# Patient Record
Sex: MALE | Race: WHITE | NOT HISPANIC OR LATINO
[De-identification: names, ages, dates, MRNs, and addresses within clinical notes are randomized per-mention and may not be internally consistent; named-entity substitution may affect disease eponyms.]

---

## 2021-01-01 ENCOUNTER — APPOINTMENT (OUTPATIENT)
Dept: ORTHOPEDIC SURGERY | Facility: CLINIC | Age: 81
End: 2021-01-01

## 2021-01-01 ENCOUNTER — TRANSCRIPTION ENCOUNTER (OUTPATIENT)
Age: 81
End: 2021-01-01

## 2021-01-01 ENCOUNTER — APPOINTMENT (OUTPATIENT)
Dept: SURGERY | Facility: CLINIC | Age: 81
End: 2021-01-01

## 2021-01-01 ENCOUNTER — INPATIENT (INPATIENT)
Facility: HOSPITAL | Age: 81
LOS: 11 days | Discharge: NURSING, REHAB OR LONG-TERM CARE | End: 2021-11-26
Attending: HOSPITALIST | Admitting: HOSPITALIST
Payer: MEDICARE

## 2021-01-01 ENCOUNTER — NON-APPOINTMENT (OUTPATIENT)
Age: 81
End: 2021-01-01

## 2021-01-01 ENCOUNTER — EMERGENCY (EMERGENCY)
Facility: HOSPITAL | Age: 81
LOS: 0 days | Discharge: HOME | End: 2021-11-01
Attending: EMERGENCY MEDICINE | Admitting: EMERGENCY MEDICINE
Payer: MEDICARE

## 2021-01-01 VITALS
RESPIRATION RATE: 18 BRPM | DIASTOLIC BLOOD PRESSURE: 67 MMHG | HEART RATE: 88 BPM | TEMPERATURE: 98 F | WEIGHT: 175.05 LBS | OXYGEN SATURATION: 98 % | SYSTOLIC BLOOD PRESSURE: 148 MMHG

## 2021-01-01 VITALS
RESPIRATION RATE: 18 BRPM | HEART RATE: 82 BPM | DIASTOLIC BLOOD PRESSURE: 68 MMHG | SYSTOLIC BLOOD PRESSURE: 142 MMHG | OXYGEN SATURATION: 99 %

## 2021-01-01 VITALS
SYSTOLIC BLOOD PRESSURE: 121 MMHG | RESPIRATION RATE: 18 BRPM | TEMPERATURE: 98 F | DIASTOLIC BLOOD PRESSURE: 89 MMHG | OXYGEN SATURATION: 98 % | WEIGHT: 169.98 LBS | HEART RATE: 75 BPM

## 2021-01-01 VITALS
HEART RATE: 74 BPM | TEMPERATURE: 97 F | SYSTOLIC BLOOD PRESSURE: 135 MMHG | DIASTOLIC BLOOD PRESSURE: 63 MMHG | RESPIRATION RATE: 18 BRPM

## 2021-01-01 DIAGNOSIS — M25.571 PAIN IN RIGHT ANKLE AND JOINTS OF RIGHT FOOT: ICD-10-CM

## 2021-01-01 DIAGNOSIS — T36.0X5A ADVERSE EFFECT OF PENICILLINS, INITIAL ENCOUNTER: ICD-10-CM

## 2021-01-01 DIAGNOSIS — I10 ESSENTIAL (PRIMARY) HYPERTENSION: ICD-10-CM
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
## 2021-01-01 DIAGNOSIS — E11.9 TYPE 2 DIABETES MELLITUS WITHOUT COMPLICATIONS: ICD-10-CM

## 2021-01-01 DIAGNOSIS — Z87.891 PERSONAL HISTORY OF NICOTINE DEPENDENCE: ICD-10-CM

## 2021-01-01 DIAGNOSIS — K52.1 TOXIC GASTROENTERITIS AND COLITIS: ICD-10-CM

## 2021-01-01 DIAGNOSIS — E87.5 HYPERKALEMIA: ICD-10-CM

## 2021-01-01 DIAGNOSIS — R05.9 COUGH, UNSPECIFIED: ICD-10-CM

## 2021-01-01 DIAGNOSIS — Z77.090 CONTACT WITH AND (SUSPECTED) EXPOSURE TO ASBESTOS: ICD-10-CM

## 2021-01-01 DIAGNOSIS — K80.00 CALCULUS OF GALLBLADDER WITH ACUTE CHOLECYSTITIS WITHOUT OBSTRUCTION: ICD-10-CM

## 2021-01-01 DIAGNOSIS — Z88.8 ALLERGY STATUS TO OTHER DRUGS, MEDICAMENTS AND BIOLOGICAL SUBSTANCES STATUS: ICD-10-CM

## 2021-01-01 DIAGNOSIS — K71.9 TOXIC LIVER DISEASE, UNSPECIFIED: ICD-10-CM

## 2021-01-01 DIAGNOSIS — J06.9 ACUTE UPPER RESPIRATORY INFECTION, UNSPECIFIED: ICD-10-CM

## 2021-01-01 DIAGNOSIS — R26.2 DIFFICULTY IN WALKING, NOT ELSEWHERE CLASSIFIED: ICD-10-CM

## 2021-01-01 DIAGNOSIS — M25.471 EFFUSION, RIGHT ANKLE: ICD-10-CM
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
## 2021-01-01 DIAGNOSIS — E78.5 HYPERLIPIDEMIA, UNSPECIFIED: ICD-10-CM

## 2021-01-01 DIAGNOSIS — N17.9 ACUTE KIDNEY FAILURE, UNSPECIFIED: ICD-10-CM

## 2021-01-01 DIAGNOSIS — T36.95XA ADVERSE EFFECT OF UNSPECIFIED SYSTEMIC ANTIBIOTIC, INITIAL ENCOUNTER: ICD-10-CM

## 2021-01-01 LAB
A1C WITH ESTIMATED AVERAGE GLUCOSE RESULT: 5.7 % — HIGH (ref 4–5.6)
ALBUMIN SERPL ELPH-MCNC: 2.1 G/DL — LOW (ref 3.5–5.2)
ALBUMIN SERPL ELPH-MCNC: 2.2 G/DL — LOW (ref 3.5–5.2)
ALBUMIN SERPL ELPH-MCNC: 2.2 G/DL — LOW (ref 3.5–5.2)
ALBUMIN SERPL ELPH-MCNC: 2.4 G/DL — LOW (ref 3.5–5.2)
ALBUMIN SERPL ELPH-MCNC: 2.5 G/DL — LOW (ref 3.5–5.2)
ALBUMIN SERPL ELPH-MCNC: 2.6 G/DL — LOW (ref 3.5–5.2)
ALBUMIN SERPL ELPH-MCNC: 2.7 G/DL — LOW (ref 3.5–5.2)
ALBUMIN SERPL ELPH-MCNC: 3.2 G/DL — LOW (ref 3.5–5.2)
ALP SERPL-CCNC: 177 U/L — HIGH (ref 30–115)
ALP SERPL-CCNC: 194 U/L — HIGH (ref 30–115)
ALP SERPL-CCNC: 208 U/L — HIGH (ref 30–115)
ALP SERPL-CCNC: 209 U/L — HIGH (ref 30–115)
ALP SERPL-CCNC: 239 U/L — HIGH (ref 30–115)
ALP SERPL-CCNC: 243 U/L — HIGH (ref 30–115)
ALP SERPL-CCNC: 283 U/L — HIGH (ref 30–115)
ALP SERPL-CCNC: 289 U/L — HIGH (ref 30–115)
ALP SERPL-CCNC: 318 U/L — HIGH (ref 30–115)
ALP SERPL-CCNC: 324 U/L — HIGH (ref 30–115)
ALP SERPL-CCNC: 351 U/L — HIGH (ref 30–115)
ALT FLD-CCNC: 108 U/L — HIGH (ref 0–41)
ALT FLD-CCNC: 117 U/L — HIGH (ref 0–41)
ALT FLD-CCNC: 128 U/L — HIGH (ref 0–41)
ALT FLD-CCNC: 206 U/L — HIGH (ref 0–41)
ALT FLD-CCNC: 78 U/L — HIGH (ref 0–41)
ALT FLD-CCNC: 79 U/L — HIGH (ref 0–41)
ALT FLD-CCNC: 80 U/L — HIGH (ref 0–41)
ALT FLD-CCNC: 81 U/L — HIGH (ref 0–41)
ALT FLD-CCNC: 82 U/L — HIGH (ref 0–41)
ALT FLD-CCNC: 91 U/L — HIGH (ref 0–41)
ALT FLD-CCNC: 92 U/L — HIGH (ref 0–41)
ANA TITR SER: NEGATIVE — SIGNIFICANT CHANGE UP
ANION GAP SERPL CALC-SCNC: 10 MMOL/L — SIGNIFICANT CHANGE UP (ref 7–14)
ANION GAP SERPL CALC-SCNC: 10 MMOL/L — SIGNIFICANT CHANGE UP (ref 7–14)
ANION GAP SERPL CALC-SCNC: 11 MMOL/L — SIGNIFICANT CHANGE UP (ref 7–14)
ANION GAP SERPL CALC-SCNC: 12 MMOL/L — SIGNIFICANT CHANGE UP (ref 7–14)
ANION GAP SERPL CALC-SCNC: 13 MMOL/L — SIGNIFICANT CHANGE UP (ref 7–14)
ANION GAP SERPL CALC-SCNC: 14 MMOL/L — SIGNIFICANT CHANGE UP (ref 7–14)
ANION GAP SERPL CALC-SCNC: 15 MMOL/L — HIGH (ref 7–14)
ANION GAP SERPL CALC-SCNC: 9 MMOL/L — SIGNIFICANT CHANGE UP (ref 7–14)
APPEARANCE UR: CLEAR — SIGNIFICANT CHANGE UP
APTT BLD: 27.2 SEC — SIGNIFICANT CHANGE UP (ref 27–39.2)
APTT BLD: 31.2 SEC — SIGNIFICANT CHANGE UP (ref 27–39.2)
AST SERPL-CCNC: 120 U/L — HIGH (ref 0–41)
AST SERPL-CCNC: 125 U/L — HIGH (ref 0–41)
AST SERPL-CCNC: 159 U/L — HIGH (ref 0–41)
AST SERPL-CCNC: 197 U/L — HIGH (ref 0–41)
AST SERPL-CCNC: 210 U/L — HIGH (ref 0–41)
AST SERPL-CCNC: 212 U/L — HIGH (ref 0–41)
AST SERPL-CCNC: 217 U/L — HIGH (ref 0–41)
AST SERPL-CCNC: 217 U/L — HIGH (ref 0–41)
AST SERPL-CCNC: 250 U/L — HIGH (ref 0–41)
AST SERPL-CCNC: 256 U/L — HIGH (ref 0–41)
AST SERPL-CCNC: 274 U/L — HIGH (ref 0–41)
AUTO DIFF PNL BLD: NEGATIVE — SIGNIFICANT CHANGE UP
BACTERIA # UR AUTO: ABNORMAL
BASOPHILS # BLD AUTO: 0.01 K/UL — SIGNIFICANT CHANGE UP (ref 0–0.2)
BASOPHILS # BLD AUTO: 0.02 K/UL — SIGNIFICANT CHANGE UP (ref 0–0.2)
BASOPHILS # BLD AUTO: 0.03 K/UL — SIGNIFICANT CHANGE UP (ref 0–0.2)
BASOPHILS # BLD AUTO: 0.03 K/UL — SIGNIFICANT CHANGE UP (ref 0–0.2)
BASOPHILS NFR BLD AUTO: 0.1 % — SIGNIFICANT CHANGE UP (ref 0–1)
BASOPHILS NFR BLD AUTO: 0.2 % — SIGNIFICANT CHANGE UP (ref 0–1)
BASOPHILS NFR BLD AUTO: 0.2 % — SIGNIFICANT CHANGE UP (ref 0–1)
BILIRUB SERPL-MCNC: 0.3 MG/DL — SIGNIFICANT CHANGE UP (ref 0.2–1.2)
BILIRUB SERPL-MCNC: 0.4 MG/DL — SIGNIFICANT CHANGE UP (ref 0.2–1.2)
BILIRUB SERPL-MCNC: 0.5 MG/DL — SIGNIFICANT CHANGE UP (ref 0.2–1.2)
BILIRUB SERPL-MCNC: 0.6 MG/DL — SIGNIFICANT CHANGE UP (ref 0.2–1.2)
BILIRUB SERPL-MCNC: 0.6 MG/DL — SIGNIFICANT CHANGE UP (ref 0.2–1.2)
BILIRUB SERPL-MCNC: 0.7 MG/DL — SIGNIFICANT CHANGE UP (ref 0.2–1.2)
BILIRUB UR-MCNC: NEGATIVE — SIGNIFICANT CHANGE UP
BUN SERPL-MCNC: 23 MG/DL — HIGH (ref 10–20)
BUN SERPL-MCNC: 24 MG/DL — HIGH (ref 10–20)
BUN SERPL-MCNC: 25 MG/DL — HIGH (ref 10–20)
BUN SERPL-MCNC: 26 MG/DL — HIGH (ref 10–20)
BUN SERPL-MCNC: 28 MG/DL — HIGH (ref 10–20)
BUN SERPL-MCNC: 28 MG/DL — HIGH (ref 10–20)
BUN SERPL-MCNC: 29 MG/DL — HIGH (ref 10–20)
BUN SERPL-MCNC: 29 MG/DL — HIGH (ref 10–20)
BUN SERPL-MCNC: 31 MG/DL — HIGH (ref 10–20)
BUN SERPL-MCNC: 32 MG/DL — HIGH (ref 10–20)
BUN SERPL-MCNC: 32 MG/DL — HIGH (ref 10–20)
BUN SERPL-MCNC: 34 MG/DL — HIGH (ref 10–20)
C DIFF BY PCR RESULT: NEGATIVE — SIGNIFICANT CHANGE UP
C DIFF TOX GENS STL QL NAA+PROBE: SIGNIFICANT CHANGE UP
C-ANCA SER-ACNC: NEGATIVE — SIGNIFICANT CHANGE UP
CALCIUM SERPL-MCNC: 7.7 MG/DL — LOW (ref 8.5–10.1)
CALCIUM SERPL-MCNC: 7.8 MG/DL — LOW (ref 8.5–10.1)
CALCIUM SERPL-MCNC: 7.9 MG/DL — LOW (ref 8.5–10.1)
CALCIUM SERPL-MCNC: 7.9 MG/DL — LOW (ref 8.5–10.1)
CALCIUM SERPL-MCNC: 8 MG/DL — LOW (ref 8.5–10.1)
CALCIUM SERPL-MCNC: 8.1 MG/DL — LOW (ref 8.5–10.1)
CALCIUM SERPL-MCNC: 8.4 MG/DL — LOW (ref 8.5–10.1)
CALCIUM SERPL-MCNC: 9 MG/DL — SIGNIFICANT CHANGE UP (ref 8.5–10.1)
CHLORIDE SERPL-SCNC: 100 MMOL/L — SIGNIFICANT CHANGE UP (ref 98–110)
CHLORIDE SERPL-SCNC: 101 MMOL/L — SIGNIFICANT CHANGE UP (ref 98–110)
CHLORIDE SERPL-SCNC: 101 MMOL/L — SIGNIFICANT CHANGE UP (ref 98–110)
CHLORIDE SERPL-SCNC: 102 MMOL/L — SIGNIFICANT CHANGE UP (ref 98–110)
CHLORIDE SERPL-SCNC: 103 MMOL/L — SIGNIFICANT CHANGE UP (ref 98–110)
CHLORIDE SERPL-SCNC: 97 MMOL/L — LOW (ref 98–110)
CHLORIDE SERPL-SCNC: 99 MMOL/L — SIGNIFICANT CHANGE UP (ref 98–110)
CO2 SERPL-SCNC: 18 MMOL/L — SIGNIFICANT CHANGE UP (ref 17–32)
CO2 SERPL-SCNC: 21 MMOL/L — SIGNIFICANT CHANGE UP (ref 17–32)
CO2 SERPL-SCNC: 22 MMOL/L — SIGNIFICANT CHANGE UP (ref 17–32)
CO2 SERPL-SCNC: 23 MMOL/L — SIGNIFICANT CHANGE UP (ref 17–32)
CO2 SERPL-SCNC: 25 MMOL/L — SIGNIFICANT CHANGE UP (ref 17–32)
COLOR SPEC: YELLOW — SIGNIFICANT CHANGE UP
CREAT SERPL-MCNC: 1 MG/DL — SIGNIFICANT CHANGE UP (ref 0.7–1.5)
CREAT SERPL-MCNC: 1.1 MG/DL — SIGNIFICANT CHANGE UP (ref 0.7–1.5)
CREAT SERPL-MCNC: 1.2 MG/DL — SIGNIFICANT CHANGE UP (ref 0.7–1.5)
CREAT SERPL-MCNC: 1.4 MG/DL — SIGNIFICANT CHANGE UP (ref 0.7–1.5)
CREAT SERPL-MCNC: 1.4 MG/DL — SIGNIFICANT CHANGE UP (ref 0.7–1.5)
CULTURE RESULTS: SIGNIFICANT CHANGE UP
DIFF PNL FLD: ABNORMAL
EBV EA AB SER IA-ACNC: <5 U/ML — SIGNIFICANT CHANGE UP
EBV EA AB TITR SER IF: POSITIVE
EBV EA IGG SER-ACNC: NEGATIVE — SIGNIFICANT CHANGE UP
EBV NA IGG SER IA-ACNC: 46.1 U/ML — HIGH
EBV PATRN SPEC IB-IMP: SIGNIFICANT CHANGE UP
EBV VCA IGG AVIDITY SER QL IA: POSITIVE
EBV VCA IGM SER IA-ACNC: 96.2 U/ML — HIGH
EBV VCA IGM SER IA-ACNC: <10 U/ML — SIGNIFICANT CHANGE UP
EBV VCA IGM TITR FLD: NEGATIVE — SIGNIFICANT CHANGE UP
EOSINOPHIL # BLD AUTO: 0.01 K/UL — SIGNIFICANT CHANGE UP (ref 0–0.7)
EOSINOPHIL # BLD AUTO: 0.03 K/UL — SIGNIFICANT CHANGE UP (ref 0–0.7)
EOSINOPHIL # BLD AUTO: 0.04 K/UL — SIGNIFICANT CHANGE UP (ref 0–0.7)
EOSINOPHIL # BLD AUTO: 0.05 K/UL — SIGNIFICANT CHANGE UP (ref 0–0.7)
EOSINOPHIL # BLD AUTO: 0.05 K/UL — SIGNIFICANT CHANGE UP (ref 0–0.7)
EOSINOPHIL # BLD AUTO: 0.07 K/UL — SIGNIFICANT CHANGE UP (ref 0–0.7)
EOSINOPHIL # BLD AUTO: 0.09 K/UL — SIGNIFICANT CHANGE UP (ref 0–0.7)
EOSINOPHIL # BLD AUTO: 0.13 K/UL — SIGNIFICANT CHANGE UP (ref 0–0.7)
EOSINOPHIL NFR BLD AUTO: 0.1 % — SIGNIFICANT CHANGE UP (ref 0–8)
EOSINOPHIL NFR BLD AUTO: 0.2 % — SIGNIFICANT CHANGE UP (ref 0–8)
EOSINOPHIL NFR BLD AUTO: 0.3 % — SIGNIFICANT CHANGE UP (ref 0–8)
EOSINOPHIL NFR BLD AUTO: 0.3 % — SIGNIFICANT CHANGE UP (ref 0–8)
EOSINOPHIL NFR BLD AUTO: 0.4 % — SIGNIFICANT CHANGE UP (ref 0–8)
EOSINOPHIL NFR BLD AUTO: 0.4 % — SIGNIFICANT CHANGE UP (ref 0–8)
EOSINOPHIL NFR BLD AUTO: 0.6 % — SIGNIFICANT CHANGE UP (ref 0–8)
EOSINOPHIL NFR BLD AUTO: 0.8 % — SIGNIFICANT CHANGE UP (ref 0–8)
EPI CELLS # UR: ABNORMAL /HPF
ESTIMATED AVERAGE GLUCOSE: 117 MG/DL — HIGH (ref 68–114)
FERRITIN SERPL-MCNC: 1707 NG/ML — HIGH (ref 30–400)
GGT SERPL-CCNC: 326 U/L — HIGH (ref 1–40)
GLUCOSE BLDC GLUCOMTR-MCNC: 103 MG/DL — HIGH (ref 70–99)
GLUCOSE BLDC GLUCOMTR-MCNC: 109 MG/DL — HIGH (ref 70–99)
GLUCOSE BLDC GLUCOMTR-MCNC: 119 MG/DL — HIGH (ref 70–99)
GLUCOSE BLDC GLUCOMTR-MCNC: 121 MG/DL — HIGH (ref 70–99)
GLUCOSE BLDC GLUCOMTR-MCNC: 126 MG/DL — HIGH (ref 70–99)
GLUCOSE BLDC GLUCOMTR-MCNC: 127 MG/DL — HIGH (ref 70–99)
GLUCOSE BLDC GLUCOMTR-MCNC: 133 MG/DL — HIGH (ref 70–99)
GLUCOSE BLDC GLUCOMTR-MCNC: 135 MG/DL — HIGH (ref 70–99)
GLUCOSE BLDC GLUCOMTR-MCNC: 137 MG/DL — HIGH (ref 70–99)
GLUCOSE BLDC GLUCOMTR-MCNC: 139 MG/DL — HIGH (ref 70–99)
GLUCOSE BLDC GLUCOMTR-MCNC: 140 MG/DL — HIGH (ref 70–99)
GLUCOSE BLDC GLUCOMTR-MCNC: 141 MG/DL — HIGH (ref 70–99)
GLUCOSE BLDC GLUCOMTR-MCNC: 142 MG/DL — HIGH (ref 70–99)
GLUCOSE BLDC GLUCOMTR-MCNC: 142 MG/DL — HIGH (ref 70–99)
GLUCOSE BLDC GLUCOMTR-MCNC: 144 MG/DL — HIGH (ref 70–99)
GLUCOSE BLDC GLUCOMTR-MCNC: 146 MG/DL — HIGH (ref 70–99)
GLUCOSE BLDC GLUCOMTR-MCNC: 149 MG/DL — HIGH (ref 70–99)
GLUCOSE BLDC GLUCOMTR-MCNC: 156 MG/DL — HIGH (ref 70–99)
GLUCOSE BLDC GLUCOMTR-MCNC: 158 MG/DL — HIGH (ref 70–99)
GLUCOSE BLDC GLUCOMTR-MCNC: 158 MG/DL — HIGH (ref 70–99)
GLUCOSE BLDC GLUCOMTR-MCNC: 159 MG/DL — HIGH (ref 70–99)
GLUCOSE BLDC GLUCOMTR-MCNC: 160 MG/DL — HIGH (ref 70–99)
GLUCOSE BLDC GLUCOMTR-MCNC: 165 MG/DL — HIGH (ref 70–99)
GLUCOSE BLDC GLUCOMTR-MCNC: 168 MG/DL — HIGH (ref 70–99)
GLUCOSE BLDC GLUCOMTR-MCNC: 168 MG/DL — HIGH (ref 70–99)
GLUCOSE BLDC GLUCOMTR-MCNC: 172 MG/DL — HIGH (ref 70–99)
GLUCOSE BLDC GLUCOMTR-MCNC: 173 MG/DL — HIGH (ref 70–99)
GLUCOSE BLDC GLUCOMTR-MCNC: 173 MG/DL — HIGH (ref 70–99)
GLUCOSE BLDC GLUCOMTR-MCNC: 178 MG/DL — HIGH (ref 70–99)
GLUCOSE BLDC GLUCOMTR-MCNC: 182 MG/DL — HIGH (ref 70–99)
GLUCOSE BLDC GLUCOMTR-MCNC: 183 MG/DL — HIGH (ref 70–99)
GLUCOSE BLDC GLUCOMTR-MCNC: 183 MG/DL — HIGH (ref 70–99)
GLUCOSE BLDC GLUCOMTR-MCNC: 184 MG/DL — HIGH (ref 70–99)
GLUCOSE BLDC GLUCOMTR-MCNC: 185 MG/DL — HIGH (ref 70–99)
GLUCOSE BLDC GLUCOMTR-MCNC: 198 MG/DL — HIGH (ref 70–99)
GLUCOSE BLDC GLUCOMTR-MCNC: 207 MG/DL — HIGH (ref 70–99)
GLUCOSE BLDC GLUCOMTR-MCNC: 207 MG/DL — HIGH (ref 70–99)
GLUCOSE BLDC GLUCOMTR-MCNC: 211 MG/DL — HIGH (ref 70–99)
GLUCOSE BLDC GLUCOMTR-MCNC: 211 MG/DL — HIGH (ref 70–99)
GLUCOSE BLDC GLUCOMTR-MCNC: 221 MG/DL — HIGH (ref 70–99)
GLUCOSE BLDC GLUCOMTR-MCNC: 233 MG/DL — HIGH (ref 70–99)
GLUCOSE BLDC GLUCOMTR-MCNC: 235 MG/DL — HIGH (ref 70–99)
GLUCOSE BLDC GLUCOMTR-MCNC: 263 MG/DL — HIGH (ref 70–99)
GLUCOSE BLDC GLUCOMTR-MCNC: 263 MG/DL — HIGH (ref 70–99)
GLUCOSE BLDC GLUCOMTR-MCNC: 70 MG/DL — SIGNIFICANT CHANGE UP (ref 70–99)
GLUCOSE BLDC GLUCOMTR-MCNC: 86 MG/DL — SIGNIFICANT CHANGE UP (ref 70–99)
GLUCOSE SERPL-MCNC: 101 MG/DL — HIGH (ref 70–99)
GLUCOSE SERPL-MCNC: 130 MG/DL — HIGH (ref 70–99)
GLUCOSE SERPL-MCNC: 132 MG/DL — HIGH (ref 70–99)
GLUCOSE SERPL-MCNC: 140 MG/DL — HIGH (ref 70–99)
GLUCOSE SERPL-MCNC: 142 MG/DL — HIGH (ref 70–99)
GLUCOSE SERPL-MCNC: 153 MG/DL — HIGH (ref 70–99)
GLUCOSE SERPL-MCNC: 156 MG/DL — HIGH (ref 70–99)
GLUCOSE SERPL-MCNC: 165 MG/DL — HIGH (ref 70–99)
GLUCOSE SERPL-MCNC: 170 MG/DL — HIGH (ref 70–99)
GLUCOSE SERPL-MCNC: 173 MG/DL — HIGH (ref 70–99)
GLUCOSE SERPL-MCNC: 187 MG/DL — HIGH (ref 70–99)
GLUCOSE SERPL-MCNC: 202 MG/DL — HIGH (ref 70–99)
GLUCOSE SERPL-MCNC: 212 MG/DL — HIGH (ref 70–99)
GLUCOSE SERPL-MCNC: 91 MG/DL — SIGNIFICANT CHANGE UP (ref 70–99)
GLUCOSE UR QL: NEGATIVE MG/DL — SIGNIFICANT CHANGE UP
HAV IGM SER-ACNC: SIGNIFICANT CHANGE UP
HBV CORE IGM SER-ACNC: SIGNIFICANT CHANGE UP
HBV SURFACE AG SER-ACNC: SIGNIFICANT CHANGE UP
HCT VFR BLD CALC: 31.8 % — LOW (ref 42–52)
HCT VFR BLD CALC: 33.8 % — LOW (ref 42–52)
HCT VFR BLD CALC: 33.8 % — LOW (ref 42–52)
HCT VFR BLD CALC: 34.9 % — LOW (ref 42–52)
HCT VFR BLD CALC: 35 % — LOW (ref 42–52)
HCT VFR BLD CALC: 35.2 % — LOW (ref 42–52)
HCT VFR BLD CALC: 35.2 % — LOW (ref 42–52)
HCT VFR BLD CALC: 35.4 % — LOW (ref 42–52)
HCT VFR BLD CALC: 35.9 % — LOW (ref 42–52)
HCT VFR BLD CALC: 36.1 % — LOW (ref 42–52)
HCT VFR BLD CALC: 36.9 % — LOW (ref 42–52)
HCT VFR BLD CALC: 41.2 % — LOW (ref 42–52)
HCV AB S/CO SERPL IA: 0.08 S/CO — SIGNIFICANT CHANGE UP (ref 0–0.99)
HCV AB SERPL-IMP: SIGNIFICANT CHANGE UP
HGB BLD-MCNC: 10.4 G/DL — LOW (ref 14–18)
HGB BLD-MCNC: 11 G/DL — LOW (ref 14–18)
HGB BLD-MCNC: 11.1 G/DL — LOW (ref 14–18)
HGB BLD-MCNC: 11.3 G/DL — LOW (ref 14–18)
HGB BLD-MCNC: 11.4 G/DL — LOW (ref 14–18)
HGB BLD-MCNC: 11.6 G/DL — LOW (ref 14–18)
HGB BLD-MCNC: 11.7 G/DL — LOW (ref 14–18)
HGB BLD-MCNC: 11.9 G/DL — LOW (ref 14–18)
HGB BLD-MCNC: 12 G/DL — LOW (ref 14–18)
HGB BLD-MCNC: 13.5 G/DL — LOW (ref 14–18)
IMM GRANULOCYTES NFR BLD AUTO: 0.4 % — HIGH (ref 0.1–0.3)
IMM GRANULOCYTES NFR BLD AUTO: 0.5 % — HIGH (ref 0.1–0.3)
IMM GRANULOCYTES NFR BLD AUTO: 0.6 % — HIGH (ref 0.1–0.3)
IMM GRANULOCYTES NFR BLD AUTO: 0.6 % — HIGH (ref 0.1–0.3)
IMM GRANULOCYTES NFR BLD AUTO: 0.7 % — HIGH (ref 0.1–0.3)
IMM GRANULOCYTES NFR BLD AUTO: 0.8 % — HIGH (ref 0.1–0.3)
IMM GRANULOCYTES NFR BLD AUTO: 0.9 % — HIGH (ref 0.1–0.3)
IMM GRANULOCYTES NFR BLD AUTO: 1 % — HIGH (ref 0.1–0.3)
INR BLD: 1.32 RATIO — HIGH (ref 0.65–1.3)
INR BLD: 1.36 RATIO — HIGH (ref 0.65–1.3)
KETONES UR-MCNC: NEGATIVE — SIGNIFICANT CHANGE UP
LEUKOCYTE ESTERASE UR-ACNC: NEGATIVE — SIGNIFICANT CHANGE UP
LYMPHOCYTES # BLD AUTO: 0.51 K/UL — LOW (ref 1.2–3.4)
LYMPHOCYTES # BLD AUTO: 0.54 K/UL — LOW (ref 1.2–3.4)
LYMPHOCYTES # BLD AUTO: 0.58 K/UL — LOW (ref 1.2–3.4)
LYMPHOCYTES # BLD AUTO: 0.62 K/UL — LOW (ref 1.2–3.4)
LYMPHOCYTES # BLD AUTO: 0.63 K/UL — LOW (ref 1.2–3.4)
LYMPHOCYTES # BLD AUTO: 0.64 K/UL — LOW (ref 1.2–3.4)
LYMPHOCYTES # BLD AUTO: 0.65 K/UL — LOW (ref 1.2–3.4)
LYMPHOCYTES # BLD AUTO: 0.71 K/UL — LOW (ref 1.2–3.4)
LYMPHOCYTES # BLD AUTO: 3.2 % — LOW (ref 20.5–51.1)
LYMPHOCYTES # BLD AUTO: 3.4 % — LOW (ref 20.5–51.1)
LYMPHOCYTES # BLD AUTO: 3.6 % — LOW (ref 20.5–51.1)
LYMPHOCYTES # BLD AUTO: 3.9 % — LOW (ref 20.5–51.1)
LYMPHOCYTES # BLD AUTO: 3.9 % — LOW (ref 20.5–51.1)
LYMPHOCYTES # BLD AUTO: 4 % — LOW (ref 20.5–51.1)
LYMPHOCYTES # BLD AUTO: 4 % — LOW (ref 20.5–51.1)
LYMPHOCYTES # BLD AUTO: 5.3 % — LOW (ref 20.5–51.1)
MCHC RBC-ENTMCNC: 28.7 PG — SIGNIFICANT CHANGE UP (ref 27–31)
MCHC RBC-ENTMCNC: 28.7 PG — SIGNIFICANT CHANGE UP (ref 27–31)
MCHC RBC-ENTMCNC: 28.8 PG — SIGNIFICANT CHANGE UP (ref 27–31)
MCHC RBC-ENTMCNC: 28.9 PG — SIGNIFICANT CHANGE UP (ref 27–31)
MCHC RBC-ENTMCNC: 28.9 PG — SIGNIFICANT CHANGE UP (ref 27–31)
MCHC RBC-ENTMCNC: 29 PG — SIGNIFICANT CHANGE UP (ref 27–31)
MCHC RBC-ENTMCNC: 29 PG — SIGNIFICANT CHANGE UP (ref 27–31)
MCHC RBC-ENTMCNC: 29.2 PG — SIGNIFICANT CHANGE UP (ref 27–31)
MCHC RBC-ENTMCNC: 29.2 PG — SIGNIFICANT CHANGE UP (ref 27–31)
MCHC RBC-ENTMCNC: 29.4 PG — SIGNIFICANT CHANGE UP (ref 27–31)
MCHC RBC-ENTMCNC: 32.3 G/DL — SIGNIFICANT CHANGE UP (ref 32–37)
MCHC RBC-ENTMCNC: 32.4 G/DL — SIGNIFICANT CHANGE UP (ref 32–37)
MCHC RBC-ENTMCNC: 32.5 G/DL — SIGNIFICANT CHANGE UP (ref 32–37)
MCHC RBC-ENTMCNC: 32.5 G/DL — SIGNIFICANT CHANGE UP (ref 32–37)
MCHC RBC-ENTMCNC: 32.6 G/DL — SIGNIFICANT CHANGE UP (ref 32–37)
MCHC RBC-ENTMCNC: 32.7 G/DL — SIGNIFICANT CHANGE UP (ref 32–37)
MCHC RBC-ENTMCNC: 32.8 G/DL — SIGNIFICANT CHANGE UP (ref 32–37)
MCHC RBC-ENTMCNC: 33 G/DL — SIGNIFICANT CHANGE UP (ref 32–37)
MCHC RBC-ENTMCNC: 33 G/DL — SIGNIFICANT CHANGE UP (ref 32–37)
MCHC RBC-ENTMCNC: 33.2 G/DL — SIGNIFICANT CHANGE UP (ref 32–37)
MCV RBC AUTO: 87.3 FL — SIGNIFICANT CHANGE UP (ref 80–94)
MCV RBC AUTO: 87.8 FL — SIGNIFICANT CHANGE UP (ref 80–94)
MCV RBC AUTO: 88 FL — SIGNIFICANT CHANGE UP (ref 80–94)
MCV RBC AUTO: 88.3 FL — SIGNIFICANT CHANGE UP (ref 80–94)
MCV RBC AUTO: 88.5 FL — SIGNIFICANT CHANGE UP (ref 80–94)
MCV RBC AUTO: 88.5 FL — SIGNIFICANT CHANGE UP (ref 80–94)
MCV RBC AUTO: 88.6 FL — SIGNIFICANT CHANGE UP (ref 80–94)
MCV RBC AUTO: 88.7 FL — SIGNIFICANT CHANGE UP (ref 80–94)
MCV RBC AUTO: 88.9 FL — SIGNIFICANT CHANGE UP (ref 80–94)
MCV RBC AUTO: 89 FL — SIGNIFICANT CHANGE UP (ref 80–94)
MCV RBC AUTO: 89.1 FL — SIGNIFICANT CHANGE UP (ref 80–94)
MCV RBC AUTO: 89.1 FL — SIGNIFICANT CHANGE UP (ref 80–94)
MITOCHONDRIA AB SER-ACNC: SIGNIFICANT CHANGE UP
MONOCYTES # BLD AUTO: 0.62 K/UL — HIGH (ref 0.1–0.6)
MONOCYTES # BLD AUTO: 0.68 K/UL — HIGH (ref 0.1–0.6)
MONOCYTES # BLD AUTO: 0.7 K/UL — HIGH (ref 0.1–0.6)
MONOCYTES # BLD AUTO: 0.71 K/UL — HIGH (ref 0.1–0.6)
MONOCYTES # BLD AUTO: 0.81 K/UL — HIGH (ref 0.1–0.6)
MONOCYTES # BLD AUTO: 0.83 K/UL — HIGH (ref 0.1–0.6)
MONOCYTES # BLD AUTO: 0.86 K/UL — HIGH (ref 0.1–0.6)
MONOCYTES # BLD AUTO: 0.95 K/UL — HIGH (ref 0.1–0.6)
MONOCYTES NFR BLD AUTO: 4.2 % — SIGNIFICANT CHANGE UP (ref 1.7–9.3)
MONOCYTES NFR BLD AUTO: 4.4 % — SIGNIFICANT CHANGE UP (ref 1.7–9.3)
MONOCYTES NFR BLD AUTO: 4.4 % — SIGNIFICANT CHANGE UP (ref 1.7–9.3)
MONOCYTES NFR BLD AUTO: 5 % — SIGNIFICANT CHANGE UP (ref 1.7–9.3)
MONOCYTES NFR BLD AUTO: 5.1 % — SIGNIFICANT CHANGE UP (ref 1.7–9.3)
MONOCYTES NFR BLD AUTO: 5.2 % — SIGNIFICANT CHANGE UP (ref 1.7–9.3)
MONOCYTES NFR BLD AUTO: 5.2 % — SIGNIFICANT CHANGE UP (ref 1.7–9.3)
MONOCYTES NFR BLD AUTO: 5.6 % — SIGNIFICANT CHANGE UP (ref 1.7–9.3)
MPO AB + PR3 PNL SER: SIGNIFICANT CHANGE UP
MRSA PCR RESULT.: NEGATIVE — SIGNIFICANT CHANGE UP
NEUTROPHILS # BLD AUTO: 10.7 K/UL — HIGH (ref 1.4–6.5)
NEUTROPHILS # BLD AUTO: 13.71 K/UL — HIGH (ref 1.4–6.5)
NEUTROPHILS # BLD AUTO: 14.3 K/UL — HIGH (ref 1.4–6.5)
NEUTROPHILS # BLD AUTO: 14.45 K/UL — HIGH (ref 1.4–6.5)
NEUTROPHILS # BLD AUTO: 14.52 K/UL — HIGH (ref 1.4–6.5)
NEUTROPHILS # BLD AUTO: 14.53 K/UL — HIGH (ref 1.4–6.5)
NEUTROPHILS # BLD AUTO: 14.7 K/UL — HIGH (ref 1.4–6.5)
NEUTROPHILS # BLD AUTO: 16.34 K/UL — HIGH (ref 1.4–6.5)
NEUTROPHILS NFR BLD AUTO: 88 % — HIGH (ref 42.2–75.2)
NEUTROPHILS NFR BLD AUTO: 89.4 % — HIGH (ref 42.2–75.2)
NEUTROPHILS NFR BLD AUTO: 90.1 % — HIGH (ref 42.2–75.2)
NEUTROPHILS NFR BLD AUTO: 90.1 % — HIGH (ref 42.2–75.2)
NEUTROPHILS NFR BLD AUTO: 90.3 % — HIGH (ref 42.2–75.2)
NEUTROPHILS NFR BLD AUTO: 90.6 % — HIGH (ref 42.2–75.2)
NEUTROPHILS NFR BLD AUTO: 90.7 % — HIGH (ref 42.2–75.2)
NEUTROPHILS NFR BLD AUTO: 90.8 % — HIGH (ref 42.2–75.2)
NITRITE UR-MCNC: NEGATIVE — SIGNIFICANT CHANGE UP
NRBC # BLD: 0 /100 WBCS — SIGNIFICANT CHANGE UP (ref 0–0)
P-ANCA SER-ACNC: NEGATIVE — SIGNIFICANT CHANGE UP
PH UR: 6 — SIGNIFICANT CHANGE UP (ref 5–8)
PLATELET # BLD AUTO: 201 K/UL — SIGNIFICANT CHANGE UP (ref 130–400)
PLATELET # BLD AUTO: 210 K/UL — SIGNIFICANT CHANGE UP (ref 130–400)
PLATELET # BLD AUTO: 231 K/UL — SIGNIFICANT CHANGE UP (ref 130–400)
PLATELET # BLD AUTO: 248 K/UL — SIGNIFICANT CHANGE UP (ref 130–400)
PLATELET # BLD AUTO: 254 K/UL — SIGNIFICANT CHANGE UP (ref 130–400)
PLATELET # BLD AUTO: 262 K/UL — SIGNIFICANT CHANGE UP (ref 130–400)
PLATELET # BLD AUTO: 269 K/UL — SIGNIFICANT CHANGE UP (ref 130–400)
PLATELET # BLD AUTO: 280 K/UL — SIGNIFICANT CHANGE UP (ref 130–400)
PLATELET # BLD AUTO: 283 K/UL — SIGNIFICANT CHANGE UP (ref 130–400)
PLATELET # BLD AUTO: 295 K/UL — SIGNIFICANT CHANGE UP (ref 130–400)
PLATELET # BLD AUTO: 296 K/UL — SIGNIFICANT CHANGE UP (ref 130–400)
PLATELET # BLD AUTO: 338 K/UL — SIGNIFICANT CHANGE UP (ref 130–400)
POTASSIUM SERPL-MCNC: 4.2 MMOL/L — SIGNIFICANT CHANGE UP (ref 3.5–5)
POTASSIUM SERPL-MCNC: 4.7 MMOL/L — SIGNIFICANT CHANGE UP (ref 3.5–5)
POTASSIUM SERPL-MCNC: 4.7 MMOL/L — SIGNIFICANT CHANGE UP (ref 3.5–5)
POTASSIUM SERPL-MCNC: 4.8 MMOL/L — SIGNIFICANT CHANGE UP (ref 3.5–5)
POTASSIUM SERPL-MCNC: 4.9 MMOL/L — SIGNIFICANT CHANGE UP (ref 3.5–5)
POTASSIUM SERPL-MCNC: 4.9 MMOL/L — SIGNIFICANT CHANGE UP (ref 3.5–5)
POTASSIUM SERPL-MCNC: 5 MMOL/L — SIGNIFICANT CHANGE UP (ref 3.5–5)
POTASSIUM SERPL-MCNC: 5.1 MMOL/L — HIGH (ref 3.5–5)
POTASSIUM SERPL-MCNC: 5.2 MMOL/L — HIGH (ref 3.5–5)
POTASSIUM SERPL-MCNC: 5.9 MMOL/L — HIGH (ref 3.5–5)
POTASSIUM SERPL-SCNC: 4.2 MMOL/L — SIGNIFICANT CHANGE UP (ref 3.5–5)
POTASSIUM SERPL-SCNC: 4.7 MMOL/L — SIGNIFICANT CHANGE UP (ref 3.5–5)
POTASSIUM SERPL-SCNC: 4.7 MMOL/L — SIGNIFICANT CHANGE UP (ref 3.5–5)
POTASSIUM SERPL-SCNC: 4.8 MMOL/L — SIGNIFICANT CHANGE UP (ref 3.5–5)
POTASSIUM SERPL-SCNC: 4.9 MMOL/L — SIGNIFICANT CHANGE UP (ref 3.5–5)
POTASSIUM SERPL-SCNC: 4.9 MMOL/L — SIGNIFICANT CHANGE UP (ref 3.5–5)
POTASSIUM SERPL-SCNC: 5 MMOL/L — SIGNIFICANT CHANGE UP (ref 3.5–5)
POTASSIUM SERPL-SCNC: 5.1 MMOL/L — HIGH (ref 3.5–5)
POTASSIUM SERPL-SCNC: 5.2 MMOL/L — HIGH (ref 3.5–5)
POTASSIUM SERPL-SCNC: 5.9 MMOL/L — HIGH (ref 3.5–5)
PROCALCITONIN SERPL-MCNC: 0.22 NG/ML — HIGH (ref 0.02–0.1)
PROCALCITONIN SERPL-MCNC: 0.33 NG/ML — HIGH (ref 0.02–0.1)
PROCALCITONIN SERPL-MCNC: 0.35 NG/ML — HIGH (ref 0.02–0.1)
PROCALCITONIN SERPL-MCNC: 0.38 NG/ML — HIGH (ref 0.02–0.1)
PROT SERPL-MCNC: 4.5 G/DL — LOW (ref 6–8)
PROT SERPL-MCNC: 4.8 G/DL — LOW (ref 6–8)
PROT SERPL-MCNC: 4.8 G/DL — LOW (ref 6–8)
PROT SERPL-MCNC: 4.9 G/DL — LOW (ref 6–8)
PROT SERPL-MCNC: 5.1 G/DL — LOW (ref 6–8)
PROT SERPL-MCNC: 5.2 G/DL — LOW (ref 6–8)
PROT SERPL-MCNC: 5.3 G/DL — LOW (ref 6–8)
PROT SERPL-MCNC: 5.4 G/DL — LOW (ref 6–8)
PROT SERPL-MCNC: 6.3 G/DL — SIGNIFICANT CHANGE UP (ref 6–8)
PROT UR-MCNC: 30 MG/DL
PROTHROM AB SERPL-ACNC: 15.1 SEC — HIGH (ref 9.95–12.87)
PROTHROM AB SERPL-ACNC: 15.6 SEC — HIGH (ref 9.95–12.87)
RAPID RVP RESULT: SIGNIFICANT CHANGE UP
RBC # BLD: 3.62 M/UL — LOW (ref 4.7–6.1)
RBC # BLD: 3.83 M/UL — LOW (ref 4.7–6.1)
RBC # BLD: 3.84 M/UL — LOW (ref 4.7–6.1)
RBC # BLD: 3.93 M/UL — LOW (ref 4.7–6.1)
RBC # BLD: 3.95 M/UL — LOW (ref 4.7–6.1)
RBC # BLD: 3.96 M/UL — LOW (ref 4.7–6.1)
RBC # BLD: 4 M/UL — LOW (ref 4.7–6.1)
RBC # BLD: 4 M/UL — LOW (ref 4.7–6.1)
RBC # BLD: 4.05 M/UL — LOW (ref 4.7–6.1)
RBC # BLD: 4.08 M/UL — LOW (ref 4.7–6.1)
RBC # BLD: 4.16 M/UL — LOW (ref 4.7–6.1)
RBC # BLD: 4.63 M/UL — LOW (ref 4.7–6.1)
RBC # FLD: 13.2 % — SIGNIFICANT CHANGE UP (ref 11.5–14.5)
RBC # FLD: 13.3 % — SIGNIFICANT CHANGE UP (ref 11.5–14.5)
RBC # FLD: 13.4 % — SIGNIFICANT CHANGE UP (ref 11.5–14.5)
RBC # FLD: 13.5 % — SIGNIFICANT CHANGE UP (ref 11.5–14.5)
RBC # FLD: 13.7 % — SIGNIFICANT CHANGE UP (ref 11.5–14.5)
RBC # FLD: 13.7 % — SIGNIFICANT CHANGE UP (ref 11.5–14.5)
RBC # FLD: 13.8 % — SIGNIFICANT CHANGE UP (ref 11.5–14.5)
RBC CASTS # UR COMP ASSIST: SIGNIFICANT CHANGE UP /HPF
SARS-COV-2 RNA SPEC QL NAA+PROBE: SIGNIFICANT CHANGE UP
SMOOTH MUSCLE AB SER-ACNC: SIGNIFICANT CHANGE UP
SODIUM SERPL-SCNC: 134 MMOL/L — LOW (ref 135–146)
SODIUM SERPL-SCNC: 134 MMOL/L — LOW (ref 135–146)
SODIUM SERPL-SCNC: 135 MMOL/L — SIGNIFICANT CHANGE UP (ref 135–146)
SODIUM SERPL-SCNC: 136 MMOL/L — SIGNIFICANT CHANGE UP (ref 135–146)
SODIUM SERPL-SCNC: 137 MMOL/L — SIGNIFICANT CHANGE UP (ref 135–146)
SODIUM SERPL-SCNC: 139 MMOL/L — SIGNIFICANT CHANGE UP (ref 135–146)
SP GR SPEC: 1.02 — SIGNIFICANT CHANGE UP (ref 1.01–1.03)
SPECIMEN SOURCE: SIGNIFICANT CHANGE UP
TRANSFERRIN SERPL-MCNC: 90 MG/DL — LOW (ref 200–360)
UROBILINOGEN FLD QL: 0.2 MG/DL — SIGNIFICANT CHANGE UP
WBC # BLD: 12.17 K/UL — HIGH (ref 4.8–10.8)
WBC # BLD: 12.22 K/UL — HIGH (ref 4.8–10.8)
WBC # BLD: 14.29 K/UL — HIGH (ref 4.8–10.8)
WBC # BLD: 14.85 K/UL — HIGH (ref 4.8–10.8)
WBC # BLD: 14.98 K/UL — HIGH (ref 4.8–10.8)
WBC # BLD: 15.35 K/UL — HIGH (ref 4.8–10.8)
WBC # BLD: 15.84 K/UL — HIGH (ref 4.8–10.8)
WBC # BLD: 15.91 K/UL — HIGH (ref 4.8–10.8)
WBC # BLD: 16.04 K/UL — HIGH (ref 4.8–10.8)
WBC # BLD: 16.1 K/UL — HIGH (ref 4.8–10.8)
WBC # BLD: 16.23 K/UL — HIGH (ref 4.8–10.8)
WBC # BLD: 18.14 K/UL — HIGH (ref 4.8–10.8)
WBC # FLD AUTO: 12.17 K/UL — HIGH (ref 4.8–10.8)
WBC # FLD AUTO: 12.22 K/UL — HIGH (ref 4.8–10.8)
WBC # FLD AUTO: 14.29 K/UL — HIGH (ref 4.8–10.8)
WBC # FLD AUTO: 14.85 K/UL — HIGH (ref 4.8–10.8)
WBC # FLD AUTO: 14.98 K/UL — HIGH (ref 4.8–10.8)
WBC # FLD AUTO: 15.35 K/UL — HIGH (ref 4.8–10.8)
WBC # FLD AUTO: 15.84 K/UL — HIGH (ref 4.8–10.8)
WBC # FLD AUTO: 15.91 K/UL — HIGH (ref 4.8–10.8)
WBC # FLD AUTO: 16.04 K/UL — HIGH (ref 4.8–10.8)
WBC # FLD AUTO: 16.1 K/UL — HIGH (ref 4.8–10.8)
WBC # FLD AUTO: 16.23 K/UL — HIGH (ref 4.8–10.8)
WBC # FLD AUTO: 18.14 K/UL — HIGH (ref 4.8–10.8)

## 2021-01-01 PROCEDURE — 99232 SBSQ HOSP IP/OBS MODERATE 35: CPT

## 2021-01-01 PROCEDURE — 71045 X-RAY EXAM CHEST 1 VIEW: CPT | Mod: 26

## 2021-01-01 PROCEDURE — 99233 SBSQ HOSP IP/OBS HIGH 50: CPT

## 2021-01-01 PROCEDURE — 73630 X-RAY EXAM OF FOOT: CPT | Mod: 26,RT

## 2021-01-01 PROCEDURE — 99222 1ST HOSP IP/OBS MODERATE 55: CPT

## 2021-01-01 PROCEDURE — 99285 EMERGENCY DEPT VISIT HI MDM: CPT

## 2021-01-01 PROCEDURE — 93010 ELECTROCARDIOGRAM REPORT: CPT

## 2021-01-01 PROCEDURE — 72170 X-RAY EXAM OF PELVIS: CPT | Mod: 26

## 2021-01-01 PROCEDURE — 73610 X-RAY EXAM OF ANKLE: CPT | Mod: 26,RT

## 2021-01-01 PROCEDURE — 99284 EMERGENCY DEPT VISIT MOD MDM: CPT

## 2021-01-01 PROCEDURE — 76705 ECHO EXAM OF ABDOMEN: CPT | Mod: 26

## 2021-01-01 PROCEDURE — 99221 1ST HOSP IP/OBS SF/LOW 40: CPT

## 2021-01-01 PROCEDURE — 99223 1ST HOSP IP/OBS HIGH 75: CPT

## 2021-01-01 PROCEDURE — 70486 CT MAXILLOFACIAL W/O DYE: CPT | Mod: 26

## 2021-01-01 PROCEDURE — 99239 HOSP IP/OBS DSCHRG MGMT >30: CPT

## 2021-01-01 PROCEDURE — 78226 HEPATOBILIARY SYSTEM IMAGING: CPT | Mod: 26

## 2021-01-01 PROCEDURE — 71250 CT THORAX DX C-: CPT | Mod: 26

## 2021-01-01 RX ORDER — AMPICILLIN SODIUM AND SULBACTAM SODIUM 250; 125 MG/ML; MG/ML
3 INJECTION, POWDER, FOR SUSPENSION INTRAMUSCULAR; INTRAVENOUS EVERY 6 HOURS
Refills: 0 | Status: DISCONTINUED | OUTPATIENT
Start: 2021-01-01 | End: 2021-01-01

## 2021-01-01 RX ORDER — INSULIN LISPRO 100/ML
VIAL (ML) SUBCUTANEOUS AT BEDTIME
Refills: 0 | Status: DISCONTINUED | OUTPATIENT
Start: 2021-01-01 | End: 2021-01-01

## 2021-01-01 RX ORDER — DEXTROSE 50 % IN WATER 50 %
12.5 SYRINGE (ML) INTRAVENOUS ONCE
Refills: 0 | Status: DISCONTINUED | OUTPATIENT
Start: 2021-01-01 | End: 2021-01-01

## 2021-01-01 RX ORDER — GLYBURIDE 5 MG
0 TABLET ORAL
Qty: 0 | Refills: 0 | DISCHARGE

## 2021-01-01 RX ORDER — ACETAMINOPHEN 500 MG
650 TABLET ORAL EVERY 6 HOURS
Refills: 0 | Status: DISCONTINUED | OUTPATIENT
Start: 2021-01-01 | End: 2021-01-01

## 2021-01-01 RX ORDER — DEXTROSE 50 % IN WATER 50 %
15 SYRINGE (ML) INTRAVENOUS ONCE
Refills: 0 | Status: DISCONTINUED | OUTPATIENT
Start: 2021-01-01 | End: 2021-01-01

## 2021-01-01 RX ORDER — GLUCAGON INJECTION, SOLUTION 0.5 MG/.1ML
1 INJECTION, SOLUTION SUBCUTANEOUS ONCE
Refills: 0 | Status: DISCONTINUED | OUTPATIENT
Start: 2021-01-01 | End: 2021-01-01

## 2021-01-01 RX ORDER — ATENOLOL 25 MG/1
0 TABLET ORAL
Qty: 0 | Refills: 0 | DISCHARGE

## 2021-01-01 RX ORDER — JNJ-78436735 50000000000 [PFU]/.5ML
0.5 SUSPENSION INTRAMUSCULAR ONCE
Refills: 0 | Status: COMPLETED | OUTPATIENT
Start: 2021-01-01 | End: 2021-01-01

## 2021-01-01 RX ORDER — SIMVASTATIN 20 MG/1
40 TABLET, FILM COATED ORAL AT BEDTIME
Refills: 0 | Status: DISCONTINUED | OUTPATIENT
Start: 2021-01-01 | End: 2021-01-01

## 2021-01-01 RX ORDER — HEPARIN SODIUM 5000 [USP'U]/ML
5000 INJECTION INTRAVENOUS; SUBCUTANEOUS EVERY 12 HOURS
Refills: 0 | Status: DISCONTINUED | OUTPATIENT
Start: 2021-01-01 | End: 2021-01-01

## 2021-01-01 RX ORDER — AMPICILLIN SODIUM AND SULBACTAM SODIUM 250; 125 MG/ML; MG/ML
INJECTION, POWDER, FOR SUSPENSION INTRAMUSCULAR; INTRAVENOUS
Refills: 0 | Status: DISCONTINUED | OUTPATIENT
Start: 2021-01-01 | End: 2021-01-01

## 2021-01-01 RX ORDER — DEXTROSE 50 % IN WATER 50 %
25 SYRINGE (ML) INTRAVENOUS ONCE
Refills: 0 | Status: DISCONTINUED | OUTPATIENT
Start: 2021-01-01 | End: 2021-01-01

## 2021-01-01 RX ORDER — CEFTRIAXONE 500 MG/1
1000 INJECTION, POWDER, FOR SOLUTION INTRAMUSCULAR; INTRAVENOUS EVERY 24 HOURS
Refills: 0 | Status: DISCONTINUED | OUTPATIENT
Start: 2021-01-01 | End: 2021-01-01

## 2021-01-01 RX ORDER — INSULIN LISPRO 100/ML
VIAL (ML) SUBCUTANEOUS
Refills: 0 | Status: DISCONTINUED | OUTPATIENT
Start: 2021-01-01 | End: 2021-01-01

## 2021-01-01 RX ORDER — ATENOLOL 25 MG/1
50 TABLET ORAL DAILY
Refills: 0 | Status: DISCONTINUED | OUTPATIENT
Start: 2021-01-01 | End: 2021-01-01

## 2021-01-01 RX ORDER — CEFTRIAXONE 500 MG/1
1000 INJECTION, POWDER, FOR SOLUTION INTRAMUSCULAR; INTRAVENOUS ONCE
Refills: 0 | Status: COMPLETED | OUTPATIENT
Start: 2021-01-01 | End: 2021-01-01

## 2021-01-01 RX ORDER — CHLORHEXIDINE GLUCONATE 213 G/1000ML
1 SOLUTION TOPICAL
Refills: 0 | Status: DISCONTINUED | OUTPATIENT
Start: 2021-01-01 | End: 2021-01-01

## 2021-01-01 RX ORDER — GUAIFENESIN/DEXTROMETHORPHAN 600MG-30MG
10 TABLET, EXTENDED RELEASE 12 HR ORAL EVERY 4 HOURS
Refills: 0 | Status: DISCONTINUED | OUTPATIENT
Start: 2021-01-01 | End: 2021-01-01

## 2021-01-01 RX ORDER — SODIUM CHLORIDE 9 MG/ML
1000 INJECTION INTRAMUSCULAR; INTRAVENOUS; SUBCUTANEOUS
Refills: 0 | Status: DISCONTINUED | OUTPATIENT
Start: 2021-01-01 | End: 2021-01-01

## 2021-01-01 RX ORDER — AMPICILLIN SODIUM AND SULBACTAM SODIUM 250; 125 MG/ML; MG/ML
3 INJECTION, POWDER, FOR SUSPENSION INTRAMUSCULAR; INTRAVENOUS ONCE
Refills: 0 | Status: COMPLETED | OUTPATIENT
Start: 2021-01-01 | End: 2021-01-01

## 2021-01-01 RX ORDER — SODIUM CHLORIDE 9 MG/ML
1000 INJECTION, SOLUTION INTRAVENOUS
Refills: 0 | Status: DISCONTINUED | OUTPATIENT
Start: 2021-01-01 | End: 2021-01-01

## 2021-01-01 RX ORDER — SIMVASTATIN 20 MG/1
0 TABLET, FILM COATED ORAL
Qty: 0 | Refills: 0 | DISCHARGE

## 2021-01-01 RX ORDER — LISINOPRIL 2.5 MG/1
0 TABLET ORAL
Qty: 0 | Refills: 0 | DISCHARGE

## 2021-01-01 RX ORDER — AZITHROMYCIN 500 MG/1
500 TABLET, FILM COATED ORAL ONCE
Refills: 0 | Status: COMPLETED | OUTPATIENT
Start: 2021-01-01 | End: 2021-01-01

## 2021-01-01 RX ADMIN — AMPICILLIN SODIUM AND SULBACTAM SODIUM 200 GRAM(S): 250; 125 INJECTION, POWDER, FOR SUSPENSION INTRAMUSCULAR; INTRAVENOUS at 05:26

## 2021-01-01 RX ADMIN — Medication 1: at 08:33

## 2021-01-01 RX ADMIN — CHLORHEXIDINE GLUCONATE 1 APPLICATION(S): 213 SOLUTION TOPICAL at 06:06

## 2021-01-01 RX ADMIN — AMPICILLIN SODIUM AND SULBACTAM SODIUM 200 GRAM(S): 250; 125 INJECTION, POWDER, FOR SUSPENSION INTRAMUSCULAR; INTRAVENOUS at 23:07

## 2021-01-01 RX ADMIN — AMPICILLIN SODIUM AND SULBACTAM SODIUM 200 GRAM(S): 250; 125 INJECTION, POWDER, FOR SUSPENSION INTRAMUSCULAR; INTRAVENOUS at 12:55

## 2021-01-01 RX ADMIN — AMPICILLIN SODIUM AND SULBACTAM SODIUM 200 GRAM(S): 250; 125 INJECTION, POWDER, FOR SUSPENSION INTRAMUSCULAR; INTRAVENOUS at 06:05

## 2021-01-01 RX ADMIN — HEPARIN SODIUM 5000 UNIT(S): 5000 INJECTION INTRAVENOUS; SUBCUTANEOUS at 17:29

## 2021-01-01 RX ADMIN — Medication 1: at 11:47

## 2021-01-01 RX ADMIN — Medication 2: at 07:47

## 2021-01-01 RX ADMIN — ATENOLOL 50 MILLIGRAM(S): 25 TABLET ORAL at 05:15

## 2021-01-01 RX ADMIN — Medication 10 MILLILITER(S): at 05:37

## 2021-01-01 RX ADMIN — Medication 1 TABLET(S): at 05:27

## 2021-01-01 RX ADMIN — AMPICILLIN SODIUM AND SULBACTAM SODIUM 200 GRAM(S): 250; 125 INJECTION, POWDER, FOR SUSPENSION INTRAMUSCULAR; INTRAVENOUS at 17:19

## 2021-01-01 RX ADMIN — Medication 1: at 12:54

## 2021-01-01 RX ADMIN — HEPARIN SODIUM 5000 UNIT(S): 5000 INJECTION INTRAVENOUS; SUBCUTANEOUS at 17:45

## 2021-01-01 RX ADMIN — JNJ-78436735 0.5 MILLILITER(S): 50000000000 SUSPENSION INTRAMUSCULAR at 13:01

## 2021-01-01 RX ADMIN — AMPICILLIN SODIUM AND SULBACTAM SODIUM 200 GRAM(S): 250; 125 INJECTION, POWDER, FOR SUSPENSION INTRAMUSCULAR; INTRAVENOUS at 23:21

## 2021-01-01 RX ADMIN — SODIUM CHLORIDE 60 MILLILITER(S): 9 INJECTION INTRAMUSCULAR; INTRAVENOUS; SUBCUTANEOUS at 07:48

## 2021-01-01 RX ADMIN — SODIUM CHLORIDE 60 MILLILITER(S): 9 INJECTION INTRAMUSCULAR; INTRAVENOUS; SUBCUTANEOUS at 17:02

## 2021-01-01 RX ADMIN — Medication 10 MILLILITER(S): at 17:42

## 2021-01-01 RX ADMIN — CHLORHEXIDINE GLUCONATE 1 APPLICATION(S): 213 SOLUTION TOPICAL at 05:27

## 2021-01-01 RX ADMIN — HEPARIN SODIUM 5000 UNIT(S): 5000 INJECTION INTRAVENOUS; SUBCUTANEOUS at 05:27

## 2021-01-01 RX ADMIN — HEPARIN SODIUM 5000 UNIT(S): 5000 INJECTION INTRAVENOUS; SUBCUTANEOUS at 05:37

## 2021-01-01 RX ADMIN — Medication 10 MILLILITER(S): at 22:26

## 2021-01-01 RX ADMIN — AMPICILLIN SODIUM AND SULBACTAM SODIUM 200 GRAM(S): 250; 125 INJECTION, POWDER, FOR SUSPENSION INTRAMUSCULAR; INTRAVENOUS at 11:08

## 2021-01-01 RX ADMIN — CEFTRIAXONE 100 MILLIGRAM(S): 500 INJECTION, POWDER, FOR SOLUTION INTRAMUSCULAR; INTRAVENOUS at 09:55

## 2021-01-01 RX ADMIN — Medication 10 MILLILITER(S): at 20:41

## 2021-01-01 RX ADMIN — Medication 10 MILLILITER(S): at 14:30

## 2021-01-01 RX ADMIN — HEPARIN SODIUM 5000 UNIT(S): 5000 INJECTION INTRAVENOUS; SUBCUTANEOUS at 17:27

## 2021-01-01 RX ADMIN — HEPARIN SODIUM 5000 UNIT(S): 5000 INJECTION INTRAVENOUS; SUBCUTANEOUS at 16:58

## 2021-01-01 RX ADMIN — AMPICILLIN SODIUM AND SULBACTAM SODIUM 200 GRAM(S): 250; 125 INJECTION, POWDER, FOR SUSPENSION INTRAMUSCULAR; INTRAVENOUS at 23:20

## 2021-01-01 RX ADMIN — AMPICILLIN SODIUM AND SULBACTAM SODIUM 200 GRAM(S): 250; 125 INJECTION, POWDER, FOR SUSPENSION INTRAMUSCULAR; INTRAVENOUS at 17:04

## 2021-01-01 RX ADMIN — HEPARIN SODIUM 5000 UNIT(S): 5000 INJECTION INTRAVENOUS; SUBCUTANEOUS at 18:08

## 2021-01-01 RX ADMIN — HEPARIN SODIUM 5000 UNIT(S): 5000 INJECTION INTRAVENOUS; SUBCUTANEOUS at 06:07

## 2021-01-01 RX ADMIN — SIMVASTATIN 40 MILLIGRAM(S): 20 TABLET, FILM COATED ORAL at 21:03

## 2021-01-01 RX ADMIN — Medication 10 MILLILITER(S): at 05:27

## 2021-01-01 RX ADMIN — AMPICILLIN SODIUM AND SULBACTAM SODIUM 200 GRAM(S): 250; 125 INJECTION, POWDER, FOR SUSPENSION INTRAMUSCULAR; INTRAVENOUS at 23:00

## 2021-01-01 RX ADMIN — Medication 1 TABLET(S): at 05:15

## 2021-01-01 RX ADMIN — CHLORHEXIDINE GLUCONATE 1 APPLICATION(S): 213 SOLUTION TOPICAL at 05:28

## 2021-01-01 RX ADMIN — Medication 10 MILLILITER(S): at 20:20

## 2021-01-01 RX ADMIN — HEPARIN SODIUM 5000 UNIT(S): 5000 INJECTION INTRAVENOUS; SUBCUTANEOUS at 17:02

## 2021-01-01 RX ADMIN — AMPICILLIN SODIUM AND SULBACTAM SODIUM 200 GRAM(S): 250; 125 INJECTION, POWDER, FOR SUSPENSION INTRAMUSCULAR; INTRAVENOUS at 05:25

## 2021-01-01 RX ADMIN — ATENOLOL 50 MILLIGRAM(S): 25 TABLET ORAL at 05:51

## 2021-01-01 RX ADMIN — Medication 10 MILLILITER(S): at 15:37

## 2021-01-01 RX ADMIN — HEPARIN SODIUM 5000 UNIT(S): 5000 INJECTION INTRAVENOUS; SUBCUTANEOUS at 17:43

## 2021-01-01 RX ADMIN — HEPARIN SODIUM 5000 UNIT(S): 5000 INJECTION INTRAVENOUS; SUBCUTANEOUS at 17:17

## 2021-01-01 RX ADMIN — ATENOLOL 50 MILLIGRAM(S): 25 TABLET ORAL at 05:39

## 2021-01-01 RX ADMIN — HEPARIN SODIUM 5000 UNIT(S): 5000 INJECTION INTRAVENOUS; SUBCUTANEOUS at 05:39

## 2021-01-01 RX ADMIN — Medication 650 MILLIGRAM(S): at 20:27

## 2021-01-01 RX ADMIN — HEPARIN SODIUM 5000 UNIT(S): 5000 INJECTION INTRAVENOUS; SUBCUTANEOUS at 17:28

## 2021-01-01 RX ADMIN — Medication 2: at 11:43

## 2021-01-01 RX ADMIN — HEPARIN SODIUM 5000 UNIT(S): 5000 INJECTION INTRAVENOUS; SUBCUTANEOUS at 05:06

## 2021-01-01 RX ADMIN — Medication 1: at 16:56

## 2021-01-01 RX ADMIN — ATENOLOL 50 MILLIGRAM(S): 25 TABLET ORAL at 05:27

## 2021-01-01 RX ADMIN — Medication 1 TABLET(S): at 05:06

## 2021-01-01 RX ADMIN — Medication 1: at 08:16

## 2021-01-01 RX ADMIN — Medication 10 MILLILITER(S): at 20:28

## 2021-01-01 RX ADMIN — HEPARIN SODIUM 5000 UNIT(S): 5000 INJECTION INTRAVENOUS; SUBCUTANEOUS at 17:20

## 2021-01-01 RX ADMIN — Medication 2: at 12:06

## 2021-01-01 RX ADMIN — Medication 10 MILLILITER(S): at 11:54

## 2021-01-01 RX ADMIN — Medication 3: at 11:36

## 2021-01-01 RX ADMIN — AMPICILLIN SODIUM AND SULBACTAM SODIUM 200 GRAM(S): 250; 125 INJECTION, POWDER, FOR SUSPENSION INTRAMUSCULAR; INTRAVENOUS at 23:29

## 2021-01-01 RX ADMIN — AMPICILLIN SODIUM AND SULBACTAM SODIUM 200 GRAM(S): 250; 125 INJECTION, POWDER, FOR SUSPENSION INTRAMUSCULAR; INTRAVENOUS at 17:17

## 2021-01-01 RX ADMIN — AMPICILLIN SODIUM AND SULBACTAM SODIUM 200 GRAM(S): 250; 125 INJECTION, POWDER, FOR SUSPENSION INTRAMUSCULAR; INTRAVENOUS at 05:52

## 2021-01-01 RX ADMIN — AMPICILLIN SODIUM AND SULBACTAM SODIUM 200 GRAM(S): 250; 125 INJECTION, POWDER, FOR SUSPENSION INTRAMUSCULAR; INTRAVENOUS at 11:38

## 2021-01-01 RX ADMIN — ATENOLOL 50 MILLIGRAM(S): 25 TABLET ORAL at 05:25

## 2021-01-01 RX ADMIN — HEPARIN SODIUM 5000 UNIT(S): 5000 INJECTION INTRAVENOUS; SUBCUTANEOUS at 17:42

## 2021-01-01 RX ADMIN — CHLORHEXIDINE GLUCONATE 1 APPLICATION(S): 213 SOLUTION TOPICAL at 05:16

## 2021-01-01 RX ADMIN — AMPICILLIN SODIUM AND SULBACTAM SODIUM 200 GRAM(S): 250; 125 INJECTION, POWDER, FOR SUSPENSION INTRAMUSCULAR; INTRAVENOUS at 12:33

## 2021-01-01 RX ADMIN — AMPICILLIN SODIUM AND SULBACTAM SODIUM 200 GRAM(S): 250; 125 INJECTION, POWDER, FOR SUSPENSION INTRAMUSCULAR; INTRAVENOUS at 17:25

## 2021-01-01 RX ADMIN — HEPARIN SODIUM 5000 UNIT(S): 5000 INJECTION INTRAVENOUS; SUBCUTANEOUS at 06:04

## 2021-01-01 RX ADMIN — Medication 10 MILLILITER(S): at 20:36

## 2021-01-01 RX ADMIN — ATENOLOL 50 MILLIGRAM(S): 25 TABLET ORAL at 06:07

## 2021-01-01 RX ADMIN — Medication 1: at 12:27

## 2021-01-01 RX ADMIN — HEPARIN SODIUM 5000 UNIT(S): 5000 INJECTION INTRAVENOUS; SUBCUTANEOUS at 17:05

## 2021-01-01 RX ADMIN — Medication 1: at 08:05

## 2021-01-01 RX ADMIN — ATENOLOL 50 MILLIGRAM(S): 25 TABLET ORAL at 05:37

## 2021-01-01 RX ADMIN — AMPICILLIN SODIUM AND SULBACTAM SODIUM 200 GRAM(S): 250; 125 INJECTION, POWDER, FOR SUSPENSION INTRAMUSCULAR; INTRAVENOUS at 17:44

## 2021-01-01 RX ADMIN — AMPICILLIN SODIUM AND SULBACTAM SODIUM 200 GRAM(S): 250; 125 INJECTION, POWDER, FOR SUSPENSION INTRAMUSCULAR; INTRAVENOUS at 17:34

## 2021-01-01 RX ADMIN — AMPICILLIN SODIUM AND SULBACTAM SODIUM 200 GRAM(S): 250; 125 INJECTION, POWDER, FOR SUSPENSION INTRAMUSCULAR; INTRAVENOUS at 13:27

## 2021-01-01 RX ADMIN — Medication 650 MILLIGRAM(S): at 21:05

## 2021-01-01 RX ADMIN — Medication 1 TABLET(S): at 17:27

## 2021-01-01 RX ADMIN — Medication 1: at 08:18

## 2021-01-01 RX ADMIN — Medication 3: at 12:19

## 2021-01-01 RX ADMIN — AMPICILLIN SODIUM AND SULBACTAM SODIUM 200 GRAM(S): 250; 125 INJECTION, POWDER, FOR SUSPENSION INTRAMUSCULAR; INTRAVENOUS at 23:36

## 2021-01-01 RX ADMIN — Medication 1 TABLET(S): at 16:58

## 2021-01-01 RX ADMIN — SIMVASTATIN 40 MILLIGRAM(S): 20 TABLET, FILM COATED ORAL at 21:19

## 2021-01-01 RX ADMIN — AMPICILLIN SODIUM AND SULBACTAM SODIUM 200 GRAM(S): 250; 125 INJECTION, POWDER, FOR SUSPENSION INTRAMUSCULAR; INTRAVENOUS at 05:14

## 2021-01-01 RX ADMIN — CEFTRIAXONE 100 MILLIGRAM(S): 500 INJECTION, POWDER, FOR SOLUTION INTRAMUSCULAR; INTRAVENOUS at 08:21

## 2021-01-01 RX ADMIN — HEPARIN SODIUM 5000 UNIT(S): 5000 INJECTION INTRAVENOUS; SUBCUTANEOUS at 05:16

## 2021-01-01 RX ADMIN — HEPARIN SODIUM 5000 UNIT(S): 5000 INJECTION INTRAVENOUS; SUBCUTANEOUS at 05:14

## 2021-01-01 RX ADMIN — AMPICILLIN SODIUM AND SULBACTAM SODIUM 200 GRAM(S): 250; 125 INJECTION, POWDER, FOR SUSPENSION INTRAMUSCULAR; INTRAVENOUS at 18:08

## 2021-01-01 RX ADMIN — CHLORHEXIDINE GLUCONATE 1 APPLICATION(S): 213 SOLUTION TOPICAL at 05:38

## 2021-01-01 RX ADMIN — ATENOLOL 50 MILLIGRAM(S): 25 TABLET ORAL at 05:06

## 2021-01-01 RX ADMIN — AMPICILLIN SODIUM AND SULBACTAM SODIUM 200 GRAM(S): 250; 125 INJECTION, POWDER, FOR SUSPENSION INTRAMUSCULAR; INTRAVENOUS at 11:33

## 2021-01-01 RX ADMIN — ATENOLOL 50 MILLIGRAM(S): 25 TABLET ORAL at 06:04

## 2021-01-01 RX ADMIN — Medication 2: at 12:05

## 2021-01-01 RX ADMIN — SIMVASTATIN 40 MILLIGRAM(S): 20 TABLET, FILM COATED ORAL at 21:37

## 2021-01-01 RX ADMIN — AZITHROMYCIN 255 MILLIGRAM(S): 500 TABLET, FILM COATED ORAL at 13:01

## 2021-01-01 RX ADMIN — CEFTRIAXONE 100 MILLIGRAM(S): 500 INJECTION, POWDER, FOR SOLUTION INTRAMUSCULAR; INTRAVENOUS at 07:45

## 2021-01-01 RX ADMIN — CHLORHEXIDINE GLUCONATE 1 APPLICATION(S): 213 SOLUTION TOPICAL at 06:04

## 2021-01-01 RX ADMIN — SIMVASTATIN 40 MILLIGRAM(S): 20 TABLET, FILM COATED ORAL at 23:36

## 2021-01-01 RX ADMIN — CEFTRIAXONE 100 MILLIGRAM(S): 500 INJECTION, POWDER, FOR SOLUTION INTRAMUSCULAR; INTRAVENOUS at 11:14

## 2021-01-01 RX ADMIN — CHLORHEXIDINE GLUCONATE 1 APPLICATION(S): 213 SOLUTION TOPICAL at 05:52

## 2021-01-01 RX ADMIN — Medication 1: at 17:04

## 2021-01-01 RX ADMIN — HEPARIN SODIUM 5000 UNIT(S): 5000 INJECTION INTRAVENOUS; SUBCUTANEOUS at 05:25

## 2021-11-01 NOTE — ED PROVIDER NOTE - PATIENT PORTAL LINK FT
You can access the FollowMyHealth Patient Portal offered by [facility] by registering at the following website: [email or web address]. By joining [employer]’s FollowMyHealth portal, you will also be able to view your health information using other applications (apps) compatible with our system.

## 2021-11-01 NOTE — ED PROVIDER NOTE - CARE PROVIDER_API CALL
Jabari Jaramillo (MD)  Orthopaedic Surgery  [address] [place] [address]  Phone: [phone]  Fax: [phone]  Follow Up Time: 1-3 Days

## 2021-11-01 NOTE — ED PROVIDER NOTE - OBJECTIVE STATEMENT
82 yo male, pmg of htn, presents to ed for rt ankle pain, mild, aching, no radiation, occurred two days ago, twisted. denies numbness, tingling, limited rom, chi.

## 2021-11-01 NOTE — ED PROVIDER NOTE - PHYSICAL EXAMINATION
Physical Exam    Vital Signs: I have reviewed the initial vital signs.  Constitutional: well-nourished, appears stated age, no acute distress  Eyes: Conjunctiva pink, Sclera clear,  Cardiovascular: S1 and S2, regular rate, regular rhythm, well-perfused extremities, radial pulses equal and 2+  Respiratory: unlabored respiratory effort, clear to auscultation bilaterally no wheezing, rales and rhonchi  Musculoskeletal: supple neck, no lower extremity edema, no midline tenderness, no rt tibia pain, from of right ankle, no specific ttp to right ankle or foot.   Integumentary: warm, dry, no rash  Neurologic: awake, alert, nvi

## 2021-11-01 NOTE — ED PROVIDER NOTE - CLINICAL SUMMARY MEDICAL DECISION MAKING FREE TEXT BOX
isolated ankle pain and swelling, no fx, pt able to ambulate and take care of self at home. dc with akbar

## 2021-11-01 NOTE — ED PROVIDER NOTE - ATTENDING CONTRIBUTION TO CARE
81 yr old male here for right ankle pain. pt reports fell 2 days ago, twisted his right ankle. Since then pt ambulating around but with pain. In addition, pt has developed swelling to right ankle. pt denies any other trauma, specifically denies hitting head.     well appearing, nontoxic, awake alert, ncat perrl eomi, no midline spinal ttp, no pain with compression of ribs, pelvis stable, no bony ext ttp, full rom X 4 swelling to right ankle, bilateral malleolus ttp s1s2 ctab soft nt/nd, perrl eomi, gcs 15, motor and sensation grossly intact, no abrasion/laceration.

## 2021-11-01 NOTE — ED PROVIDER NOTE - CARE PROVIDERS DIRECT ADDRESSES
[email or web address]

## 2021-11-08 PROBLEM — I10 ESSENTIAL (PRIMARY) HYPERTENSION: Chronic | Status: ACTIVE | Noted: 2021-01-01

## 2021-11-14 NOTE — ED PROVIDER NOTE - CLINICAL SUMMARY MEDICAL DECISION MAKING FREE TEXT BOX
Patient here with cough, runny nose, weakness urinary Sx. Patient found to have wbc 18K, bl infiltrates on xray. mild arelis. Will admit for further eval and tx

## 2021-11-14 NOTE — ED ADULT NURSE NOTE - NSIMPLEMENTINTERV_GEN_ALL_ED
Implemented All Fall with Harm Risk Interventions:  [place] to call system. Call bell, personal items and telephone within reach. Instruct patient to call for assistance. Room bathroom lighting operational. Non-slip footwear when patient is off stretcher. Physically safe environment: no spills, clutter or unnecessary equipment. Stretcher in lowest position, wheels locked, appropriate side rails in place. Provide visual cue, wrist band, yellow gown, etc. Monitor gait and stability. Monitor for mental status changes and reorient to person, place, and time. Review medications for side effects contributing to fall risk. Reinforce activity limits and safety measures with patient and family. Provide visual clues: red socks. Father

## 2021-11-14 NOTE — H&P ADULT - NSHPSOCIALHISTORY_GEN_ALL_CORE
Tobacco use: Exsmoker, Quit 31 years ago   EtOH use: Occasional beer   Illicit drug use: Denies   Marital Status:

## 2021-11-14 NOTE — ED PROVIDER NOTE - OBJECTIVE STATEMENT
81 year old M with hx of htn, hld, dm presenting to er for eval. Pt had mech fall x 2 weeks ago when rt leg gave out and was seen in ed. Now sts is having difficulty ambulating with walker because rt leg "gives out." Pt also c/o dry cough, sinus congestion, runny nose and increased urination. pt denies any pain to leg/back pain, decreased sensation, paresthesias, decreased rom, fever/chills, nausea, vomiting, diarrhea, sick contacts, chest pain, sob. Pt utd with covid vaccine. Pt has follow up with PT

## 2021-11-14 NOTE — ED PROVIDER NOTE - PROGRESS NOTE DETAILS
ATTENDING NOTE:  82 y/o M here for 2 weeks of HA, sinus congestion, cough, and runny nose. Pt reports he was recently here in the ED for ankle swelling from a fall. Since then pain has been improving but the pt feels generalized weakness. Pt is ambulating in the house but still feels weak. Pt notes he also feels urinary frequency.   On exam: CON: ao x 3, HENMT: clear oropharynx, neck supple,  CV: rrr, equal pulses b/l, RESP: cta b/l, GI:  soft, nontender, no rebound, no guarding, SKIN: no rash, MSK: no deformities, NEURO: no gross motor or sensory deficit Psychiatric: appropriate mood, appropriate affect.  Impression: cough, runny nose, weakness urinary Sx. Will check labs, UA, CXR.

## 2021-11-14 NOTE — ED PROVIDER NOTE - PHYSICAL EXAMINATION
CONSTITUTIONAL: Well-appearing; well-nourished; in no apparent distress.   EYES: PERRL; EOM intact.   ENT: normal nose; no rhinorrhea; normal pharynx with no tonsillar hypertrophy.   NECK: Supple; non-tender; no cervical lymphadenopathy.   CARDIOVASCULAR: Normal S1, S2; no murmurs, rubs, or gallops. equal radial pulses  RESPIRATORY: Normal chest excursion with respiration; breath sounds clear and equal bilaterally; no wheezes, rhonchi, or rales.  GI/: Normal bowel sounds; non-distended; non-tender; no palpable organomegaly  MSK: No midline spinal ttp. Stable pelvis. No ttp to knee. Full rom of all extrem. Strength equal 5/5 throughout  SKIN: Normal for age and race; warm; dry; good turgor; no apparent lesions or exudate.   NEURO/PSYCH: A & O x 4; grossly unremarkable. mood and manner are appropriate. no focal deficits. Neurovascular intact. Sensation intact

## 2021-11-14 NOTE — H&P ADULT - NSHPLABSRESULTS_GEN_ALL_CORE
13.5   18.14 )-----------( 338      ( 2021 10:00 )             41.2           135  |  97<L>  |  32<H>  ----------------------------<  91  5.1<H>   |  25  |  1.4    Ca    9.0      2021 10:00    TPro  6.3  /  Alb  3.2<L>  /  TBili  0.7  /  DBili  x   /  AST  120<H>  /  ALT  91<H>  /  AlkPhos  318<H>                    Urinalysis Basic - ( 2021 11:20 )    Color: Yellow / Appearance: Clear / S.020 / pH: x  Gluc: x / Ketone: Negative  / Bili: Negative / Urobili: 0.2 mg/dL   Blood: x / Protein: 30 mg/dL / Nitrite: Negative   Leuk Esterase: Negative / RBC: 1-2 /HPF / WBC x   Sq Epi: x / Non Sq Epi: Few /HPF / Bacteria: Moderate      Xray Chest 1 View-PORTABLE IMMEDIATE (Xray Chest 1 View-PORTABLE IMMEDIATE .) (11.14.21 @ 10:36)  Impression:  Bilateral opacities compatible with pleural plaques. These are without significant change when compared to chest radiograph 2008 given differences in technique. Given overlying pleural plaques, evaluation is limited on this single frontal chest radiograph.

## 2021-11-14 NOTE — H&P ADULT - HISTORY OF PRESENT ILLNESS
Patient is an 81 year old Male with past medical history of HTN, HLD, DM presenting to ED secondary to Acute URI symptoms and Dysambulatory status. Patient states he had a mechanical fall 2 weeks ago, s/p Splint in ER on 11/1. Patient went home and has been having difficulty with ambulation even with walker as his legs give up. Patient also started to develop cough and sinus congestion along with rhinorrhea. Patient admits to dysuria but states it was resolved. Patient denies fevers, chills, nausea, vomiting, chest pain, shortness of breath, palpitations, abdominal pain, flank pain, new onset of / worsening low back pain, diarrhea, constipation, new onset of leg swelling.

## 2021-11-14 NOTE — ED ADULT TRIAGE NOTE - CHIEF COMPLAINT QUOTE
c/o headache, sinus congestion, cough, runny nose x 2 weeks. Pt fell 2 weeks age and has been unable to get an ortho appointment  Also has urinary frequency and weakness of both legs when he tries to stand from sitting position

## 2021-11-14 NOTE — CONSULT NOTE ADULT - ASSESSMENT
Patient is a 81y old  Male with past medical history of HTN, HLD, DM, now presents to the ER  for evaluation of URI symptoms and non ambulatory status. Patient states he had a mechanical fall 2 weeks ago, s/p Splint in ER on 11/1, went home and has been having difficulty with ambulation even with walker as his legs give up. Patient also started to develop cough and sinus congestion along with rhinorrhea. Patient admits to dysuria but states it was resolved. On admission, he has no fever but Leukocytosis. The CXR shows no infiltrate. The ID consult requested to assist with further evaluation and antibiotic  management.    # Suspected pneumonia  # Leukocytosis  # S./p Fall    would recommend:    1. Follow up Blood cultures  2. Please obtain Procalcitonin level, MRSA PCR and vitamin D level  3. CT chest and sinus for further evaluation of pneumonia and sinusitis  4. Obtain C and P ANCA    will follow the patient with you and make further recommendation based on the clinical course and Lab results  Thank you for the opportunity to participate in Ms. GARZA's care      Attending Attestation:    Spent more than 65 minutes on total encounter, more than 50 % of the visit was spent counseling and/or coordinating care by the Attending physician.         Patient is a 81y old  Male with past medical history of HTN, HLD, DM, now presents to the ER  for evaluation of URI symptoms and non ambulatory status. Patient states he had a mechanical fall 2 weeks ago, s/p Splint in ER on 11/1, went home and has been having difficulty with ambulation even with walker as his legs give up. Patient also started to develop cough and sinus congestion along with rhinorrhea. Patient admits to dysuria but states it was resolved. On admission, he has no fever but Leukocytosis. The CXR shows no infiltrate. The ID consult requested to assist with further evaluation and antibiotic  management.    # Suspected pneumonia  # Leukocytosis  # S./p Fall    would recommend:    1. Follow up Blood cultures  2. Please obtain Procalcitonin level, MRSA PCR and vitamin D level  3. CT chest and sinus for further evaluation of pneumonia and sinusitis  4. Obtain C and P ANCA  5. Monitor WBC count    will follow the patient with you and make further recommendation based on the clinical course and Lab results  Thank you for the opportunity to participate in Ms. GARZA's care      Attending Attestation:    Spent more than 65 minutes on total encounter, more than 50 % of the visit was spent counseling and/or coordinating care by the Attending physician.

## 2021-11-14 NOTE — ED PROVIDER NOTE - NS ED ROS FT
Constitutional: no fever, chills, no recent weight loss, change in appetite or malaise  Eyes: no redness/discharge/pain/vision changes  ENT: + sinus congestion and runny nose  Cardiac: No chest pain, SOB or edema.  Respiratory: + cough. No respiratory distress  GI: No nausea, vomiting, diarrhea or abdominal pain.  : No dysuria, frequency, urgency or hematuria  MS: no pain to back or extremities, no loss of ROM, no weakness  Neuro: No headache or weakness. No LOC.  Skin: No skin rash.  Endocrine: + hx of diabetes.  Except as documented in the HPI, all other systems are negative.

## 2021-11-14 NOTE — H&P ADULT - NSHPPHYSICALEXAM_GEN_ALL_CORE
VITAL SIGNS: Vital Signs Last 24 Hrs  T(C): 37.7 (14 Nov 2021 11:09), Max: 37.7 (14 Nov 2021 11:09)  T(F): 99.9 (14 Nov 2021 11:09), Max: 99.9 (14 Nov 2021 11:09)  HR: 75 (14 Nov 2021 09:45) (75 - 75)  BP: 121/89 (14 Nov 2021 09:45) (121/89 - 121/89)  RR: 18 (14 Nov 2021 09:45) (18 - 18)  SpO2: 98% (14 Nov 2021 09:45) (98% - 98%)    GENERAL: NAD, lying in bed  HEAD:  Atraumatic, Normocephalic  EYES: Left eye ?yellowish/Purulent discharge, conjunctiva clear   ENT: Moist mucous membranes  NECK: Supple  CHEST/LUNG: Clear to auscultation bilaterally. Unlabored respirations  HEART: Regular rate and rhythm  ABDOMEN: Soft, Nontender, Nondistended  EXTREMITIES:  No calf tenderness   NERVOUS SYSTEM:  Alert & Oriented X3, speech clear  MSK: FROM all 4 extremities, full and equal strength  SKIN: No rashes or lesions

## 2021-11-14 NOTE — H&P ADULT - ASSESSMENT
Patient is an 81 year old Male with past medical history of HTN, HLD, DM presenting to ED secondary to Acute URI symptoms and Dysambulatory status. Patient was found to have Pneumonia on CXR, therefore will be admitted to medicine.     # Pneumonia   # Acute Respiratory Infection   - CXR: Bilateral opacities compatible with pleural plaques.  - Patient was afebrile, monitor fevers   - +Leukocytosis 18k, trend WBC   - PRN Tylenol, Robitussin   - S/P Azithromycin and Rocephin in ER, will continue   - Follow up blood cultures   - ID Consult   - Procalcitonin in AM   - Incentive spirometry     # Ambulatory Difficulties   - Physical Therapy   - Physiatry     # Dysuria   - UA negative   - Follow up urine cultures   - Patient is receiving Rocephin for Pneumonia, which also covers UTI     # Essential Hypertension    - Monitor Vital Signs    - DASH diet   - Continue with Atenolol   - Holding Lisinopril for Creatinine of 1.4      # Mixed Hyperlipidemia  - DASH diet   - Continue with home Simvastatin     # FILOMENA   - Likely Prerenal   - Gentle Intravenous Fluids   - Hold Lisinopril for now  Patient is an 81 year old Male with past medical history of HTN, HLD, DM presenting to ED secondary to Acute URI symptoms and Dysambulatory status. Patient was found to have Pneumonia on CXR, therefore will be admitted to medicine.     # Pneumonia   # Acute Respiratory Infection   - CXR: Bilateral opacities compatible with pleural plaques.  - Patient was afebrile, monitor fevers   - +Leukocytosis 18k, trend WBC   - PRN Tylenol, Robitussin   - S/P Azithromycin and Rocephin in ER, will continue   - Follow up blood cultures   - ID Consult   - Procalcitonin in AM   - Incentive spirometry     # Ambulatory Difficulties   - Physical Therapy   - Physiatry     # Dysuria   - UA negative   - Follow up urine cultures   - Patient is receiving Rocephin for Pneumonia, which also covers UTI     # Essential Hypertension    - Monitor Vital Signs    - DASH diet   - Continue with Atenolol   - Holding Lisinopril for Creatinine of 1.4      # Mixed Hyperlipidemia  - DASH diet   - Continue with home Simvastatin     # Diabetes Mellitus  - Monitor Fingersticks  - Diabetic carbohydrate consistent diet   - Glyburide-Metformin is nonformulary, will give insulin sliding scale     # FILOMENA   - Likely Prerenal   - Gentle Intravenous Fluids   - Hold Lisinopril for now

## 2021-11-14 NOTE — H&P ADULT - ATTENDING COMMENTS
81 year old Male with past medical history of HTN, HLD, DM presenting to ED secondary to Acute URI symptoms and Dysambulatory status. HE reports he had been having symptoms for about a week; denies F/C, SOB, CP, palpitations. Does reports of dry cough. Patient was found to have Pneumonia on CXR. Patient was seen and examined at bedside.    PE:  Gen: NAD, elderly, pleasant  HEENT: AT, NC, EOMI, MMM  Resp: CTAb/l, no w/r/r  CVS: RRR, no m/r/g, no LE edema  Abd: soft, NT, ND, +BS  Neuro: AAOX3, no focal deficit  Psych: appropiate mood, normal affect    # leukocytosis, generalized weakness, cough possible 2/2 community acquired pneumonia   # Ambulatory Difficulties 2/2 debility in the setting of underlying infection  # Mild FILOMENA  # HTN, HLD  # DM 2  # Dysuria  - PRN Tylenol, Robitussin   - S/P Azithromycin and Rocephin in ER, will continue   - Follow up blood cultures   - ID Consult   - Procalcitonin in AM   - Incentive spirometry   - Physical Therapy; Physiatry   - Follow up urine cultures   - Continue with Atenolol   - Holding Lisinopril for Creatinine of 1.4    - Continue with home Simvastatin   - Monitor Fingersticks  - ISS    Patient was seen and examined at bedside. Case was discussed with PA, agree with his H&P and A/P

## 2021-11-15 NOTE — PHYSICAL THERAPY INITIAL EVALUATION ADULT - GENERAL OBSERVATIONS, REHAB EVAL
Chart reviewed. Patient available to be seen for physical therapy, confirmed with nurse. Patient encountered semi-reclined in bed, +IV, +condom catheter. Denies pain at rest and would like to walk with PT now.

## 2021-11-15 NOTE — PHYSICAL THERAPY INITIAL EVALUATION ADULT - ADDITIONAL COMMENTS
Per patient , resides with his wife in an apartment, with 4-5 steps outside to enter. Patient ambulates with use of rolling walker as needed.

## 2021-11-15 NOTE — PROGRESS NOTE ADULT - ASSESSMENT
Patient is a 81y old  Male with past medical history of HTN, HLD, DM, now presents to the ER  for evaluation of URI symptoms and non ambulatory status. Patient states he had a mechanical fall 2 weeks ago, s/p Splint in ER on 11/1, went home and has been having difficulty with ambulation even with walker as his legs give up. Patient also started to develop cough and sinus congestion along with rhinorrhea. Patient admits to dysuria but states it was resolved. On admission, he has no fever but Leukocytosis. The CXR shows no infiltrate. The ID consult requested to assist with further evaluation and antibiotic  management.    # Suspected pneumonia  # Leukocytosis  # S./p Fall    would recommend:    1. Follow up Blood cultures  Procalcitonin level,   2.  Please obtain MRSA PCR and Vitamin D level   3. CT chest and sinus for further evaluation of pneumonia and sinusitis  4. Follow up C and P ANCA  5. Monitor WBC count, started trending down  6. Continue Ceftriaxone for now     Attending Attestation:    Spent more than 45 minutes on total encounter, more than 50 % of the visit was spent counseling and/or coordinating care by the Attending physician.   Patient is a 81y old  Male with past medical history of HTN, HLD, DM, now presents to the ER  for evaluation of URI symptoms and non ambulatory status. Patient states he had a mechanical fall 2 weeks ago, s/p Splint in ER on 11/1, went home and has been having difficulty with ambulation even with walker as his legs give up. Patient also started to develop cough and sinus congestion along with rhinorrhea. Patient admits to dysuria but states it was resolved. On admission, he has no fever but Leukocytosis. The CXR shows no infiltrate. The ID consult requested to assist with further evaluation and antibiotic  management.    # Suspected pneumonia  # Leukocytosis - resolving   # S./p Fall    would recommend:    1. Follow up Blood culture and  Procalcitonin level,   2. Please obtain MRSA PCR and Vitamin D level   3. CT chest and sinus for further evaluation of pneumonia and sinusitis  4. Follow up C and P ANCA  5. Monitor WBC count, started trending down  6. Continue Ceftriaxone for now     Attending Attestation:    Spent more than 45 minutes on total encounter, more than 50 % of the visit was spent counseling and/or coordinating care by the Attending physician.

## 2021-11-15 NOTE — PHYSICAL THERAPY INITIAL EVALUATION ADULT - IMPAIRMENTS CONTRIBUTING IMPAIRED BED MOBILITY, REHAB EVAL
decreased endurance/impaired balance/decreased flexibility/impaired postural control/decreased strength

## 2021-11-15 NOTE — CONSULT NOTE ADULT - ASSESSMENT
IMPRESSION: Rehab of 82 y/o m  rehab  for  debility  GD  sp  fall      PRECAUTIONS: [  ] Cardiac  [  ] Respiratory  [  ] Seizures [  ] Contact Isolation  [  ] Droplet Isolation  [ FALL ] Other    Weight Bearing Status:     RECOMMENDATION:    Out of Bed to Chair     DVT/Decubiti Prophylaxis    REHAB PLAN:     [  xx ] Bedside P/T 3-5 times a week   [   ]   Bedside O/T  2-3 times a week             [   ] No Rehab Therapy Indicated                   [   ]  Speech Therapy   Conditioning/ROM                                    ADL  Bed Mobility                                               Conditioning/ROM  Transfers                                                     Bed Mobility  Sitting /Standing Balance                         Transfers                                        Gait Training                                               Sitting/Standing Balance  Stair Training [   ]Applicable                    Home equipment Eval                                                                        Splinting  [   ] Only      GOALS:   ADL   [  x ]   Independent                    Transfers  [ x  ] Independent                          Ambulation  [x   ] Independent     [x    ] With device                            [  x ]  CG                                                         [x   ]  CG                                                                  [ x ] CG                            [    ] Min A                                                   [   ] Min A                                                              [   ] Min  A          DISCHARGE PLAN:   [   ]  Good candidate for Intensive Rehabilitation/Hospital based-4A [facility]                                             Will tolerate 3hrs Intensive Rehab Daily                                       [ xx   ]  Short Term Rehab in Skilled Nursing Facility and  cont  iv  abx  and  current care                                        [    ]  Home with Outpatient or VN services                                         [    ]  Possible Candidate for Intensive Hospital based Rehab

## 2021-11-15 NOTE — PROGRESS NOTE ADULT - SUBJECTIVE AND OBJECTIVE BOX
KAYLAMIGUEL ANGEL  81y, Male  Allergy: No Known Allergies    Hospital Day: 1d    Patient seen and examined. No acute events overnight    PMH/PSH:  PAST MEDICAL & SURGICAL HISTORY:  Hypertension    Diabetes mellitus    VITALS:  T(F): 97.6 (11-15-21 @ 05:34), Max: 97.9 (21 @ 21:19)  HR: 84 (11-15-21 @ 05:34)  BP: 124/69 (11-15-21 @ 05:34) (121/76 - 149/72)  RR: 16 (11-15-21 @ 05:34)  SpO2: 96% (11-15-21 @ 07:49)    TESTS & MEASUREMENTS:  Weight (Kg): 87 (21 @ 14:54)  BMI (kg/m2): 31.9 ()    21 @ 07:01  -  11-15-21 @ 07:00  --------------------------------------------------------  IN: 0 mL / OUT: 150 mL / NET: -150 mL                        11.6   14.98 )-----------( 296      ( 15 Nov 2021 06:59 )             35.4     11-15    136  |  101  |  25<H>  ----------------------------<  101<H>  4.8   |  23  |  1.1    Ca    8.4<L>      15 Nov 2021 06:59    TPro  5.3<L>  /  Alb  2.7<L>  /  TBili  0.6  /  DBili  x   /  AST  125<H>  /  ALT  82<H>  /  AlkPhos  289<H>  11-15    LIVER FUNCTIONS - ( 15 Nov 2021 06:59 )  Alb: 2.7 g/dL / Pro: 5.3 g/dL / ALK PHOS: 289 U/L / ALT: 82 U/L / AST: 125 U/L / GGT: x           Urinalysis Basic - ( 2021 11:20 )    Color: Yellow / Appearance: Clear / S.020 / pH: x  Gluc: x / Ketone: Negative  / Bili: Negative / Urobili: 0.2 mg/dL   Blood: x / Protein: 30 mg/dL / Nitrite: Negative   Leuk Esterase: Negative / RBC: 1-2 /HPF / WBC x   Sq Epi: x / Non Sq Epi: Few /HPF / Bacteria: Moderate    RECENT DIAGNOSTIC ORDERS:  CT Chest No Cont: Routine   Indication: Assess for PNA  Transport: Stretcher-Crib  Exam Completed (11-15-21 @ 07:53)  Procalcitonin, Serum: AM Sched. Collection: 15-Nov-2021 04:30 (11-15-21 @ 00:23)  ANCA Reflex MPO and PROT3: AM Sched. Collection: 15-Nov-2021 04:30 (21 @ 21:17)  Neutrophil Cytoplasmic Antibody: AM Sched. Collection: 15-Nov-2021 04:30 (21 @ 21:17)  MRSA/MSSA PCR: Routine (21 @ 21:16)  Procalcitonin, Serum: AM Sched. Collection: 15-Nov-2021 04:30 (21 @ 21:16)  COVID-19 Boyd Domain Antibody: AM Sched. Collection: 15-Nov-2021 04:30 (21 @ 16:37)  COVID-19 Nucleocapsid Antibody: AM Sched. Collection: 15-Nov-2021 04:30 (21 @ 16:37)  Procalcitonin, Serum: AM Sched. Collection: 15-Nov-2021 04:30 (21 @ 14:25)  A1C with Estimated Average Glucose: AM Sched. Collection: 15-Nov-2021 04:30 (21 @ 14:25)  Diet, Regular:   Consistent Carbohydrate Evening Snack  DASH/TLC Sodium & Cholesterol Restricted (21 @ 14:25)    MEDICATIONS:  MEDICATIONS  (STANDING):  ATENolol  Tablet 50 milliGRAM(s) Oral daily  cefTRIAXone   IVPB 1000 milliGRAM(s) IV Intermittent every 24 hours  chlorhexidine 4% Liquid 1 Application(s) Topical <User Schedule>  dextrose 40% Gel 15 Gram(s) Oral once  dextrose 5%. 1000 milliLiter(s) (50 mL/Hr) IV Continuous <Continuous>  dextrose 5%. 1000 milliLiter(s) (100 mL/Hr) IV Continuous <Continuous>  dextrose 50% Injectable 25 Gram(s) IV Push once  dextrose 50% Injectable 12.5 Gram(s) IV Push once  dextrose 50% Injectable 25 Gram(s) IV Push once  glucagon  Injectable 1 milliGRAM(s) IntraMuscular once  heparin   Injectable 5000 Unit(s) SubCutaneous every 12 hours  insulin lispro (ADMELOG) corrective regimen sliding scale   SubCutaneous three times a day before meals  insulin lispro (ADMELOG) corrective regimen sliding scale   SubCutaneous at bedtime  simvastatin 40 milliGRAM(s) Oral at bedtime  sodium chloride 0.9%. 1000 milliLiter(s) (60 mL/Hr) IV Continuous <Continuous>    MEDICATIONS  (PRN):  acetaminophen     Tablet .. 650 milliGRAM(s) Oral every 6 hours PRN Temp greater or equal to 38C (100.4F), Mild Pain (1 - 3)  guaifenesin/dextromethorphan Oral Liquid 10 milliLiter(s) Oral every 4 hours PRN Cough    HOME MEDICATIONS:  atenolol 50 mg oral tablet ()  glyburide-metformin 2.5 mg-500 mg oral tablet ()  lisinopril 20 mg oral tablet ()  simvastatin 40 mg oral tablet ()    REVIEW OF SYSTEMS:  All other review of systems is negative unless indicated above.     PHYSICAL EXAM:  PHYSICAL EXAM:  GENERAL: NAD, well-developed  HEAD:  Atraumatic, Normocephalic  NECK: Supple, No JVD  CHEST/LUNG: Clear to auscultation bilaterally; No wheeze  HEART: Regular rate and rhythm; No murmurs, rubs, or gallops  ABDOMEN: Soft, Nontender, Nondistended; Bowel sounds present  EXTREMITIES:  2+ Peripheral Pulses, No clubbing, cyanosis, or edema  PSYCH: AAOx3  SKIN: No rashes or lesions

## 2021-11-15 NOTE — PHYSICAL THERAPY INITIAL EVALUATION ADULT - GAIT DEVIATIONS NOTED, PT EVAL
stooped posture, decreased heel strike / push off/decreased femi/increased time in double stance/decreased step length/decreased weight-shifting ability

## 2021-11-15 NOTE — PROGRESS NOTE ADULT - ASSESSMENT
81 year old Male with past medical history of HTN, HLD, DM presenting to ED secondary to Acute URI symptoms and Dysambulatory status. Patient states he had a mechanical fall 2 weeks ago, s/p Splint in ER on 11/1. Patient went home and has been having difficulty with ambulation even with walker as his legs give up.    Cough/Congestion  Likely due to URI, acute sinusitis  Does not meet sepsis criteria, sepsis ruled out  CT Chest (11/15): Extensive bilateral partially calcified pleural plaques and adjacent bibasilar round atelectasis, unchanged since 2013. No consolidation noted  CT SInuses (11/15): Findings suggesting acute sinusitis  ID following  - Cont ceftriaxone 1g q24h  - f/u BCx, UCx, MRSA PCR, procalcitonin  - f/u [facility] P Anca 81 year old Male with past medical history of HTN, HLD, DM presenting to ED secondary to Acute URI symptoms and Dysambulatory status. Patient states he had a mechanical fall 2 weeks ago, s/p Splint in ER on 11/1. Patient went home and has been having difficulty with ambulation even with walker as his legs give up.    Cough/Congestion  Likely due to URI, acute sinusitis  Does not meet sepsis criteria, sepsis ruled out  CT Chest (11/15): Extensive bilateral partially calcified pleural plaques and adjacent bibasilar round atelectasis, unchanged since 2013. No consolidation noted  CT SInuses (11/15): Findings suggesting acute sinusitis  ID following  - Cont ceftriaxone 1g q24h  - f/u BCx, UCx, MRSA PCR, procalcitonin  - f/u [facility]    #HTN/HLD  - Cont atenolol 50mg qD  - Cont simvastatin 40mg qHs    #DM2  - Cont ISS  - Monitor FS    DVT PPX, heparin   81 year old Male with past medical history of HTN, HLD, DM presenting to ED secondary to Acute URI symptoms and Dysambulatory status. Patient states he had a mechanical fall 2 weeks ago, s/p Splint in ER on 11/1. Patient went home and has been having difficulty with ambulation even with walker as his legs give up.    Cough/Congestion  Likely due to URI  Does not meet sepsis criteria, sepsis ruled out  CT Chest (11/15): Extensive bilateral partially calcified pleural plaques and adjacent bibasilar round atelectasis, unchanged since 2013. No consolidation noted  CT SInuses (11/15): No findings of acute sinusitis  ID following  - Cont ceftriaxone 1g q24h  - f/u BCx, UCx, MRSA PCR, procalcitonin  - f/u C and P Anca    #HTN/HLD  - Cont atenolol 50mg qD  - Cont simvastatin 40mg qHs    #DM2  - Cont ISS  - Monitor FS    DVT PPX, heparin

## 2021-11-15 NOTE — PROGRESS NOTE ADULT - SUBJECTIVE AND OBJECTIVE BOX
Patient is seen and examined at the bed side, is afebrile.      REVIEW OF SYSTEMS: All other review systems are negative      ALLERGIES: No Known Allergies      Vital Signs Last 24 Hrs  T(C): 36.4 (15 Nov 2021 05:34), Max: 36.6 (2021 21:19)  T(F): 97.6 (15 Nov 2021 05:34), Max: 97.9 (2021 21:19)  HR: 84 (15 Nov 2021 05:34) (82 - 85)  BP: 124/69 (15 Nov 2021 05:34) (121/76 - 149/72)  BP(mean): --  RR: 16 (15 Nov 2021 05:34) (16 - 16)  SpO2: 96% (15 Nov 2021 07:49) (95% - 96%)      PHYSICAL EXAM:  GENERAL: Not in distress   CHEST/LUNG: Not using accessory muscles   HEART: s1 and s2 present  ABDOMEN:  Nontender and  Nondistended  EXTREMITIES: No pedal  edema  CNS: Awake and Alert      LABS:                        11.6   14.98 )-----------( 296      ( 15 Nov 2021 06:59 )             35.4                           13.5   18.14 )-----------( 338      ( 2021 10:00 )             41.2       11-15    136  |  101  |  25<H>  ----------------------------<  101<H>  4.8   |  23  |  1.1    Ca    8.4<L>      15 Nov 2021 06:59    TPro  5.3<L>  /  Alb  2.7<L>  /  TBili  0.6  /  DBili  x   /  AST  125<H>  /  ALT  82<H>  /  AlkPhos  289<H>  11-15    11-14    135  |  97<L>  |  32<H>  ----------------------------<  91  5.1<H>   |  25  |  1.4    Ca    9.0      2021 10:00    TPro  6.3  /  Alb  3.2<L>  /  TBili  0.7  /  DBili  x   /  AST  120<H>  /  ALT  91<H>  /  AlkPhos  318<H>        CAPILLARY BLOOD GLUCOSE  POCT Blood Glucose.: 70 mg/dL (2021 20:52)  POCT Blood Glucose.: 86 mg/dL (2021 16:52)      Urinalysis Basic - ( 2021 11:20 )  Color: Yellow / Appearance: Clear / S.020 / pH: x  Gluc: x / Ketone: Negative  / Bili: Negative / Urobili: 0.2 mg/dL   Blood: x / Protein: 30 mg/dL / Nitrite: Negative   Leuk Esterase: Negative / RBC: 1-2 /HPF / WBC x   Sq Epi: x / Non Sq Epi: Few /HPF / Bacteria: Moderate        MEDICATIONS  (STANDING):    ATENolol  Tablet 50 milliGRAM(s) Oral daily  cefTRIAXone   IVPB 1000 milliGRAM(s) IV Intermittent every 24 hours  chlorhexidine 4% Liquid 1 Application(s) Topical <User Schedule>  glucagon  Injectable 1 milliGRAM(s) IntraMuscular once  heparin   Injectable 5000 Unit(s) SubCutaneous every 12 hours  insulin lispro (ADMELOG) corrective regimen sliding scale   SubCutaneous three times a day before meals  insulin lispro (ADMELOG) corrective regimen sliding scale   SubCutaneous at bedtime  simvastatin 40 milliGRAM(s) Oral at bedtime  sodium chloride 0.9%. 1000 milliLiter(s) (60 mL/Hr) IV Continuous <Continuous>      RADIOLOGY & ADDITIONAL TESTS:    < from: Xray Chest 1 View-PORTABLE IMMEDIATE (Xray Chest 1 View-PORTABLE IMMEDIATE .) (21 @ 10:36) >  Bilateral opacities compatible with pleural plaques. These are without significant change when compared to chest radiograph 2008 given differences in technique. Given overlying pleural plaques, evaluation is limited on this single frontal chest radiograph.      MICROBIOLOGY DATA:    Culture - Urine (21 @ 11:20)   Specimen Source: Clean Catch Clean Catch (Midstream)   Culture Results:   <10,000 CFU/mL Normal Urogenital Keiko       Respiratory Viral Panel with COVID-19 by SOFIA (21 @ 09:55)   Rapid RVP Result: Yohantearis   SARS-CoV-2: NotDetec: T           Patient is seen and examined at the bed side, is afebrile. The Leukocytosis is trending down.       REVIEW OF SYSTEMS: All other review systems are negative      ALLERGIES: No Known Allergies      Vital Signs Last 24 Hrs  T(C): 36.4 (15 Nov 2021 05:34), Max: 36.6 (2021 21:19)  T(F): 97.6 (15 Nov 2021 05:34), Max: 97.9 (2021 21:19)  HR: 84 (15 Nov 2021 05:34) (82 - 85)  BP: 124/69 (15 Nov 2021 05:34) (121/76 - 149/72)  BP(mean): --  RR: 16 (15 Nov 2021 05:34) (16 - 16)  SpO2: 96% (15 Nov 2021 07:49) (95% - 96%)      PHYSICAL EXAM:  GENERAL: Not in distress   CHEST/LUNG: Not using accessory muscles   HEART: s1 and s2 present  ABDOMEN:  Nontender and  Nondistended  EXTREMITIES: No pedal  edema  CNS: Awake and Alert      LABS:                        11.6   14.98 )-----------( 296      ( 15 Nov 2021 06:59 )             35.4                           13.5   18.14 )-----------( 338      ( 2021 10:00 )             41.2       11-15    136  |  101  |  25<H>  ----------------------------<  101<H>  4.8   |  23  |  1.1    Ca    8.4<L>      15 Nov 2021 06:59    TPro  5.3<L>  /  Alb  2.7<L>  /  TBili  0.6  /  DBili  x   /  AST  125<H>  /  ALT  82<H>  /  AlkPhos  289<H>  11-15    11-14    135  |  97<L>  |  32<H>  ----------------------------<  91  5.1<H>   |  25  |  1.4    Ca    9.0      2021 10:00    TPro  6.3  /  Alb  3.2<L>  /  TBili  0.7  /  DBili  x   /  AST  120<H>  /  ALT  91<H>  /  AlkPhos  318<H>        CAPILLARY BLOOD GLUCOSE  POCT Blood Glucose.: 70 mg/dL (2021 20:52)  POCT Blood Glucose.: 86 mg/dL (2021 16:52)      Urinalysis Basic - ( 2021 11:20 )  Color: Yellow / Appearance: Clear / S.020 / pH: x  Gluc: x / Ketone: Negative  / Bili: Negative / Urobili: 0.2 mg/dL   Blood: x / Protein: 30 mg/dL / Nitrite: Negative   Leuk Esterase: Negative / RBC: 1-2 /HPF / WBC x   Sq Epi: x / Non Sq Epi: Few /HPF / Bacteria: Moderate        MEDICATIONS  (STANDING):    ATENolol  Tablet 50 milliGRAM(s) Oral daily  cefTRIAXone   IVPB 1000 milliGRAM(s) IV Intermittent every 24 hours  chlorhexidine 4% Liquid 1 Application(s) Topical <User Schedule>  glucagon  Injectable 1 milliGRAM(s) IntraMuscular once  heparin   Injectable 5000 Unit(s) SubCutaneous every 12 hours  insulin lispro (ADMELOG) corrective regimen sliding scale   SubCutaneous three times a day before meals  insulin lispro (ADMELOG) corrective regimen sliding scale   SubCutaneous at bedtime  simvastatin 40 milliGRAM(s) Oral at bedtime  sodium chloride 0.9%. 1000 milliLiter(s) (60 mL/Hr) IV Continuous <Continuous>      RADIOLOGY & ADDITIONAL TESTS:    < from: Xray Chest 1 View-PORTABLE IMMEDIATE (Xray Chest 1 View-PORTABLE IMMEDIATE .) (21 @ 10:36) >  Bilateral opacities compatible with pleural plaques. These are without significant change when compared to chest radiograph 2008 given differences in technique. Given overlying pleural plaques, evaluation is limited on this single frontal chest radiograph.      MICROBIOLOGY DATA:    Culture - Urine (21 @ 11:20)   Specimen Source: Clean Catch Clean Catch (Midstream)   Culture Results:   <10,000 CFU/mL Normal Urogenital Keiko       Respiratory Viral Panel with COVID-19 by SOFIA (21 @ 09:55)   Rapid RVP Result: Yohantearis   SARS-CoV-2: NotDetec: T

## 2021-11-16 NOTE — DISCHARGE NOTE PROVIDER - PROVIDER TOKENS
PROVIDER:[TOKEN:[[id number]],FOLLOWUP:[2 weeks]] PROVIDER:[TOKEN:[[id number]],FOLLOWUP:[2 weeks]],FREE:[LAST:[Gastroenterology],PHONE:[(   )    -],FAX:[(   )    -],ADDRESS:[[address] [place] [address]  Phone: [phone]  Fax: [phone]]],PROVIDER:[TOKEN:[[id number]],FOLLOWUP:[1 week]]

## 2021-11-16 NOTE — DISCHARGE NOTE PROVIDER - NSDCFUADDINST_GEN_ALL_CORE_FT
-follow up with your pcp  -follow up with gastroenterology for transaminitis  -follow up with surgery for cholelithiasis  Patient was given Sanjeev and Sanjeev booster vaccine today 11/26/2021. Patient was consented for the vaccine.  ----------------------------------------------------------------------------------  -follow up with your pcp  -follow up with gastroenterology for transaminitis  -follow up with surgery for cholelithiasis

## 2021-11-16 NOTE — DISCHARGE NOTE PROVIDER - CARE PROVIDER_API CALL
Blanca Viramontes (DO)  Internal Medicine  [address] [place] [address]  Phone: [phone]  Fax: [phone]  Follow Up Time: 2 weeks   Blanca Viramontes (DO)  Internal Medicine  [address] [place] [address]  Phone: [phone]  Fax: [phone]  Follow Up Time: 2 weeks    Gastroenterology,   [address] [place] [address]  Phone: [phone]  Fax: [phone]  Phone: (   )    -  Fax: (   )    -  Follow Up Time:     Edmundo Hirsch)  ColonRectal Surgery; Surgery  [address] [facility], [address] [place] [address]  Phone: [phone]  Fax: [phone]  Follow Up Time: 1 week

## 2021-11-16 NOTE — DISCHARGE NOTE PROVIDER - NSDCCPCAREPLAN_GEN_ALL_CORE_FT
PRINCIPAL DISCHARGE DIAGNOSIS  Diagnosis: Upper respiratory infection  Assessment and Plan of Treatment: Continue course of antibiotics per medication rec. Follow up with PCP within 2 weeks of discharge.      SECONDARY DISCHARGE DIAGNOSES  Diagnosis: Weakness  Assessment and Plan of Treatment: Continue PT at short term rehab     PRINCIPAL DISCHARGE DIAGNOSIS  Diagnosis: Upper respiratory infection  Assessment and Plan of Treatment: Continue course of antibiotics per medication rec.   Follow up with PCP within 2 weeks of discharge.      SECONDARY DISCHARGE DIAGNOSES  Diagnosis: Weakness  Assessment and Plan of Treatment: Continue PT at short term rehab    Diagnosis: Leukocytosis  Assessment and Plan of Treatment: resolved, treated with IV anitbiotics  take antibiotics as prescribed  follow up with your PCP    Diagnosis: Transaminitis  Assessment and Plan of Treatment: follow up with Gastroenterology as oupt    Diagnosis: Cholelithiasis  Assessment and Plan of Treatment: -follow up with surgery as oupt

## 2021-11-16 NOTE — PROGRESS NOTE ADULT - ASSESSMENT
Patient is a 81y old  Male with past medical history of HTN, HLD, DM, now presents to the ER  for evaluation of URI symptoms and non ambulatory status. Patient states he had a mechanical fall 2 weeks ago, s/p Splint in ER on 11/1, went home and has been having difficulty with ambulation even with walker as his legs give up. Patient also started to develop cough and sinus congestion along with rhinorrhea. Patient admits to dysuria but states it was resolved. On admission, he has no fever but Leukocytosis. The CXR shows no infiltrate. The ID consult requested to assist with further evaluation and antibiotic  management.    # Suspected pneumonia  - CT Chest No Cont (11.15.21 @ 08:58):  No CT evidence for acute intrathoracic disease. 2. Extensive bilateral partially calcified pleural plaques and adjacent bibasilar round atelectasis, unchanged since 2013. 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  - CT Sinuses No Cont (11.15.21 @ 08:56):  Trace scattered paranasal sinus mucosal thickening. No air-fluid levelto suggest acute sinusitis. Severe deviation of the osseous nasal septum to the left with a 4 mm left-sided septal spur impinging the left nasal cavity. Right-sided lula bullosa.  Left maxillary periapical lucencies. Recommend follow-up with dental exam.  - RVP negative  - Blood Cx 11/14 NG  - Urine Cx 11/14 NG  - Procalcitonin, Serum: 0.22 (11.15.21 @ 06:59)    #Transaminitis   # Leukocytosis - resolving   # S./p Fall    Recommendations  This is a preliminary incomplete pended note, all final recommendations to follow after interview and examination of the patient.    Please call or message on [employer] Teams if with any questions.  Spectra [phone]

## 2021-11-16 NOTE — PROGRESS NOTE ADULT - SUBJECTIVE AND OBJECTIVE BOX
KAYLAMIGUEL ANGEL WILSON  81y, Male  Allergy: No Known Allergies    Hospital Day: 2d    Patient seen and examined. No acute events overnight    PMH/PSH:  PAST MEDICAL & SURGICAL HISTORY:  Hypertension    Diabetes mellitus    VITALS:  T(F): 96.3 (11-16-21 @ 14:00), Max: 99.6 (11-15-21 @ 21:00)  HR: 75 (11-16-21 @ 14:00)  BP: 149/68 (11-16-21 @ 14:00) (135/66 - 151/67)  RR: 16 (11-16-21 @ 14:00)  SpO2: --    TESTS & MEASUREMENTS:  Weight (Kg):   BMI (kg/m2): 31.9 (11-14)    11-14-21 @ 07:01  -  11-15-21 @ 07:00  --------------------------------------------------------  IN: 0 mL / OUT: 150 mL / NET: -150 mL    11-15-21 @ 07:01  -  11-16-21 @ 07:00  --------------------------------------------------------  IN: 650 mL / OUT: 400 mL / NET: 250 mL    11-16-21 @ 07:01  -  11-16-21 @ 16:05  --------------------------------------------------------  IN: 0 mL / OUT: 500 mL / NET: -500 mL                       11.6   14.29 )-----------( 269      ( 16 Nov 2021 06:25 )             35.2     11-16    135  |  100  |  25<H>  ----------------------------<  187<H>  4.8   |  22  |  1.1    Ca    7.9<L>      16 Nov 2021 06:25    TPro  5.3<L>  /  Alb  2.7<L>  /  TBili  0.6  /  DBili  x   /  AST  125<H>  /  ALT  82<H>  /  AlkPhos  289<H>  11-15    LIVER FUNCTIONS - ( 15 Nov 2021 06:59 )  Alb: 2.7 g/dL / Pro: 5.3 g/dL / ALK PHOS: 289 U/L / ALT: 82 U/L / AST: 125 U/L / GGT: x           Culture - Blood (collected 11-14-21 @ 11:20)  Source: .Blood Blood  Preliminary Report (11-15-21 @ 19:02):    No growth to date.    Culture - Blood (collected 11-14-21 @ 11:20)  Source: .Blood Blood  Preliminary Report (11-15-21 @ 19:02):    No growth to date.    Culture - Urine (collected 11-14-21 @ 11:20)  Source: Clean Catch Clean Catch (Midstream)  Final Report (11-15-21 @ 13:37):    <10,000 CFU/mL Normal Urogenital Keiko    MEDICATIONS:  MEDICATIONS  (STANDING):  ATENolol  Tablet 50 milliGRAM(s) Oral daily  cefTRIAXone   IVPB 1000 milliGRAM(s) IV Intermittent every 24 hours  chlorhexidine 4% Liquid 1 Application(s) Topical <User Schedule>  dextrose 40% Gel 15 Gram(s) Oral once  dextrose 5%. 1000 milliLiter(s) (50 mL/Hr) IV Continuous <Continuous>  dextrose 5%. 1000 milliLiter(s) (100 mL/Hr) IV Continuous <Continuous>  dextrose 50% Injectable 25 Gram(s) IV Push once  dextrose 50% Injectable 12.5 Gram(s) IV Push once  dextrose 50% Injectable 25 Gram(s) IV Push once  glucagon  Injectable 1 milliGRAM(s) IntraMuscular once  heparin   Injectable 5000 Unit(s) SubCutaneous every 12 hours  insulin lispro (ADMELOG) corrective regimen sliding scale   SubCutaneous three times a day before meals  insulin lispro (ADMELOG) corrective regimen sliding scale   SubCutaneous at bedtime  simvastatin 40 milliGRAM(s) Oral at bedtime    MEDICATIONS  (PRN):  acetaminophen     Tablet .. 650 milliGRAM(s) Oral every 6 hours PRN Temp greater or equal to 38C (100.4F), Mild Pain (1 - 3)  guaifenesin/dextromethorphan Oral Liquid 10 milliLiter(s) Oral every 4 hours PRN Cough    HOME MEDICATIONS:  atenolol 50 mg oral tablet (11-14)  glyburide-metformin 2.5 mg-500 mg oral tablet (11-14)  lisinopril 20 mg oral tablet (11-14)  simvastatin 40 mg oral tablet (11-14)    REVIEW OF SYSTEMS:  All other review of systems is negative unless indicated above.     PHYSICAL EXAM:  PHYSICAL EXAM:  GENERAL: NAD, well-developed  HEAD:  Atraumatic, Normocephalic  NECK: Supple, No JVD  CHEST/LUNG: Clear to auscultation bilaterally; No wheeze  HEART: Regular rate and rhythm; No murmurs, rubs, or gallops  ABDOMEN: Soft, Nontender, Nondistended; Bowel sounds present  EXTREMITIES:  2+ Peripheral Pulses, No clubbing, cyanosis, or edema  PSYCH: AAOx3  SKIN: No rashes or lesions

## 2021-11-16 NOTE — PROGRESS NOTE ADULT - SUBJECTIVE AND OBJECTIVE BOX
MIGUEL ANGEL GARZA  81y, Male  Allergy: No Known Allergies      LOS  2d    CHIEF COMPLAINT: Pneumonia (15 Nov 2021 13:36)      INTERVAL EVENTS/HPI  - No acute events overnight  - T(F): , Max: 99.6 (11-15-21 @ 21:00)  - Denies any worsening symptoms  - Tolerating medication  - WBC Count: 14.29 (11-16-21 @ 06:25)  WBC Count: 14.98 (11-15-21 @ 06:59)     - Creatinine, Serum: 1.1 (11-16-21 @ 06:25)  Creatinine, Serum: 1.1 (11-15-21 @ 06:59)       ROS  General: Denies rigors, nightsweats  HEENT: Denies headache, rhinorrhea, sore throat, eye pain  CV: Denies CP, palpitations  PULM: Denies wheezing, hemoptysis  GI: Denies hematemesis, hematochezia, melena  : Denies discharge, hematuria  MSK: Denies arthralgias, myalgias  SKIN: Denies rash, lesions  NEURO: Denies paresthesias, weakness  PSYCH: Denies depression, anxiety    VITALS:  T(F): 98.9, Max: 99.6 (11-15-21 @ 21:00)  HR: 91  BP: 135/66  RR: 16Vital Signs Last 24 Hrs  T(C): 37.2 (16 Nov 2021 05:46), Max: 37.6 (15 Nov 2021 21:00)  T(F): 98.9 (16 Nov 2021 05:46), Max: 99.6 (15 Nov 2021 21:00)  HR: 91 (16 Nov 2021 05:46) (77 - 91)  BP: 135/66 (16 Nov 2021 05:46) (135/66 - 151/67)  BP(mean): --  RR: 16 (16 Nov 2021 05:46) (16 - 156)  SpO2: --    PHYSICAL EXAM:  Gen: NAD, resting in bed  HEENT: Normocephalic, atraumatic  Neck: supple, no lymphadenopathy  CV: Regular rate & regular rhythm  Lungs: decreased BS at bases, no fremitus  Abdomen: Soft, BS present  Ext: Warm, well perfused  Neuro: non focal, awake  Skin: no rash, no erythema  Lines: no phlebitis    FH: Non-contributory  Social Hx: Non-contributory    TESTS & MEASUREMENTS:                        11.6   14.29 )-----------( 269      ( 16 Nov 2021 06:25 )             35.2     11-16    135  |  100  |  25<H>  ----------------------------<  187<H>  4.8   |  22  |  1.1    Ca    7.9<L>      16 Nov 2021 06:25    TPro  5.3<L>  /  Alb  2.7<L>  /  TBili  0.6  /  DBili  x   /  AST  125<H>  /  ALT  82<H>  /  AlkPhos  289<H>  11-15    eGFR if : 72 mL/min/1.73M2 (11-16-21 @ 06:25)  eGFR if Non African American: 62 mL/min/1.73M2 (11-16-21 @ 06:25)    LIVER FUNCTIONS - ( 15 Nov 2021 06:59 )  Alb: 2.7 g/dL / Pro: 5.3 g/dL / ALK PHOS: 289 U/L / ALT: 82 U/L / AST: 125 U/L / GGT: x               Culture - Blood (collected 11-14-21 @ 11:20)  Source: .Blood Blood  Preliminary Report (11-15-21 @ 19:02):    No growth to date.    Culture - Blood (collected 11-14-21 @ 11:20)  Source: .Blood Blood  Preliminary Report (11-15-21 @ 19:02):    No growth to date.    Culture - Urine (collected 11-14-21 @ 11:20)  Source: Clean Catch Clean Catch (Midstream)  Final Report (11-15-21 @ 13:37):    <10,000 CFU/mL Normal Urogenital Keiko            INFECTIOUS DISEASES TESTING  MRSA PCR Result.: Negative (11-15-21 @ 13:30)  Procalcitonin, Serum: 0.22 (11-15-21 @ 06:59)  Rapid RVP Result: NotDetec (11-14-21 @ 09:55)      INFLAMMATORY MARKERS      RADIOLOGY & ADDITIONAL TESTS:  I have personally reviewed the last available Chest xray  CXR      CT  CT Chest No Cont:   EXAM:  CT CHEST            PROCEDURE DATE:  11/15/2021            INTERPRETATION:  CLINICAL INDICATION: Evaluate for pneumonia    TECHNIQUE:  A noncontrast CT of the thorax was performed. Sagittal and coronal reformats were performed as well as MIPreconstructions.    COMPARISON: CT chest dated 5/31/2013    INTERPRETATION:    AIRWAYS, LUNGS AND PLEURA: The central tracheobronchial tree is patent. Stable scattered subcentimeter left lung pulmonary nodules. Extensive bilateral partially calcifiedpleural plaques with stable adjacent areas of bibasilar round atelectasis. No confluent consolidation.  There is no pleural effusion. There is no pneumothorax.    MEDIASTINUM: There are no enlarged mediastinal, hilar or axillary lymph nodes. The visualized portion of the thyroid gland is unremarkable.    HEART AND VESSELS: The heart is normal in size. Aortic valvular calcifications. There are aortic and coronary artery calcifications. The thoracic aorta has a normal caliber. There is no pericardial effusion.    UPPER ABDOMEN: Images of the upper abdomen demonstrate no abnormality.    BONES AND SOFT TISSUES: There are degenerative changes of the spine.  The soft tissues are unremarkable.    TUBES AND LINES: None.    IMPRESSION:    1. No CT evidence for acute intrathoracic disease.    2. Extensive bilateral partially calcified pleural plaques and adjacent bibasilar round atelectasis, unchanged since 2013. 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    --- End of Report ---              NYDIA NINO MD; Attending Radiologist  This document has been electronically signed. Nov 15 2021 12:24PM (11-15-21 @ 08:58)      CARDIOLOGY TESTING  12 Lead ECG:   Ventricular Rate 83 BPM    Atrial Rate 83 BPM    P-R Interval 154 ms    QRS Duration 76 ms    Q-T Interval 344 ms    QTC Calculation(Bazett) 404 ms    P Axis 41 degrees    R Axis 25 degrees    T Axis 23 degrees    Diagnosis Line Normal sinus rhythm  Nonspecific ST-T changes    Confirmed by MIGUEL ANGEL PALUMBO MD ([id number]) on 11/15/2021 1:58:29 PM (11-14-21 @ 10:53)      MEDICATIONS  ATENolol  Tablet 50 Oral daily  cefTRIAXone   IVPB 1000 IV Intermittent every 24 hours  chlorhexidine 4% Liquid 1 Topical <User Schedule>  dextrose 40% Gel 15 Oral once  dextrose 5%. 1000 IV Continuous <Continuous>  dextrose 5%. 1000 IV Continuous <Continuous>  dextrose 50% Injectable 25 IV Push once  dextrose 50% Injectable 12.5 IV Push once  dextrose 50% Injectable 25 IV Push once  glucagon  Injectable 1 IntraMuscular once  heparin   Injectable 5000 SubCutaneous every 12 hours  insulin lispro (ADMELOG) corrective regimen sliding scale  SubCutaneous three times a day before meals  insulin lispro (ADMELOG) corrective regimen sliding scale  SubCutaneous at bedtime  simvastatin 40 Oral at bedtime  sodium chloride 0.9%. 1000 IV Continuous <Continuous>      WEIGHT  Weight (kg): 87 (11-14-21 @ 14:54)  Creatinine, Serum: 1.1 mg/dL (11-16-21 @ 06:25)      ANTIBIOTICS:  cefTRIAXone   IVPB 1000 milliGRAM(s) IV Intermittent every 24 hours      All available historical records have been reviewed

## 2021-11-16 NOTE — DISCHARGE NOTE PROVIDER - NSDCMRMEDTOKEN_GEN_ALL_CORE_FT
atenolol 50 mg oral tablet: 1 tab(s) orally once a day  glyburide-metformin 2.5 mg-500 mg oral tablet: 1 tab(s) orally once a day  lisinopril 20 mg oral tablet: 1 tab(s) orally once a day  simvastatin 40 mg oral tablet: 1 tab(s) orally once a day (at bedtime)   amoxicillin-clavulanate 875 mg-125 mg oral tablet: 1 tab(s) orally 2 times a day until 11/27/21  atenolol 50 mg oral tablet: 1 tab(s) orally once a day  glyburide-metformin 2.5 mg-500 mg oral tablet: 1 tab(s) orally once a day  lisinopril 20 mg oral tablet: 1 tab(s) orally once a day  simvastatin 40 mg oral tablet: 1 tab(s) orally once a day (at bedtime)

## 2021-11-16 NOTE — DISCHARGE NOTE PROVIDER - HOSPITAL COURSE
81 year old Male with past medical history of HTN, HLD, DM presenting to ED secondary to Acute URI symptoms and Dysambulatory status. Patient states he had a mechanical fall 2 weeks ago, s/p Splint in ER on 11/1. Patient went home and has been having difficulty with ambulation even with walker as his legs give up. 81 year old Male with past medical history of HTN, HLD, DM presenting to ED secondary to Acute URI symptoms and Dysambulatory status.   Patient states he had a mechanical fall 2 weeks ago, s/p Splint in ER on 11/1.   Patient went home and has been having difficulty with ambulation even with walker as his legs give up.  Patient also started to develop cough and sinus congestion along with rhinorrhea.   - CT Sinuses No Cont (11.15.21 @ 08:56):  Trace scattered paranasal sinus mucosal thickening. No air-fluid level to suggest acute sinusitis. Severe deviation of the osseous nasal septum to the left with a 4 mm left-sided septal spur impinging the left nasal cavity. Right-sided lula bullosa. Left maxillary periapical lucencies.   Pt to follow up with his dentist as oupt.  Pt with  Leucocytosis -persistent; episode of isolated fever on 11/21 ,  possibly related to asymptomatic acute cholecystitis  HIDA concerning for cholecystitis; surgeon following- recommended conservative management  - IR consulted and case discussed for cholecystostomy; no need for intervention  Pt was treated with IV abx , seen by infection disease, IV  ceftriaxone  changed to unasyn 3g q 6 on 11/17--> now on Augmentin until 11/27/21.   Pt with diarrhea episode on 11/21- possibly  antibiotics related- resolved  - C diff - neg; culture- no enteric pathogen  PT with Transaminitis - stable; Drug induced injury vs cholecystitis,  GGT elevated; RUQ US showing cholelithiasis; no bile duct dialatation, P-ANCA and C-ANCA- neg  - TAWANDA, AMA, Sm Ab- pending; Pt seen by GI to follow up as oupt   Follow up with your PCP 81 year old Male with past medical history of HTN, HLD, DM presenting to ED secondary to Acute URI symptoms and Dysambulatory status.   Patient states he had a mechanical fall 2 weeks ago, s/p Splint in ER on 11/1.   Patient went home and has been having difficulty with ambulation even with walker as his legs give up.  Patient also started to develop cough and sinus congestion along with rhinorrhea.   - CT Sinuses No Cont (11.15.21 @ 08:56):  Trace scattered paranasal sinus mucosal thickening. No air-fluid level to suggest acute sinusitis. Severe deviation of the osseous nasal septum to the left with a 4 mm left-sided septal spur impinging the left nasal cavity. Right-sided lula bullosa. Left maxillary periapical lucencies.   Pt to follow up with his dentist as oupt.  Pt with  Leucocytosis -persistent; episode of isolated fever on 11/21 ,  possibly related to asymptomatic acute cholecystitis  HIDA concerning for cholecystitis; surgeon following- recommended conservative management  - IR consulted and case discussed for cholecystostomy; no need for intervention  Pt was treated with IV abx , seen by infection disease, IV  ceftriaxone  changed to unasyn 3g q 6 on 11/17--> now on Augmentin until 11/27/21.   Pt with diarrhea episode on 11/21- possibly  antibiotics related- resolved  - C diff - neg; culture- no enteric pathogen  PT with Transaminitis - stable; Drug induced injury vs cholecystitis,  GGT elevated; RUQ US showing cholelithiasis; no bile duct dialatation, P-ANCA and C-ANCA- neg  - TAWANDA, AMA, Sm Ab- pending; Pt seen by GI to follow up as oupt   Follow up with your PCP     Patient was given Sanjeev and Sanjeev booster vaccine today 11/26/2021. Patient was consented for the vaccine.

## 2021-11-16 NOTE — PROGRESS NOTE ADULT - ASSESSMENT
81 year old Male with past medical history of HTN, HLD, DM presenting to ED secondary to Acute URI symptoms and Dysambulatory status. Patient states he had a mechanical fall 2 weeks ago, s/p Splint in ER on 11/1. Patient went home and has been having difficulty with ambulation even with walker as his legs give up.    Cough/Congestion  Debility  Likely due to URI  Does not meet sepsis criteria, sepsis ruled out  CT Chest (11/15): Extensive bilateral partially calcified pleural plaques and adjacent bibasilar round atelectasis, unchanged since 2013. No consolidation noted  CT SInuses (11/15): No findings of acute sinusitis  ID following  BCX UCX (11/15): NGTD  Procalcitonin 0.22  - Cont ceftriaxone 1g q24h  - f/u C and P Anca  - DC planning to STR    #HTN/HLD  - Cont atenolol 50mg qD  - Cont simvastatin 40mg qHs    #DM2  - Cont ISS  - Monitor FS    DVT PPX, heparin    #Progress Note Handoff  Pending (specify): Trending WBC, DC planning to STR in 24h  Family discussion: d/w pt regarding DC to STR  Disposition: STR   81 year old Male with past medical history of HTN, HLD, DM presenting to ED secondary to Acute URI symptoms and Dysambulatory status. Patient states he had a mechanical fall 2 weeks ago, s/p Splint in ER on 11/1. Patient went home and has been having difficulty with ambulation even with walker as his legs give up.    Cough/Congestion  Debility  Likely due to URI  Does not meet sepsis criteria, sepsis ruled out  CT Chest (11/15): Extensive bilateral partially calcified pleural plaques and adjacent bibasilar round atelectasis, unchanged since 2013. No consolidation noted  CT SInuses (11/15): No findings of acute sinusitis  ID following  BCX UCX (11/15): NGTD  Procalcitonin 0.22  - Cont ceftriaxone 1g q24h  - ID f/u for DC antibiotics  - f/u C and P Anca  - DC planning to STR    #HTN/HLD  - Cont atenolol 50mg qD  - Cont simvastatin 40mg qHs    #DM2  - Cont ISS  - Monitor FS    DVT PPX, heparin    #Progress Note Handoff  Pending (specify): Trending WBC, DC planning to STR in 24h  Family discussion: d/w pt regarding DC to STR  Disposition: STR

## 2021-11-17 NOTE — PROGRESS NOTE ADULT - SUBJECTIVE AND OBJECTIVE BOX
MIGUEL ANGEL GARZA  81y, Male  Allergy: No Known Allergies      LOS  3d    CHIEF COMPLAINT: Pneumonia (16 Nov 2021 17:23)      INTERVAL EVENTS/HPI  - No acute events overnight  - T(F): , Max: 97.5 (11-16-21 @ 20:27)  - WBC remains elevated  - denies any abdominal pain   - WBC Count: 14.85 (11-17-21 @ 07:14)  WBC Count: 14.29 (11-16-21 @ 06:25)     - Creatinine, Serum: 1.0 (11-17-21 @ 07:14)  Creatinine, Serum: 1.1 (11-16-21 @ 06:25)       ROS  General: Denies rigors, nightsweats  HEENT: Denies headache, rhinorrhea, sore throat, eye pain  CV: Denies CP, palpitations  PULM: Denies wheezing, hemoptysis  GI: Denies hematemesis, hematochezia, melena  : Denies discharge, hematuria  MSK: Denies arthralgias, myalgias  SKIN: Denies rash, lesions  NEURO: Denies paresthesias, weakness  PSYCH: Denies depression, anxiety    VITALS:  T(F): 97, Max: 97.5 (11-16-21 @ 20:27)  HR: 91  BP: 156/75  RR: 16Vital Signs Last 24 Hrs  T(C): 36.1 (17 Nov 2021 05:40), Max: 36.4 (16 Nov 2021 20:27)  T(F): 97 (17 Nov 2021 05:40), Max: 97.5 (16 Nov 2021 20:27)  HR: 91 (17 Nov 2021 05:40) (75 - 91)  BP: 156/75 (17 Nov 2021 05:40) (139/69 - 156/75)  BP(mean): --  RR: 16 (17 Nov 2021 05:40) (16 - 16)  SpO2: --    PHYSICAL EXAM:  Gen: NAD, resting in bed  HEENT: Normocephalic, atraumatic  Neck: supple, no lymphadenopathy  CV: Regular rate & regular rhythm  Lungs: decreased BS at bases, no fremitus  Abdomen: Soft, BS present  Ext: Warm, well perfused  Neuro: non focal, awake  Skin: no rash, no erythema  Lines: no phlebitis    FH: Non-contributory  Social Hx: Non-contributory    TESTS & MEASUREMENTS:                        11.9   14.85 )-----------( 201      ( 17 Nov 2021 07:14 )             36.1     11-17    134<L>  |  102  |  23<H>  ----------------------------<  156<H>  4.7   |  22  |  1.0    Ca    8.1<L>      17 Nov 2021 07:14      eGFR if Non : 69 mL/min/1.73M2 (11-17-21 @ 07:14)  eGFR if African American: 80 mL/min/1.73M2 (11-17-21 @ 07:14)          Culture - Blood (collected 11-14-21 @ 11:20)  Source: .Blood Blood  Preliminary Report (11-15-21 @ 19:02):    No growth to date.    Culture - Blood (collected 11-14-21 @ 11:20)  Source: .Blood Blood  Preliminary Report (11-15-21 @ 19:02):    No growth to date.    Culture - Urine (collected 11-14-21 @ 11:20)  Source: Clean Catch Clean Catch (Midstream)  Final Report (11-15-21 @ 13:37):    <10,000 CFU/mL Normal Urogenital Keiko            INFECTIOUS DISEASES TESTING  MRSA PCR Result.: Negative (11-15-21 @ 13:30)  Procalcitonin, Serum: 0.22 (11-15-21 @ 06:59)  Rapid RVP Result: NotDetec (11-14-21 @ 09:55)      INFLAMMATORY MARKERS      RADIOLOGY & ADDITIONAL TESTS:  I have personally reviewed the last available Chest xray  CXR      CT  CT Chest No Cont:   EXAM:  CT CHEST            PROCEDURE DATE:  11/15/2021            INTERPRETATION:  CLINICAL INDICATION: Evaluate for pneumonia    TECHNIQUE:  A noncontrast CT of the thorax was performed. Sagittal and coronal reformats were performed as well as MIPreconstructions.    COMPARISON: CT chest dated 5/31/2013    INTERPRETATION:    AIRWAYS, LUNGS AND PLEURA: The central tracheobronchial tree is patent. Stable scattered subcentimeter left lung pulmonary nodules. Extensive bilateral partially calcifiedpleural plaques with stable adjacent areas of bibasilar round atelectasis. No confluent consolidation.  There is no pleural effusion. There is no pneumothorax.    MEDIASTINUM: There are no enlarged mediastinal, hilar or axillary lymph nodes. The visualized portion of the thyroid gland is unremarkable.    HEART AND VESSELS: The heart is normal in size. Aortic valvular calcifications. There are aortic and coronary artery calcifications. The thoracic aorta has a normal caliber. There is no pericardial effusion.    UPPER ABDOMEN: Images of the upper abdomen demonstrate no abnormality.    BONES AND SOFT TISSUES: There are degenerative changes of the spine.  The soft tissues are unremarkable.    TUBES AND LINES: None.    IMPRESSION:    1. No CT evidence for acute intrathoracic disease.    2. Extensive bilateral partially calcified pleural plaques and adjacent bibasilar round atelectasis, unchanged since 2013. 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    --- End of Report ---              NYDIA NINO MD; Attending Radiologist  This document has been electronically signed. Nov 15 2021 12:24PM (11-15-21 @ 08:58)      CARDIOLOGY TESTING  12 Lead ECG:   Ventricular Rate 83 BPM    Atrial Rate 83 BPM    P-R Interval 154 ms    QRS Duration 76 ms    Q-T Interval 344 ms    QTC Calculation(Bazett) 404 ms    P Axis 41 degrees    R Axis 25 degrees    T Axis 23 degrees    Diagnosis Line Normal sinus rhythm  Nonspecific ST-T changes    Confirmed by MIGUEL ANGEL PALUMBO MD ([id number]) on 11/15/2021 1:58:29 PM (11-14-21 @ 10:53)      MEDICATIONS  ATENolol  Tablet 50 Oral daily  cefTRIAXone   IVPB 1000 IV Intermittent every 24 hours  chlorhexidine 4% Liquid 1 Topical <User Schedule>  dextrose 40% Gel 15 Oral once  dextrose 5%. 1000 IV Continuous <Continuous>  dextrose 5%. 1000 IV Continuous <Continuous>  dextrose 50% Injectable 25 IV Push once  dextrose 50% Injectable 12.5 IV Push once  dextrose 50% Injectable 25 IV Push once  glucagon  Injectable 1 IntraMuscular once  heparin   Injectable 5000 SubCutaneous every 12 hours  insulin lispro (ADMELOG) corrective regimen sliding scale  SubCutaneous three times a day before meals  insulin lispro (ADMELOG) corrective regimen sliding scale  SubCutaneous at bedtime  simvastatin 40 Oral at bedtime      WEIGHT  Weight (kg): 87 (11-14-21 @ 14:54)  Creatinine, Serum: 1.0 mg/dL (11-17-21 @ 07:14)      ANTIBIOTICS:  cefTRIAXone   IVPB 1000 milliGRAM(s) IV Intermittent every 24 hours      All available historical records have been reviewed

## 2021-11-17 NOTE — PROGRESS NOTE ADULT - SUBJECTIVE AND OBJECTIVE BOX
HPI  Patient is a 81y old Male who presents with a chief complaint of Pneumonia (17 Nov 2021 13:25)    Currently admitted to medicine with the primary diagnosis of Upper respiratory infection       Today is hospital day 3d.     INTERVAL HPI / OVERNIGHT EVENTS:  Patient was seen and examined at bedside  Has some clear phlegm; chronic    Patient Feels okay  No new complaints  Denies any complains of chest pain or shortness of breath  Denies any abdominal pain/nausea/vomiting        PAST MEDICAL & SURGICAL HISTORY  Hypertension    Diabetes mellitus      ALLERGIES  No Known Allergies    MEDICATIONS  STANDING MEDICATIONS  ampicillin/sulbactam  IVPB      ATENolol  Tablet 50 milliGRAM(s) Oral daily  chlorhexidine 4% Liquid 1 Application(s) Topical <User Schedule>  dextrose 40% Gel 15 Gram(s) Oral once  dextrose 5%. 1000 milliLiter(s) IV Continuous <Continuous>  dextrose 5%. 1000 milliLiter(s) IV Continuous <Continuous>  dextrose 50% Injectable 25 Gram(s) IV Push once  dextrose 50% Injectable 12.5 Gram(s) IV Push once  dextrose 50% Injectable 25 Gram(s) IV Push once  glucagon  Injectable 1 milliGRAM(s) IntraMuscular once  heparin   Injectable 5000 Unit(s) SubCutaneous every 12 hours  insulin lispro (ADMELOG) corrective regimen sliding scale   SubCutaneous three times a day before meals  insulin lispro (ADMELOG) corrective regimen sliding scale   SubCutaneous at bedtime  simvastatin 40 milliGRAM(s) Oral at bedtime    PRN MEDICATIONS  acetaminophen     Tablet .. 650 milliGRAM(s) Oral every 6 hours PRN  guaifenesin/dextromethorphan Oral Liquid 10 milliLiter(s) Oral every 4 hours PRN    VITALS:  T(F): 97.1  HR: 80  BP: 122/58  RR: 16  SpO2: --    PHYSICAL EXAM  GEN: no distress, comfortable  PULM: BS heard b/l equal, No wheezing  CVS: S1S2 present, no rubs or gallops  ABD: Soft, non-distended, no guarding; non-tender  EXT: No lower extremity edema  NEURO: A&Ox3, moving all extremities    LABS                        11.9   14.85 )-----------( 201      ( 17 Nov 2021 07:14 )             36.1     11-17    134<L>  |  102  |  23<H>  ----------------------------<  156<H>  4.7   |  22  |  1.0    Ca    8.1<L>      17 Nov 2021 07:14                    RADIOLOGY

## 2021-11-17 NOTE — PROGRESS NOTE ADULT - ASSESSMENT
Patient is a 81y old  Male with past medical history of HTN, HLD, DM, now presents to the ER  for evaluation of URI symptoms and non ambulatory status. Patient states he had a mechanical fall 2 weeks ago, s/p Splint in ER on 11/1, went home and has been having difficulty with ambulation even with walker as his legs give up. Patient also started to develop cough and sinus congestion along with rhinorrhea. Patient admits to dysuria but states it was resolved. On admission, he has no fever but Leukocytosis. The CXR shows no infiltrate. The ID consult requested to assist with further evaluation and antibiotic  management.    # Suspected pneumonia?  - CT Chest No Cont (11.15.21 @ 08:58):  No CT evidence for acute intrathoracic disease. Extensive bilateral partially calcified pleural plaques and adjacent bibasilar round atelectasis, unchanged since 2013. 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  - CT Sinuses No Cont (11.15.21 @ 08:56):  Trace scattered paranasal sinus mucosal thickening. No air-fluid levelto suggest acute sinusitis. Severe deviation of the osseous nasal septum to the left with a 4 mm left-sided septal spur impinging the left nasal cavity. Right-sided lula bullosa. Left maxillary periapical lucencies. Recommend follow-up with dental exam    # Leukocytosis   - Procalcitonin, Serum: 0.22(11.15.21 @ 06:59)  - Blood Cx 11/14 NG  - Urine Cx 11/14 NG     # Transaminitis   # S./p Fall    Recommendations  - unclear etiology of WBC -- no abdominal pain, but having elevated LFTs  - check GGT and consider RUQ US  - can switch ceftriaxone to unasyn 3g q 6 hours for now while trending WBC  - follow-up P-ANCA and C-ANCA    Please call or message on [employer] Teams if with any questions.  Spectra [phone]

## 2021-11-17 NOTE — PROGRESS NOTE ADULT - ASSESSMENT
81 year old Male with past medical history of HTN, HLD, DM presenting to ED secondary to Acute URI symptoms and Dysambulatory status. Patient states he had a mechanical fall 2 weeks ago, s/p Splint in ER on 11/1. Patient went home and has been having difficulty with ambulation even with walker as his legs give up.  Patient also started to develop cough and sinus congestion along with rhinorrhea. Patient admits to dysuria but states it was resolved. On admission, he has no fever but Leukocytosis    - CT Chest No Cont (11.15.21 @ 08:58):  No CT evidence for acute intrathoracic disease. Extensive bilateral partially calcified pleural plaques and adjacent bibasilar round atelectasis, unchanged since 2013. 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  - CT Sinuses No Cont (11.15.21 @ 08:56):  Trace scattered paranasal sinus mucosal thickening. No air-fluid level to suggest acute sinusitis. Severe deviation of the osseous nasal septum to the left with a 4 mm left-sided septal spur impinging the left nasal cavity. Right-sided lula bullosa. Left maxillary periapical lucencies. Recommend follow-up with dental exam    A&P    # Leucocytosis of unsure etiology  # Transaminitis   - Procalcitonin, Serum: 0.22(11.15.21 @ 06:59)  - Blood Cx 11/14 NGTD  - P-ANCA and C-ANCA- neg  monitor LFT, procal level, blood culture  ID following  - check GGT ,  RUQ US  - change eftriaxone to unasyn 3g q 6    #  chronic pulmonary change- stable; possibly due to asbestosis    #HTN/HLD  - Cont atenolol 50mg qD  - Cont simvastatin 40mg qHs    #DM2  - Cont ISS  - Monitor FS    DVT PPX, heparin    #Progress Note Handoff  Pending (specify): Trending WBC, DC planning to STR in 24h  Family discussion: d/w pt and wife  regarding DC to STR  Disposition: STR

## 2021-11-18 PROBLEM — Z00.00 ENCOUNTER FOR PREVENTIVE HEALTH EXAMINATION: Status: ACTIVE | Noted: 2021-01-01

## 2021-11-18 NOTE — PROGRESS NOTE ADULT - SUBJECTIVE AND OBJECTIVE BOX
Patient is seen and examined at the bed side, is afebrile. The wbc count stay elevated. The LFts are worsening,.       REVIEW OF SYSTEMS: All other review systems are negative      ALLERGIES: No Known Allergies      Vital Signs Last 24 Hrs  T(C): 36.7 (2021 14:31), Max: 37.4 (2021 05:51)  T(F): 98 (2021 14:31), Max: 99.4 (2021 05:51)  HR: 73 (2021 14:31) (73 - 81)  BP: 142/63 (2021 14:31) (142/63 - 150/67)  BP(mean): --  RR: 16 (:31) (16 - 16)  SpO2: --      PHYSICAL EXAM:  GENERAL: Not in distress   CHEST/LUNG: Not using accessory muscles   HEART: s1 and s2 present  ABDOMEN:  Nontender and  Nondistended  EXTREMITIES: No pedal  edema  CNS: Awake and Alert      LABS:                        12.0   15.91 )-----------( 295      ( 2021 09:58 )             36.9                           11.6   14.98 )-----------( 296      ( 15 Nov 2021 06:59 )             35.4           135  |  100  |  24<H>  ----------------------------<  170<H>  4.8   |  23  |  1.2    Ca    8.1<L>      2021 09:58    TPro  5.4<L>  /  Alb  2.6<L>  /  TBili  0.6  /  DBili  x   /  AST  210<H>  /  ALT  117<H>  /  AlkPhos  351<H>      135  |  97<L>  |  32<H>  ----------------------------<  91  5.1<H>   |  25  |  1.4    Ca    9.0      2021 10:00    TPro  6.3  /  Alb  3.2<L>  /  TBili  0.7  /  DBili  x   /  AST  120<H>  /  ALT  91<H>  /  AlkPhos  318<H>        CAPILLARY BLOOD GLUCOSE  POCT Blood Glucose.: 70 mg/dL (2021 20:52)  POCT Blood Glucose.: 86 mg/dL (2021 16:52)      Urinalysis Basic - ( 2021 11:20 )  Color: Yellow / Appearance: Clear / S.020 / pH: x  Gluc: x / Ketone: Negative  / Bili: Negative / Urobili: 0.2 mg/dL   Blood: x / Protein: 30 mg/dL / Nitrite: Negative   Leuk Esterase: Negative / RBC: 1-2 /HPF / WBC x   Sq Epi: x / Non Sq Epi: Few /HPF / Bacteria: Moderate        MEDICATIONS  (STANDING):    ampicillin/sulbactam  IVPB      ampicillin/sulbactam  IVPB 3 Gram(s) IV Intermittent every 6 hours  ATENolol  Tablet 50 milliGRAM(s) Oral daily  chlorhexidine 4% Liquid 1 Application(s) Topical <User Schedule>  glucagon  Injectable 1 milliGRAM(s) IntraMuscular once  heparin   Injectable 5000 Unit(s) SubCutaneous every 12 hours  insulin lispro (ADMELOG) corrective regimen sliding scale   SubCutaneous three times a day before meals  insulin lispro (ADMELOG) corrective regimen sliding scale   SubCutaneous at bedtime      RADIOLOGY & ADDITIONAL TESTS:    < from: US Abdomen Upper Quadrant Right (21 @ 17:11) >    Cholelithiasis without cholecystitis.    < from: Xray Chest 1 View-PORTABLE IMMEDIATE (Xray Chest 1 View-PORTABLE IMMEDIATE .) (21 @ 10:36) >  Bilateral opacities compatible with pleural plaques. These are without significant change when compared to chest radiograph 2008 given differences in technique. Given overlying pleural plaques, evaluation is limited on this single frontal chest radiograph.      MICROBIOLOGY DATA:    Culture - Urine (21 @ 11:20)   Specimen Source: Clean Catch Clean Catch (Midstream)   Culture Results:   <10,000 CFU/mL Normal Urogenital Keiko       Respiratory Viral Panel with COVID-19 by SOFIA (21 @ 09:55)   Rapid RVP Result: NotDetearis   SARS-CoV-2: NotDetec: T

## 2021-11-18 NOTE — PROGRESS NOTE ADULT - SUBJECTIVE AND OBJECTIVE BOX
HPI  Patient is a 81y old Male who presents with a chief complaint of Pneumonia (17 Nov 2021 16:27)    Currently admitted to medicine with the primary diagnosis of Upper respiratory infection       Today is hospital day 4d.     INTERVAL HPI / OVERNIGHT EVENTS:  Patient was seen and examined at bedside    Patient Feels better  No new complaints  Denies any complains of chest pain or shortness of breath  Denies any abdominal pain/nausea/vomiting        PAST MEDICAL & SURGICAL HISTORY  Hypertension    Diabetes mellitus      ALLERGIES  No Known Allergies    MEDICATIONS  STANDING MEDICATIONS  ampicillin/sulbactam  IVPB      ampicillin/sulbactam  IVPB 3 Gram(s) IV Intermittent every 6 hours  ATENolol  Tablet 50 milliGRAM(s) Oral daily  chlorhexidine 4% Liquid 1 Application(s) Topical <User Schedule>  dextrose 40% Gel 15 Gram(s) Oral once  dextrose 5%. 1000 milliLiter(s) IV Continuous <Continuous>  dextrose 5%. 1000 milliLiter(s) IV Continuous <Continuous>  dextrose 50% Injectable 25 Gram(s) IV Push once  dextrose 50% Injectable 12.5 Gram(s) IV Push once  dextrose 50% Injectable 25 Gram(s) IV Push once  glucagon  Injectable 1 milliGRAM(s) IntraMuscular once  heparin   Injectable 5000 Unit(s) SubCutaneous every 12 hours  insulin lispro (ADMELOG) corrective regimen sliding scale   SubCutaneous three times a day before meals  insulin lispro (ADMELOG) corrective regimen sliding scale   SubCutaneous at bedtime    PRN MEDICATIONS  acetaminophen     Tablet .. 650 milliGRAM(s) Oral every 6 hours PRN  guaifenesin/dextromethorphan Oral Liquid 10 milliLiter(s) Oral every 4 hours PRN    VITALS:  T(F): 98  HR: 73  BP: 142/63  RR: 16  SpO2: --    PHYSICAL EXAM  GEN: no distress, comfortable  PULM: BS heard b/l equal, No wheezing  CVS: S1S2 present, no rubs or gallops  ABD: Soft, non-distended, no guarding; non-tender  EXT: No lower extremity edema  NEURO: A&Ox3, moving all extremities    LABS                        12.0   15.91 )-----------( 295      ( 18 Nov 2021 09:58 )             36.9     11-18    135  |  100  |  24<H>  ----------------------------<  170<H>  4.8   |  23  |  1.2    Ca    8.1<L>      18 Nov 2021 09:58    TPro  5.4<L>  /  Alb  2.6<L>  /  TBili  0.6  /  DBili  x   /  AST  210<H>  /  ALT  117<H>  /  AlkPhos  351<H>  11-18                  RADIOLOGY

## 2021-11-18 NOTE — PROGRESS NOTE ADULT - ASSESSMENT
Patient is a 81y old  Male with past medical history of HTN, HLD, DM, now presents to the ER  for evaluation of URI symptoms and non ambulatory status. Patient states he had a mechanical fall 2 weeks ago, s/p Splint in ER on 11/1, went home and has been having difficulty with ambulation even with walker as his legs give up. Patient also started to develop cough and sinus congestion along with rhinorrhea. Patient admits to dysuria but states it was resolved. On admission, he has no fever but Leukocytosis. The CXR shows no infiltrate. The ID consult requested to assist with further evaluation and antibiotic  management.    # Suspected pneumonia  # Leukocytosis - resolving   # Cholelithiasis w/o cholecystitis- on Abdominal US    would recommend:    1. Please obtain HIDA scan   2. Monitor LFTs, AVOID Cephalosporins   3. Monitor WBC count  4. Continue Unasyn for now    Attending Attestation:    Spent more than 35 minutes on total encounter, more than 50 % of the visit was spent counseling and/or coordinating care by the Attending physician.

## 2021-11-18 NOTE — PROGRESS NOTE ADULT - ASSESSMENT
81 year old Male with past medical history of HTN, HLD, DM presenting to ED secondary to Acute URI symptoms and Dysambulatory status. Patient states he had a mechanical fall 2 weeks ago, s/p Splint in ER on 11/1. Patient went home and has been having difficulty with ambulation even with walker as his legs give up.  Patient also started to develop cough and sinus congestion along with rhinorrhea. Patient admits to dysuria but states it was resolved. On admission, he has no fever but Leukocytosis    - CT Chest No Cont (11.15.21 @ 08:58):  No CT evidence for acute intrathoracic disease. Extensive bilateral partially calcified pleural plaques and adjacent bibasilar round atelectasis, unchanged since 2013. 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  - CT Sinuses No Cont (11.15.21 @ 08:56):  Trace scattered paranasal sinus mucosal thickening. No air-fluid level to suggest acute sinusitis. Severe deviation of the osseous nasal septum to the left with a 4 mm left-sided septal spur impinging the left nasal cavity. Right-sided lula bullosa. Left maxillary periapical lucencies. Recommend follow-up with dental exam    A&P    # Leucocytosis of unsure etiology  - Procalcitonin, Serum: 0.22(11.15.21 @ 06:59)  - Blood Cx 11/14 NGTD  -  eftriaxone  changed to unasyn 3g q 6 on 11/17  - monitor procal level, blood culture  ID following    # Transaminitis   GGT elevated; RUQ US showing cholelithiasis; no bile duct dialatation  - P-ANCA and C-ANCA- neg  check TAWANDA, AMA  monitor LFT,   Consult gastroenterology  hold statin    #  chronic pulmonary change- stable; possibly due to asbestosis    #HTN/HLD  - Cont atenolol 50mg qD    #DM2  - Cont ISS  - Monitor FS    DVT PPX, heparin    #Progress Note Handoff  Pending (specify): Trending WBC, GI consult; ID follow up  Family discussion: d/w patient  Disposition: STR

## 2021-11-18 NOTE — CHART NOTE - NSCHARTNOTEFT_GEN_A_CORE
COVID-19 collected and received by laboratory.   Patient tolerated well, now resting comfortably in his bed.

## 2021-11-19 NOTE — CONSULT NOTE ADULT - ATTENDING COMMENTS
Patient is 81M with PMH of HTN and Type 2 diabetes admitted for URI / Pnuemonia.    Surgery consulted due to findings of Leukocytosis, elevated LFT's and HIDA scan findings consistent with cholecystitis.      Patient seen and examined.  Patient denies abdominal pain nausea or vomiting.    Abdomen - soft, non tender, non distended, negative Lawson's sign    Vitals labs and images reviewed    Plan  - CLD  - IVF  - IV Unasyn  - f/u GI  - f/u ID  - patient is poor surgical candidate and is currently asymptomatic.  Will plan for non operative management.
I edited the note

## 2021-11-19 NOTE — CONSULT NOTE ADULT - ASSESSMENT
Impression:  80 y/o male with medical history of HTN and Type 2 diabetes admitted for URI / Pnuemonia.    Surgery consulted due to findings of Leukocytosis, elevated LFT's and HIDA scan findings consistent with cholecystitis.          Plan:  - Patient currently NPO.  - IV hydration.  - Ivabx --> Patient currently on Unasyn per I.D.    - Monitor WBC count and LFT's.  - GI following.    - I.D. following.   - Case d/w Dr. Hirsch and patient not a good surgical candidate for Lap Marylu at this time due to respiratory issues with URI / PNA and recommends to continue conservative mgt.   Dr. Hirsch will follow in am.

## 2021-11-19 NOTE — CONSULT NOTE ADULT - ASSESSMENT
81yMale pmh HTN and DM here for recent fall and acute UTI symptoms. GI consulted for altered LFTs.    Problem 1-Altered LFTs  -mixed pattern  likely due to DILI with superimposed rhabdo  Rec  -unasyn likelihood liver injury class C probable rare cause of apparent liver injury that can cause mild AST and ALT elevations  -ceftriaxone causes cholestatic hepatitis it was stopped and ALP improving   -Monitor LFTs  -CKMB levels   -CBD 6mm not dilated   -Avoid hepatotoxic medications when possible   - Check acute hepatitis panel ,Smooth Muscle Antibody, Transferrin Saturation, SPEP, Anti LK M1 antibody, AMA, TAWANDA, Ceruloplasmin, Ferritin, Alpha-1-antitrypsin, CMV, Herpes serologies, EBV serologies, Immunoglobulins panel.       Problem 2- asymptomatic Cholelithiasis  Rec  - Care as per primary team   81yMale pmh HTN and DM here for recent fall and acute UTI symptoms. GI consulted for altered LFTs.    Problem 1-Altered LFTs  -mixed pattern  likely due to DILI with superimposed rhabdo  Rec  -unasyn likelihood liver injury class C probable rare cause of apparent liver injury that can cause mild AST and ALT elevations  -ceftriaxone causes cholestatic hepatitis it was stopped and ALP improving   -Monitor LFTs  -CKMB levels   -CBD 6mm not dilated   -Avoid hepatotoxic medications when possible   - Check acute hepatitis panel ,Smooth Muscle Antibody, Transferrin Saturation, SPEP, Anti LK M1 antibody, AMA, TAWANDA, Ceruloplasmin, Ferritin, Alpha-1-antitrypsin, CMV, Herpes serologies, EBV serologies, Immunoglobulins panel.     Problem 2- asymptomatic Cholelithiasis  +HIDA scan  Rec  -please obtain surgical consult    Recall GI if needed  81yMale pmh HTN and DM here for recent fall and acute UTI symptoms. GI consulted for altered LFTs.    Problem 1-Altered LFTs  -mixed pattern  likely due to DILI with superimposed rhabdo  Rec  -unasyn likelihood liver injury class C probable rare cause of apparent liver injury that can cause mild AST and ALT elevations  -ceftriaxone causes cholestatic hepatitis it was stopped and ALP improving   -Monitor LFTs  -CKMB levels   -CBD 6mm not dilated   -Avoid hepatotoxic medications when possible   - Check acute hepatitis panel ,Smooth Muscle Antibody, Transferrin Saturation, SPEP, Anti LK M1 antibody, AMA, TAWANDA, Ceruloplasmin, Ferritin, Alpha-1-antitrypsin, CMV, Herpes serologies, EBV serologies, Immunoglobulins panel.     Problem 2- asymptomatic Cholelithiasis  +HIDA scan for cholecystitis  Rec  -please obtain surgical consult  will follow up

## 2021-11-19 NOTE — PROGRESS NOTE ADULT - ASSESSMENT
Patient is a 81y old  Male with past medical history of HTN, HLD, DM, now presents to the ER  for evaluation of URI symptoms and non ambulatory status. Patient states he had a mechanical fall 2 weeks ago, s/p Splint in ER on 11/1, went home and has been having difficulty with ambulation even with walker as his legs give up. Patient also started to develop cough and sinus congestion along with rhinorrhea. Patient admits to dysuria but states it was resolved. On admission, he has no fever but Leukocytosis. The CXR shows no infiltrate. The ID consult requested to assist with further evaluation and antibiotic  management.    # Suspected pneumonia  # Leukocytosis - resolving   # Cholelithiasis w/o cholecystitis- on Abdominal US    Recommendations  - given elevated GGT, plan for HIDA scan given leukocytosis  - continue unasyn for now  - trend LFTs -- cephalosporins are being held   - Monitor WBC count    Please call or message on [employer] Teams if with any questions.  Spectra [phone]

## 2021-11-19 NOTE — PROGRESS NOTE ADULT - SUBJECTIVE AND OBJECTIVE BOX
HPI  Patient is a 81y old Male who presents with a chief complaint of Pneumonia (19 Nov 2021 11:21)    Currently admitted to medicine with the primary diagnosis of Upper respiratory infection       Today is hospital day 5d.     INTERVAL HPI / OVERNIGHT EVENTS:  Patient was seen and examined at bedside    Patient Feels good  No new complaints  Denies any complains of chest pain or shortness of breath  Denies any abdominal pain/nausea/vomiting        PAST MEDICAL & SURGICAL HISTORY  Hypertension    Diabetes mellitus      ALLERGIES  No Known Allergies    MEDICATIONS  STANDING MEDICATIONS  ampicillin/sulbactam  IVPB      ampicillin/sulbactam  IVPB 3 Gram(s) IV Intermittent every 6 hours  ATENolol  Tablet 50 milliGRAM(s) Oral daily  chlorhexidine 4% Liquid 1 Application(s) Topical <User Schedule>  dextrose 40% Gel 15 Gram(s) Oral once  dextrose 5%. 1000 milliLiter(s) IV Continuous <Continuous>  dextrose 5%. 1000 milliLiter(s) IV Continuous <Continuous>  dextrose 50% Injectable 25 Gram(s) IV Push once  dextrose 50% Injectable 12.5 Gram(s) IV Push once  dextrose 50% Injectable 25 Gram(s) IV Push once  glucagon  Injectable 1 milliGRAM(s) IntraMuscular once  heparin   Injectable 5000 Unit(s) SubCutaneous every 12 hours  insulin lispro (ADMELOG) corrective regimen sliding scale   SubCutaneous three times a day before meals  insulin lispro (ADMELOG) corrective regimen sliding scale   SubCutaneous at bedtime    PRN MEDICATIONS  acetaminophen     Tablet .. 650 milliGRAM(s) Oral every 6 hours PRN  guaifenesin/dextromethorphan Oral Liquid 10 milliLiter(s) Oral every 4 hours PRN    VITALS:  T(F): 97.2  HR: 73  BP: 157/65  RR: 16  SpO2: 94%    PHYSICAL EXAM  GEN: no distress, comfortable  PULM: BS heard b/l equal, No wheezing  CVS: S1S2 present, no rubs or gallops  ABD: Soft, non-distended, no guarding; non-tender  EXT: No lower extremity edema  NEURO: A&Ox3, moving all extremities    LABS                        11.6   15.35 )-----------( 283      ( 19 Nov 2021 06:41 )             34.9     11-19    136  |  100  |  28<H>  ----------------------------<  132<H>  4.7   |  22  |  1.2    Ca    7.8<L>      19 Nov 2021 06:41    TPro  5.2<L>  /  Alb  2.4<L>  /  TBili  0.5  /  DBili  x   /  AST  217<H>  /  ALT  108<H>  /  AlkPhos  324<H>  11-19    PT/INR - ( 19 Nov 2021 11:09 )   PT: 15.10 sec;   INR: 1.32 ratio         PTT - ( 19 Nov 2021 11:09 )  PTT:31.2 sec              RADIOLOGY

## 2021-11-19 NOTE — PROGRESS NOTE ADULT - ASSESSMENT
81 year old Male with past medical history of HTN, HLD, DM presenting to ED secondary to Acute URI symptoms and Dysambulatory status. Patient states he had a mechanical fall 2 weeks ago, s/p Splint in ER on 11/1. Patient went home and has been having difficulty with ambulation even with walker as his legs give up.  Patient also started to develop cough and sinus congestion along with rhinorrhea. Patient admits to dysuria but states it was resolved. On admission, he has no fever but Leukocytosis    - CT Chest No Cont (11.15.21 @ 08:58):  No CT evidence for acute intrathoracic disease. Extensive bilateral partially calcified pleural plaques and adjacent bibasilar round atelectasis, unchanged since 2013. 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  - CT Sinuses No Cont (11.15.21 @ 08:56):  Trace scattered paranasal sinus mucosal thickening. No air-fluid level to suggest acute sinusitis. Severe deviation of the osseous nasal septum to the left with a 4 mm left-sided septal spur impinging the left nasal cavity. Right-sided lula bullosa. Left maxillary periapical lucencies. Recommend follow-up with dental exam    A&P    # Leucocytosis of unsure etiology  - Procalcitonin, Serum: 0.22(11.15.21 @ 06:59)  - Blood Cx 11/14 NGTD  -  eftriaxone  changed to unasyn 3g q 6 on 11/17  - monitor procal level, blood culture  ID following- case dsicussed; check HIDA    # Transaminitis - Drug induced injury  GGT elevated; RUQ US showing cholelithiasis; no bile duct dialatation  - P-ANCA and C-ANCA- neg  check TAWANDA, AMA  monitor LFT,   Consult gastroenterology- check infectious and autoimmune markers  hold statin    #  chronic pulmonary change- stable; possibly due to asbestosis    #HTN/HLD  - Cont atenolol 50mg qD    #DM2  - Cont ISS  - Monitor FS    DVT PPX, heparin

## 2021-11-19 NOTE — PROGRESS NOTE ADULT - SUBJECTIVE AND OBJECTIVE BOX
MIGUEL ANGEL GARZA  81y, Male  Allergy: No Known Allergies      LOS  5d    CHIEF COMPLAINT: Pneumonia (19 Nov 2021 11:17)      INTERVAL EVENTS/HPI  - No acute events overnight  - T(F): , Max: 99.6 (11-19-21 @ 05:48)  - WBC remains elevated  - denies any abdominal pain, nausae, vomiting   - WBC Count: 15.35 (11-19-21 @ 06:41)  WBC Count: 15.91 (11-18-21 @ 09:58)     - Creatinine, Serum: 1.2 (11-19-21 @ 06:41)  Creatinine, Serum: 1.2 (11-18-21 @ 09:58)       ROS  General: Denies rigors, nightsweats  HEENT: Denies headache, rhinorrhea, sore throat, eye pain  CV: Denies CP, palpitations  PULM: Denies wheezing, hemoptysis  GI: Denies hematemesis, hematochezia, melena  : Denies discharge, hematuria  MSK: Denies arthralgias, myalgias  SKIN: Denies rash, lesions  NEURO: Denies paresthesias, weakness  PSYCH: Denies depression, anxiety    VITALS:  T(F): 99.6, Max: 99.6 (11-19-21 @ 05:48)  HR: 87  BP: 148/68  RR: 16Vital Signs Last 24 Hrs  T(C): 37.6 (19 Nov 2021 05:48), Max: 37.6 (19 Nov 2021 05:48)  T(F): 99.6 (19 Nov 2021 05:48), Max: 99.6 (19 Nov 2021 05:48)  HR: 87 (19 Nov 2021 05:48) (73 - 87)  BP: 148/68 (19 Nov 2021 05:48) (142/63 - 148/68)  BP(mean): --  RR: 16 (19 Nov 2021 05:48) (16 - 16)  SpO2: 94% (19 Nov 2021 09:33) (94% - 94%)    PHYSICAL EXAM:  Gen: NAD, resting in bed  HEENT: Normocephalic, atraumatic  Neck: supple, no lymphadenopathy  CV: Regular rate & regular rhythm  Lungs: decreased BS at bases, no fremitus  Abdomen: Soft, BS present  Ext: Warm, well perfused  Neuro: non focal, awake  Skin: no rash, no erythema  Lines: no phlebitis    FH: Non-contributory  Social Hx: Non-contributory    TESTS & MEASUREMENTS:                        11.6   15.35 )-----------( 283      ( 19 Nov 2021 06:41 )             34.9     11-19    136  |  100  |  28<H>  ----------------------------<  132<H>  4.7   |  22  |  1.2    Ca    7.8<L>      19 Nov 2021 06:41    TPro  5.2<L>  /  Alb  2.4<L>  /  TBili  0.5  /  DBili  x   /  AST  217<H>  /  ALT  108<H>  /  AlkPhos  324<H>  11-19    eGFR if Non African American: 56 mL/min/1.73M2 (11-19-21 @ 06:41)  eGFR if : 64 mL/min/1.73M2 (11-19-21 @ 06:41)    LIVER FUNCTIONS - ( 19 Nov 2021 06:41 )  Alb: 2.4 g/dL / Pro: 5.2 g/dL / ALK PHOS: 324 U/L / ALT: 108 U/L / AST: 217 U/L / GGT: x               Culture - Blood (collected 11-14-21 @ 11:20)  Source: .Blood Blood  Preliminary Report (11-15-21 @ 19:02):    No growth to date.    Culture - Blood (collected 11-14-21 @ 11:20)  Source: .Blood Blood  Preliminary Report (11-15-21 @ 19:02):    No growth to date.    Culture - Urine (collected 11-14-21 @ 11:20)  Source: Clean Catch Clean Catch (Midstream)  Final Report (11-15-21 @ 13:37):    <10,000 CFU/mL Normal Urogenital Keiko            INFECTIOUS DISEASES TESTING  Procalcitonin, Serum: 0.38 (11-18-21 @ 09:58)  COVID-19 PCR: NotDetec (11-18-21 @ 08:50)  MRSA PCR Result.: Negative (11-15-21 @ 13:30)  Procalcitonin, Serum: 0.22 (11-15-21 @ 06:59)  Rapid RVP Result: NotDetec (11-14-21 @ 09:55)      INFLAMMATORY MARKERS      RADIOLOGY & ADDITIONAL TESTS:  I have personally reviewed the last available Chest xray  CXR      CT      CARDIOLOGY TESTING  12 Lead ECG:   Ventricular Rate 83 BPM    Atrial Rate 83 BPM    P-R Interval 154 ms    QRS Duration 76 ms    Q-T Interval 344 ms    QTC Calculation(Bazett) 404 ms    P Axis 41 degrees    R Axis 25 degrees    T Axis 23 degrees    Diagnosis Line Normal sinus rhythm  Nonspecific ST-T changes    Confirmed by MIGUEL ANGEL PALUMBO MD ([id number]) on 11/15/2021 1:58:29 PM (11-14-21 @ 10:53)      MEDICATIONS  ampicillin/sulbactam  IVPB     ampicillin/sulbactam  IVPB 3 IV Intermittent every 6 hours  ATENolol  Tablet 50 Oral daily  chlorhexidine 4% Liquid 1 Topical <User Schedule>  dextrose 40% Gel 15 Oral once  dextrose 5%. 1000 IV Continuous <Continuous>  dextrose 5%. 1000 IV Continuous <Continuous>  dextrose 50% Injectable 25 IV Push once  dextrose 50% Injectable 12.5 IV Push once  dextrose 50% Injectable 25 IV Push once  glucagon  Injectable 1 IntraMuscular once  heparin   Injectable 5000 SubCutaneous every 12 hours  insulin lispro (ADMELOG) corrective regimen sliding scale  SubCutaneous three times a day before meals  insulin lispro (ADMELOG) corrective regimen sliding scale  SubCutaneous at bedtime      WEIGHT  Weight (kg): 87 (11-14-21 @ 14:54)  Creatinine, Serum: 1.2 mg/dL (11-19-21 @ 06:41)      ANTIBIOTICS:  ampicillin/sulbactam  IVPB      ampicillin/sulbactam  IVPB 3 Gram(s) IV Intermittent every 6 hours      All available historical records have been reviewed

## 2021-11-20 NOTE — PROGRESS NOTE ADULT - SUBJECTIVE AND OBJECTIVE BOX
HPI  Patient is a 81y old Male who presents with a chief complaint of Pneumonia (19 Nov 2021 21:45)    Currently admitted to medicine with the primary diagnosis of Upper respiratory infection       Today is hospital day 6d.     INTERVAL HPI / OVERNIGHT EVENTS:  Patient was seen and examined at bedside    Patient Feels okay  Denies any complains of chest pain or shortness of breath  Denies any abdominal pain/nausea/vomiting        PAST MEDICAL & SURGICAL HISTORY  Hypertension    Diabetes mellitus      ALLERGIES  No Known Allergies    MEDICATIONS  STANDING MEDICATIONS  ampicillin/sulbactam  IVPB      ampicillin/sulbactam  IVPB 3 Gram(s) IV Intermittent every 6 hours  ATENolol  Tablet 50 milliGRAM(s) Oral daily  chlorhexidine 4% Liquid 1 Application(s) Topical <User Schedule>  dextrose 40% Gel 15 Gram(s) Oral once  dextrose 5%. 1000 milliLiter(s) IV Continuous <Continuous>  dextrose 5%. 1000 milliLiter(s) IV Continuous <Continuous>  dextrose 50% Injectable 25 Gram(s) IV Push once  dextrose 50% Injectable 12.5 Gram(s) IV Push once  dextrose 50% Injectable 25 Gram(s) IV Push once  glucagon  Injectable 1 milliGRAM(s) IntraMuscular once  heparin   Injectable 5000 Unit(s) SubCutaneous every 12 hours  insulin lispro (ADMELOG) corrective regimen sliding scale   SubCutaneous three times a day before meals  insulin lispro (ADMELOG) corrective regimen sliding scale   SubCutaneous at bedtime    PRN MEDICATIONS  acetaminophen     Tablet .. 650 milliGRAM(s) Oral every 6 hours PRN  guaifenesin/dextromethorphan Oral Liquid 10 milliLiter(s) Oral every 4 hours PRN    VITALS:  T(F): 98.9  HR: 77  BP: 149/67  RR: 16  SpO2: --    PHYSICAL EXAM  GEN: no distress, comfortable  PULM: BS heard b/l equal, No wheezing  CVS: S1S2 present, no rubs or gallops  ABD: Soft, non-distended, no guarding; non-tender  EXT: No lower extremity edema  NEURO: A&Ox3, generalized weakness    LABS                        11.7   15.84 )-----------( 280      ( 20 Nov 2021 09:17 )             35.9     11-20    136  |  99  |  26<H>  ----------------------------<  140<H>  5.0   |  22  |  1.1    Ca    7.7<L>      20 Nov 2021 09:17    TPro  5.1<L>  /  Alb  2.4<L>  /  TBili  0.5  /  DBili  x   /  AST  212<H>  /  ALT  92<H>  /  AlkPhos  283<H>  11-20    PT/INR - ( 19 Nov 2021 11:09 )   PT: 15.10 sec;   INR: 1.32 ratio         PTT - ( 19 Nov 2021 11:09 )  PTT:31.2 sec              RADIOLOGY

## 2021-11-20 NOTE — PROGRESS NOTE ADULT - ASSESSMENT
81 year old Male with past medical history of HTN, HLD, DM presenting to ED secondary to Acute URI symptoms and Dysambulatory status. Patient states he had a mechanical fall 2 weeks ago, s/p Splint in ER on 11/1. Patient went home and has been having difficulty with ambulation even with walker as his legs give up.  Patient also started to develop cough and sinus congestion along with rhinorrhea. Patient admits to dysuria but states it was resolved. On admission, he has no fever but Leukocytosis    - CT Chest No Cont (11.15.21 @ 08:58):  No CT evidence for acute intrathoracic disease. Extensive bilateral partially calcified pleural plaques and adjacent bibasilar round atelectasis, unchanged since 2013. 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  - CT Sinuses No Cont (11.15.21 @ 08:56):  Trace scattered paranasal sinus mucosal thickening. No air-fluid level to suggest acute sinusitis. Severe deviation of the osseous nasal septum to the left with a 4 mm left-sided septal spur impinging the left nasal cavity. Right-sided lula bullosa. Left maxillary periapical lucencies. Recommend follow-up with dental exam    A&P    # Leucocytosis - possibly related to asymptomatic acute cholecystitis  - Procalcitonin, Serum: 0.22- 0.38:   - Blood Cx 11/14 NGTD  - ceftriaxone  changed to unasyn 3g q 6 on 11/17  HIDA concerning for cholecystitis; surgeon following; case d/w team    # Transaminitis - Drug induced injury vs cholecystitis; no biliary ductal dialatation  GGT elevated; RUQ US showing cholelithiasis; no bile duct dialatation  - P-ANCA and C-ANCA- neg  check TAWANDA, AMA, Sm Ab; ferritin, ceruloplasmin  monitor LFT,   GI following  hold statin    #  chronic pulmonary change- stable; possibly due to asbestosis    #HTN/HLD  - Cont atenolol 50mg qD    #DM2  - Cont ISS  - Monitor FS    DVT PPX, heparin  plan of care d/w patient and family  pending: improvement in leucocytosis

## 2021-11-21 NOTE — PROGRESS NOTE ADULT - SUBJECTIVE AND OBJECTIVE BOX
HPI  Patient is a 81y old Male who presents with a chief complaint of Pneumonia (21 Nov 2021 11:43)    Currently admitted to medicine with the primary diagnosis of Upper respiratory infection       Today is hospital day 7d.     INTERVAL HPI / OVERNIGHT EVENTS:  Patient was seen and examined at bedside    Patient Feels okay; states he had 5-6 runs today  started last nigth; he had 5-6 loose bowel movements  still has some cough; but stable and no change from his chronic status  Denies any complains of chest pain or shortness of breath  Denies any abdominal pain/nausea/vomiting        PAST MEDICAL & SURGICAL HISTORY  Hypertension    Diabetes mellitus      ALLERGIES  No Known Allergies    MEDICATIONS  STANDING MEDICATIONS  ampicillin/sulbactam  IVPB      ampicillin/sulbactam  IVPB 3 Gram(s) IV Intermittent every 6 hours  ATENolol  Tablet 50 milliGRAM(s) Oral daily  chlorhexidine 4% Liquid 1 Application(s) Topical <User Schedule>  dextrose 40% Gel 15 Gram(s) Oral once  dextrose 5%. 1000 milliLiter(s) IV Continuous <Continuous>  dextrose 5%. 1000 milliLiter(s) IV Continuous <Continuous>  dextrose 50% Injectable 25 Gram(s) IV Push once  dextrose 50% Injectable 25 Gram(s) IV Push once  dextrose 50% Injectable 12.5 Gram(s) IV Push once  glucagon  Injectable 1 milliGRAM(s) IntraMuscular once  heparin   Injectable 5000 Unit(s) SubCutaneous every 12 hours  insulin lispro (ADMELOG) corrective regimen sliding scale   SubCutaneous three times a day before meals  insulin lispro (ADMELOG) corrective regimen sliding scale   SubCutaneous at bedtime    PRN MEDICATIONS  acetaminophen     Tablet .. 650 milliGRAM(s) Oral every 6 hours PRN  guaifenesin/dextromethorphan Oral Liquid 10 milliLiter(s) Oral every 4 hours PRN    VITALS:  T(F): 97.6  HR: 73  BP: 145/65  RR: 18  SpO2: 95%    PHYSICAL EXAM  GEN: no distress, comfortable  PULM: BS heard b/l equal, No wheezing  CVS: S1S2 present, no rubs or gallops  ABD: Soft, non-distended, no guarding; non-tender  EXT: No lower extremity edema  NEURO: A&Ox3, generalized weakness of lower extremity present    LABS                        11.4   16.10 )-----------( 262      ( 21 Nov 2021 06:49 )             35.2     11-21    134<L>  |  99  |  29<H>  ----------------------------<  142<H>  4.8   |  23  |  1.1    Ca    8.0<L>      21 Nov 2021 06:49    TPro  4.9<L>  /  Alb  2.4<L>  /  TBili  0.5  /  DBili  x   /  AST  256<H>  /  ALT  81<H>  /  AlkPhos  239<H>  11-21                  RADIOLOGY

## 2021-11-21 NOTE — PROGRESS NOTE ADULT - SUBJECTIVE AND OBJECTIVE BOX
HPI:  Patient tolerating regular diet. Patient denies any n/v or abd pain.      PAST MEDICAL & SURGICAL HISTORY:  Hypertension    Diabetes mellitus      Allergies    No Known Allergies      MEDICATIONS  (STANDING):  ampicillin/sulbactam  IVPB      ampicillin/sulbactam  IVPB 3 Gram(s) IV Intermittent every 6 hours  ATENolol  Tablet 50 milliGRAM(s) Oral daily  chlorhexidine 4% Liquid 1 Application(s) Topical <User Schedule>  dextrose 40% Gel 15 Gram(s) Oral once  dextrose 5%. 1000 milliLiter(s) (50 mL/Hr) IV Continuous <Continuous>  dextrose 5%. 1000 milliLiter(s) (100 mL/Hr) IV Continuous <Continuous>  dextrose 50% Injectable 25 Gram(s) IV Push once  dextrose 50% Injectable 12.5 Gram(s) IV Push once  dextrose 50% Injectable 25 Gram(s) IV Push once  glucagon  Injectable 1 milliGRAM(s) IntraMuscular once  heparin   Injectable 5000 Unit(s) SubCutaneous every 12 hours  insulin lispro (ADMELOG) corrective regimen sliding scale   SubCutaneous three times a day before meals  insulin lispro (ADMELOG) corrective regimen sliding scale   SubCutaneous at bedtime    MEDICATIONS  (PRN):  acetaminophen     Tablet .. 650 milliGRAM(s) Oral every 6 hours PRN Temp greater or equal to 38C (100.4F), Mild Pain (1 - 3)  guaifenesin/dextromethorphan Oral Liquid 10 milliLiter(s) Oral every 4 hours PRN Cough    ROS:  General:	no fever, weight loss,  chills  Respiratory and Thorax: no cough, wheeze,  sob  Cardiovascular:	no chest pain, palpitations, dizziness  Gastrointestinal:	no nausea, vomiting, diarrhea, abd pain          Vital Signs Last 24 Hrs  T(C): 35.9 (21 Nov 2021 05:00), Max: 37.2 (20 Nov 2021 14:36)  T(F): 96.6 (21 Nov 2021 05:00), Max: 99 (20 Nov 2021 21:05)  HR: 92 (21 Nov 2021 05:00) (73 - 92)  BP: 153/65 (21 Nov 2021 05:00) (124/58 - 153/65)  RR: 18 (21 Nov 2021 05:00) (16 - 18)  SpO2: 95% (21 Nov 2021 09:34) (95% - 95%)    PHYSICAL EXAM:      Constitutional: A&Ox4  Respiratory: cta b/l  Cardiovascular: s1 s2 rrr  Gastrointestinal: soft nt  nd + bs no rebound or guarding  Genitourinary: no cva tenderness  Extremities: normal rom, no edema, calf tenderness                            11.4   16.10 )-----------( 262      ( 21 Nov 2021 06:49 )             35.2       11-21    134<L>  |  99  |  29<H>  ----------------------------<  142<H>  4.8   |  23  |  1.1    Ca    8.0<L>      21 Nov 2021 06:49    TPro  4.9<L>  /  Alb  2.4<L>  /  TBili  0.5  /  DBili  x   /  AST  256<H>  /  ALT  81<H>  /  AlkPhos  239<H>  11-21

## 2021-11-21 NOTE — PROGRESS NOTE ADULT - ASSESSMENT
Impression:  82 y/o male with medical history of HTN and Type 2 diabetes admitted for URI / Pnuemonia.    Surgery consulted due to findings of Leukocytosis, elevated LFT's and HIDA scan findings consistent with cholecystitis.          Plan:  - Patient currently on regular diet and tolerating  - Patient is not having abd pain or n/v.  - lft and wbc improved  - Patient currently on Unasyn per I.D.    - Monitor WBC count and LFT's.  - GI following.    - I.D. following.   - Case d/w Dr. Grullon and patient not a good surgical candidate for Lap Marylu at this time due to respiratory issues with URI / PNA and recommends to continue conservative mgt.    - Patient is clinically improving with conservative treatment.  - would consult IR for percutaneous GB drainage if patient clinical condition detoriates such increasing LFT and WBC abd pain/fevers.    - Recall surgery as needed

## 2021-11-21 NOTE — PROGRESS NOTE ADULT - ASSESSMENT
81 year old Male with past medical history of HTN, HLD, DM presenting to ED secondary to Acute URI symptoms and Dysambulatory status. Patient states he had a mechanical fall 2 weeks ago, s/p Splint in ER on 11/1. Patient went home and has been having difficulty with ambulation even with walker as his legs give up.  Patient also started to develop cough and sinus congestion along with rhinorrhea. Patient admits to dysuria but states it was resolved. On admission, he has no fever but Leukocytosis    - CT Chest No Cont (11.15.21 @ 08:58):  No CT evidence for acute intrathoracic disease. Extensive bilateral partially calcified pleural plaques and adjacent bibasilar round atelectasis, unchanged since 2013. 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  - CT Sinuses No Cont (11.15.21 @ 08:56):  Trace scattered paranasal sinus mucosal thickening. No air-fluid level to suggest acute sinusitis. Severe deviation of the osseous nasal septum to the left with a 4 mm left-sided septal spur impinging the left nasal cavity. Right-sided lula bullosa. Left maxillary periapical lucencies. Recommend follow-up with dental exam    A&P    # diarrhea- r/o infectious vs antibiotics related  check C diff and stool culture  isolation until tested    # Leucocytosis - possibly related to asymptomatic acute cholecystitis  - Procalcitonin, Serum: 0.22- 0.38:   - Blood Cx 11/14 NGTD  - ceftriaxone  changed to unasyn 3g q 6 on 11/17  HIDA concerning for cholecystitis; surgeon following- recommended conservative management    # Transaminitis - stable; Drug induced injury vs cholecystitis  GGT elevated; RUQ US showing cholelithiasis; no bile duct dialatation  - P-ANCA and C-ANCA- neg  TAWANDA, AMA, Sm Ab- pending;  ferritin, elevated- 1707 (possibly inflammation related; patient's hg and Hct is stable)  monitor LFT,   GI following  hold statin    #  chronic pulmonary change- stable; possibly due to asbestosis    #HTN  - Cont atenolol 50mg qD    # HLD- statin on hold    #DM2  - Cont ISS  - Monitor FS    DVT PPX, heparin  plan of care d/w patient and family  pending: improvement in leucocytosis; diarrhea 81 year old Male with past medical history of HTN, HLD, DM presenting to ED secondary to Acute URI symptoms and Dysambulatory status. Patient states he had a mechanical fall 2 weeks ago, s/p Splint in ER on 11/1. Patient went home and has been having difficulty with ambulation even with walker as his legs give up.  Patient also started to develop cough and sinus congestion along with rhinorrhea. Patient admits to dysuria but states it was resolved. On admission, he has no fever but Leukocytosis    - CT Chest No Cont (11.15.21 @ 08:58):  No CT evidence for acute intrathoracic disease. Extensive bilateral partially calcified pleural plaques and adjacent bibasilar round atelectasis, unchanged since 2013. 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  - CT Sinuses No Cont (11.15.21 @ 08:56):  Trace scattered paranasal sinus mucosal thickening. No air-fluid level to suggest acute sinusitis. Severe deviation of the osseous nasal septum to the left with a 4 mm left-sided septal spur impinging the left nasal cavity. Right-sided lula bullosa. Left maxillary periapical lucencies. Recommend follow-up with dental exam    A&P    # diarrhea- r/o infectious vs antibiotics related  check C diff and stool culture  isolation until tested    # Leucocytosis - possibly related to asymptomatic acute cholecystitis  - Procalcitonin, Serum: 0.22- 0.38:   - Blood Cx 11/14 NGTD  - ceftriaxone  changed to unasyn 3g q 6 on 11/17  HIDA concerning for cholecystitis; surgeon following- recommended conservative management    # Transaminitis - stable; Drug induced injury vs cholecystitis  GGT elevated; RUQ US showing cholelithiasis; no bile duct dialatation  - P-ANCA and C-ANCA- neg  TAWANDA, AMA, Sm Ab- pending;  ferritin, elevated- 1707 (possibly inflammation related; low transferring andpatient's hg and Hct is stable)  monitor LFT,   GI following  hold statin    #  chronic pulmonary change- stable; possibly due to asbestosis    #HTN  - Cont atenolol 50mg qD    # HLD- statin on hold    #DM2  - Cont ISS  - Monitor FS    DVT PPX, heparin  plan of care d/w patient and family  pending: improvement in leucocytosis; diarrhea

## 2021-11-22 NOTE — PROGRESS NOTE ADULT - ASSESSMENT
81 year old Male with past medical history of HTN, HLD, DM presenting to ED secondary to Acute URI symptoms and Dysambulatory status. Patient states he had a mechanical fall 2 weeks ago, s/p Splint in ER on 11/1. Patient went home and has been having difficulty with ambulation even with walker as his legs give up.  Patient also started to develop cough and sinus congestion along with rhinorrhea. Patient admits to dysuria but states it was resolved. On admission, he has no fever but Leukocytosis    - CT Chest No Cont (11.15.21 @ 08:58):  No CT evidence for acute intrathoracic disease. Extensive bilateral partially calcified pleural plaques and adjacent bibasilar round atelectasis, unchanged since 2013. 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  - CT Sinuses No Cont (11.15.21 @ 08:56):  Trace scattered paranasal sinus mucosal thickening. No air-fluid level to suggest acute sinusitis. Severe deviation of the osseous nasal septum to the left with a 4 mm left-sided septal spur impinging the left nasal cavity. Right-sided lula bullosa. Left maxillary periapical lucencies. Recommend follow-up with dental exam    A&P    # Leucocytosis -persistent; episode of isolated fever on 11/21  -  possibly related to asymptomatic acute cholecystitis  HIDA concerning for cholecystitis; surgeon following- recommended conservative management  - Procalcitonin, Serum: 0.22- 0.38:   - Blood Cx 11/14 NGTD  - ceftriaxone  changed to unasyn 3g q 6 on 11/17    # diarrhea- possibly  antibiotics related- resolved  C diff - neg; culture- pending    # Transaminitis - stable; Drug induced injury vs cholecystitis  GGT elevated; RUQ US showing cholelithiasis; no bile duct dialatation  - P-ANCA and C-ANCA- neg  TAWANDA, AMA, Sm Ab- pending;  ferritin, elevated- 1707 (possibly inflammation related; low transferring andpatient's hg and Hct is stable)  monitor LFT,   GI following  hold statin    #  chronic pulmonary change- stable; possibly due to asbestosis    #HTN- Cont atenolol 50mg qD    # HLD- statin on hold    #DM2- Cont ISS- Monitor FS    DVT PPX, heparin  plan of care d/w patient and family  pending: improvement in leucocytosis; ID follow up

## 2021-11-22 NOTE — PROGRESS NOTE ADULT - ASSESSMENT
Patient is a 81y old  Male with past medical history of HTN, HLD, DM, now presents to the ER  for evaluation of URI symptoms and non ambulatory status. Patient states he had a mechanical fall 2 weeks ago, s/p Splint in ER on 11/1, went home and has been having difficulty with ambulation even with walker as his legs give up. Patient also started to develop cough and sinus congestion along with rhinorrhea. Patient admits to dysuria but states it was resolved. On admission, he has no fever but Leukocytosis. The CXR shows no infiltrate. The ID consult requested to assist with further evaluation and antibiotic  management.    # Suspected pneumonia  # Leukocytosis - resolving   # Cholelithiasis w/o cholecystitis- on Abdominal US  # Cholecyustitis- HIDA scan positive     would recommend:    1. Surgery follow up for Cholecystectomy since spiking fever and Leukocytosis is worsening  2. Monitor LFTs, AVOID Cephalosporins   3. Monitor WBC count, is worsening  4. Continue Unasyn for now    d/w Dr. Malik     Attending Attestation:    Spent more than 45 minutes on total encounter, more than 50 % of the visit was spent counseling and/or coordinating care by the Attending physician.   Patient is a 81y old  Male with past medical history of HTN, HLD, DM, now presents to the ER  for evaluation of URI symptoms and non ambulatory status. Patient states he had a mechanical fall 2 weeks ago, s/p Splint in ER on 11/1, went home and has been having difficulty with ambulation even with walker as his legs give up. Patient also started to develop cough and sinus congestion along with rhinorrhea. Patient admits to dysuria but states it was resolved. On admission, he has no fever but Leukocytosis. The CXR shows no infiltrate. The ID consult requested to assist with further evaluation and antibiotic  management.    # Suspected pneumonia  # Leukocytosis - resolved  # Cholelithiasis w/o cholecystitis- on Abdominal US  # Cholecyustitis- HIDA scan positive     would recommend:    1. Surgery follow up for Cholecystectomy since spiking fever and Leukocytosis is worsening on appropriate Antibiotic  2. Monitor LFTs, AVOID Cephalosporins   3. Monitor WBC count, is worsening  4. Continue Unasyn for now    d/w Dr. Malik and patient     Attending Attestation:    Spent more than 45 minutes on total encounter, more than 50 % of the visit was spent counseling and/or coordinating care by the Attending physician.

## 2021-11-22 NOTE — PROGRESS NOTE ADULT - ASSESSMENT
81yMale pmh HTN and DM here for recent fall and acute UTI symptoms. GI consulted for altered LFTs.    Problem 1-Altered LFTs  -mixed pattern  likely due to DILI with superimposed rhabdo  Rec  -unasyn likelihood liver injury class C probable rare cause of apparent liver injury that can cause mild AST and ALT elevations  -ceftriaxone causes cholestatic hepatitis it was stopped and ALP improving   -Monitor LFTs, LFTs improving   -CKMB levels   -CBD 6mm not dilated   -Avoid hepatotoxic medications when possible   - CLD negative thus far     Problem 2- asymptomatic Cholelithiasis  +HIDA scan for cholecystitis  Rec  -please obtain surgical consult

## 2021-11-22 NOTE — PROGRESS NOTE ADULT - SUBJECTIVE AND OBJECTIVE BOX
HPI  Patient is a 81y old Male who presents with a chief complaint of Pneumonia (22 Nov 2021 10:48)    Currently admitted to medicine with the primary diagnosis of Upper respiratory infection       Today is hospital day 8d.     INTERVAL HPI / OVERNIGHT EVENTS:  Patient was seen and examined at bedside  No new complaints  Denies any complains of chest pain or shortness of breath  Denies any abdominal pain/nausea/vomiting    had fever yesterday evening; patient did not feel anything different  no further diarrhea episode    PAST MEDICAL & SURGICAL HISTORY  Hypertension    Diabetes mellitus      ALLERGIES  No Known Allergies    MEDICATIONS  STANDING MEDICATIONS  ampicillin/sulbactam  IVPB      ampicillin/sulbactam  IVPB 3 Gram(s) IV Intermittent every 6 hours  ATENolol  Tablet 50 milliGRAM(s) Oral daily  chlorhexidine 4% Liquid 1 Application(s) Topical <User Schedule>  dextrose 40% Gel 15 Gram(s) Oral once  dextrose 5%. 1000 milliLiter(s) IV Continuous <Continuous>  dextrose 5%. 1000 milliLiter(s) IV Continuous <Continuous>  dextrose 50% Injectable 25 Gram(s) IV Push once  dextrose 50% Injectable 12.5 Gram(s) IV Push once  dextrose 50% Injectable 25 Gram(s) IV Push once  glucagon  Injectable 1 milliGRAM(s) IntraMuscular once  heparin   Injectable 5000 Unit(s) SubCutaneous every 12 hours  insulin lispro (ADMELOG) corrective regimen sliding scale   SubCutaneous three times a day before meals  insulin lispro (ADMELOG) corrective regimen sliding scale   SubCutaneous at bedtime    PRN MEDICATIONS  acetaminophen     Tablet .. 650 milliGRAM(s) Oral every 6 hours PRN  guaifenesin/dextromethorphan Oral Liquid 10 milliLiter(s) Oral every 4 hours PRN    VITALS:  T(F): 98.2  HR: 80  BP: 140/63  RR: 18  SpO2: --    PHYSICAL EXAM  GEN: no distress, comfortable  PULM: BS heard b/l equal, No wheezing  CVS: S1S2 present, no rubs or gallops  ABD: Soft, non-distended, no guarding; non-tender  EXT: No lower extremity edema  NEURO: A&Ox3, moving all extremities    LABS                        11.3   16.04 )-----------( 231      ( 22 Nov 2021 06:26 )             35.0     11-22    135  |  99  |  31<H>  ----------------------------<  130<H>  4.8   |  22  |  1.1    Ca    8.0<L>      22 Nov 2021 06:26    TPro  4.8<L>  /  Alb  2.5<L>  /  TBili  0.5  /  DBili  x   /  AST  250<H>  /  ALT  78<H>  /  AlkPhos  209<H>  11-22                  RADIOLOGY

## 2021-11-22 NOTE — PROGRESS NOTE ADULT - SUBJECTIVE AND OBJECTIVE BOX
81yMale  Being followed for altered LFTs   Interval history: Patient denies nausea, vomiting, hematemesis, melena, blood in stool, diarrhea, constipation, abdominal pain. Patient comfortable and tolerating diet.      PAST MEDICAL & SURGICAL HISTORY:   Hypertension    Diabetes mellitus            Social History: No smoking. No alcohol. No illegal drug use.            MEDICATIONS  (STANDING):  ampicillin/sulbactam  IVPB      ampicillin/sulbactam  IVPB 3 Gram(s) IV Intermittent every 6 hours  ATENolol  Tablet 50 milliGRAM(s) Oral daily  chlorhexidine 4% Liquid 1 Application(s) Topical <User Schedule>  dextrose 40% Gel 15 Gram(s) Oral once  dextrose 5%. 1000 milliLiter(s) (50 mL/Hr) IV Continuous <Continuous>  dextrose 5%. 1000 milliLiter(s) (100 mL/Hr) IV Continuous <Continuous>  dextrose 50% Injectable 25 Gram(s) IV Push once  dextrose 50% Injectable 12.5 Gram(s) IV Push once  dextrose 50% Injectable 25 Gram(s) IV Push once  glucagon  Injectable 1 milliGRAM(s) IntraMuscular once  heparin   Injectable 5000 Unit(s) SubCutaneous every 12 hours  insulin lispro (ADMELOG) corrective regimen sliding scale   SubCutaneous three times a day before meals  insulin lispro (ADMELOG) corrective regimen sliding scale   SubCutaneous at bedtime    MEDICATIONS  (PRN):  acetaminophen     Tablet .. 650 milliGRAM(s) Oral every 6 hours PRN Temp greater or equal to 38C (100.4F), Mild Pain (1 - 3)  guaifenesin/dextromethorphan Oral Liquid 10 milliLiter(s) Oral every 4 hours PRN Cough      Allergies:   No Known Allergies              REVIEW OF SYSTEMS:  General:  No weight loss, fevers, or chills.  Eyes:  No reported pain or visual changes  ENT:  No sore throat or runny nose.  NECK: No stiffness   CV:  No chest pain or palpitations.  Resp:  No shortness of breath, cough  GI:  No abdominal pain, nausea, vomiting, dysphagia, diarrhea or constipation. No rectal bleeding, melena, or hematemesis.  Neuro:  No tingling, numbness           VITAL SIGNS:   T(F): 98.2 (11-22-21 @ 05:00), Max: 101 (11-21-21 @ 20:28)  HR: 80 (11-22-21 @ 05:00) (73 - 80)  BP: 140/63 (11-22-21 @ 05:00) (139/64 - 145/65)  RR: 18 (11-22-21 @ 05:00) (18 - 18)  SpO2: --    PHYSICAL EXAM:  GENERAL: AAOx3, no acute distress.  HEAD:  Atraumatic, Normocephalic  EYES: conjunctiva and sclera clear  NECK: Supple, no JVD or thyromegaly  CHEST/LUNG: Clear to auscultation bilaterally; No wheeze, rhonchi, or rales  HEART: Regular rate and rhythm; normal S1, S2, No murmurs.  ABDOMEN: Soft, nontender, nondistended; Bowel sounds present  NEUROLOGY: No asterixis or tremor.   SKIN: Intact, no jaundice            LABS:                        11.3   16.04 )-----------( 231      ( 22 Nov 2021 06:26 )             35.0     11-22    135  |  99  |  31<H>  ----------------------------<  130<H>  4.8   |  22  |  1.1    Ca    8.0<L>      22 Nov 2021 06:26    TPro  4.8<L>  /  Alb  2.5<L>  /  TBili  0.5  /  DBili  x   /  AST  250<H>  /  ALT  78<H>  /  AlkPhos  209<H>  11-22    LIVER FUNCTIONS - ( 22 Nov 2021 06:26 )  Alb: 2.5 g/dL / Pro: 4.8 g/dL / ALK PHOS: 209 U/L / ALT: 78 U/L / AST: 250 U/L / GGT: x               IMAGING:    < from: US Abdomen Upper Quadrant Right (11.17.21 @ 17:11) >    EXAM:  US ABDOMEN RT UPR QUADRANT            PROCEDURE DATE:  11/17/2021            INTERPRETATION:  CLINICAL INFORMATION: Elevated liver function enzymes.    COMPARISON: None available.    TECHNIQUE: Sonography of the right upper quadrant.    FINDINGS:    Liver: Within normal limits.  Bile ducts: Normal caliber. Common bile duct measures 6 (mm).  Gallbladder: Cholelithiasis.  No focal tenderness or evidence of cholecystitis.  Pancreas: Visualized portions are within normal limits.  Right kidney: 9.2 cm. No hydronephrosis.  Ascites: None.  IVC: Visualized portions are within normal limits.    IMPRESSION:    Cholelithiasis without cholecystitis.        --- End of Report ---              HERNESTO ZAMUDIO MD; Attending Interventional Radiologist  This document has been electronically signed. Nov 18 2021  7:50AM    < end of copied text >

## 2021-11-23 NOTE — PROGRESS NOTE ADULT - ASSESSMENT
Assessment:  81y Male patient came to the hospital because he has been having falls more frequently. Past medical history of HTN, HLD, DM presenting to ED secondary to Acute URI symptoms and Dysambulatory status. Patient states he had a mechanical fall 2 weeks ago, s/p Splint in ER on 11/1. Patient went home and has been having difficulty with ambulation even with walker as his legs give up. Patient also started to develop cough and sinus congestion along with rhinorrhea. Patient admits to dysuria but states it was resolved.    During workup, it was revealed patient had transaminitis with elevated WBC. Decision for RUQ that showed only cholelithiasis, and no evidence of cholecystitis. HIDA scan was also done, showing non-visualization of the gallbladder. Surgery consulted. When initially consulted, patient's abdomen soft and nontender. Patient also had a diagnosis of pneumonia, that rendered him a non-surgical candidate. Surgery had signed off at the time and documented that if patient becomes symptomatic in the setting of pneumonia to consider a percutaneous cholecystostomy.   Now, surgery team recalled because patient still with persistent leukocytosis, and primary team determined that he never had pneumonia.     Patient seen and examined at bedside, and he is still completely asymptomatic. Patient is nontender, completely soft. Passing gas and having bowel movements. Patient is asking if he can eat and go home.    I explained to the patient that he does have cholelithiasis, and it is recommended that he have his gallbladder removed, but that it is not urgent at this time.    Based off our exam, and an Ultrasound that does not show any evidence of cholecystitis, there is no need for urgent surgical intervention in this patient. Patient can followup with Dr. Wilson as an outpatient for elective cholecystectomy if he so chooses. If patient develops acute pain, or abdominal exam changes, please notify surgery team.     Plan:  - Patient can have diet from our standpoint. No plan for surgical intervention at this time.  - Monitor LFTs, downtrending, GI thinks likely due to ceftriaxone.  - Antibiotics as per ID  - Monitor vitals  - Monitor labs and replete as necessary  - Encourage ambulation as tolerated  - DVT and GI Prophylaxis  - Follow PT/Physiatry  - Followup with Dr. Wilson as an outpatient.     Date/Time: 11-23-21 @ 14:46

## 2021-11-23 NOTE — PROGRESS NOTE ADULT - SUBJECTIVE AND OBJECTIVE BOX
KAYLAMIGUEL ANGEL  81y, Male  Allergy: No Known Allergies      LOS  9d    CHIEF COMPLAINT: Pneumonia (22 Nov 2021 20:18)      INTERVAL EVENTS/HPI  - No acute events overnight  - T(F): , Max: 97.6 (11-22-21 @ 14:54)  - Denies any worsening symptoms  - Tolerating medication  - WBC Count: 16.23 (11-23-21 @ 07:47)  WBC Count: 16.04 (11-22-21 @ 06:26)     - Creatinine, Serum: 1.4 (11-23-21 @ 07:47)  Creatinine, Serum: 1.1 (11-22-21 @ 06:26)       ROS  General: Denies rigors, nightsweats  HEENT: Denies headache, rhinorrhea, sore throat, eye pain  CV: Denies CP, palpitations  PULM: Denies wheezing, hemoptysis  GI: Denies hematemesis, hematochezia, melena  : Denies discharge, hematuria  MSK: Denies arthralgias, myalgias  SKIN: Denies rash, lesions  NEURO: Denies paresthesias, weakness  PSYCH: Denies depression, anxiety    VITALS:  T(F): 97.5, Max: 97.6 (11-22-21 @ 14:54)  HR: 83  BP: 141/66  RR: 16Vital Signs Last 24 Hrs  T(C): 36.4 (23 Nov 2021 05:31), Max: 36.4 (22 Nov 2021 14:54)  T(F): 97.5 (23 Nov 2021 05:31), Max: 97.6 (22 Nov 2021 14:54)  HR: 83 (23 Nov 2021 05:31) (73 - 83)  BP: 141/66 (23 Nov 2021 05:31) (136/63 - 141/66)  BP(mean): --  RR: 16 (23 Nov 2021 05:31) (16 - 16)  SpO2: --    PHYSICAL EXAM:  Gen: NAD, resting in bed  HEENT: Normocephalic, atraumatic  Neck: supple, no lymphadenopathy  CV: Regular rate & regular rhythm  Lungs: decreased BS at bases, no fremitus  Abdomen: Soft, BS present  Ext: Warm, well perfused  Neuro: non focal, awake  Skin: no rash, no erythema  Lines: no phlebitis    FH: Non-contributory  Social Hx: Non-contributory    TESTS & MEASUREMENTS:                        11.1   16.23 )-----------( 254      ( 23 Nov 2021 07:47 )             33.8     11-23    139  |  103  |  32<H>  ----------------------------<  165<H>  5.2<H>   |  25  |  1.4    Ca    8.1<L>      23 Nov 2021 07:47    TPro  4.8<L>  /  Alb  2.2<L>  /  TBili  0.4  /  DBili  x   /  AST  197<H>  /  ALT  79<H>  /  AlkPhos  208<H>  11-23    eGFR if : 53 mL/min/1.73M2 (11-23-21 @ 07:47)  eGFR if Non African American: 46 mL/min/1.73M2 (11-23-21 @ 07:47)    LIVER FUNCTIONS - ( 23 Nov 2021 07:47 )  Alb: 2.2 g/dL / Pro: 4.8 g/dL / ALK PHOS: 208 U/L / ALT: 79 U/L / AST: 197 U/L / GGT: x               Culture - Stool (collected 11-21-21 @ 14:00)  Source: .Stool Feces  Preliminary Report (11-22-21 @ 19:39):    No enteric pathogens to date: Final culture pending    Culture - Blood (collected 11-14-21 @ 11:20)  Source: .Blood Blood  Final Report (11-19-21 @ 19:01):    No Growth Final    Culture - Blood (collected 11-14-21 @ 11:20)  Source: .Blood Blood  Final Report (11-19-21 @ 19:00):    No Growth Final    Culture - Urine (collected 11-14-21 @ 11:20)  Source: Clean Catch Clean Catch (Midstream)  Final Report (11-15-21 @ 13:37):    <10,000 CFU/mL Normal Urogenital Keiko            INFECTIOUS DISEASES TESTING  Procalcitonin, Serum: 0.35 (11-22-21 @ 06:26)  Procalcitonin, Serum: 0.38 (11-18-21 @ 09:58)  COVID-19 PCR: NotDetec (11-18-21 @ 08:50)  MRSA PCR Result.: Negative (11-15-21 @ 13:30)  Procalcitonin, Serum: 0.22 (11-15-21 @ 06:59)  Rapid RVP Result: NotDetec (11-14-21 @ 09:55)      INFLAMMATORY MARKERS      RADIOLOGY & ADDITIONAL TESTS:  I have personally reviewed the last available Chest xray  CXR  Xray Chest 1 View- PORTABLE-Urgent:   EXAM:  XR CHEST PORTABLE URGENT 1V            PROCEDURE DATE:  11/22/2021            INTERPRETATION:  Clinical History / Reason for exam: Fever    Comparison : Chest radiograph 11/14/2021.    Technique/Positioning: Frontal chest radiograph.    Findings:    Support devices: None.    Cardiac/mediastinum/hilum: Unchanged    Lung parenchyma/Pleura: Calcified bilateral pleural plaques redemonstrated. Bibasilar opacities/small effusions, similar to prior. No pneumothorax.    Skeleton/soft tissues: Unchanged    Impression:    Bibasilar opacities/small effusions, similar to prior.    --- End of Report ---              ANJU ALEJANDRO MD; Attending Radiologist  This document has been electronically signed. Nov 22 2021 11:53AM (11-22-21 @ 10:51)      CT      CARDIOLOGY TESTING  12 Lead ECG:   Ventricular Rate 83 BPM    Atrial Rate 83 BPM    P-R Interval 154 ms    QRS Duration 76 ms    Q-T Interval 344 ms    QTC Calculation(Bazett) 404 ms    P Axis 41 degrees    R Axis 25 degrees    T Axis 23 degrees    Diagnosis Line Normal sinus rhythm  Nonspecific ST-T changes    Confirmed by MIGUEL ANGEL PALUMBO MD ([id number]) on 11/15/2021 1:58:29 PM (11-14-21 @ 10:53)      MEDICATIONS  ampicillin/sulbactam  IVPB     ampicillin/sulbactam  IVPB 3 IV Intermittent every 6 hours  ATENolol  Tablet 50 Oral daily  chlorhexidine 4% Liquid 1 Topical <User Schedule>  dextrose 40% Gel 15 Oral once  dextrose 5%. 1000 IV Continuous <Continuous>  dextrose 5%. 1000 IV Continuous <Continuous>  dextrose 50% Injectable 25 IV Push once  dextrose 50% Injectable 12.5 IV Push once  dextrose 50% Injectable 25 IV Push once  glucagon  Injectable 1 IntraMuscular once  heparin   Injectable 5000 SubCutaneous every 12 hours  insulin lispro (ADMELOG) corrective regimen sliding scale  SubCutaneous three times a day before meals  insulin lispro (ADMELOG) corrective regimen sliding scale  SubCutaneous at bedtime      WEIGHT  Weight (kg): 87 (11-14-21 @ 14:54)  Creatinine, Serum: 1.4 mg/dL (11-23-21 @ 07:47)      ANTIBIOTICS:  ampicillin/sulbactam  IVPB      ampicillin/sulbactam  IVPB 3 Gram(s) IV Intermittent every 6 hours      All available historical records have been reviewed

## 2021-11-23 NOTE — PROGRESS NOTE ADULT - ASSESSMENT
81yMale pmh HTN and DM here for recent fall and acute UTI symptoms. GI consulted for altered LFTs.    Problem 1-Altered LFTs  -mixed pattern  likely due to DILI with superimposed rhabdo  Rec  -unasyn likelihood liver injury class C probable rare cause of apparent liver injury that can cause mild AST and ALT elevations  -ceftriaxone causes cholestatic hepatitis it was stopped and ALP improving   -Monitor LFTs, LFTs improving   -CKMB levels   -CBD 6mm not dilated   -Avoid hepatotoxic medications when possible   - CLD negative thus far     Problem 2- asymptomatic Cholelithiasis  +HIDA scan for cholecystitis  Rec  -surgery following

## 2021-11-23 NOTE — CONSULT NOTE ADULT - SUBJECTIVE AND OBJECTIVE BOX
INTERVENTIONAL RADIOLOGY CONSULT:     HPI:  Patient is an 81 year old Male with past medical history of HTN, HLD, DM presenting to ED secondary to Acute URI symptoms and Dysambulatory status. Patient states he had a mechanical fall 2 weeks prior to admission, s/p Splint in ER on 11/1. Patient went home and has been having difficulty with ambulation even with walker as his legs give up. Patient also started to develop cough and sinus congestion along with rhinorrhea. Patient admits to dysuria but states it was resolved. Patient denies fevers, chills, nausea, vomiting, chest pain, shortness of breath, palpitations, abdominal pain, flank pain, new onset of / worsening low back pain, diarrhea, constipation, new onset of leg swelling.  (14 Nov 2021 13:49).    During workup, it was revealed patient had transaminitis with elevated WBC. Decision for RUQ that showed only cholelithiasis, and no evidence of acute cholecystitis. HIDA scan was also done, showing non-visualization of the gallbladder and suggestive of cholecystitis. Surgery consulted. When initially consulted, patient's abdomen soft and nontender.    Now, IR team consulted due to HIDA scan findings and persistent leukocytosis     PAST MEDICAL & SURGICAL HISTORY:  Hypertension  Diabetes mellitus    MEDICATIONS  (STANDING):  ampicillin/sulbactam  IVPB      ampicillin/sulbactam  IVPB 3 Gram(s) IV Intermittent every 6 hours  ATENolol  Tablet 50 milliGRAM(s) Oral daily  chlorhexidine 4% Liquid 1 Application(s) Topical <User Schedule>  dextrose 40% Gel 15 Gram(s) Oral once  dextrose 5%. 1000 milliLiter(s) (50 mL/Hr) IV Continuous <Continuous>  dextrose 5%. 1000 milliLiter(s) (100 mL/Hr) IV Continuous <Continuous>  dextrose 50% Injectable 25 Gram(s) IV Push once  dextrose 50% Injectable 12.5 Gram(s) IV Push once  dextrose 50% Injectable 25 Gram(s) IV Push once  glucagon  Injectable 1 milliGRAM(s) IntraMuscular once  heparin   Injectable 5000 Unit(s) SubCutaneous every 12 hours  insulin lispro (ADMELOG) corrective regimen sliding scale   SubCutaneous three times a day before meals  insulin lispro (ADMELOG) corrective regimen sliding scale   SubCutaneous at bedtime    MEDICATIONS  (PRN):  acetaminophen     Tablet .. 650 milliGRAM(s) Oral every 6 hours PRN Temp greater or equal to 38C (100.4F), Mild Pain (1 - 3)  guaifenesin/dextromethorphan Oral Liquid 10 milliLiter(s) Oral every 4 hours PRN Cough      Allergies    No Known Allergies    Intolerances    Physical Exam:   Vital Signs Last 24 Hrs  T(C): 36.8 (23 Nov 2021 13:05), Max: 36.8 (23 Nov 2021 13:05)  T(F): 98.3 (23 Nov 2021 13:05), Max: 98.3 (23 Nov 2021 13:05)  HR: 68 (23 Nov 2021 13:05) (68 - 83)  BP: 143/66 (23 Nov 2021 13:05) (136/63 - 143/66)  BP(mean): --  RR: 16 (23 Nov 2021 13:05) (16 - 16)  SpO2: --    Labs:                         11.1   16.23 )-----------( 254      ( 23 Nov 2021 07:47 )             33.8     11-23    139  |  103  |  32<H>  ----------------------------<  165<H>  5.2<H>   |  25  |  1.4    Ca    8.1<L>      23 Nov 2021 07:47    TPro  4.8<L>  /  Alb  2.2<L>  /  TBili  0.4  /  DBili  x   /  AST  197<H>  /  ALT  79<H>  /  AlkPhos  208<H>  11-23    PT/INR - ( 23 Nov 2021 07:47 )   PT: 15.60 sec;   INR: 1.36 ratio         PTT - ( 23 Nov 2021 07:47 )  PTT:27.2 sec    Pertinent labs:                      11.1   16.23 )-----------( 254      ( 23 Nov 2021 07:47 )             33.8       11-23    139  |  103  |  32<H>  ----------------------------<  165<H>  5.2<H>   |  25  |  1.4    Ca    8.1<L>      23 Nov 2021 07:47    TPro  4.8<L>  /  Alb  2.2<L>  /  TBili  0.4  /  DBili  x   /  AST  197<H>  /  ALT  79<H>  /  AlkPhos  208<H>  11-23      PT/INR - ( 23 Nov 2021 07:47 )   PT: 15.60 sec;   INR: 1.36 ratio         PTT - ( 23 Nov 2021 07:47 )  PTT:27.2 sec    Radiology imaging reviewed.     ASSESSMENT/PLAN:     No acute IR intervention at this time given patient is asymptomatic and clinically stable. Patient with known cholelithiasis, recommend f/u with surgery recommendations. Will sign up, please contact IR with any questions.     Thank you for the courtesy of this consult, please call [phone] with any further questions. [phone] after 5pm.   
  Patient is a 81y old  Male with past medical history of HTN, HLD, DM, now presents to the ER  for evaluation of URI symptoms and non ambulatory status. Patient states he had a mechanical fall 2 weeks ago, s/p Splint in ER on , went home and has been having difficulty with ambulation even with walker as his legs give up. Patient also started to develop cough and sinus congestion along with rhinorrhea. Patient admits to dysuria but states it was resolved. On admission, he has no fever but Leukocytosis. The CXR shows no infiltrate. The ID consult requested to assist with further evaluation and antibiotic  management.      REVIEW OF SYSTEMS: Total of twelve systems have been reviewed with patient and found to be negative unless mentioned in HPI      PAST MEDICAL & SURGICAL HISTORY:  Hypertension  Diabetes mellitus      SOCIAL HISTORY  Alcohol: Does not drink  Tobacco: Does not smoke  Illicit substance use: None      FAMILY HISTORY: Non contributory to the present illness      ALLERGIES: No Known Allergies      Vital Signs Last 24 Hrs  T(C): 36.2 (2021 14:54), Max: 37.7 (2021 11:09)  T(F): 97.1 (2021 14:54), Max: 99.9 (2021 11:09)  HR: 85 (2021 14:54) (75 - 85)  BP: 121/76 (2021 14:54) (121/76 - 121/89)  BP(mean): --  RR: 16 (2021 14:54) (16 - 18)  SpO2: 98% (2021 09:45) (98% - 98%)    PHYSICAL EXAM:  GENERAL: Not in distress   CHEST/LUNG: Not using accessory muscles   HEART: s1 and s2 present  ABDOMEN:  Nontender and  Nondistended  EXTREMITIES: No pedal  edema  CNS: Awake and Alert      LABS:                        13.5   18.14 )-----------( 338      ( 2021 10:00 )             41.2       11-14    135  |  97<L>  |  32<H>  ----------------------------<  91  5.1<H>   |  25  |  1.4    Ca    9.0      2021 10:00    TPro  6.3  /  Alb  3.2<L>  /  TBili  0.7  /  DBili  x   /  AST  120<H>  /  ALT  91<H>  /  AlkPhos  318<H>        CAPILLARY BLOOD GLUCOSE  POCT Blood Glucose.: 70 mg/dL (2021 20:52)  POCT Blood Glucose.: 86 mg/dL (2021 16:52)      Urinalysis Basic - ( 2021 11:20 )  Color: Yellow / Appearance: Clear / S.020 / pH: x  Gluc: x / Ketone: Negative  / Bili: Negative / Urobili: 0.2 mg/dL   Blood: x / Protein: 30 mg/dL / Nitrite: Negative   Leuk Esterase: Negative / RBC: 1-2 /HPF / WBC x   Sq Epi: x / Non Sq Epi: Few /HPF / Bacteria: Moderate        MEDICATIONS  (STANDING):  ATENolol  Tablet 50 milliGRAM(s) Oral daily  chlorhexidine 4% Liquid 1 Application(s) Topical <User Schedule>  glucagon  Injectable 1 milliGRAM(s) IntraMuscular once  heparin   Injectable 5000 Unit(s) SubCutaneous every 12 hours  insulin lispro (ADMELOG) corrective regimen sliding scale   SubCutaneous three times a day before meals  insulin lispro (ADMELOG) corrective regimen sliding scale   SubCutaneous at bedtime  simvastatin 40 milliGRAM(s) Oral at bedtime  sodium chloride 0.9%. 1000 milliLiter(s) (60 mL/Hr) IV Continuous <Continuous>    MEDICATIONS  (PRN):  acetaminophen     Tablet .. 650 milliGRAM(s) Oral every 6 hours PRN Temp greater or equal to 38C (100.4F), Mild Pain (1 - 3)  guaifenesin/dextromethorphan Oral Liquid 10 milliLiter(s) Oral every 4 hours PRN Cough      RADIOLOGY & ADDITIONAL TESTS:    < from: Xray Chest 1 View-PORTABLE IMMEDIATE (Xray Chest 1 View-PORTABLE IMMEDIATE .) (21 @ 10:36) >  Bilateral opacities compatible with pleural plaques. These are without significant change when compared to chest radiograph 2008 given differences in technique. Given overlying pleural plaques, evaluation is limited on this single frontal chest radiograph.      MICROBIOLOGY DATA:    Respiratory Viral Panel with COVID-19 by SOFIA (21 @ 09:55)   Rapid RVP Result: Juanita   SARS-CoV-2: Yohante: T        
HPI  Patient is a 81y old Male with past medical history of HTN, Type 2 diabetes who is currently admitted to medicine with the primary diagnosis of Upper respiratory infection/ Pneumonia.      Surgery consulted due to findings of Leukocytosis, elevated LFT's and HIDA scan findings consistent with cholecystitis.    Patient had GI consult done for elevated LFT's and RUQ sono was completed which showed cholelithiasis without sonographic evidence of cholecystitis.  Patient then had a HIDA scan completed today with findings of No visualization of the gallbladder after 4 hours post-injection, consistent with cholecystitis.    Patient denies history of knowing about gallstones, denies prior episodes, denies abdominal pain, fever, chills, tremors, N/V/D or chest pains.            PAST MEDICAL & SURGICAL HISTORY:  Hypertension  Diabetes mellitus, type 2          MEDICATIONS  (STANDING):  ampicillin/sulbactam  IVPB      ampicillin/sulbactam  IVPB 3 Gram(s) IV Intermittent every 6 hours  ATENolol  Tablet 50 milliGRAM(s) Oral daily  chlorhexidine 4% Liquid 1 Application(s) Topical <User Schedule>  dextrose 40% Gel 15 Gram(s) Oral once  dextrose 5%. 1000 milliLiter(s) (50 mL/Hr) IV Continuous <Continuous>  dextrose 5%. 1000 milliLiter(s) (100 mL/Hr) IV Continuous <Continuous>  dextrose 50% Injectable 25 Gram(s) IV Push once  dextrose 50% Injectable 25 Gram(s) IV Push once  dextrose 50% Injectable 12.5 Gram(s) IV Push once  glucagon  Injectable 1 milliGRAM(s) IntraMuscular once  heparin   Injectable 5000 Unit(s) SubCutaneous every 12 hours  insulin lispro (ADMELOG) corrective regimen sliding scale   SubCutaneous three times a day before meals  insulin lispro (ADMELOG) corrective regimen sliding scale   SubCutaneous at bedtime    MEDICATIONS  (PRN):  acetaminophen     Tablet .. 650 milliGRAM(s) Oral every 6 hours PRN Temp greater or equal to 38C (100.4F), Mild Pain (1 - 3)  guaifenesin/dextromethorphan Oral Liquid 10 milliLiter(s) Oral every 4 hours PRN Cough            Allergies  No Known Allergies          SOCIAL HISTORY:  Tobacco use:  Denies  Alcohol use: Denies  Drug use:  Denies          FAMILY HISTORY:  No pertinent family history in first degree relatives          I&O's Summary    18 Nov 2021 07:01  -  19 Nov 2021 07:00  --------------------------------------------------------  IN: 0 mL / OUT: 1300 mL / NET: -1300 mL        Vital Signs Last 24 Hrs  T(C): 36.2 (19 Nov 2021 14:20), Max: 37.6 (19 Nov 2021 05:48)  T(F): 97.2 (19 Nov 2021 14:20), Max: 99.6 (19 Nov 2021 05:48)  HR: 73 (19 Nov 2021 14:20) (73 - 87)  BP: 157/65 (19 Nov 2021 14:20) (148/68 - 157/65)  RR: 16 (19 Nov 2021 14:20) (16 - 16)  SpO2: 94% (19 Nov 2021 09:33) (94% - 94%)          Physical Exam:  General:  WD, WN, conversant in NAD, resting comfortably in bed.   Abdominal:  + BS, soft, ND/NT, No RUQ or epigastric tenderness.  No rebound, guarding or peritoneal signs.          LABS:                        11.6   15.35 )-----------( 283      ( 19 Nov 2021 06:41 )             34.9     11-19    136  |  100  |  28<H>  ----------------------------<  132<H>  4.7   |  22  |  1.2    Ca    7.8<L>      19 Nov 2021 06:41    TPro  5.2<L>  /  Alb  2.4<L>  /  TBili  0.5  /  DBili  x   /  AST  217<H>  /  ALT  108<H>  /  AlkPhos  324<H>  11-19    PT/INR - ( 19 Nov 2021 11:09 )   PT: 15.10 sec;   INR: 1.32 ratio    PTT - ( 19 Nov 2021 11:09 )  PTT:31.2 sec        CAPILLARY BLOOD GLUCOSE    POCT Blood Glucose.: 168 mg/dL (19 Nov 2021 21:01)  POCT Blood Glucose.: 142 mg/dL (19 Nov 2021 16:45)  POCT Blood Glucose.: 160 mg/dL (19 Nov 2021 11:27)  POCT Blood Glucose.: 126 mg/dL (19 Nov 2021 07:29)    LIVER FUNCTIONS - ( 19 Nov 2021 06:41 )  Alb: 2.4 g/dL / Pro: 5.2 g/dL / ALK PHOS: 324 U/L / ALT: 108 U/L / AST: 217 U/L / GGT: x                     NM Hepatobiliary Imaging (11.19.21 @ 16:45)   EXAM:  NM HEPATOBILIARY IMG            PROCEDURE DATE:  11/19/2021      INTERPRETATION:  HEPATOBILIARY IMAGES    REASON: Cholecystitis.    COMPARISON: Abdominal ultrasound November 17, 2021    TECHNIQUE:  Following the intravenous administration of 4 mCi 99m-Tc Mebrofenin, anterior images over the abdomen were acquired at 2, 5, 10, 15, 30, 45, 60 minutes,  2 hours, and 4 hours post injection. Oblique and lateral views were obtained at 1,2, and 4 hours post-injection. A 14 cm length size standardization marker was used.    These images reveal no definite visualization of the gallbladder through 4 hours post-injection, consistent with cholecystitis, and clinical correlation is suggested. Biliary tree is visualized at 2 minutes, and there is visualization of intestinal activity by 5 minutes post injection.      IMPRESSION:    NO DEFINITE VISUALIZATION OF THE GALLBLADDER THROUGH 4 HOURS POST-INJECTION, CONSISTENT WITH CHOLECYSTITIS, AS DESCRIBED ABOVE. CLINICAL CORRELATION IS SUGGESTED.    --- End of Report ---              US Abdomen Upper Quadrant Right (11.17.21 @ 17:11)     EXAM:  US ABDOMEN RT UPR QUADRANT          PROCEDURE DATE:  11/17/2021        INTERPRETATION:  CLINICAL INFORMATION: Elevated liver function enzymes.    COMPARISON: None available.    TECHNIQUE: Sonography of the right upper quadrant.    FINDINGS:    Liver: Within normal limits.  Bile ducts: Normal caliber. Common bile duct measures 6 (mm).  Gallbladder: Cholelithiasis.  No focal tenderness or evidence of cholecystitis.  Pancreas: Visualized portions are within normal limits.  Right kidney: 9.2 cm. No hydronephrosis.  Ascites: None.  IVC: Visualized portions are within normal limits.    IMPRESSION:    Cholelithiasis without cholecystitis.        --- End of Report ---          
Chief complaint/Reason for consult: altered LFTs    HPI:  Patient is an 81 year old Male with past medical history of HTN, HLD, DM presenting to ED secondary to Acute URI symptoms and Dysambulatory status. Patient states he had a mechanical fall 2 weeks ago, s/p Splint in ER on 11/1. Patient went home and has been having difficulty with ambulation even with walker as his legs give up. Patient also started to develop cough and sinus congestion along with rhinorrhea. Patient admits to dysuria but states it was resolved. Patient denies fevers, chills, nausea, vomiting, chest pain, shortness of breath, palpitations, abdominal pain, flank pain, new onset of / worsening low back pain, diarrhea, constipation, new onset of leg swelling.  (14 Nov 2021 13:49)    GI Updates: 81[facility] HTN and DM here for recent fall and acute UTI symptoms. GI consulted for altered LFTs. Patient denies nausea, vomiting, hematemesis, melena, blood in stool, diarrhea, constipation, abdominal pain.      PAST MEDICAL & SURGICAL HISTORY:   Hypertension    Diabetes mellitus          Family history:  FAMILY HISTORY:  No pertinent family history in first degree relatives      No GI cancers in first or second degree relatives    Social History: No smoking. No alcohol. No illegal drug use.    Allergies:   No Known Allergies              MEDICATIONS: Home Medications:  atenolol 50 mg oral tablet: 1 tab(s) orally once a day (14 Nov 2021 14:00)  glyburide-metformin 2.5 mg-500 mg oral tablet: 1 tab(s) orally once a day (14 Nov 2021 14:00)  lisinopril 20 mg oral tablet: 1 tab(s) orally once a day (14 Nov 2021 14:00)  simvastatin 40 mg oral tablet: 1 tab(s) orally once a day (at bedtime) (14 Nov 2021 14:00)    MEDICATIONS  (STANDING):  ampicillin/sulbactam  IVPB      ampicillin/sulbactam  IVPB 3 Gram(s) IV Intermittent every 6 hours  ATENolol  Tablet 50 milliGRAM(s) Oral daily  chlorhexidine 4% Liquid 1 Application(s) Topical <User Schedule>  dextrose 40% Gel 15 Gram(s) Oral once  dextrose 5%. 1000 milliLiter(s) (50 mL/Hr) IV Continuous <Continuous>  dextrose 5%. 1000 milliLiter(s) (100 mL/Hr) IV Continuous <Continuous>  dextrose 50% Injectable 25 Gram(s) IV Push once  dextrose 50% Injectable 12.5 Gram(s) IV Push once  dextrose 50% Injectable 25 Gram(s) IV Push once  glucagon  Injectable 1 milliGRAM(s) IntraMuscular once  heparin   Injectable 5000 Unit(s) SubCutaneous every 12 hours  insulin lispro (ADMELOG) corrective regimen sliding scale   SubCutaneous three times a day before meals  insulin lispro (ADMELOG) corrective regimen sliding scale   SubCutaneous at bedtime    MEDICATIONS  (PRN):  acetaminophen     Tablet .. 650 milliGRAM(s) Oral every 6 hours PRN Temp greater or equal to 38C (100.4F), Mild Pain (1 - 3)  guaifenesin/dextromethorphan Oral Liquid 10 milliLiter(s) Oral every 4 hours PRN Cough        REVIEW OF SYSTEMS  General:  No weight loss, fevers, or chills.  Eyes:  No reported pain or visual changes  ENT:  No sore throat or runny nose.  NECK: No stiffness or lymphadenopathy  CV:  No chest pain or palpitations.  Resp:  No shortness of breath, cough, wheezing or hemoptysis  GI:  No abdominal pain, nausea, vomiting, dysphagia, diarrhea or constipation. No rectal bleeding, melena, or hematemesis.  Muscle:  No aches or weakness  Neuro:  No tingling, numbness       VITALS:   T(F): 99.6 (11-19-21 @ 05:48), Max: 99.6 (11-19-21 @ 05:48)  HR: 87 (11-19-21 @ 05:48) (73 - 87)  BP: 148/68 (11-19-21 @ 05:48) (142/63 - 148/68)  RR: 16 (11-19-21 @ 05:48) (16 - 16)  SpO2: 94% (11-19-21 @ 09:33) (94% - 94%)    PHYSICAL EXAM:  GENERAL: AAOx3, no acute distress.  HEAD:  Atraumatic, Normocephalic  EYES: conjunctiva and sclera clear  NECK: Supple, No thyromegaly   CHEST/LUNG: Clear to auscultation bilaterally; No wheeze, rhonchi, or rales  HEART: Regular rate and rhythm; normal S1, S2, No murmurs.  ABDOMEN: Soft, nontender, nondistended; Bowel sounds present  NEUROLOGY: No asterixis or tremor  SKIN: Intact, no jaundice          LABS:  11-19    136  |  100  |  28<H>  ----------------------------<  132<H>  4.7   |  22  |  1.2    Ca    7.8<L>      19 Nov 2021 06:41    TPro  5.2<L>  /  Alb  2.4<L>  /  TBili  0.5  /  DBili  x   /  AST  217<H>  /  ALT  108<H>  /  AlkPhos  324<H>  11-19                          11.6   15.35 )-----------( 283      ( 19 Nov 2021 06:41 )             34.9     LIVER FUNCTIONS - ( 19 Nov 2021 06:41 )  Alb: 2.4 g/dL / Pro: 5.2 g/dL / ALK PHOS: 324 U/L / ALT: 108 U/L / AST: 217 U/L / GGT: x               IMAGING:    < from: US Abdomen Upper Quadrant Right (11.17.21 @ 17:11) >    EXAM:  US ABDOMEN RT UPR QUADRANT            PROCEDURE DATE:  11/17/2021            INTERPRETATION:  CLINICAL INFORMATION: Elevated liver function enzymes.    COMPARISON: None available.    TECHNIQUE: Sonography of the right upper quadrant.    FINDINGS:    Liver: Within normal limits.  Bile ducts: Normal caliber. Common bile duct measures 6 (mm).  Gallbladder: Cholelithiasis.  No focal tenderness or evidence of cholecystitis.  Pancreas: Visualized portions are within normal limits.  Right kidney: 9.2 cm. No hydronephrosis.  Ascites: None.  IVC: Visualized portions are within normal limits.    IMPRESSION:    Cholelithiasis without cholecystitis.        --- End of Report ---              HERNESTO ZAMUDIO MD; Attending Interventional Radiologist  This document has been electronically signed. Nov 18 2021  7:50AM    < end of copied text >      
HPI :  81 year old Male with past medical history of HTN, HLD, DM presenting to ED secondary to Acute URI symptoms and Nonambulatory status. Patient states he had a mechanical fall 2 weeks ago, s/p Splint in ER on . Patient went home and has been having difficulty with ambulation even with walker as his legs give up. Patient also started to develop cough and sinus congestion along with rhinorrhea. Patient admits to dysuria but states it was resolved. Patient denies fevers, chills, nausea, vomiting, chest pain, shortness of breath, palpitations, abdominal pain, flank pain, new onset of / worsening low back pain, diarrhea, constipation, new onset of leg swelling.  ptn  seen at  bed  side  nad  fu  command  no new  c/o  aox3  use  rw  for  ambulation      PTN  REFERRED TO ACUTE  REHAB  FOR  EVAL AND  TX   PAST MEDICAL & SURGICAL HISTORY:  Hypertension    Diabetes mellitus        Hospital Course:    TODAY'S SUBJECTIVE & REVIEW OF SYMPTOMS:     Constitutional WNL   Cardio WNL   Resp WNL   GI WNL  Heme WNL  Endo WNL  Skin WNL  MSK WNL  Neuro WNL  Cognitive WNL  Psych WNL      MEDICATIONS  (STANDING):  ATENolol  Tablet 50 milliGRAM(s) Oral daily  cefTRIAXone   IVPB 1000 milliGRAM(s) IV Intermittent every 24 hours  chlorhexidine 4% Liquid 1 Application(s) Topical <User Schedule>  dextrose 40% Gel 15 Gram(s) Oral once  dextrose 5%. 1000 milliLiter(s) (50 mL/Hr) IV Continuous <Continuous>  dextrose 5%. 1000 milliLiter(s) (100 mL/Hr) IV Continuous <Continuous>  dextrose 50% Injectable 25 Gram(s) IV Push once  dextrose 50% Injectable 12.5 Gram(s) IV Push once  dextrose 50% Injectable 25 Gram(s) IV Push once  glucagon  Injectable 1 milliGRAM(s) IntraMuscular once  heparin   Injectable 5000 Unit(s) SubCutaneous every 12 hours  insulin lispro (ADMELOG) corrective regimen sliding scale   SubCutaneous three times a day before meals  insulin lispro (ADMELOG) corrective regimen sliding scale   SubCutaneous at bedtime  simvastatin 40 milliGRAM(s) Oral at bedtime  sodium chloride 0.9%. 1000 milliLiter(s) (60 mL/Hr) IV Continuous <Continuous>    MEDICATIONS  (PRN):  acetaminophen     Tablet .. 650 milliGRAM(s) Oral every 6 hours PRN Temp greater or equal to 38C (100.4F), Mild Pain (1 - 3)  guaifenesin/dextromethorphan Oral Liquid 10 milliLiter(s) Oral every 4 hours PRN Cough      FAMILY HISTORY:  No pertinent family history in first degree relatives        Allergies    No Known Allergies    Intolerances        SOCIAL HISTORY:    [  ] Etoh  [  ] Smoking  [  ] Substance abuse     Home Environment:  [  ] Home Alone  [  xx] Lives with Family wife   [  ] Home Health Aid    Dwelling:  [  ] Apartment  [ x ] Private House  [  ] Adult Home  [  ] Skilled Nursing Facility      [  ] Short Term  [  ] Long Term  [  x] Stairs out  side         Elevator [  ]    FUNCTIONAL STATUS PTA: (Check all that apply)  Ambulation: [  x ]Independent    [  ] Dependent     [  ] Non-Ambulatory  Assistive Device: [  ] SA Cane  [  ]  Q Cane  [x  ] Walker  [  ]  Wheelchair  ADL : [ x ] Independent  [  ]  Dependent       Vital Signs Last 24 Hrs  T(C): 36.4 (15 Nov 2021 05:34), Max: 37.7 (2021 11:09)  T(F): 97.6 (15 Nov 2021 05:34), Max: 99.9 (2021 11:09)  HR: 84 (15 Nov 2021 05:34) (75 - 85)  BP: 124/69 (15 Nov 2021 05:34) (121/76 - 149/72)  BP(mean): --  RR: 16 (15 Nov 2021 05:34) (16 - 18)  SpO2: 96% (15 Nov 2021 07:49) (95% - 98%)      PHYSICAL EXAM: Alert & Oriented X3  GENERAL: NAD, well-groomed, well-developed  HEAD:  Atraumatic, Normocephalic  EYES: EOMI, PERRLA, conjunctiva and sclera clear  NECK: Supple, No JVD, Normal thyroid  CHEST/LUNG: Clear to percussion bilaterally; No rales, rhonchi, wheezing, or rubs  HEART: Regular rate and rhythm; No murmurs, rubs, or gallops  ABDOMEN: Soft, Nontender, Nondistended; Bowel sounds present  EXTREMITIES:  2+ Peripheral Pulses, No clubbing, cyanosis, or edema    NERVOUS SYSTEM:  Cranial Nerves 2-12 intact [ x ] Abnormal  [  ]  ROM: WFL all extremities [ passive  ]  Abnormal [  ]  Motor Strength: WFL all extremities  [ 4/5  all  ext   ]  Abnormal [  ]  Sensation: intact to light touch [ x ] Abnormal [  ]  Reflexes: Symmetric [  ]  Abnormal [x  ]    FUNCTIONAL STATUS:  Bed Mobility: Independent [  ]  Supervision [  ]  Needs Assistance [  ]  N/A [ x ]  Transfers: Independent [  ]  Supervision [  ]  Needs Assistance [  ]  N/A [x  ]   Ambulation: Independent [  ]  Supervision [  ]  Needs Assistance [  ]  N/A [ x ]  ADL: Independent [  ] Requires Assistance [  ] N/A [x  ]  SEE PT/OT IE NOTES    LABS:                        11.6   14.98 )-----------( 296      ( 15 Nov 2021 06:59 )             35.4     11-15    136  |  101  |  25<H>  ----------------------------<  101<H>  4.8   |  23  |  1.1    Ca    8.4<L>      15 Nov 2021 06:59    TPro  5.3<L>  /  Alb  2.7<L>  /  TBili  0.6  /  DBili  x   /  AST  125<H>  /  ALT  82<H>  /  AlkPhos  289<H>  11-15      Urinalysis Basic - ( 2021 11:20 )    Color: Yellow / Appearance: Clear / S.020 / pH: x  Gluc: x / Ketone: Negative  / Bili: Negative / Urobili: 0.2 mg/dL   Blood: x / Protein: 30 mg/dL / Nitrite: Negative   Leuk Esterase: Negative / RBC: 1-2 /HPF / WBC x   Sq Epi: x / Non Sq Epi: Few /HPF / Bacteria: Moderate        RADIOLOGY & ADDITIONAL STUDIES:    Assesment:

## 2021-11-23 NOTE — PROGRESS NOTE ADULT - SUBJECTIVE AND OBJECTIVE BOX
81yMale  Being followed for altered LFTs  Interval history: Patient denies nausea, vomiting, hematemesis, melena, blood in stool, diarrhea, constipation, abdominal pain.       PAST MEDICAL & SURGICAL HISTORY: :  Hypertension    Diabetes mellitus            Social History: No smoking. No alcohol. No illegal drug use.          MEDICATIONS  (STANDING):  ampicillin/sulbactam  IVPB      ampicillin/sulbactam  IVPB 3 Gram(s) IV Intermittent every 6 hours  ATENolol  Tablet 50 milliGRAM(s) Oral daily  chlorhexidine 4% Liquid 1 Application(s) Topical <User Schedule>  dextrose 40% Gel 15 Gram(s) Oral once  dextrose 5%. 1000 milliLiter(s) (50 mL/Hr) IV Continuous <Continuous>  dextrose 5%. 1000 milliLiter(s) (100 mL/Hr) IV Continuous <Continuous>  dextrose 50% Injectable 25 Gram(s) IV Push once  dextrose 50% Injectable 12.5 Gram(s) IV Push once  dextrose 50% Injectable 25 Gram(s) IV Push once  glucagon  Injectable 1 milliGRAM(s) IntraMuscular once  heparin   Injectable 5000 Unit(s) SubCutaneous every 12 hours  insulin lispro (ADMELOG) corrective regimen sliding scale   SubCutaneous three times a day before meals  insulin lispro (ADMELOG) corrective regimen sliding scale   SubCutaneous at bedtime    MEDICATIONS  (PRN):  acetaminophen     Tablet .. 650 milliGRAM(s) Oral every 6 hours PRN Temp greater or equal to 38C (100.4F), Mild Pain (1 - 3)  guaifenesin/dextromethorphan Oral Liquid 10 milliLiter(s) Oral every 4 hours PRN Cough      Allergies:   No Known Allergies            REVIEW OF SYSTEMS:  General:  No weight loss, fevers, or chills.  Eyes:  No reported pain or visual changes  ENT:  No sore throat or runny nose.  NECK: No stiffness   CV:  No chest pain or palpitations.  Resp:  No shortness of breath, cough  GI:  No abdominal pain, nausea, vomiting, dysphagia, diarrhea or constipation. No rectal bleeding, melena, or hematemesis.  Neuro:  No tingling, numbness       VITAL SIGNS:   T(F): 98.3 (11-23-21 @ 13:05), Max: 98.3 (11-23-21 @ 13:05)  HR: 68 (11-23-21 @ 13:05) (68 - 83)  BP: 143/66 (11-23-21 @ 13:05) (136/63 - 143/66)  RR: 16 (11-23-21 @ 13:05) (16 - 16)  SpO2: --    PHYSICAL EXAM:  GENERAL: AAOx3, no acute distress.  HEAD:  Atraumatic, Normocephalic  EYES: conjunctiva and sclera clear  NECK: Supple, no JVD or thyromegaly  CHEST/LUNG: Clear to auscultation bilaterally; No wheeze, rhonchi, or rales  HEART: Regular rate and rhythm; normal S1, S2, No murmurs.  ABDOMEN: Soft, nontender, nondistended; Bowel sounds present  NEUROLOGY: No asterixis or tremor.   SKIN: Intact, no jaundice            LABS:                        11.1 16.23 )-----------( 254      ( 23 Nov 2021 07:47 )             33.8     11-23    139  |  103  |  32<H>  ----------------------------<  165<H>  5.2<H>   |  25  |  1.4    Ca    8.1<L>      23 Nov 2021 07:47    TPro  4.8<L>  /  Alb  2.2<L>  /  TBili  0.4  /  DBili  x   /  AST  197<H>  /  ALT  79<H>  /  AlkPhos  208<H>  11-23    LIVER FUNCTIONS - ( 23 Nov 2021 07:47 )  Alb: 2.2 g/dL / Pro: 4.8 g/dL / ALK PHOS: 208 U/L / ALT: 79 U/L / AST: 197 U/L / GGT: x           PT/INR - ( 23 Nov 2021 07:47 )   PT: 15.60 sec;   INR: 1.36 ratio         PTT - ( 23 Nov 2021 07:47 )  PTT:27.2 sec    IMAGING:    < from: US Abdomen Upper Quadrant Right (11.17.21 @ 17:11) >    EXAM:  US ABDOMEN RT UPR QUADRANT            PROCEDURE DATE:  11/17/2021            INTERPRETATION:  CLINICAL INFORMATION: Elevated liver function enzymes.    COMPARISON: None available.    TECHNIQUE: Sonography of the right upper quadrant.    FINDINGS:    Liver: Within normal limits.  Bile ducts: Normal caliber. Common bile duct measures 6 (mm).  Gallbladder: Cholelithiasis.  No focal tenderness or evidence of cholecystitis.  Pancreas: Visualized portions are within normal limits.  Right kidney: 9.2 cm. No hydronephrosis.  Ascites: None.  IVC: Visualized portions are within normal limits.    IMPRESSION:    Cholelithiasis without cholecystitis.        --- End of Report ---              HERNESTO ZAMUDIO MD; Attending Interventional Radiologist  This document has been electronically signed. Nov 18 2021  7:50AM    < end of copied text >

## 2021-11-23 NOTE — PROGRESS NOTE ADULT - ASSESSMENT
81 year old Male with past medical history of HTN, HLD, DM presenting to ED secondary to Acute URI symptoms and Dysambulatory status. Patient states he had a mechanical fall 2 weeks ago, s/p Splint in ER on 11/1. Patient went home and has been having difficulty with ambulation even with walker as his legs give up.  Patient also started to develop cough and sinus congestion along with rhinorrhea. Patient admits to dysuria but states it was resolved. On admission, he has no fever but Leukocytosis    - CT Chest No Cont (11.15.21 @ 08:58):  No CT evidence for acute intrathoracic disease. Extensive bilateral partially calcified pleural plaques and adjacent bibasilar round atelectasis, unchanged since 2013. 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  - CT Sinuses No Cont (11.15.21 @ 08:56):  Trace scattered paranasal sinus mucosal thickening. No air-fluid level to suggest acute sinusitis. Severe deviation of the osseous nasal septum to the left with a 4 mm left-sided septal spur impinging the left nasal cavity. Right-sided lula bullosa. Left maxillary periapical lucencies. Recommend follow-up with dental exam  -US Abdomen Upper Quadrant Right (11.17.21 @ 17:11) >  Cholelithiasis without cholecystitis.  -NM Hepatobiliary Imaging (11.19.21 @ 16:45) >  NO DEFINITE VISUALIZATION OF THE GALLBLADDER THROUGH 4 HOURS POST-INJECTION, CONSISTENT WITH CHOLECYSTITIS, AS DESCRIBED ABOVE. CLINICAL CORRELATION IS SUGGESTED.    A&P    # Leucocytosis -persistent; episode of isolated fever on 11/21  -  possibly related to asymptomatic acute cholecystitis  HIDA concerning for cholecystitis; surgeon following- recommended conservative management  - Procalcitonin, Serum: 0.22- 0.38:   - Blood Cx 11/14 NGTD  - ceftriaxone  changed to unasyn 3g q 6 on 11/17  - ID following; case discussed  - IR consulted and case discussed for cholecystostomy; will await evaluation    # diarrhea episode on 11/21- possibly  antibiotics related- resolved  C diff - neg; culture- no enteric pathogen    # Transaminitis - stable; Drug induced injury vs cholecystitis  GGT elevated; RUQ US showing cholelithiasis; no bile duct dialatation  - P-ANCA and C-ANCA- neg  TAWANDA, AMA, Sm Ab- pending;  ferritin, elevated- 1707 (possibly inflammation related; low transferring and patient's hg and Hct is stable)  monitor LFT,   GI following  hold statin    #  chronic pulmonary change- stable; possibly due to asbestosis    #HTN- Cont atenolol 50mg qD    # HLD- statin on hold    #DM2- Cont ISS- Monitor FS    DVT PPX, heparin  plan of care d/w patient and family    pending: IR consult, improvement in leucocytosis

## 2021-11-23 NOTE — PROGRESS NOTE ADULT - SUBJECTIVE AND OBJECTIVE BOX
HPI  Patient is a 81y old Male who presents with a chief complaint of Pneumonia (23 Nov 2021 15:31)    Currently admitted to medicine with the primary diagnosis of Upper respiratory infection       Today is hospital day 9d.     INTERVAL HPI / OVERNIGHT EVENTS:  Patient was seen and examined at bedside  Patient Feels good  No new complaints  Denies any complains of chest pain or shortness of breath. stable cough  Denies any abdominal pain/nausea/vomiting        PAST MEDICAL & SURGICAL HISTORY  Hypertension    Diabetes mellitus      ALLERGIES  No Known Allergies    MEDICATIONS  STANDING MEDICATIONS  ampicillin/sulbactam  IVPB      ampicillin/sulbactam  IVPB 3 Gram(s) IV Intermittent every 6 hours  ATENolol  Tablet 50 milliGRAM(s) Oral daily  chlorhexidine 4% Liquid 1 Application(s) Topical <User Schedule>  dextrose 40% Gel 15 Gram(s) Oral once  dextrose 5%. 1000 milliLiter(s) IV Continuous <Continuous>  dextrose 5%. 1000 milliLiter(s) IV Continuous <Continuous>  dextrose 50% Injectable 25 Gram(s) IV Push once  dextrose 50% Injectable 12.5 Gram(s) IV Push once  dextrose 50% Injectable 25 Gram(s) IV Push once  glucagon  Injectable 1 milliGRAM(s) IntraMuscular once  heparin   Injectable 5000 Unit(s) SubCutaneous every 12 hours  insulin lispro (ADMELOG) corrective regimen sliding scale   SubCutaneous three times a day before meals  insulin lispro (ADMELOG) corrective regimen sliding scale   SubCutaneous at bedtime    PRN MEDICATIONS  acetaminophen     Tablet .. 650 milliGRAM(s) Oral every 6 hours PRN  guaifenesin/dextromethorphan Oral Liquid 10 milliLiter(s) Oral every 4 hours PRN    VITALS:  T(F): 98.3  HR: 68  BP: 143/66  RR: 16  SpO2: --    PHYSICAL EXAM  GEN: no distress, comfortable  PULM: BS heard b/l equal, No wheezing  CVS: S1S2 present, no rubs or gallops  ABD: Soft, non-distended, no guarding; non-tender, condom catheter present  EXT: No lower extremity edema  NEURO: A&Ox3, moving all extremities; generalized weakness present    LABS                        11.1   16.23 )-----------( 254      ( 23 Nov 2021 07:47 )             33.8     11-23    139  |  103  |  32<H>  ----------------------------<  165<H>  5.2<H>   |  25  |  1.4    Ca    8.1<L>      23 Nov 2021 07:47    TPro  4.8<L>  /  Alb  2.2<L>  /  TBili  0.4  /  DBili  x   /  AST  197<H>  /  ALT  79<H>  /  AlkPhos  208<H>  11-23    PT/INR - ( 23 Nov 2021 07:47 )   PT: 15.60 sec;   INR: 1.36 ratio         PTT - ( 23 Nov 2021 07:47 )  PTT:27.2 sec          Culture - Stool (collected 21 Nov 2021 14:00)  Source: .Stool Feces  Preliminary Report (22 Nov 2021 19:39):    No enteric pathogens to date: Final culture pending          RADIOLOGY

## 2021-11-23 NOTE — PROGRESS NOTE ADULT - SUBJECTIVE AND OBJECTIVE BOX
Progress Note: General Surgery  Patient: MIGUEL ANGEL GARZA , 81y (1940)Male   MRN: [id number]  Location: [place] [address]  Visit: 11-14-21 Inpatient  Date: 11-23-21 @ 14:46    Admit Diagnosis/Chief Complaint: Consulted for cholelithiasis, ?cholecystitis    Patient came to the hospital because he has been having falls more frequently. Past medical history of HTN, HLD, DM presenting to ED secondary to Acute URI symptoms and Dysambulatory status. Patient states he had a mechanical fall 2 weeks ago, s/p Splint in ER on 11/1. Patient went home and has been having difficulty with ambulation even with walker as his legs give up. Patient also started to develop cough and sinus congestion along with rhinorrhea. Patient admits to dysuria but states it was resolved.    During workup, it was revealed patient had transaminitis with elevated WBC. Decision for RUQ that showed only cholelithiasis, and no evidence of cholecystitis. HIDA scan was also done, showing non-visualization of the gallbladder. Surgery consulted. When initially consulted, patient's abdomen soft and nontender. Patient also had a diagnosis of pneumonia, that rendered him a non-surgical candidate. Surgery had signed off at the time and documented that if patient becomes symptomatic    Vitals: T(F): 98.3 (11-23-21 @ 13:05), Max: 98.3 (11-23-21 @ 13:05)  HR: 68 (11-23-21 @ 13:05)  BP: 143/66 (11-23-21 @ 13:05) (136/63 - 143/66)  RR: 16 (11-23-21 @ 13:05)  SpO2: --    In:   11-22-21 @ 07:01  -  11-23-21 @ 07:00  --------------------------------------------------------  IN: 0 mL      Out:   11-22-21 @ 07:01  -  11-23-21 @ 07:00  --------------------------------------------------------  OUT:    Incontinent per Collection Bag (mL): 1550 mL  Total OUT: 1550 mL        Net:   11-22-21 @ 07:01  -  11-23-21 @ 07:00  --------------------------------------------------------  NET: -1550 mL        Diet: Diet, NPO after Midnight:      NPO Start Date: 22-Nov-2021,   NPO Start Time: 23:59 (11-22-21 @ 18:44)  Diet, Regular:   Consistent Carbohydrate Evening Snack  DASH/TLC Sodium & Cholesterol Restricted (11-19-21 @ 21:15)    IV Fluids: dextrose 5%. 1000 milliLiter(s) (50 mL/Hr) IV Continuous <Continuous>  dextrose 5%. 1000 milliLiter(s) (100 mL/Hr) IV Continuous <Continuous>      Physical Examination:  General Appearance: NAD ***  HEENT: EOMI, sclera non-icteric.  Heart: RRR   Lungs: CTABL.   Abdomen:  Soft, ***nontender, nondistended.   MSK/Extremities: Warm & well-perfused.   Skin: Warm, dry. No jaundice.       Medications: [Standing]  ampicillin/sulbactam  IVPB      ampicillin/sulbactam  IVPB 3 Gram(s) IV Intermittent every 6 hours  ATENolol  Tablet 50 milliGRAM(s) Oral daily  chlorhexidine 4% Liquid 1 Application(s) Topical <User Schedule>  dextrose 40% Gel 15 Gram(s) Oral once  dextrose 5%. 1000 milliLiter(s) (50 mL/Hr) IV Continuous <Continuous>  dextrose 5%. 1000 milliLiter(s) (100 mL/Hr) IV Continuous <Continuous>  dextrose 50% Injectable 25 Gram(s) IV Push once  dextrose 50% Injectable 12.5 Gram(s) IV Push once  dextrose 50% Injectable 25 Gram(s) IV Push once  glucagon  Injectable 1 milliGRAM(s) IntraMuscular once  heparin   Injectable 5000 Unit(s) SubCutaneous every 12 hours  insulin lispro (ADMELOG) corrective regimen sliding scale   SubCutaneous three times a day before meals  insulin lispro (ADMELOG) corrective regimen sliding scale   SubCutaneous at bedtime    DVT Prophylaxis: heparin   Injectable 5000 Unit(s) SubCutaneous every 12 hours    GI Prophylaxis:   Antibiotics: ampicillin/sulbactam  IVPB      ampicillin/sulbactam  IVPB 3 Gram(s) IV Intermittent every 6 hours    Anticoagulation:   Medications:[PRN]  acetaminophen     Tablet .. 650 milliGRAM(s) Oral every 6 hours PRN  guaifenesin/dextromethorphan Oral Liquid 10 milliLiter(s) Oral every 4 hours PRN      Labs:                        11.1   16.23 )-----------( 254      ( 23 Nov 2021 07:47 )             33.8     11-23    139  |  103  |  32<H>  ----------------------------<  165<H>  5.2<H>   |  25  |  1.4    Ca    8.1<L>      23 Nov 2021 07:47    TPro  4.8<L>  /  Alb  2.2<L>  /  TBili  0.4  /  DBili  x   /  AST  197<H>  /  ALT  79<H>  /  AlkPhos  208<H>  11-23    LIVER FUNCTIONS - ( 23 Nov 2021 07:47 )  Alb: 2.2 g/dL / Pro: 4.8 g/dL / ALK PHOS: 208 U/L / ALT: 79 U/L / AST: 197 U/L / GGT: x           PT/INR - ( 23 Nov 2021 07:47 )   PT: 15.60 sec;   INR: 1.36 ratio         PTT - ( 23 Nov 2021 07:47 )  PTT:27.2 sec          Urine/Micro:    Culture - Stool (collected 21 Nov 2021 14:00)  Source: .Stool Feces  Preliminary Report (22 Nov 2021 19:39):    No enteric pathogens to date: Final culture pending          Imaging:   ***      Assessment:  81y Male patient admitted S/P ***  Patient seen and examined at bedside. NAD.     Plan:  ***  - Monitor vitals  - Monitor labs and replete as necessary  - Monitor for bowel function  - Monitor urine output  - Continue Pain Medications  - Continue Antibiotics  - Encourage ambulation as tolerated  - DVT and GI Prophylaxis  - Follow PT/Physiatry  - Contact social work/case management for necessary patient needs.           Date/Time: 11-23-21 @ 14:46   Progress Note: General Surgery  Patient: MIGUEL ANGEL GARZA , 81y (1940)Male   MRN: [id number]  Location: [place] [address]  Visit: 11-14-21 Inpatient  Date: 11-23-21 @ 14:46    Admit Diagnosis/Chief Complaint: Consulted for cholelithiasis, ?cholecystitis    Patient came to the hospital because he has been having falls more frequently. Past medical history of HTN, HLD, DM presenting to ED secondary to Acute URI symptoms and Dysambulatory status. Patient states he had a mechanical fall 2 weeks ago, s/p Splint in ER on 11/1. Patient went home and has been having difficulty with ambulation even with walker as his legs give up. Patient also started to develop cough and sinus congestion along with rhinorrhea. Patient admits to dysuria but states it was resolved.    During workup, it was revealed patient had transaminitis with elevated WBC. Decision for RUQ that showed only cholelithiasis, and no evidence of cholecystitis. HIDA scan was also done, showing non-visualization of the gallbladder. Surgery consulted. When initially consulted, patient's abdomen soft and nontender. Patient also had a diagnosis of pneumonia, that rendered him a non-surgical candidate. Surgery had signed off at the time and documented that if patient becomes symptomatic in the setting of pneumonia to consider a percutaneous cholecystostomy.   Now, surgery team recalled because patient still with persistent leukocytosis, and primary team determined that he never had pneumonia.     Patient seen and examined at bedside, and he is still completely asymptomatic. Patient is nontender, completely soft. Passing gas and having bowel movements. Patient is asking if he can eat and go home.      Vitals: T(F): 98.3 (11-23-21 @ 13:05), Max: 98.3 (11-23-21 @ 13:05)  HR: 68 (11-23-21 @ 13:05)  BP: 143/66 (11-23-21 @ 13:05) (136/63 - 143/66)  RR: 16 (11-23-21 @ 13:05)  SpO2: --    In:   11-22-21 @ 07:01  -  11-23-21 @ 07:00  --------------------------------------------------------  IN: 0 mL      Out:   11-22-21 @ 07:01  -  11-23-21 @ 07:00  --------------------------------------------------------  OUT:    Incontinent per Collection Bag (mL): 1550 mL  Total OUT: 1550 mL        Net:   11-22-21 @ 07:01  -  11-23-21 @ 07:00  --------------------------------------------------------  NET: -1550 mL        Diet: Diet, NPO after Midnight:      NPO Start Date: 22-Nov-2021,   NPO Start Time: 23:59 (11-22-21 @ 18:44)  Diet, Regular:   Consistent Carbohydrate Evening Snack  DASH/TLC Sodium & Cholesterol Restricted (11-19-21 @ 21:15)    IV Fluids: dextrose 5%. 1000 milliLiter(s) (50 mL/Hr) IV Continuous <Continuous>  dextrose 5%. 1000 milliLiter(s) (100 mL/Hr) IV Continuous <Continuous>      Physical Examination:  General Appearance: NAD  HEENT: EOMI, sclera non-icteric.  Heart: RRR   Lungs: CTABL.   Abdomen:  Soft, nontender, nondistended. negative griffin's. no guarding, no rebound, no rigidity.  MSK/Extremities: Warm & well-perfused.   Skin: Warm, dry. No jaundice.       Medications: [Standing]  ampicillin/sulbactam  IVPB      ampicillin/sulbactam  IVPB 3 Gram(s) IV Intermittent every 6 hours  ATENolol  Tablet 50 milliGRAM(s) Oral daily  chlorhexidine 4% Liquid 1 Application(s) Topical <User Schedule>  dextrose 40% Gel 15 Gram(s) Oral once  dextrose 5%. 1000 milliLiter(s) (50 mL/Hr) IV Continuous <Continuous>  dextrose 5%. 1000 milliLiter(s) (100 mL/Hr) IV Continuous <Continuous>  dextrose 50% Injectable 25 Gram(s) IV Push once  dextrose 50% Injectable 12.5 Gram(s) IV Push once  dextrose 50% Injectable 25 Gram(s) IV Push once  glucagon  Injectable 1 milliGRAM(s) IntraMuscular once  heparin   Injectable 5000 Unit(s) SubCutaneous every 12 hours  insulin lispro (ADMELOG) corrective regimen sliding scale   SubCutaneous three times a day before meals  insulin lispro (ADMELOG) corrective regimen sliding scale   SubCutaneous at bedtime    DVT Prophylaxis: heparin   Injectable 5000 Unit(s) SubCutaneous every 12 hours    GI Prophylaxis:   Antibiotics: ampicillin/sulbactam  IVPB      ampicillin/sulbactam  IVPB 3 Gram(s) IV Intermittent every 6 hours    Anticoagulation:   Medications:[PRN]  acetaminophen     Tablet .. 650 milliGRAM(s) Oral every 6 hours PRN  guaifenesin/dextromethorphan Oral Liquid 10 milliLiter(s) Oral every 4 hours PRN      Labs:                        11.1   16.23 )-----------( 254      ( 23 Nov 2021 07:47 )             33.8     11-23    139  |  103  |  32<H>  ----------------------------<  165<H>  5.2<H>   |  25  |  1.4    Ca    8.1<L>      23 Nov 2021 07:47    TPro  4.8<L>  /  Alb  2.2<L>  /  TBili  0.4  /  DBili  x   /  AST  197<H>  /  ALT  79<H>  /  AlkPhos  208<H>  11-23    LIVER FUNCTIONS - ( 23 Nov 2021 07:47 )  Alb: 2.2 g/dL / Pro: 4.8 g/dL / ALK PHOS: 208 U/L / ALT: 79 U/L / AST: 197 U/L / GGT: x           PT/INR - ( 23 Nov 2021 07:47 )   PT: 15.60 sec;   INR: 1.36 ratio         PTT - ( 23 Nov 2021 07:47 )  PTT:27.2 sec          Urine/Micro:    Culture - Stool (collected 21 Nov 2021 14:00)  Source: .Stool Feces  Preliminary Report (22 Nov 2021 19:39):    No enteric pathogens to date: Final culture pending          Imaging:   < from: US Abdomen Upper Quadrant Right (11.17.21 @ 17:11) >  IMPRESSION:  Cholelithiasis without cholecystitis.  < end of copied text >    < from: NM Hepatobiliary Imaging (11.19.21 @ 16:45) >  IMPRESSION:  NO DEFINITE VISUALIZATION OF THE GALLBLADDER THROUGH 4 HOURS POST-INJECTION, CONSISTENT WITH CHOLECYSTITIS, AS DESCRIBED ABOVE. CLINICAL CORRELATION IS SUGGESTED.  < end of copied text >      Assessment:  81y Male patient came to the hospital because he has been having falls more frequently. Past medical history of HTN, HLD, DM presenting to ED secondary to Acute URI symptoms and Dysambulatory status. Patient states he had a mechanical fall 2 weeks ago, s/p Splint in ER on 11/1. Patient went home and has been having difficulty with ambulation even with walker as his legs give up. Patient also started to develop cough and sinus congestion along with rhinorrhea. Patient admits to dysuria but states it was resolved.    During workup, it was revealed patient had transaminitis with elevated WBC. Decision for RUQ that showed only cholelithiasis, and no evidence of cholecystitis. HIDA scan was also done, showing non-visualization of the gallbladder. Surgery consulted. When initially consulted, patient's abdomen soft and nontender. Patient also had a diagnosis of pneumonia, that rendered him a non-surgical candidate. Surgery had signed off at the time and documented that if patient becomes symptomatic in the setting of pneumonia to consider a percutaneous cholecystostomy.   Now, surgery team recalled because patient still with persistent leukocytosis, and primary team determined that he never had pneumonia.     Patient seen and examined at bedside, and he is still completely asymptomatic. Patient is nontender, completely soft. Passing gas and having bowel movements. Patient is asking if he can eat and go home.    I explained to the patient that he does have cholelithiasis, and it is recommended that he have his gallbladder removed, but that it is not urgent at this time.    Plan:  ***  - Monitor vitals  - Monitor labs and replete as necessary  - Monitor for bowel function  - Monitor urine output  - Continue Pain Medications  - Continue Antibiotics  - Encourage ambulation as tolerated  - DVT and GI Prophylaxis  - Follow PT/Physiatry  - Contact social work/case management for necessary patient needs.           Date/Time: 11-23-21 @ 14:46   Progress Note: General Surgery  Patient: MIGUEL ANGEL GARZA , 81y (1940)Male   MRN: [id number]  Location: [place] [address]  Visit: 11-14-21 Inpatient  Date: 11-23-21 @ 14:46    Admit Diagnosis/Chief Complaint: Consulted for cholelithiasis, ?cholecystitis    Patient came to the hospital because he has been having falls more frequently. Past medical history of HTN, HLD, DM presenting to ED secondary to Acute URI symptoms and Dysambulatory status. Patient states he had a mechanical fall 2 weeks ago, s/p Splint in ER on 11/1. Patient went home and has been having difficulty with ambulation even with walker as his legs give up. Patient also started to develop cough and sinus congestion along with rhinorrhea. Patient admits to dysuria but states it was resolved.    During workup, it was revealed patient had transaminitis with elevated WBC. Decision for RUQ that showed only cholelithiasis, and no evidence of cholecystitis. HIDA scan was also done, showing non-visualization of the gallbladder. Surgery consulted. When initially consulted, patient's abdomen soft and nontender. Patient also had a diagnosis of pneumonia, that rendered him a non-surgical candidate. Surgery had signed off at the time and documented that if patient becomes symptomatic in the setting of pneumonia to consider a percutaneous cholecystostomy.   Now, surgery team recalled because patient still with persistent leukocytosis, and primary team determined that he never had pneumonia.     Patient seen and examined at bedside, and he is still completely asymptomatic. Patient is nontender, completely soft. Passing gas and having bowel movements. Patient is asking if he can eat and go home.      Vitals: T(F): 98.3 (11-23-21 @ 13:05), Max: 98.3 (11-23-21 @ 13:05)  HR: 68 (11-23-21 @ 13:05)  BP: 143/66 (11-23-21 @ 13:05) (136/63 - 143/66)  RR: 16 (11-23-21 @ 13:05)  SpO2: --    In:   11-22-21 @ 07:01  -  11-23-21 @ 07:00  --------------------------------------------------------  IN: 0 mL      Out:   11-22-21 @ 07:01  -  11-23-21 @ 07:00  --------------------------------------------------------  OUT:    Incontinent per Collection Bag (mL): 1550 mL  Total OUT: 1550 mL        Net:   11-22-21 @ 07:01  -  11-23-21 @ 07:00  --------------------------------------------------------  NET: -1550 mL        Diet: Diet, NPO after Midnight:      NPO Start Date: 22-Nov-2021,   NPO Start Time: 23:59 (11-22-21 @ 18:44)  Diet, Regular:   Consistent Carbohydrate Evening Snack  DASH/TLC Sodium & Cholesterol Restricted (11-19-21 @ 21:15)    IV Fluids: dextrose 5%. 1000 milliLiter(s) (50 mL/Hr) IV Continuous <Continuous>  dextrose 5%. 1000 milliLiter(s) (100 mL/Hr) IV Continuous <Continuous>      Physical Examination:  General Appearance: NAD  HEENT: EOMI, sclera non-icteric.  Heart: RRR   Lungs: CTABL.   Abdomen:  Soft, nontender, nondistended. negative griffin's. no guarding, no rebound, no rigidity.  MSK/Extremities: Warm & well-perfused.   Skin: Warm, dry. No jaundice.       Medications: [Standing]  ampicillin/sulbactam  IVPB      ampicillin/sulbactam  IVPB 3 Gram(s) IV Intermittent every 6 hours  ATENolol  Tablet 50 milliGRAM(s) Oral daily  chlorhexidine 4% Liquid 1 Application(s) Topical <User Schedule>  dextrose 40% Gel 15 Gram(s) Oral once  dextrose 5%. 1000 milliLiter(s) (50 mL/Hr) IV Continuous <Continuous>  dextrose 5%. 1000 milliLiter(s) (100 mL/Hr) IV Continuous <Continuous>  dextrose 50% Injectable 25 Gram(s) IV Push once  dextrose 50% Injectable 12.5 Gram(s) IV Push once  dextrose 50% Injectable 25 Gram(s) IV Push once  glucagon  Injectable 1 milliGRAM(s) IntraMuscular once  heparin   Injectable 5000 Unit(s) SubCutaneous every 12 hours  insulin lispro (ADMELOG) corrective regimen sliding scale   SubCutaneous three times a day before meals  insulin lispro (ADMELOG) corrective regimen sliding scale   SubCutaneous at bedtime    DVT Prophylaxis: heparin   Injectable 5000 Unit(s) SubCutaneous every 12 hours    GI Prophylaxis:   Antibiotics: ampicillin/sulbactam  IVPB      ampicillin/sulbactam  IVPB 3 Gram(s) IV Intermittent every 6 hours    Anticoagulation:   Medications:[PRN]  acetaminophen     Tablet .. 650 milliGRAM(s) Oral every 6 hours PRN  guaifenesin/dextromethorphan Oral Liquid 10 milliLiter(s) Oral every 4 hours PRN      Labs:                        11.1   16.23 )-----------( 254      ( 23 Nov 2021 07:47 )             33.8     11-23    139  |  103  |  32<H>  ----------------------------<  165<H>  5.2<H>   |  25  |  1.4    Ca    8.1<L>      23 Nov 2021 07:47    TPro  4.8<L>  /  Alb  2.2<L>  /  TBili  0.4  /  DBili  x   /  AST  197<H>  /  ALT  79<H>  /  AlkPhos  208<H>  11-23    LIVER FUNCTIONS - ( 23 Nov 2021 07:47 )  Alb: 2.2 g/dL / Pro: 4.8 g/dL / ALK PHOS: 208 U/L / ALT: 79 U/L / AST: 197 U/L / GGT: x           PT/INR - ( 23 Nov 2021 07:47 )   PT: 15.60 sec;   INR: 1.36 ratio         PTT - ( 23 Nov 2021 07:47 )  PTT:27.2 sec          Urine/Micro:    Culture - Stool (collected 21 Nov 2021 14:00)  Source: .Stool Feces  Preliminary Report (22 Nov 2021 19:39):    No enteric pathogens to date: Final culture pending          Imaging:   < from: US Abdomen Upper Quadrant Right (11.17.21 @ 17:11) >  IMPRESSION:  Cholelithiasis without cholecystitis.  < end of copied text >    < from: NM Hepatobiliary Imaging (11.19.21 @ 16:45) >  IMPRESSION:  NO DEFINITE VISUALIZATION OF THE GALLBLADDER THROUGH 4 HOURS POST-INJECTION, CONSISTENT WITH CHOLECYSTITIS, AS DESCRIBED ABOVE. CLINICAL CORRELATION IS SUGGESTED.  < end of copied text >

## 2021-11-23 NOTE — PROGRESS NOTE ADULT - ASSESSMENT
Patient is a 81y old  Male with past medical history of HTN, HLD, DM, now presents to the ER  for evaluation of URI symptoms and non ambulatory status. Patient states he had a mechanical fall 2 weeks ago, s/p Splint in ER on 11/1, went home and has been having difficulty with ambulation even with walker as his legs give up. Patient also started to develop cough and sinus congestion along with rhinorrhea. Patient admits to dysuria but states it was resolved. On admission, he has no fever but Leukocytosis. The CXR shows no infiltrate. The ID consult requested to assist with further evaluation and antibiotic  management.    # Suspected pneumonia  # Leukocytosis   # Cholelithiasis   # Cholecyustitis- HIDA scan positive     Recommendations  - surgery evaluation   - consider IR evaluation given fevers and persistently elevated WBC while on appropriate antibiotics   - trend WBC  - continue unasyn     Please call or message on [employer] Teams if with any questions.  Spectra [phone]

## 2021-11-23 NOTE — CHART NOTE - NSCHARTNOTEFT_GEN_A_CORE
D/w surgery, IR and ID team.    Will change antibiotics to augmentin to finish 14 day course; last day 11/27/21    Discharge plan to rehab

## 2021-11-24 NOTE — PROGRESS NOTE ADULT - ATTENDING COMMENTS
Altered LFTs . Most likely drug induced. Monitor LFts no intervention at this time.
As noted above, patient feels improved, tolerating diet and denies abdominal pain.    afeb (tm 99), HR 92    abd benign    WBC 16    As note, pt is a poor surgical candidate due to numerous comorbidities.  Agree with perc drain if clinical condition worsens or fails to improve.
I edited the note
I edited the note
Pt with known cystic duct obstruction as based on HIDA scan. There are some derangements in the lFTs and leukocytosis, however, there is no tenderness on physical exam nor pain reported by the pt. I suspect HIDA findings result chronic cystic duct obstruction rather than acute cholecystitis. I recommend cont'd antibiotic therapy and workup of other causes of leukocytosis. Should the patient develop RUQ pain or tenderness, will likely recommend perc cholecystostomy as per prior surgical notes/recommendation. Please monitor for recurrent fever and trend LFTs daily.

## 2021-11-24 NOTE — PROGRESS NOTE ADULT - ASSESSMENT
81yMale pmh HTN and DM here for recent fall and acute UTI symptoms. GI consulted for altered LFTs.    Problem 1-Altered LFTs  -mixed pattern  likely due to DILI with superimposed rhabdo  Rec  -unasyn likelihood liver injury class C probable rare cause of apparent liver injury that can cause mild AST and ALT elevations  -ceftriaxone causes cholestatic hepatitis it was stopped and ALP improving   -Monitor LFTs, LFTs improving   -CKMB levels   -CBD 6mm not dilated   -MELD score 12  -Avoid hepatotoxic medications when possible   - CLD negative thus far     Problem 2- asymptomatic Cholelithiasis  +HIDA scan for cholecystitis  Rec  -surgery following   -appreciate IR    Recall GI if needed

## 2021-11-24 NOTE — PROGRESS NOTE ADULT - SUBJECTIVE AND OBJECTIVE BOX
81 year old Male with past medical history of HTN, HLD, DM presenting to ED secondary to Acute URI symptoms and Dysambulatory status. Patient states he had a mechanical fall 2 weeks ago, s/p Splint in ER on 11/1. Patient went home and has been having difficulty with ambulation even with walker as his legs give up.  Patient also started to develop cough and sinus congestion along with rhinorrhea. Patient admits to dysuria but states it was resolved. On admission, he has no fever but Leukocytosis.    Today:  Seen at bedside, no new complaints.          REVIEW OF SYSTEMS:  No new complaints      MEDICATIONS  (STANDING):  amoxicillin  875 milliGRAM(s)/clavulanate 1 Tablet(s) Oral two times a day  ATENolol  Tablet 50 milliGRAM(s) Oral daily  chlorhexidine 4% Liquid 1 Application(s) Topical <User Schedule>  dextrose 40% Gel 15 Gram(s) Oral once  dextrose 5%. 1000 milliLiter(s) (50 mL/Hr) IV Continuous <Continuous>  dextrose 5%. 1000 milliLiter(s) (100 mL/Hr) IV Continuous <Continuous>  dextrose 50% Injectable 25 Gram(s) IV Push once  dextrose 50% Injectable 12.5 Gram(s) IV Push once  dextrose 50% Injectable 25 Gram(s) IV Push once  glucagon  Injectable 1 milliGRAM(s) IntraMuscular once  heparin   Injectable 5000 Unit(s) SubCutaneous every 12 hours  insulin lispro (ADMELOG) corrective regimen sliding scale   SubCutaneous three times a day before meals  insulin lispro (ADMELOG) corrective regimen sliding scale   SubCutaneous at bedtime    MEDICATIONS  (PRN):  acetaminophen     Tablet .. 650 milliGRAM(s) Oral every 6 hours PRN Temp greater or equal to 38C (100.4F), Mild Pain (1 - 3)  guaifenesin/dextromethorphan Oral Liquid 10 milliLiter(s) Oral every 4 hours PRN Cough      Allergies  No Known Allergies        FAMILY HISTORY:  No pertinent family history in first degree relatives        Vital Signs Last 24 Hrs  T(C): 36.9 (24 Nov 2021 13:57), Max: 37.4 (23 Nov 2021 20:46)  T(F): 98.4 (24 Nov 2021 13:57), Max: 99.3 (23 Nov 2021 20:46)  HR: 78 (24 Nov 2021 13:57) (78 - 89)  BP: 121/64 (24 Nov 2021 13:57) (121/64 - 145/63)  RR: 16 (24 Nov 2021 13:57) (16 - 16)  SpO2: 96% (23 Nov 2021 17:00) (96% - 96%)    PHYSICAL EXAM:  GEN: no distress, comfortable  PULM: BS heard b/l equal, No wheezing  CVS: S1S2 present, no rubs or gallops  ABD: Soft, non-distended, no guarding; non-tender, condom catheter present  EXT: No lower extremity edema  NEURO: A&Ox3, moving all extremities; generalized weakness present      LABS:                        10.4   12.17 )-----------( 210      ( 24 Nov 2021 07:20 )             31.8     11-24    137  |  103  |  34<H>  ----------------------------<  212<H>  4.9   |  25  |  1.2    Ca    8.1<L>      24 Nov 2021 07:20    TPro  4.5<L>  /  Alb  2.1<L>  /  TBili  0.3  /  DBili  x   /  AST  159<H>  /  ALT  80<H>  /  AlkPhos  177<H>  11-24    PT/INR - ( 23 Nov 2021 07:47 )   PT: 15.60 sec;   INR: 1.36 ratio         PTT - ( 23 Nov 2021 07:47 )  PTT:27.2 sec

## 2021-11-24 NOTE — PROGRESS NOTE ADULT - ASSESSMENT
Patient is a 81y old  Male with past medical history of HTN, HLD, DM, now presents to the ER  for evaluation of URI symptoms and non ambulatory status. Patient states he had a mechanical fall 2 weeks ago, s/p Splint in ER on 11/1, went home and has been having difficulty with ambulation even with walker as his legs give up. Patient also started to develop cough and sinus congestion along with rhinorrhea. Patient admits to dysuria but states it was resolved. On admission, he has no fever but Leukocytosis. The CXR shows no infiltrate. The ID consult requested to assist with further evaluation and antibiotic  management.    # Suspected pneumonia  # Leukocytosis   # Cholelithiasis   # Cholecyustitis- HIDA scan positive     Recommendations  - appreciate  surgery and IR evaluation   - continue augmentin 875/125 mg BID until 11/27 to complete 2 week course of antibiotics for cholecystitis    Please call or message on [employer] Teams if with any questions.  Spectra [phone]

## 2021-11-24 NOTE — PROGRESS NOTE ADULT - SUBJECTIVE AND OBJECTIVE BOX
KAYLAMIGUEL ANGEL LYNCH  81y, Male  Allergy: No Known Allergies      LOS  10d    CHIEF COMPLAINT: Pneumonia (24 Nov 2021 09:48)      INTERVAL EVENTS/HPI  - No acute events overnight  - T(F): , Max: 99.3 (11-23-21 @ 20:46)  - Denies any worsening symptoms  - Tolerating medication  - WBC Count: 12.17 (11-24-21 @ 07:20)  WBC Count: 16.23 (11-23-21 @ 07:47)     - Creatinine, Serum: 1.2 (11-24-21 @ 07:20)  Creatinine, Serum: 1.4 (11-23-21 @ 07:47)       ROS  General: Denies rigors, nightsweats  HEENT: Denies headache, rhinorrhea, sore throat, eye pain  CV: Denies CP, palpitations  PULM: Denies wheezing, hemoptysis  GI: Denies hematemesis, hematochezia, melena  : Denies discharge, hematuria  MSK: Denies arthralgias, myalgias  SKIN: Denies rash, lesions  NEURO: Denies paresthesias, weakness  PSYCH: Denies depression, anxiety    VITALS:  T(F): 96.8, Max: 99.3 (11-23-21 @ 20:46)  HR: 89  BP: 141/68  RR: 16Vital Signs Last 24 Hrs  T(C): 36 (24 Nov 2021 05:19), Max: 37.4 (23 Nov 2021 20:46)  T(F): 96.8 (24 Nov 2021 05:19), Max: 99.3 (23 Nov 2021 20:46)  HR: 89 (24 Nov 2021 05:19) (68 - 89)  BP: 141/68 (24 Nov 2021 05:19) (141/68 - 145/63)  BP(mean): --  RR: 16 (24 Nov 2021 05:19) (16 - 16)  SpO2: 96% (23 Nov 2021 17:00) (96% - 96%)    PHYSICAL EXAM:  Gen: NAD, resting in bed  HEENT: Normocephalic, atraumatic  Neck: supple, no lymphadenopathy  CV: Regular rate & regular rhythm  Lungs: decreased BS at bases, no fremitus  Abdomen: Soft, BS present  Ext: Warm, well perfused  Neuro: non focal, awake  Skin: no rash, no erythema  Lines: no phlebitis    FH: Non-contributory  Social Hx: Non-contributory    TESTS & MEASUREMENTS:                        10.4   12.17 )-----------( 210      ( 24 Nov 2021 07:20 )             31.8     11-24    137  |  103  |  34<H>  ----------------------------<  212<H>  4.9   |  25  |  1.2    Ca    8.1<L>      24 Nov 2021 07:20    TPro  4.5<L>  /  Alb  2.1<L>  /  TBili  0.3  /  DBili  x   /  AST  159<H>  /  ALT  80<H>  /  AlkPhos  177<H>  11-24    eGFR if Non African American: 56 mL/min/1.73M2 (11-24-21 @ 07:20)  eGFR if : 64 mL/min/1.73M2 (11-24-21 @ 07:20)    LIVER FUNCTIONS - ( 24 Nov 2021 07:20 )  Alb: 2.1 g/dL / Pro: 4.5 g/dL / ALK PHOS: 177 U/L / ALT: 80 U/L / AST: 159 U/L / GGT: x               Culture - Blood (collected 11-22-21 @ 11:47)  Source: .Blood None  Preliminary Report (11-23-21 @ 22:01):    No growth to date.    Culture - Blood (collected 11-22-21 @ 11:47)  Source: .Blood None  Preliminary Report (11-23-21 @ 22:01):    No growth to date.    Culture - Stool (collected 11-21-21 @ 14:00)  Source: .Stool Feces  Final Report (11-23-21 @ 18:23):    No enteric pathogens isolated.    (Stool culture examined for Salmonella,    Shigella, Campylobacter, Aeromonas, Plesiomonas,    Vibrio, E.coli O157 and Yersinia)    Culture - Blood (collected 11-14-21 @ 11:20)  Source: .Blood Blood  Final Report (11-19-21 @ 19:01):    No Growth Final    Culture - Blood (collected 11-14-21 @ 11:20)  Source: .Blood Blood  Final Report (11-19-21 @ 19:00):    No Growth Final    Culture - Urine (collected 11-14-21 @ 11:20)  Source: Clean Catch Clean Catch (Midstream)  Final Report (11-15-21 @ 13:37):    <10,000 CFU/mL Normal Urogenital Keiko            INFECTIOUS DISEASES TESTING  Procalcitonin, Serum: 0.35 (11-22-21 @ 06:26)  Procalcitonin, Serum: 0.38 (11-18-21 @ 09:58)  COVID-19 PCR: NotDetec (11-18-21 @ 08:50)  MRSA PCR Result.: Negative (11-15-21 @ 13:30)  Procalcitonin, Serum: 0.22 (11-15-21 @ 06:59)  Rapid RVP Result: NotDetec (11-14-21 @ 09:55)      INFLAMMATORY MARKERS      RADIOLOGY & ADDITIONAL TESTS:  I have personally reviewed the last available Chest xray  CXR  Xray Chest 1 View- PORTABLE-Urgent:   EXAM:  XR CHEST PORTABLE URGENT 1V            PROCEDURE DATE:  11/22/2021            INTERPRETATION:  Clinical History / Reason for exam: Fever    Comparison : Chest radiograph 11/14/2021.    Technique/Positioning: Frontal chest radiograph.    Findings:    Support devices: None.    Cardiac/mediastinum/hilum: Unchanged    Lung parenchyma/Pleura: Calcified bilateral pleural plaques redemonstrated. Bibasilar opacities/small effusions, similar to prior. No pneumothorax.    Skeleton/soft tissues: Unchanged    Impression:    Bibasilar opacities/small effusions, similar to prior.    --- End of Report ---              ANJU ALEJANDRO MD; Attending Radiologist  This document has been electronically signed. Nov 22 2021 11:53AM (11-22-21 @ 10:51)      CT      CARDIOLOGY TESTING  12 Lead ECG:   Ventricular Rate 83 BPM    Atrial Rate 83 BPM    P-R Interval 154 ms    QRS Duration 76 ms    Q-T Interval 344 ms    QTC Calculation(Bazett) 404 ms    P Axis 41 degrees    R Axis 25 degrees    T Axis 23 degrees    Diagnosis Line Normal sinus rhythm  Nonspecific ST-T changes    Confirmed by MIGUEL ANGEL PALUMBO MD ([id number]) on 11/15/2021 1:58:29 PM (11-14-21 @ 10:53)      MEDICATIONS  amoxicillin  875 milliGRAM(s)/clavulanate 1 Oral two times a day  ATENolol  Tablet 50 Oral daily  chlorhexidine 4% Liquid 1 Topical <User Schedule>  dextrose 40% Gel 15 Oral once  dextrose 5%. 1000 IV Continuous <Continuous>  dextrose 5%. 1000 IV Continuous <Continuous>  dextrose 50% Injectable 25 IV Push once  dextrose 50% Injectable 12.5 IV Push once  dextrose 50% Injectable 25 IV Push once  glucagon  Injectable 1 IntraMuscular once  heparin   Injectable 5000 SubCutaneous every 12 hours  insulin lispro (ADMELOG) corrective regimen sliding scale  SubCutaneous three times a day before meals  insulin lispro (ADMELOG) corrective regimen sliding scale  SubCutaneous at bedtime      WEIGHT  Weight (kg): 87 (11-14-21 @ 14:54)  Creatinine, Serum: 1.2 mg/dL (11-24-21 @ 07:20)      ANTIBIOTICS:  amoxicillin  875 milliGRAM(s)/clavulanate 1 Tablet(s) Oral two times a day      All available historical records have been reviewed

## 2021-11-24 NOTE — PROGRESS NOTE ADULT - TIME BILLING
Direct patient care.
I have personally seen and examined this patient.    I have reviewed all pertinent clinical information and reviewed all relevant imaging and diagnostic studies personally.   I counseled the patient about diagnostic testing and treatment plan. All questions were answered.   I discussed recommendations with the primary team.
Coordination of care
Coordination of care

## 2021-11-24 NOTE — PROGRESS NOTE ADULT - ASSESSMENT
81 year old Male with past medical history of HTN, HLD, DM presenting to ED secondary to Acute URI symptoms and Dysambulatory status. Patient states he had a mechanical fall 2 weeks ago, s/p Splint in ER on 11/1. Patient went home and has been having difficulty with ambulation even with walker as his legs give up.  Patient also started to develop cough and sinus congestion along with rhinorrhea. Patient admits to dysuria but states it was resolved. On admission, he has no fever but Leukocytosis    - CT Chest No Cont (11.15.21 @ 08:58):  No CT evidence for acute intrathoracic disease. Extensive bilateral partially calcified pleural plaques and adjacent bibasilar round atelectasis, unchanged since 2013. 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  - CT Sinuses No Cont (11.15.21 @ 08:56):  Trace scattered paranasal sinus mucosal thickening. No air-fluid level to suggest acute sinusitis. Severe deviation of the osseous nasal septum to the left with a 4 mm left-sided septal spur impinging the left nasal cavity. Right-sided lula bullosa. Left maxillary periapical lucencies. Recommend follow-up with dental exam  -US Abdomen Upper Quadrant Right (11.17.21 @ 17:11) >  Cholelithiasis without cholecystitis.  -NM Hepatobiliary Imaging (11.19.21 @ 16:45) >  NO DEFINITE VISUALIZATION OF THE GALLBLADDER THROUGH 4 HOURS POST-INJECTION, CONSISTENT WITH CHOLECYSTITIS, AS DESCRIBED ABOVE. CLINICAL CORRELATION IS SUGGESTED.    A&P    # Leucocytosis -persistent; episode of isolated fever on 11/21  -  possibly related to asymptomatic acute cholecystitis  HIDA concerning for cholecystitis; surgeon following- recommended conservative management  - Procalcitonin, Serum: 0.22- 0.38:   - Blood Cx 11/14 NGTD  - ceftriaxone  changed to unasyn 3g q 6 on 11/17--> now on Augmentin until 11/27/21  - ID following; case discussed  - IR consulted and case discussed for cholecystostomy; no need for intervention    # diarrhea episode on 11/21- possibly  antibiotics related- resolved  C diff - neg; culture- no enteric pathogen    # Transaminitis - stable; Drug induced injury vs cholecystitis  GGT elevated; RUQ US showing cholelithiasis; no bile duct dialatation  - P-ANCA and C-ANCA- neg  TAWANDA, AMA, Sm Ab- pending;  ferritin, elevated- 1707 (possibly inflammation related; low transferring and patient's hg and Hct is stable)  monitor LFT,   GI following  hold statin    #  chronic pulmonary change- stable; possibly due to asbestosis    #HTN- Cont atenolol 50mg qD    # HLD- statin on hold    #DM2- Cont ISS- Monitor FS    DVT PPX, heparin  plan of care d/w patient and family    pending: DC to STR, complete Augmentin for medical mgmt of Cholecystitis.

## 2021-11-24 NOTE — PROGRESS NOTE ADULT - SUBJECTIVE AND OBJECTIVE BOX
81yMale  Being followed for altered LFTs, cholelithiasis   Interval history: Patient denies nausea, vomiting, hematemesis, melena, blood in stool, diarrhea, constipation, abdominal pain. Patient tolerating DASH diet without complaints.      PAST MEDICAL & SURGICAL HISTORY:   Hypertension    Diabetes mellitus            Social History: No smoking. No alcohol. No illegal drug use.            MEDICATIONS  (STANDING):  amoxicillin  875 milliGRAM(s)/clavulanate 1 Tablet(s) Oral two times a day  ATENolol  Tablet 50 milliGRAM(s) Oral daily  chlorhexidine 4% Liquid 1 Application(s) Topical <User Schedule>  dextrose 40% Gel 15 Gram(s) Oral once  dextrose 5%. 1000 milliLiter(s) (50 mL/Hr) IV Continuous <Continuous>  dextrose 5%. 1000 milliLiter(s) (100 mL/Hr) IV Continuous <Continuous>  dextrose 50% Injectable 25 Gram(s) IV Push once  dextrose 50% Injectable 12.5 Gram(s) IV Push once  dextrose 50% Injectable 25 Gram(s) IV Push once  glucagon  Injectable 1 milliGRAM(s) IntraMuscular once  heparin   Injectable 5000 Unit(s) SubCutaneous every 12 hours  insulin lispro (ADMELOG) corrective regimen sliding scale   SubCutaneous three times a day before meals  insulin lispro (ADMELOG) corrective regimen sliding scale   SubCutaneous at bedtime    MEDICATIONS  (PRN):  acetaminophen     Tablet .. 650 milliGRAM(s) Oral every 6 hours PRN Temp greater or equal to 38C (100.4F), Mild Pain (1 - 3)  guaifenesin/dextromethorphan Oral Liquid 10 milliLiter(s) Oral every 4 hours PRN Cough      Allergies:   No Known Allergies              REVIEW OF SYSTEMS:  General:  No weight loss, fevers, or chills.  Eyes:  No reported pain or visual changes  ENT:  No sore throat or runny nose.  NECK: No stiffness   CV:  No chest pain or palpitations.  Resp:  No shortness of breath, cough  GI:  No abdominal pain, nausea, vomiting, dysphagia, diarrhea or constipation. No rectal bleeding, melena, or hematemesis.  Muscle:  No aches or weakness  Neuro:  No tingling, numbness           VITAL SIGNS:   T(F): 96.8 (11-24-21 @ 05:19), Max: 99.3 (11-23-21 @ 20:46)  HR: 89 (11-24-21 @ 05:19) (68 - 89)  BP: 141/68 (11-24-21 @ 05:19) (141/68 - 145/63)  RR: 16 (11-24-21 @ 05:19) (16 - 16)  SpO2: 96% (11-23-21 @ 17:00) (96% - 96%)    PHYSICAL EXAM:  GENERAL: AAOx3, no acute distress.  HEAD:  Atraumatic, Normocephalic  EYES: conjunctiva and sclera clear  NECK: Supple, no JVD or thyromegaly  CHEST/LUNG: Clear to auscultation bilaterally; No wheeze, rhonchi, or rales  HEART: Regular rate and rhythm; normal S1, S2, No murmurs.  ABDOMEN: Soft, nontender, nondistended; Bowel sounds present  NEUROLOGY: No asterixis or tremor.   SKIN: Intact, no jaundice            LABS:                        10.4   12.17 )-----------( 210      ( 24 Nov 2021 07:20 )             31.8     11-24    137  |  103  |  34<H>  ----------------------------<  212<H>  4.9   |  25  |  1.2    Ca    8.1<L>      24 Nov 2021 07:20    TPro  4.5<L>  /  Alb  2.1<L>  /  TBili  0.3  /  DBili  x   /  AST  159<H>  /  ALT  80<H>  /  AlkPhos  177<H>  11-24    LIVER FUNCTIONS - ( 24 Nov 2021 07:20 )  Alb: 2.1 g/dL / Pro: 4.5 g/dL / ALK PHOS: 177 U/L / ALT: 80 U/L / AST: 159 U/L / GGT: x           PT/INR - ( 23 Nov 2021 07:47 )   PT: 15.60 sec;   INR: 1.36 ratio         PTT - ( 23 Nov 2021 07:47 )  PTT:27.2 sec    IMAGING:    < from: US Abdomen Upper Quadrant Right (11.17.21 @ 17:11) >    EXAM:  US ABDOMEN RT UPR QUADRANT            PROCEDURE DATE:  11/17/2021            INTERPRETATION:  CLINICAL INFORMATION: Elevated liver function enzymes.    COMPARISON: None available.    TECHNIQUE: Sonography of the right upper quadrant.    FINDINGS:    Liver: Within normal limits.  Bile ducts: Normal caliber. Common bile duct measures 6 (mm).  Gallbladder: Cholelithiasis.  No focal tenderness or evidence of cholecystitis.  Pancreas: Visualized portions are within normal limits.  Right kidney: 9.2 cm. No hydronephrosis.  Ascites: None.  IVC: Visualized portions are within normal limits.    IMPRESSION:    Cholelithiasis without cholecystitis.        --- End of Report ---              HERNESTO ZAMUDIO MD; Attending Interventional Radiologist  This document has been electronically signed. Nov 18 2021  7:50AM    < end of copied text >

## 2021-11-25 NOTE — DIETITIAN INITIAL EVALUATION ADULT. - PERTINENT MEDS FT
amoxicillin  875 milliGRAM(s)/clavulanate 1 Tablet(s) Oral two times a day  ATENolol  Tablet 50 milliGRAM(s) Oral daily  heparin   Injectable 5000 Unit(s) SubCutaneous every 12 hours  insulin lispro (ADMELOG) corrective regimen sliding scale   SubCutaneous three times a day before meals  insulin lispro (ADMELOG) corrective regimen sliding scale   SubCutaneous at bedtime

## 2021-11-25 NOTE — DIETITIAN INITIAL EVALUATION ADULT. - OTHER INFO
Hx: HTN, HLD, DM. Admitted w/dx of leukocytosis, possibly r/t asymptomatic acute cholecystitis. Has experienced diarrhea possibly r/t antibiotics- resolved. Transaminitis. Pending DC to STR.    Pt reports -180 lbs, wt per RN flowsheets wt 191.8, RD bedscale today 167.2 lbs, pt reports -180 lbs, unknown etiology for wt fluctuations, pt reports no edema.

## 2021-11-25 NOTE — DIETITIAN INITIAL EVALUATION ADULT. - ORAL INTAKE PTA/DIET HISTORY
Pt up in bed, reports feeling well, good appetite and intake, likes the food. No problems chewing/swallowing, NKFA, no specific food preferences reported. -180 lbs per pt.

## 2021-11-25 NOTE — PROGRESS NOTE ADULT - SUBJECTIVE AND OBJECTIVE BOX
81 year old Male with past medical history of HTN, HLD, DM presenting to ED secondary to Acute URI symptoms and Dysambulatory status. Patient states he had a mechanical fall 2 weeks ago, s/p Splint in ER on 11/1. Patient went home and has been having difficulty with ambulation even with walker as his legs give up.  Patient also started to develop cough and sinus congestion along with rhinorrhea. Patient admits to dysuria but states it was resolved. On admission, he has no fever but Leukocytosis.    Today:  Seen at bedside, no new complaints. Patient does report that his diarrhea has improved overall but this AM he did have 2 BMs. Spoke to patient's son.       REVIEW OF SYSTEMS:  No new complaints      MEDICATIONS  (STANDING):  amoxicillin  875 milliGRAM(s)/clavulanate 1 Tablet(s) Oral two times a day  ATENolol  Tablet 50 milliGRAM(s) Oral daily  chlorhexidine 4% Liquid 1 Application(s) Topical <User Schedule>  dextrose 40% Gel 15 Gram(s) Oral once  dextrose 5%. 1000 milliLiter(s) (50 mL/Hr) IV Continuous <Continuous>  dextrose 5%. 1000 milliLiter(s) (100 mL/Hr) IV Continuous <Continuous>  dextrose 50% Injectable 25 Gram(s) IV Push once  dextrose 50% Injectable 12.5 Gram(s) IV Push once  dextrose 50% Injectable 25 Gram(s) IV Push once  glucagon  Injectable 1 milliGRAM(s) IntraMuscular once  heparin   Injectable 5000 Unit(s) SubCutaneous every 12 hours  insulin lispro (ADMELOG) corrective regimen sliding scale   SubCutaneous three times a day before meals  insulin lispro (ADMELOG) corrective regimen sliding scale   SubCutaneous at bedtime    MEDICATIONS  (PRN):  acetaminophen     Tablet .. 650 milliGRAM(s) Oral every 6 hours PRN Temp greater or equal to 38C (100.4F), Mild Pain (1 - 3)  guaifenesin/dextromethorphan Oral Liquid 10 milliLiter(s) Oral every 4 hours PRN Cough        Allergies  No Known Allergies        FAMILY HISTORY:  No pertinent family history in first degree relatives      Vital Signs Last 24 Hrs  T(C): 36.6 (25 Nov 2021 05:00), Max: 36.9 (24 Nov 2021 13:57)  T(F): 97.8 (25 Nov 2021 05:00), Max: 98.4 (24 Nov 2021 13:57)  HR: 81 (25 Nov 2021 05:00) (78 - 81)  BP: 142/75 (25 Nov 2021 05:00) (121/64 - 157/69)  BP(mean): --  RR: 18 (25 Nov 2021 05:00) (16 - 18)  SpO2: --  PHYSICAL EXAM:  GEN: no distress, comfortable  PULM: BS heard b/l equal, No wheezing  CVS: S1S2 present, no rubs or gallops  ABD: Soft, non-distended, no guarding; non-tender, condom catheter present  EXT: No lower extremity edema  NEURO: A&Ox3, moving all extremities; generalized weakness present      LABS:                        11.3   13.02 )-----------( 229      ( 25 Nov 2021 11:42 )             34.5   11-25    135  |  103  |  29<H>  ----------------------------<  153<H>  5.9<H>   |  18  |  1.2    Ca    8.0<L>      25 Nov 2021 06:57    TPro  5.1<L>  /  Alb  2.2<L>  /  TBili  0.4  /  DBili  x   /  AST  217<H>  /  ALT  128<H>  /  AlkPhos  194<H>  11-25

## 2021-11-25 NOTE — PROGRESS NOTE ADULT - ASSESSMENT
81 year old Male with past medical history of HTN, HLD, DM presenting to ED secondary to Acute URI symptoms and Dysambulatory status. Patient states he had a mechanical fall 2 weeks ago, s/p Splint in ER on 11/1. Patient went home and has been having difficulty with ambulation even with walker as his legs give up.  Patient also started to develop cough and sinus congestion along with rhinorrhea. Patient admits to dysuria but states it was resolved. On admission, he has no fever but Leukocytosis    - CT Chest No Cont (11.15.21 @ 08:58):  No CT evidence for acute intrathoracic disease. Extensive bilateral partially calcified pleural plaques and adjacent bibasilar round atelectasis, unchanged since 2013. 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  - CT Sinuses No Cont (11.15.21 @ 08:56):  Trace scattered paranasal sinus mucosal thickening. No air-fluid level to suggest acute sinusitis. Severe deviation of the osseous nasal septum to the left with a 4 mm left-sided septal spur impinging the left nasal cavity. Right-sided lula bullosa. Left maxillary periapical lucencies. Recommend follow-up with dental exam  -US Abdomen Upper Quadrant Right (11.17.21 @ 17:11) >  Cholelithiasis without cholecystitis.  -NM Hepatobiliary Imaging (11.19.21 @ 16:45) >  NO DEFINITE VISUALIZATION OF THE GALLBLADDER THROUGH 4 HOURS POST-INJECTION, CONSISTENT WITH CHOLECYSTITIS, AS DESCRIBED ABOVE. CLINICAL CORRELATION IS SUGGESTED.    A&P    # Leucocytosis -persistent; episode of isolated fever on 11/21  -  possibly related to asymptomatic acute cholecystitis  HIDA concerning for cholecystitis; surgeon following- recommended conservative management  - Procalcitonin, Serum: 0.22- 0.38:   - Blood Cx 11/14 NGTD  - ceftriaxone  changed to unasyn 3g q 6 on 11/17--> now on Augmentin until 11/27/21  - ID following; case discussed  - IR consulted and case discussed for cholecystostomy; no need for intervention    # diarrhea episode on 11/21- possibly  antibiotics related- resolved  - C diff - neg; culture- no enteric pathogen    # Hyperkalemia (hemolyzed)  - will repeat BMP    # Transaminitis - stable; Drug induced injury vs cholecystitis  - GGT elevated; RUQ US showing cholelithiasis; no bile duct dialatation  - P-ANCA and C-ANCA- neg  - TAWANDA, AMA, Sm Ab- pending;  - ferritin, elevated- 1707 (possibly inflammation related; low transferring and patient's hg and Hct is stable)  PLAN  - monitor LFT,   - GI following  - hold statin    # chronic pulmonary change- stable; possibly due to asbestosis    #HTN- Cont atenolol 50mg qD    # HLD- statin on hold    #DM2- Cont ISS- Monitor FS    DVT PPX, heparin  plan of care d/w patient and family    pending: DC to STR, complete Augmentin for medical mgmt of Cholecystitis.

## 2021-11-26 NOTE — PROGRESS NOTE ADULT - REASON FOR ADMISSION
Pneumonia

## 2021-11-26 NOTE — DISCHARGE NOTE NURSING/CASE MANAGEMENT/SOCIAL WORK - PATIENT PORTAL LINK FT
You can access the FollowMyHealth Patient Portal offered by [facility] by registering at the following website: [email or web address]. By joining [facility]’s FollowMyHealth portal, you will also be able to view your health information using other applications (apps) compatible with our system.

## 2021-11-26 NOTE — DISCHARGE NOTE NURSING/CASE MANAGEMENT/SOCIAL WORK - NSDCVIVACCINE_GEN_ALL_CORE_FT
COVID-19 vaccine, vector-nr, rS-Ad26, PF, 0.5 mL (Filippo); 26-Nov-2021 13:01; Beatrice Forde (RENETTA); Filippo; [id number] (Exp. Date: 21-Dec-2021); IntraMuscular; Deltoid Left.; 0.5 milliLiter(s);   
10

## 2021-11-26 NOTE — PROGRESS NOTE ADULT - SUBJECTIVE AND OBJECTIVE BOX
81yMale  Being followed for altered LFTs  Interval history: Patient denies nausea, vomiting, hematemesis, melena, blood in stool, diarrhea, constipation, abdominal pain. Patient tolerating diet.       PAST MEDICAL & SURGICAL HISTORY:   Hypertension    Diabetes mellitus            Social History: No smoking. No alcohol. No illegal drug use.            MEDICATIONS  (STANDING):  amoxicillin  875 milliGRAM(s)/clavulanate 1 Tablet(s) Oral two times a day  ATENolol  Tablet 50 milliGRAM(s) Oral daily  chlorhexidine 4% Liquid 1 Application(s) Topical <User Schedule>  dextrose 40% Gel 15 Gram(s) Oral once  dextrose 5%. 1000 milliLiter(s) (50 mL/Hr) IV Continuous <Continuous>  dextrose 5%. 1000 milliLiter(s) (100 mL/Hr) IV Continuous <Continuous>  dextrose 50% Injectable 25 Gram(s) IV Push once  dextrose 50% Injectable 12.5 Gram(s) IV Push once  dextrose 50% Injectable 25 Gram(s) IV Push once  glucagon  Injectable 1 milliGRAM(s) IntraMuscular once  heparin   Injectable 5000 Unit(s) SubCutaneous every 12 hours  insulin lispro (ADMELOG) corrective regimen sliding scale   SubCutaneous three times a day before meals  insulin lispro (ADMELOG) corrective regimen sliding scale   SubCutaneous at bedtime    MEDICATIONS  (PRN):  acetaminophen     Tablet .. 650 milliGRAM(s) Oral every 6 hours PRN Temp greater or equal to 38C (100.4F), Mild Pain (1 - 3)  guaifenesin/dextromethorphan Oral Liquid 10 milliLiter(s) Oral every 4 hours PRN Cough      Allergies:    No Known Allergies    Intolerances        REVIEW OF SYSTEMS:  General:  No weight loss, fevers, or chills.  Eyes:  No reported pain or visual changes  ENT:  No sore throat or runny nose.  NECK: No stiffness   CV:  No chest pain or palpitations.  Resp:  No shortness of breath, cough  GI:  No abdominal pain, nausea, vomiting, dysphagia, diarrhea or constipation. No rectal bleeding, melena, or hematemesis.  Neuro:  No tingling, numbness       VITAL SIGNS:   T(F): 97.4 (11-26-21 @ 14:11), Max: 99.9 (11-25-21 @ 20:42)  HR: 74 (11-26-21 @ 14:11) (74 - 98)  BP: 135/63 (11-26-21 @ 14:11) (135/63 - 173/79)  RR: 18 (11-26-21 @ 14:11) (18 - 18)  SpO2: 94% (11-26-21 @ 08:20) (94% - 96%)    PHYSICAL EXAM:  GENERAL: AAOx3, no acute distress.  HEAD:  Atraumatic, Normocephalic  EYES: conjunctiva and sclera clear  NECK: Supple, no JVD or thyromegaly  CHEST/LUNG: Clear to auscultation bilaterally; No wheeze, rhonchi, or rales  HEART: Regular rate and rhythm; normal S1, S2, No murmurs.  ABDOMEN: Soft, nontender, nondistended; Bowel sounds present  NEUROLOGY: No asterixis or tremor.   SKIN: Intact, no jaundice            LABS:                        11.0   12.22 )-----------( 248      ( 26 Nov 2021 08:42 )             33.8     11-26    135  |  100  |  25<H>  ----------------------------<  173<H>  4.2   |  25  |  1.1    Ca    7.9<L>      26 Nov 2021 08:42    TPro  5.1<L>  /  Alb  2.4<L>  /  TBili  0.4  /  DBili  x   /  AST  274<H>  /  ALT  206<H>  /  AlkPhos  243<H>  11-26    LIVER FUNCTIONS - ( 26 Nov 2021 08:42 )  Alb: 2.4 g/dL / Pro: 5.1 g/dL / ALK PHOS: 243 U/L / ALT: 206 U/L / AST: 274 U/L / GGT: x               IMAGING:    < from: US Abdomen Upper Quadrant Right (11.17.21 @ 17:11) >    EXAM:  US ABDOMEN RT UPR QUADRANT            PROCEDURE DATE:  11/17/2021            INTERPRETATION:  CLINICAL INFORMATION: Elevated liver function enzymes.    COMPARISON: None available.    TECHNIQUE: Sonography of the right upper quadrant.    FINDINGS:    Liver: Within normal limits.  Bile ducts: Normal caliber. Common bile duct measures 6 (mm).  Gallbladder: Cholelithiasis.  No focal tenderness or evidence of cholecystitis.  Pancreas: Visualized portions are within normal limits.  Right kidney: 9.2 cm. No hydronephrosis.  Ascites: None.  IVC: Visualized portions are within normal limits.    IMPRESSION:    Cholelithiasis without cholecystitis.        --- End of Report ---              HERNESTO ZAMUDIO MD; Attending Interventional Radiologist  This document has been electronically signed. Nov 18 2021  7:50AM    < end of copied text >

## 2021-11-26 NOTE — PROGRESS NOTE ADULT - ASSESSMENT
81yMale pmh HTN and DM here for recent fall and acute UTI symptoms. GI consulted for altered LFTs.    Problem 1-Altered LFTs  -mixed pattern  likely due to DILI with superimposed rhabdo  Rec  -unasyn likelihood liver injury class C probable rare cause of apparent liver injury that can cause mild AST and ALT elevations  -ceftriaxone causes cholestatic hepatitis it was stopped and ALP improving   -Monitor LFTs, LFTs worsening, IF LFTs continue to worsen obtain MR with and without contrast  -CKMB levels   -CBD 6mm not dilated   -Avoid hepatotoxic medications when possible   - CLD negative thus far     Problem 2- asymptomatic Cholelithiasis  +HIDA scan for cholecystitis  Rec  -surgery following   -appreciate IR

## 2021-11-26 NOTE — PROGRESS NOTE ADULT - PROVIDER SPECIALTY LIST ADULT
Internal Medicine
Infectious Disease
Infectious Disease
Surgery
Gastroenterology
Hospitalist
Infectious Disease
Internal Medicine
Surgery
Hospitalist
Infectious Disease

## 2021-11-26 NOTE — PROGRESS NOTE ADULT - SUBJECTIVE AND OBJECTIVE BOX
81 year old Male with past medical history of HTN, HLD, DM presenting to ED secondary to Acute URI symptoms and Dysambulatory status. Patient states he had a mechanical fall 2 weeks ago, s/p Splint in ER on 11/1. Patient went home and has been having difficulty with ambulation even with walker as his legs give up.  Patient also started to develop cough and sinus congestion along with rhinorrhea. Patient admits to dysuria but states it was resolved. On admission, he has no fever but Leukocytosis.    Today:  Seen at bedside, no new complaints. Patient does report that his diarrhea has improved; discharge planning was discussed with him. Spoke to patient's wife and son.      REVIEW OF SYSTEMS:  No new complaints      MEDICATIONS  (STANDING):  amoxicillin  875 milliGRAM(s)/clavulanate 1 Tablet(s) Oral two times a day  ATENolol  Tablet 50 milliGRAM(s) Oral daily  chlorhexidine 4% Liquid 1 Application(s) Topical <User Schedule>  coronavirus (EUA) Vaccine ([employer]) 0.5 milliLiter(s) IntraMuscular once  dextrose 40% Gel 15 Gram(s) Oral once  dextrose 5%. 1000 milliLiter(s) (50 mL/Hr) IV Continuous <Continuous>  dextrose 5%. 1000 milliLiter(s) (100 mL/Hr) IV Continuous <Continuous>  dextrose 50% Injectable 25 Gram(s) IV Push once  dextrose 50% Injectable 12.5 Gram(s) IV Push once  dextrose 50% Injectable 25 Gram(s) IV Push once  glucagon  Injectable 1 milliGRAM(s) IntraMuscular once  heparin   Injectable 5000 Unit(s) SubCutaneous every 12 hours  insulin lispro (ADMELOG) corrective regimen sliding scale   SubCutaneous three times a day before meals  insulin lispro (ADMELOG) corrective regimen sliding scale   SubCutaneous at bedtime    MEDICATIONS  (PRN):  acetaminophen     Tablet .. 650 milliGRAM(s) Oral every 6 hours PRN Temp greater or equal to 38C (100.4F), Mild Pain (1 - 3)  guaifenesin/dextromethorphan Oral Liquid 10 milliLiter(s) Oral every 4 hours PRN Cough        Allergies  No Known Allergies        FAMILY HISTORY:  No pertinent family history in first degree relatives      Vital Signs Last 24 Hrs  T(C): 37.3 (26 Nov 2021 05:00), Max: 37.7 (25 Nov 2021 20:42)  T(F): 99.1 (26 Nov 2021 05:00), Max: 99.9 (25 Nov 2021 20:42)  HR: 75 (26 Nov 2021 05:55) (75 - 98)  BP: 157/66 (26 Nov 2021 05:55) (143/73 - 173/79)  BP(mean): --  RR: 18 (26 Nov 2021 05:00) (16 - 18)  SpO2: 94% (26 Nov 2021 08:20) (94% - 96%)  PHYSICAL EXAM:  GEN: no distress, comfortable  PULM: BS heard b/l equal, No wheezing  CVS: S1S2 present, no rubs or gallops  ABD: Soft, non-distended, no guarding; non-tender, condom catheter present  EXT: No lower extremity edema  NEURO: A&Ox3, moving all extremities; generalized weakness present      LABS:                        11.0   12.22 )-----------( 248      ( 26 Nov 2021 08:42 )             33.8   11-26    135  |  100  |  25<H>  ----------------------------<  173<H>  4.2   |  25  |  1.1    Ca    7.9<L>      26 Nov 2021 08:42    TPro  5.1<L>  /  Alb  2.4<L>  /  TBili  0.4  /  DBili  x   /  AST  274<H>  /  ALT  206<H>  /  AlkPhos  243<H>  11-26

## 2021-11-26 NOTE — PROGRESS NOTE ADULT - ASSESSMENT
81 year old Male with past medical history of HTN, HLD, DM presenting to ED secondary to Acute URI symptoms and Dysambulatory status. Patient states he had a mechanical fall 2 weeks ago, s/p Splint in ER on 11/1. Patient went home and has been having difficulty with ambulation even with walker as his legs give up.  Patient also started to develop cough and sinus congestion along with rhinorrhea. Patient admits to dysuria but states it was resolved. On admission, he has no fever but Leukocytosis    - CT Chest No Cont (11.15.21 @ 08:58):  No CT evidence for acute intrathoracic disease. Extensive bilateral partially calcified pleural plaques and adjacent bibasilar round atelectasis, unchanged since 2013. 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  - CT Sinuses No Cont (11.15.21 @ 08:56):  Trace scattered paranasal sinus mucosal thickening. No air-fluid level to suggest acute sinusitis. Severe deviation of the osseous nasal septum to the left with a 4 mm left-sided septal spur impinging the left nasal cavity. Right-sided lula bullosa. Left maxillary periapical lucencies. Recommend follow-up with dental exam  -US Abdomen Upper Quadrant Right (11.17.21 @ 17:11) >  Cholelithiasis without cholecystitis.  -NM Hepatobiliary Imaging (11.19.21 @ 16:45) >  NO DEFINITE VISUALIZATION OF THE GALLBLADDER THROUGH 4 HOURS POST-INJECTION, CONSISTENT WITH CHOLECYSTITIS, AS DESCRIBED ABOVE. CLINICAL CORRELATION IS SUGGESTED.    A&P    # Leucocytosis -persistent; episode of isolated fever on 11/21  -  possibly related to asymptomatic acute cholecystitis  HIDA concerning for cholecystitis; surgeon following- recommended conservative management  - Procalcitonin, Serum: 0.22- 0.38:   - Blood Cx 11/14 NGTD  - ceftriaxone  changed to unasyn 3g q 6 on 11/17--> now on Augmentin until 11/27/21  - ID following; case discussed  - IR consulted and case discussed for cholecystostomy; no need for intervention    # diarrhea episode on 11/21- possibly  antibiotics related- resolved  - C diff - neg; culture- no enteric pathogen    # Hyperkalemia (hemolyzed)  - will repeat BMP    # Transaminitis - stable; Drug induced injury vs cholecystitis  - GGT elevated; RUQ US showing cholelithiasis; no bile duct dialatation  - P-ANCA and C-ANCA- neg  - TAWANDA, AMA, Sm Ab- pending;  - ferritin, elevated- 1707 (possibly inflammation related; low transferring and patient's hg and Hct is stable)  PLAN  - monitor LFT,   - GI following  - hold statin    # chronic pulmonary change- stable; possibly due to asbestosis    #HTN- Cont atenolol 50mg qD    # HLD- statin on hold    #DM2- Cont ISS- Monitor FS    DVT PPX, heparin  plan of care d/w patient and family    pending: DC to STR today, complete Augmentin for medical mgmt of Cholecystitis.

## 2021-12-06 PROBLEM — E11.9 TYPE 2 DIABETES MELLITUS WITHOUT COMPLICATIONS: Chronic | Status: ACTIVE | Noted: 2021-01-01

## 2022-01-01 ENCOUNTER — TRANSCRIPTION ENCOUNTER (OUTPATIENT)
Age: 82
End: 2022-01-01

## 2022-01-01 ENCOUNTER — INPATIENT (INPATIENT)
Facility: HOSPITAL | Age: 82
LOS: 19 days | Discharge: NURSING, REHAB OR LONG-TERM CARE | End: 2022-02-05
Attending: STUDENT IN AN ORGANIZED HEALTH CARE EDUCATION/TRAINING PROGRAM | Admitting: STUDENT IN AN ORGANIZED HEALTH CARE EDUCATION/TRAINING PROGRAM
Payer: MEDICARE

## 2022-01-01 ENCOUNTER — APPOINTMENT (OUTPATIENT)
Dept: UROLOGY | Facility: CLINIC | Age: 82
End: 2022-01-01

## 2022-01-01 ENCOUNTER — INPATIENT (INPATIENT)
Facility: HOSPITAL | Age: 82
LOS: 7 days | End: 2022-03-27
Attending: INTERNAL MEDICINE | Admitting: INTERNAL MEDICINE
Payer: MEDICARE

## 2022-01-01 ENCOUNTER — APPOINTMENT (OUTPATIENT)
Dept: HEMATOLOGY ONCOLOGY | Facility: CLINIC | Age: 82
End: 2022-01-01

## 2022-01-01 ENCOUNTER — RESULT REVIEW (OUTPATIENT)
Age: 82
End: 2022-01-01

## 2022-01-01 VITALS
TEMPERATURE: 99 F | SYSTOLIC BLOOD PRESSURE: 164 MMHG | RESPIRATION RATE: 24 BRPM | DIASTOLIC BLOOD PRESSURE: 65 MMHG | HEART RATE: 115 BPM | OXYGEN SATURATION: 94 % | HEIGHT: 68 IN

## 2022-01-01 VITALS — RESPIRATION RATE: 42 BRPM

## 2022-01-01 VITALS
OXYGEN SATURATION: 96 % | DIASTOLIC BLOOD PRESSURE: 60 MMHG | RESPIRATION RATE: 18 BRPM | TEMPERATURE: 101 F | WEIGHT: 145.95 LBS | HEART RATE: 122 BPM | HEIGHT: 65 IN | SYSTOLIC BLOOD PRESSURE: 135 MMHG

## 2022-01-01 VITALS
DIASTOLIC BLOOD PRESSURE: 61 MMHG | HEART RATE: 89 BPM | RESPIRATION RATE: 18 BRPM | SYSTOLIC BLOOD PRESSURE: 138 MMHG | TEMPERATURE: 99 F

## 2022-01-01 DIAGNOSIS — R53.81 OTHER MALAISE: ICD-10-CM

## 2022-01-01 DIAGNOSIS — I10 ESSENTIAL (PRIMARY) HYPERTENSION: ICD-10-CM

## 2022-01-01 DIAGNOSIS — R05.9 COUGH, UNSPECIFIED: ICD-10-CM

## 2022-01-01 DIAGNOSIS — E11.9 TYPE 2 DIABETES MELLITUS WITHOUT COMPLICATIONS: ICD-10-CM

## 2022-01-01 DIAGNOSIS — E78.2 MIXED HYPERLIPIDEMIA: ICD-10-CM

## 2022-01-01 DIAGNOSIS — J61 PNEUMOCONIOSIS DUE TO ASBESTOS AND OTHER MINERAL FIBERS: ICD-10-CM

## 2022-01-01 DIAGNOSIS — N17.9 ACUTE KIDNEY FAILURE, UNSPECIFIED: ICD-10-CM

## 2022-01-01 DIAGNOSIS — R50.9 FEVER, UNSPECIFIED: ICD-10-CM

## 2022-01-01 DIAGNOSIS — R74.01 ELEVATION OF LEVELS OF LIVER TRANSAMINASE LEVELS: ICD-10-CM

## 2022-01-01 DIAGNOSIS — Z51.5 ENCOUNTER FOR PALLIATIVE CARE: ICD-10-CM

## 2022-01-01 DIAGNOSIS — R41.82 ALTERED MENTAL STATUS, UNSPECIFIED: ICD-10-CM

## 2022-01-01 DIAGNOSIS — E87.2 ACIDOSIS: ICD-10-CM

## 2022-01-01 DIAGNOSIS — Z71.89 OTHER SPECIFIED COUNSELING: ICD-10-CM

## 2022-01-01 DIAGNOSIS — Z79.84 LONG TERM (CURRENT) USE OF ORAL HYPOGLYCEMIC DRUGS: ICD-10-CM

## 2022-01-01 DIAGNOSIS — R53.1 WEAKNESS: ICD-10-CM

## 2022-01-01 DIAGNOSIS — A41.9 SEPSIS, UNSPECIFIED ORGANISM: ICD-10-CM

## 2022-01-01 DIAGNOSIS — E63.9 NUTRITIONAL DEFICIENCY, UNSPECIFIED: ICD-10-CM

## 2022-01-01 DIAGNOSIS — K81.9 CHOLECYSTITIS, UNSPECIFIED: ICD-10-CM

## 2022-01-01 LAB
% ALBUMIN: 32.9 % — SIGNIFICANT CHANGE UP
% ALPHA 1: 11.1 % — SIGNIFICANT CHANGE UP
% ALPHA 2: 17.8 % — SIGNIFICANT CHANGE UP
% BETA: 14.1 % — SIGNIFICANT CHANGE UP
% GAMMA: 24.1 % — SIGNIFICANT CHANGE UP
-  AMIKACIN: SIGNIFICANT CHANGE UP
-  AMPICILLIN: SIGNIFICANT CHANGE UP
-  AZTREONAM: SIGNIFICANT CHANGE UP
-  CEFEPIME: SIGNIFICANT CHANGE UP
-  CEFTAZIDIME: SIGNIFICANT CHANGE UP
-  CIPROFLOXACIN: SIGNIFICANT CHANGE UP
-  CIPROFLOXACIN: SIGNIFICANT CHANGE UP
-  DAPTOMYCIN: SIGNIFICANT CHANGE UP
-  GENTAMICIN: SIGNIFICANT CHANGE UP
-  IMIPENEM: SIGNIFICANT CHANGE UP
-  LEVOFLOXACIN: SIGNIFICANT CHANGE UP
-  LEVOFLOXACIN: SIGNIFICANT CHANGE UP
-  LINEZOLID: SIGNIFICANT CHANGE UP
-  MEROPENEM: SIGNIFICANT CHANGE UP
-  NITROFURANTOIN: SIGNIFICANT CHANGE UP
-  PIPERACILLIN/TAZOBACTAM: SIGNIFICANT CHANGE UP
-  TETRACYCLINE: SIGNIFICANT CHANGE UP
-  TOBRAMYCIN: SIGNIFICANT CHANGE UP
-  VANCOMYCIN: SIGNIFICANT CHANGE UP
A1C WITH ESTIMATED AVERAGE GLUCOSE RESULT: 6.6 % — HIGH (ref 4–5.6)
A1C WITH ESTIMATED AVERAGE GLUCOSE RESULT: 6.8 % — HIGH (ref 4–5.6)
ALBUMIN SERPL ELPH-MCNC: 1.5 G/DL — LOW (ref 3.5–5.2)
ALBUMIN SERPL ELPH-MCNC: 1.6 G/DL — LOW (ref 3.5–5.2)
ALBUMIN SERPL ELPH-MCNC: 1.7 G/DL — LOW (ref 3.5–5.2)
ALBUMIN SERPL ELPH-MCNC: 1.7 G/DL — LOW (ref 3.5–5.2)
ALBUMIN SERPL ELPH-MCNC: 1.7 G/DL — LOW (ref 3.6–5.5)
ALBUMIN SERPL ELPH-MCNC: 1.8 G/DL — LOW (ref 3.5–5.2)
ALBUMIN SERPL ELPH-MCNC: 1.9 G/DL — LOW (ref 3.5–5.2)
ALBUMIN SERPL ELPH-MCNC: 2 G/DL — LOW (ref 3.5–5.2)
ALBUMIN SERPL ELPH-MCNC: 2 G/DL — LOW (ref 3.5–5.2)
ALBUMIN SERPL ELPH-MCNC: 2.1 G/DL — LOW (ref 3.5–5.2)
ALBUMIN SERPL ELPH-MCNC: 2.2 G/DL — LOW (ref 3.5–5.2)
ALBUMIN SERPL ELPH-MCNC: 2.3 G/DL — LOW (ref 3.5–5.2)
ALBUMIN SERPL ELPH-MCNC: 2.4 G/DL — LOW (ref 3.5–5.2)
ALBUMIN SERPL ELPH-MCNC: 2.4 G/DL — LOW (ref 3.5–5.2)
ALBUMIN SERPL ELPH-MCNC: 2.5 G/DL — LOW (ref 3.5–5.2)
ALBUMIN/GLOB SERPL ELPH: 0.5 RATIO — SIGNIFICANT CHANGE UP
ALP SERPL-CCNC: 113 U/L — SIGNIFICANT CHANGE UP (ref 30–115)
ALP SERPL-CCNC: 113 U/L — SIGNIFICANT CHANGE UP (ref 30–115)
ALP SERPL-CCNC: 115 U/L — SIGNIFICANT CHANGE UP (ref 30–115)
ALP SERPL-CCNC: 115 U/L — SIGNIFICANT CHANGE UP (ref 30–115)
ALP SERPL-CCNC: 118 U/L — HIGH (ref 30–115)
ALP SERPL-CCNC: 119 U/L — HIGH (ref 30–115)
ALP SERPL-CCNC: 120 U/L — HIGH (ref 30–115)
ALP SERPL-CCNC: 127 U/L — HIGH (ref 30–115)
ALP SERPL-CCNC: 131 U/L — HIGH (ref 30–115)
ALP SERPL-CCNC: 147 U/L — HIGH (ref 30–115)
ALP SERPL-CCNC: 171 U/L — HIGH (ref 30–115)
ALP SERPL-CCNC: 174 U/L — HIGH (ref 30–115)
ALP SERPL-CCNC: 179 U/L — HIGH (ref 30–115)
ALP SERPL-CCNC: 180 U/L — HIGH (ref 30–115)
ALP SERPL-CCNC: 192 U/L — HIGH (ref 30–115)
ALP SERPL-CCNC: 192 U/L — HIGH (ref 30–115)
ALP SERPL-CCNC: 204 U/L — HIGH (ref 30–115)
ALP SERPL-CCNC: 206 U/L — HIGH (ref 30–115)
ALP SERPL-CCNC: 212 U/L — HIGH (ref 30–115)
ALP SERPL-CCNC: 226 U/L — HIGH (ref 30–115)
ALP SERPL-CCNC: 240 U/L — HIGH (ref 30–115)
ALP SERPL-CCNC: 250 U/L — HIGH (ref 30–115)
ALP SERPL-CCNC: 286 U/L — HIGH (ref 30–115)
ALP SERPL-CCNC: 292 U/L — HIGH (ref 30–115)
ALP SERPL-CCNC: 308 U/L — HIGH (ref 30–115)
ALP SERPL-CCNC: 323 U/L — HIGH (ref 30–115)
ALP SERPL-CCNC: 330 U/L — HIGH (ref 30–115)
ALP SERPL-CCNC: 344 U/L — HIGH (ref 30–115)
ALPHA1 GLOB SERPL ELPH-MCNC: 0.6 G/DL — HIGH (ref 0.1–0.4)
ALPHA2 GLOB SERPL ELPH-MCNC: 0.9 G/DL — SIGNIFICANT CHANGE UP (ref 0.5–1)
ALT FLD-CCNC: 10 U/L — SIGNIFICANT CHANGE UP (ref 0–41)
ALT FLD-CCNC: 12 U/L — SIGNIFICANT CHANGE UP (ref 0–41)
ALT FLD-CCNC: 13 U/L — SIGNIFICANT CHANGE UP (ref 0–41)
ALT FLD-CCNC: 23 U/L — SIGNIFICANT CHANGE UP (ref 0–41)
ALT FLD-CCNC: 24 U/L — SIGNIFICANT CHANGE UP (ref 0–41)
ALT FLD-CCNC: 26 U/L — SIGNIFICANT CHANGE UP (ref 0–41)
ALT FLD-CCNC: 27 U/L — SIGNIFICANT CHANGE UP (ref 0–41)
ALT FLD-CCNC: 27 U/L — SIGNIFICANT CHANGE UP (ref 0–41)
ALT FLD-CCNC: 28 U/L — SIGNIFICANT CHANGE UP (ref 0–41)
ALT FLD-CCNC: 28 U/L — SIGNIFICANT CHANGE UP (ref 0–41)
ALT FLD-CCNC: 29 U/L — SIGNIFICANT CHANGE UP (ref 0–41)
ALT FLD-CCNC: 33 U/L — SIGNIFICANT CHANGE UP (ref 0–41)
ALT FLD-CCNC: 33 U/L — SIGNIFICANT CHANGE UP (ref 0–41)
ALT FLD-CCNC: 34 U/L — SIGNIFICANT CHANGE UP (ref 0–41)
ALT FLD-CCNC: 34 U/L — SIGNIFICANT CHANGE UP (ref 0–41)
ALT FLD-CCNC: 35 U/L — SIGNIFICANT CHANGE UP (ref 0–41)
ALT FLD-CCNC: 36 U/L — SIGNIFICANT CHANGE UP (ref 0–41)
ALT FLD-CCNC: 39 U/L — SIGNIFICANT CHANGE UP (ref 0–41)
ALT FLD-CCNC: 41 U/L — SIGNIFICANT CHANGE UP (ref 0–41)
ALT FLD-CCNC: 42 U/L — HIGH (ref 0–41)
ALT FLD-CCNC: 57 U/L — HIGH (ref 0–41)
ALT FLD-CCNC: 57 U/L — HIGH (ref 0–41)
ALT FLD-CCNC: 58 U/L — HIGH (ref 0–41)
ALT FLD-CCNC: 73 U/L — HIGH (ref 0–41)
ALT FLD-CCNC: 74 U/L — HIGH (ref 0–41)
ALT FLD-CCNC: 9 U/L — SIGNIFICANT CHANGE UP (ref 0–41)
AMYLASE P1 CFR SERPL: 61 U/L — SIGNIFICANT CHANGE UP (ref 25–115)
ANION GAP SERPL CALC-SCNC: 11 MMOL/L — SIGNIFICANT CHANGE UP (ref 7–14)
ANION GAP SERPL CALC-SCNC: 13 MMOL/L — SIGNIFICANT CHANGE UP (ref 7–14)
ANION GAP SERPL CALC-SCNC: 14 MMOL/L — SIGNIFICANT CHANGE UP (ref 7–14)
ANION GAP SERPL CALC-SCNC: 15 MMOL/L — HIGH (ref 7–14)
ANION GAP SERPL CALC-SCNC: 16 MMOL/L — HIGH (ref 7–14)
ANION GAP SERPL CALC-SCNC: 17 MMOL/L — HIGH (ref 7–14)
ANION GAP SERPL CALC-SCNC: 17 MMOL/L — HIGH (ref 7–14)
ANION GAP SERPL CALC-SCNC: 18 MMOL/L — HIGH (ref 7–14)
ANION GAP SERPL CALC-SCNC: 18 MMOL/L — HIGH (ref 7–14)
ANION GAP SERPL CALC-SCNC: 20 MMOL/L — HIGH (ref 7–14)
ANION GAP SERPL CALC-SCNC: 20 MMOL/L — HIGH (ref 7–14)
ANION GAP SERPL CALC-SCNC: 21 MMOL/L — HIGH (ref 7–14)
ANION GAP SERPL CALC-SCNC: 21 MMOL/L — HIGH (ref 7–14)
ANION GAP SERPL CALC-SCNC: 6 MMOL/L — LOW (ref 7–14)
ANION GAP SERPL CALC-SCNC: 8 MMOL/L — SIGNIFICANT CHANGE UP (ref 7–14)
ANION GAP SERPL CALC-SCNC: 9 MMOL/L — SIGNIFICANT CHANGE UP (ref 7–14)
ANION GAP SERPL CALC-SCNC: 9 MMOL/L — SIGNIFICANT CHANGE UP (ref 7–14)
ANISOCYTOSIS BLD QL: SLIGHT — SIGNIFICANT CHANGE UP
ANISOCYTOSIS BLD QL: SLIGHT — SIGNIFICANT CHANGE UP
APPEARANCE UR: ABNORMAL
APPEARANCE UR: ABNORMAL
APPEARANCE UR: CLEAR — SIGNIFICANT CHANGE UP
APPEARANCE UR: CLEAR — SIGNIFICANT CHANGE UP
APTT BLD: 29.9 SEC — SIGNIFICANT CHANGE UP (ref 27–39.2)
APTT BLD: 30.6 SEC — SIGNIFICANT CHANGE UP (ref 27–39.2)
APTT BLD: 30.9 SEC — SIGNIFICANT CHANGE UP (ref 27–39.2)
AST SERPL-CCNC: 101 U/L — HIGH (ref 0–41)
AST SERPL-CCNC: 102 U/L — HIGH (ref 0–41)
AST SERPL-CCNC: 111 U/L — HIGH (ref 0–41)
AST SERPL-CCNC: 138 U/L — HIGH (ref 0–41)
AST SERPL-CCNC: 143 U/L — HIGH (ref 0–41)
AST SERPL-CCNC: 160 U/L — HIGH (ref 0–41)
AST SERPL-CCNC: 177 U/L — HIGH (ref 0–41)
AST SERPL-CCNC: 18 U/L — SIGNIFICANT CHANGE UP (ref 0–41)
AST SERPL-CCNC: 21 U/L — SIGNIFICANT CHANGE UP (ref 0–41)
AST SERPL-CCNC: 22 U/L — SIGNIFICANT CHANGE UP (ref 0–41)
AST SERPL-CCNC: 40 U/L — SIGNIFICANT CHANGE UP (ref 0–41)
AST SERPL-CCNC: 57 U/L — HIGH (ref 0–41)
AST SERPL-CCNC: 60 U/L — HIGH (ref 0–41)
AST SERPL-CCNC: 61 U/L — HIGH (ref 0–41)
AST SERPL-CCNC: 63 U/L — HIGH (ref 0–41)
AST SERPL-CCNC: 64 U/L — HIGH (ref 0–41)
AST SERPL-CCNC: 64 U/L — HIGH (ref 0–41)
AST SERPL-CCNC: 65 U/L — HIGH (ref 0–41)
AST SERPL-CCNC: 66 U/L — HIGH (ref 0–41)
AST SERPL-CCNC: 69 U/L — HIGH (ref 0–41)
AST SERPL-CCNC: 72 U/L — HIGH (ref 0–41)
AST SERPL-CCNC: 73 U/L — HIGH (ref 0–41)
AST SERPL-CCNC: 75 U/L — HIGH (ref 0–41)
AST SERPL-CCNC: 78 U/L — HIGH (ref 0–41)
AST SERPL-CCNC: 81 U/L — HIGH (ref 0–41)
AST SERPL-CCNC: 85 U/L — HIGH (ref 0–41)
B-GLOBULIN SERPL ELPH-MCNC: 0.7 G/DL — SIGNIFICANT CHANGE UP (ref 0.5–1)
BACTERIA # UR AUTO: ABNORMAL
BACTERIA # UR AUTO: NEGATIVE — SIGNIFICANT CHANGE UP
BASE EXCESS BLDV CALC-SCNC: -4.1 MMOL/L — LOW (ref -2–3)
BASOPHILS # BLD AUTO: 0 K/UL — SIGNIFICANT CHANGE UP (ref 0–0.2)
BASOPHILS # BLD AUTO: 0 K/UL — SIGNIFICANT CHANGE UP (ref 0–0.2)
BASOPHILS # BLD AUTO: 0.01 K/UL — SIGNIFICANT CHANGE UP (ref 0–0.2)
BASOPHILS # BLD AUTO: 0.01 K/UL — SIGNIFICANT CHANGE UP (ref 0–0.2)
BASOPHILS # BLD AUTO: 0.02 K/UL — SIGNIFICANT CHANGE UP (ref 0–0.2)
BASOPHILS # BLD AUTO: 0.03 K/UL — SIGNIFICANT CHANGE UP (ref 0–0.2)
BASOPHILS # BLD AUTO: 0.04 K/UL — SIGNIFICANT CHANGE UP (ref 0–0.2)
BASOPHILS # BLD AUTO: 0.05 K/UL — SIGNIFICANT CHANGE UP (ref 0–0.2)
BASOPHILS NFR BLD AUTO: 0 % — SIGNIFICANT CHANGE UP (ref 0–1)
BASOPHILS NFR BLD AUTO: 0 % — SIGNIFICANT CHANGE UP (ref 0–1)
BASOPHILS NFR BLD AUTO: 0.1 % — SIGNIFICANT CHANGE UP (ref 0–1)
BASOPHILS NFR BLD AUTO: 0.2 % — SIGNIFICANT CHANGE UP (ref 0–1)
BASOPHILS NFR BLD AUTO: 0.3 % — SIGNIFICANT CHANGE UP (ref 0–1)
BCR/ABL BY RT - PCR QUANTITATIVE: SIGNIFICANT CHANGE UP
BILIRUB SERPL-MCNC: 0.2 MG/DL — SIGNIFICANT CHANGE UP (ref 0.2–1.2)
BILIRUB SERPL-MCNC: 0.2 MG/DL — SIGNIFICANT CHANGE UP (ref 0.2–1.2)
BILIRUB SERPL-MCNC: 0.3 MG/DL — SIGNIFICANT CHANGE UP (ref 0.2–1.2)
BILIRUB SERPL-MCNC: 0.4 MG/DL — SIGNIFICANT CHANGE UP (ref 0.2–1.2)
BILIRUB SERPL-MCNC: 0.5 MG/DL — SIGNIFICANT CHANGE UP (ref 0.2–1.2)
BILIRUB SERPL-MCNC: 0.6 MG/DL — SIGNIFICANT CHANGE UP (ref 0.2–1.2)
BILIRUB UR-MCNC: NEGATIVE — SIGNIFICANT CHANGE UP
BLD GP AB SCN SERPL QL: SIGNIFICANT CHANGE UP
BUN SERPL-MCNC: 15 MG/DL — SIGNIFICANT CHANGE UP (ref 10–20)
BUN SERPL-MCNC: 18 MG/DL — SIGNIFICANT CHANGE UP (ref 10–20)
BUN SERPL-MCNC: 19 MG/DL — SIGNIFICANT CHANGE UP (ref 10–20)
BUN SERPL-MCNC: 20 MG/DL — SIGNIFICANT CHANGE UP (ref 10–20)
BUN SERPL-MCNC: 20 MG/DL — SIGNIFICANT CHANGE UP (ref 10–20)
BUN SERPL-MCNC: 21 MG/DL — HIGH (ref 10–20)
BUN SERPL-MCNC: 22 MG/DL — HIGH (ref 10–20)
BUN SERPL-MCNC: 23 MG/DL — HIGH (ref 10–20)
BUN SERPL-MCNC: 24 MG/DL — HIGH (ref 10–20)
BUN SERPL-MCNC: 25 MG/DL — HIGH (ref 10–20)
BUN SERPL-MCNC: 26 MG/DL — HIGH (ref 10–20)
BUN SERPL-MCNC: 26 MG/DL — HIGH (ref 10–20)
BUN SERPL-MCNC: 27 MG/DL — HIGH (ref 10–20)
BUN SERPL-MCNC: 27 MG/DL — HIGH (ref 10–20)
BUN SERPL-MCNC: 30 MG/DL — HIGH (ref 10–20)
BUN SERPL-MCNC: 31 MG/DL — HIGH (ref 10–20)
BUN SERPL-MCNC: 32 MG/DL — HIGH (ref 10–20)
BUN SERPL-MCNC: 33 MG/DL — HIGH (ref 10–20)
BUN SERPL-MCNC: 53 MG/DL — HIGH (ref 10–20)
BUN SERPL-MCNC: 65 MG/DL — CRITICAL HIGH (ref 10–20)
BUN SERPL-MCNC: 74 MG/DL — CRITICAL HIGH (ref 10–20)
BUN SERPL-MCNC: 80 MG/DL — CRITICAL HIGH (ref 10–20)
BUN SERPL-MCNC: 85 MG/DL — CRITICAL HIGH (ref 10–20)
BUN SERPL-MCNC: 86 MG/DL — CRITICAL HIGH (ref 10–20)
BUN SERPL-MCNC: 88 MG/DL — CRITICAL HIGH (ref 10–20)
BUN SERPL-MCNC: 91 MG/DL — CRITICAL HIGH (ref 10–20)
CA-I SERPL-SCNC: 1.16 MMOL/L — SIGNIFICANT CHANGE UP (ref 1.15–1.33)
CALCIUM SERPL-MCNC: 7.1 MG/DL — LOW (ref 8.5–10.1)
CALCIUM SERPL-MCNC: 7.2 MG/DL — LOW (ref 8.5–10.1)
CALCIUM SERPL-MCNC: 7.4 MG/DL — LOW (ref 8.5–10.1)
CALCIUM SERPL-MCNC: 7.5 MG/DL — LOW (ref 8.5–10.1)
CALCIUM SERPL-MCNC: 7.6 MG/DL — LOW (ref 8.5–10.1)
CALCIUM SERPL-MCNC: 7.6 MG/DL — LOW (ref 8.5–10.1)
CALCIUM SERPL-MCNC: 7.7 MG/DL — LOW (ref 8.5–10.1)
CALCIUM SERPL-MCNC: 7.8 MG/DL — LOW (ref 8.5–10.1)
CALCIUM SERPL-MCNC: 7.9 MG/DL — LOW (ref 8.5–10.1)
CALCIUM SERPL-MCNC: 8.1 MG/DL — LOW (ref 8.5–10.1)
CALCIUM SERPL-MCNC: 8.2 MG/DL — LOW (ref 8.5–10.1)
CALCIUM SERPL-MCNC: 8.4 MG/DL — LOW (ref 8.5–10.1)
CALCIUM SERPL-MCNC: 8.9 MG/DL — SIGNIFICANT CHANGE UP (ref 8.5–10.1)
CHLORIDE SERPL-SCNC: 100 MMOL/L — SIGNIFICANT CHANGE UP (ref 98–110)
CHLORIDE SERPL-SCNC: 101 MMOL/L — SIGNIFICANT CHANGE UP (ref 98–110)
CHLORIDE SERPL-SCNC: 102 MMOL/L — SIGNIFICANT CHANGE UP (ref 98–110)
CHLORIDE SERPL-SCNC: 102 MMOL/L — SIGNIFICANT CHANGE UP (ref 98–110)
CHLORIDE SERPL-SCNC: 103 MMOL/L — SIGNIFICANT CHANGE UP (ref 98–110)
CHLORIDE SERPL-SCNC: 104 MMOL/L — SIGNIFICANT CHANGE UP (ref 98–110)
CHLORIDE SERPL-SCNC: 105 MMOL/L — SIGNIFICANT CHANGE UP (ref 98–110)
CHLORIDE SERPL-SCNC: 108 MMOL/L — SIGNIFICANT CHANGE UP (ref 98–110)
CHLORIDE SERPL-SCNC: 109 MMOL/L — SIGNIFICANT CHANGE UP (ref 98–110)
CHLORIDE SERPL-SCNC: 110 MMOL/L — SIGNIFICANT CHANGE UP (ref 98–110)
CHLORIDE SERPL-SCNC: 110 MMOL/L — SIGNIFICANT CHANGE UP (ref 98–110)
CHLORIDE SERPL-SCNC: 111 MMOL/L — HIGH (ref 98–110)
CHLORIDE SERPL-SCNC: 111 MMOL/L — HIGH (ref 98–110)
CHLORIDE SERPL-SCNC: 112 MMOL/L — HIGH (ref 98–110)
CHLORIDE SERPL-SCNC: 112 MMOL/L — HIGH (ref 98–110)
CHLORIDE SERPL-SCNC: 96 MMOL/L — LOW (ref 98–110)
CHLORIDE SERPL-SCNC: 98 MMOL/L — SIGNIFICANT CHANGE UP (ref 98–110)
CHLORIDE SERPL-SCNC: 98 MMOL/L — SIGNIFICANT CHANGE UP (ref 98–110)
CHROM ANALY INTERPHASE BLD FISH-IMP: SIGNIFICANT CHANGE UP
CHROM ANALY OVERALL INTERP SPEC-IMP: SIGNIFICANT CHANGE UP
CO2 SERPL-SCNC: 16 MMOL/L — LOW (ref 17–32)
CO2 SERPL-SCNC: 17 MMOL/L — SIGNIFICANT CHANGE UP (ref 17–32)
CO2 SERPL-SCNC: 18 MMOL/L — SIGNIFICANT CHANGE UP (ref 17–32)
CO2 SERPL-SCNC: 18 MMOL/L — SIGNIFICANT CHANGE UP (ref 17–32)
CO2 SERPL-SCNC: 19 MMOL/L — SIGNIFICANT CHANGE UP (ref 17–32)
CO2 SERPL-SCNC: 20 MMOL/L — SIGNIFICANT CHANGE UP (ref 17–32)
CO2 SERPL-SCNC: 21 MMOL/L — SIGNIFICANT CHANGE UP (ref 17–32)
CO2 SERPL-SCNC: 22 MMOL/L — SIGNIFICANT CHANGE UP (ref 17–32)
CO2 SERPL-SCNC: 23 MMOL/L — SIGNIFICANT CHANGE UP (ref 17–32)
CO2 SERPL-SCNC: 23 MMOL/L — SIGNIFICANT CHANGE UP (ref 17–32)
CO2 SERPL-SCNC: 24 MMOL/L — SIGNIFICANT CHANGE UP (ref 17–32)
CO2 SERPL-SCNC: 25 MMOL/L — SIGNIFICANT CHANGE UP (ref 17–32)
CO2 SERPL-SCNC: 25 MMOL/L — SIGNIFICANT CHANGE UP (ref 17–32)
CO2 SERPL-SCNC: 27 MMOL/L — SIGNIFICANT CHANGE UP (ref 17–32)
COLOR SPEC: YELLOW — SIGNIFICANT CHANGE UP
CREAT ?TM UR-MCNC: 24 MG/DL — SIGNIFICANT CHANGE UP
CREAT SERPL-MCNC: 0.7 MG/DL — SIGNIFICANT CHANGE UP (ref 0.7–1.5)
CREAT SERPL-MCNC: 0.8 MG/DL — SIGNIFICANT CHANGE UP (ref 0.7–1.5)
CREAT SERPL-MCNC: 0.9 MG/DL — SIGNIFICANT CHANGE UP (ref 0.7–1.5)
CREAT SERPL-MCNC: 1.5 MG/DL — SIGNIFICANT CHANGE UP (ref 0.7–1.5)
CREAT SERPL-MCNC: 1.8 MG/DL — HIGH (ref 0.7–1.5)
CREAT SERPL-MCNC: 1.8 MG/DL — HIGH (ref 0.7–1.5)
CREAT SERPL-MCNC: 2 MG/DL — HIGH (ref 0.7–1.5)
CREAT SERPL-MCNC: 2 MG/DL — HIGH (ref 0.7–1.5)
CREAT SERPL-MCNC: 2.1 MG/DL — HIGH (ref 0.7–1.5)
CREAT SERPL-MCNC: 2.4 MG/DL — HIGH (ref 0.7–1.5)
CREAT SERPL-MCNC: 2.5 MG/DL — HIGH (ref 0.7–1.5)
CRP SERPL-MCNC: 194 MG/L — HIGH
CULTURE RESULTS: NO GROWTH — SIGNIFICANT CHANGE UP
CULTURE RESULTS: SIGNIFICANT CHANGE UP
DIFF PNL FLD: ABNORMAL
DIFF PNL FLD: ABNORMAL
DIFF PNL FLD: NEGATIVE — SIGNIFICANT CHANGE UP
DIFF PNL FLD: NEGATIVE — SIGNIFICANT CHANGE UP
EGFR: 25 ML/MIN/1.73M2 — LOW
EGFR: 26 ML/MIN/1.73M2 — LOW
EGFR: 30 ML/MIN/1.73M2 — LOW
EGFR: 32 ML/MIN/1.73M2 — LOW
EGFR: 32 ML/MIN/1.73M2 — LOW
EGFR: 37 ML/MIN/1.73M2 — LOW
EGFR: 37 ML/MIN/1.73M2 — LOW
EGFR: 46 ML/MIN/1.73M2 — LOW
EOSINOPHIL # BLD AUTO: 0 K/UL — SIGNIFICANT CHANGE UP (ref 0–0.7)
EOSINOPHIL # BLD AUTO: 0.03 K/UL — SIGNIFICANT CHANGE UP (ref 0–0.7)
EOSINOPHIL # BLD AUTO: 0.04 K/UL — SIGNIFICANT CHANGE UP (ref 0–0.7)
EOSINOPHIL # BLD AUTO: 0.05 K/UL — SIGNIFICANT CHANGE UP (ref 0–0.7)
EOSINOPHIL # BLD AUTO: 0.05 K/UL — SIGNIFICANT CHANGE UP (ref 0–0.7)
EOSINOPHIL # BLD AUTO: 0.06 K/UL — SIGNIFICANT CHANGE UP (ref 0–0.7)
EOSINOPHIL # BLD AUTO: 0.07 K/UL — SIGNIFICANT CHANGE UP (ref 0–0.7)
EOSINOPHIL # BLD AUTO: 0.07 K/UL — SIGNIFICANT CHANGE UP (ref 0–0.7)
EOSINOPHIL # BLD AUTO: 0.09 K/UL — SIGNIFICANT CHANGE UP (ref 0–0.7)
EOSINOPHIL # BLD AUTO: 0.12 K/UL — SIGNIFICANT CHANGE UP (ref 0–0.7)
EOSINOPHIL # BLD AUTO: 0.13 K/UL — SIGNIFICANT CHANGE UP (ref 0–0.7)
EOSINOPHIL # BLD AUTO: 0.14 K/UL — SIGNIFICANT CHANGE UP (ref 0–0.7)
EOSINOPHIL # BLD AUTO: 0.19 K/UL — SIGNIFICANT CHANGE UP (ref 0–0.7)
EOSINOPHIL # BLD AUTO: 0.24 K/UL — SIGNIFICANT CHANGE UP (ref 0–0.7)
EOSINOPHIL # BLD AUTO: 0.35 K/UL — SIGNIFICANT CHANGE UP (ref 0–0.7)
EOSINOPHIL NFR BLD AUTO: 0 % — SIGNIFICANT CHANGE UP (ref 0–8)
EOSINOPHIL NFR BLD AUTO: 0.2 % — SIGNIFICANT CHANGE UP (ref 0–8)
EOSINOPHIL NFR BLD AUTO: 0.2 % — SIGNIFICANT CHANGE UP (ref 0–8)
EOSINOPHIL NFR BLD AUTO: 0.3 % — SIGNIFICANT CHANGE UP (ref 0–8)
EOSINOPHIL NFR BLD AUTO: 0.3 % — SIGNIFICANT CHANGE UP (ref 0–8)
EOSINOPHIL NFR BLD AUTO: 0.4 % — SIGNIFICANT CHANGE UP (ref 0–8)
EOSINOPHIL NFR BLD AUTO: 0.5 % — SIGNIFICANT CHANGE UP (ref 0–8)
EOSINOPHIL NFR BLD AUTO: 0.5 % — SIGNIFICANT CHANGE UP (ref 0–8)
EOSINOPHIL NFR BLD AUTO: 0.6 % — SIGNIFICANT CHANGE UP (ref 0–8)
EOSINOPHIL NFR BLD AUTO: 0.6 % — SIGNIFICANT CHANGE UP (ref 0–8)
EOSINOPHIL NFR BLD AUTO: 0.7 % — SIGNIFICANT CHANGE UP (ref 0–8)
EOSINOPHIL NFR BLD AUTO: 0.7 % — SIGNIFICANT CHANGE UP (ref 0–8)
EOSINOPHIL NFR BLD AUTO: 0.8 % — SIGNIFICANT CHANGE UP (ref 0–8)
EOSINOPHIL NFR BLD AUTO: 0.8 % — SIGNIFICANT CHANGE UP (ref 0–8)
EOSINOPHIL NFR BLD AUTO: 0.9 % — SIGNIFICANT CHANGE UP (ref 0–8)
EOSINOPHIL NFR BLD AUTO: 1.2 % — SIGNIFICANT CHANGE UP (ref 0–8)
EOSINOPHIL NFR BLD AUTO: 1.2 % — SIGNIFICANT CHANGE UP (ref 0–8)
EOSINOPHIL NFR BLD AUTO: 3 % — SIGNIFICANT CHANGE UP (ref 0–8)
EOSINOPHIL NFR BLD AUTO: 3.8 % — SIGNIFICANT CHANGE UP (ref 0–8)
EPI CELLS # UR: 0 /HPF — SIGNIFICANT CHANGE UP (ref 0–5)
EPI CELLS # UR: 1 /HPF — SIGNIFICANT CHANGE UP (ref 0–5)
EPI CELLS # UR: 2 /HPF — SIGNIFICANT CHANGE UP (ref 0–5)
EPI CELLS # UR: 4 /HPF — SIGNIFICANT CHANGE UP (ref 0–5)
ESTIMATED AVERAGE GLUCOSE: 143 MG/DL — HIGH (ref 68–114)
ESTIMATED AVERAGE GLUCOSE: 148 MG/DL — HIGH (ref 68–114)
FERRITIN SERPL-MCNC: 1203 NG/ML — HIGH (ref 30–400)
FOLATE SERPL-MCNC: 10.8 NG/ML — SIGNIFICANT CHANGE UP
GAMMA GLOBULIN: 1.2 G/DL — SIGNIFICANT CHANGE UP (ref 0.6–1.6)
GAS PNL BLDA: SIGNIFICANT CHANGE UP
GAS PNL BLDV: 146 MMOL/L — HIGH (ref 136–145)
GAS PNL BLDV: SIGNIFICANT CHANGE UP
GAS PNL BLDV: SIGNIFICANT CHANGE UP
GGT SERPL-CCNC: 163 U/L — HIGH (ref 1–40)
GIANT PLATELETS BLD QL SMEAR: PRESENT — SIGNIFICANT CHANGE UP
GIANT PLATELETS BLD QL SMEAR: PRESENT — SIGNIFICANT CHANGE UP
GLUCOSE BLDC GLUCOMTR-MCNC: 133 MG/DL — HIGH (ref 70–99)
GLUCOSE BLDC GLUCOMTR-MCNC: 135 MG/DL — HIGH (ref 70–99)
GLUCOSE BLDC GLUCOMTR-MCNC: 139 MG/DL — HIGH (ref 70–99)
GLUCOSE BLDC GLUCOMTR-MCNC: 144 MG/DL — HIGH (ref 70–99)
GLUCOSE BLDC GLUCOMTR-MCNC: 144 MG/DL — HIGH (ref 70–99)
GLUCOSE BLDC GLUCOMTR-MCNC: 148 MG/DL — HIGH (ref 70–99)
GLUCOSE BLDC GLUCOMTR-MCNC: 151 MG/DL — HIGH (ref 70–99)
GLUCOSE BLDC GLUCOMTR-MCNC: 151 MG/DL — HIGH (ref 70–99)
GLUCOSE BLDC GLUCOMTR-MCNC: 155 MG/DL — HIGH (ref 70–99)
GLUCOSE BLDC GLUCOMTR-MCNC: 157 MG/DL — HIGH (ref 70–99)
GLUCOSE BLDC GLUCOMTR-MCNC: 159 MG/DL — HIGH (ref 70–99)
GLUCOSE BLDC GLUCOMTR-MCNC: 159 MG/DL — HIGH (ref 70–99)
GLUCOSE BLDC GLUCOMTR-MCNC: 160 MG/DL — HIGH (ref 70–99)
GLUCOSE BLDC GLUCOMTR-MCNC: 160 MG/DL — HIGH (ref 70–99)
GLUCOSE BLDC GLUCOMTR-MCNC: 161 MG/DL — HIGH (ref 70–99)
GLUCOSE BLDC GLUCOMTR-MCNC: 162 MG/DL — HIGH (ref 70–99)
GLUCOSE BLDC GLUCOMTR-MCNC: 162 MG/DL — HIGH (ref 70–99)
GLUCOSE BLDC GLUCOMTR-MCNC: 165 MG/DL — HIGH (ref 70–99)
GLUCOSE BLDC GLUCOMTR-MCNC: 165 MG/DL — HIGH (ref 70–99)
GLUCOSE BLDC GLUCOMTR-MCNC: 166 MG/DL — HIGH (ref 70–99)
GLUCOSE BLDC GLUCOMTR-MCNC: 166 MG/DL — HIGH (ref 70–99)
GLUCOSE BLDC GLUCOMTR-MCNC: 168 MG/DL — HIGH (ref 70–99)
GLUCOSE BLDC GLUCOMTR-MCNC: 169 MG/DL — HIGH (ref 70–99)
GLUCOSE BLDC GLUCOMTR-MCNC: 169 MG/DL — HIGH (ref 70–99)
GLUCOSE BLDC GLUCOMTR-MCNC: 170 MG/DL — HIGH (ref 70–99)
GLUCOSE BLDC GLUCOMTR-MCNC: 171 MG/DL — HIGH (ref 70–99)
GLUCOSE BLDC GLUCOMTR-MCNC: 171 MG/DL — HIGH (ref 70–99)
GLUCOSE BLDC GLUCOMTR-MCNC: 174 MG/DL — HIGH (ref 70–99)
GLUCOSE BLDC GLUCOMTR-MCNC: 175 MG/DL — HIGH (ref 70–99)
GLUCOSE BLDC GLUCOMTR-MCNC: 175 MG/DL — HIGH (ref 70–99)
GLUCOSE BLDC GLUCOMTR-MCNC: 176 MG/DL — HIGH (ref 70–99)
GLUCOSE BLDC GLUCOMTR-MCNC: 177 MG/DL — HIGH (ref 70–99)
GLUCOSE BLDC GLUCOMTR-MCNC: 178 MG/DL — HIGH (ref 70–99)
GLUCOSE BLDC GLUCOMTR-MCNC: 179 MG/DL — HIGH (ref 70–99)
GLUCOSE BLDC GLUCOMTR-MCNC: 180 MG/DL — HIGH (ref 70–99)
GLUCOSE BLDC GLUCOMTR-MCNC: 181 MG/DL — HIGH (ref 70–99)
GLUCOSE BLDC GLUCOMTR-MCNC: 183 MG/DL — HIGH (ref 70–99)
GLUCOSE BLDC GLUCOMTR-MCNC: 183 MG/DL — HIGH (ref 70–99)
GLUCOSE BLDC GLUCOMTR-MCNC: 184 MG/DL — HIGH (ref 70–99)
GLUCOSE BLDC GLUCOMTR-MCNC: 186 MG/DL — HIGH (ref 70–99)
GLUCOSE BLDC GLUCOMTR-MCNC: 186 MG/DL — HIGH (ref 70–99)
GLUCOSE BLDC GLUCOMTR-MCNC: 188 MG/DL — HIGH (ref 70–99)
GLUCOSE BLDC GLUCOMTR-MCNC: 189 MG/DL — HIGH (ref 70–99)
GLUCOSE BLDC GLUCOMTR-MCNC: 189 MG/DL — HIGH (ref 70–99)
GLUCOSE BLDC GLUCOMTR-MCNC: 190 MG/DL — HIGH (ref 70–99)
GLUCOSE BLDC GLUCOMTR-MCNC: 191 MG/DL — HIGH (ref 70–99)
GLUCOSE BLDC GLUCOMTR-MCNC: 192 MG/DL — HIGH (ref 70–99)
GLUCOSE BLDC GLUCOMTR-MCNC: 192 MG/DL — HIGH (ref 70–99)
GLUCOSE BLDC GLUCOMTR-MCNC: 193 MG/DL — HIGH (ref 70–99)
GLUCOSE BLDC GLUCOMTR-MCNC: 196 MG/DL — HIGH (ref 70–99)
GLUCOSE BLDC GLUCOMTR-MCNC: 197 MG/DL — HIGH (ref 70–99)
GLUCOSE BLDC GLUCOMTR-MCNC: 199 MG/DL — HIGH (ref 70–99)
GLUCOSE BLDC GLUCOMTR-MCNC: 200 MG/DL — HIGH (ref 70–99)
GLUCOSE BLDC GLUCOMTR-MCNC: 201 MG/DL — HIGH (ref 70–99)
GLUCOSE BLDC GLUCOMTR-MCNC: 201 MG/DL — HIGH (ref 70–99)
GLUCOSE BLDC GLUCOMTR-MCNC: 203 MG/DL — HIGH (ref 70–99)
GLUCOSE BLDC GLUCOMTR-MCNC: 204 MG/DL — HIGH (ref 70–99)
GLUCOSE BLDC GLUCOMTR-MCNC: 205 MG/DL — HIGH (ref 70–99)
GLUCOSE BLDC GLUCOMTR-MCNC: 206 MG/DL — HIGH (ref 70–99)
GLUCOSE BLDC GLUCOMTR-MCNC: 213 MG/DL — HIGH (ref 70–99)
GLUCOSE BLDC GLUCOMTR-MCNC: 218 MG/DL — HIGH (ref 70–99)
GLUCOSE BLDC GLUCOMTR-MCNC: 219 MG/DL — HIGH (ref 70–99)
GLUCOSE BLDC GLUCOMTR-MCNC: 219 MG/DL — HIGH (ref 70–99)
GLUCOSE BLDC GLUCOMTR-MCNC: 222 MG/DL — HIGH (ref 70–99)
GLUCOSE BLDC GLUCOMTR-MCNC: 224 MG/DL — HIGH (ref 70–99)
GLUCOSE BLDC GLUCOMTR-MCNC: 229 MG/DL — HIGH (ref 70–99)
GLUCOSE BLDC GLUCOMTR-MCNC: 232 MG/DL — HIGH (ref 70–99)
GLUCOSE BLDC GLUCOMTR-MCNC: 233 MG/DL — HIGH (ref 70–99)
GLUCOSE BLDC GLUCOMTR-MCNC: 235 MG/DL — HIGH (ref 70–99)
GLUCOSE BLDC GLUCOMTR-MCNC: 235 MG/DL — HIGH (ref 70–99)
GLUCOSE BLDC GLUCOMTR-MCNC: 241 MG/DL — HIGH (ref 70–99)
GLUCOSE BLDC GLUCOMTR-MCNC: 244 MG/DL — HIGH (ref 70–99)
GLUCOSE BLDC GLUCOMTR-MCNC: 247 MG/DL — HIGH (ref 70–99)
GLUCOSE BLDC GLUCOMTR-MCNC: 247 MG/DL — HIGH (ref 70–99)
GLUCOSE BLDC GLUCOMTR-MCNC: 249 MG/DL — HIGH (ref 70–99)
GLUCOSE BLDC GLUCOMTR-MCNC: 250 MG/DL — HIGH (ref 70–99)
GLUCOSE BLDC GLUCOMTR-MCNC: 250 MG/DL — HIGH (ref 70–99)
GLUCOSE BLDC GLUCOMTR-MCNC: 252 MG/DL — HIGH (ref 70–99)
GLUCOSE BLDC GLUCOMTR-MCNC: 256 MG/DL — HIGH (ref 70–99)
GLUCOSE BLDC GLUCOMTR-MCNC: 257 MG/DL — HIGH (ref 70–99)
GLUCOSE BLDC GLUCOMTR-MCNC: 258 MG/DL — HIGH (ref 70–99)
GLUCOSE BLDC GLUCOMTR-MCNC: 259 MG/DL — HIGH (ref 70–99)
GLUCOSE BLDC GLUCOMTR-MCNC: 259 MG/DL — HIGH (ref 70–99)
GLUCOSE BLDC GLUCOMTR-MCNC: 264 MG/DL — HIGH (ref 70–99)
GLUCOSE BLDC GLUCOMTR-MCNC: 277 MG/DL — HIGH (ref 70–99)
GLUCOSE BLDC GLUCOMTR-MCNC: 280 MG/DL — HIGH (ref 70–99)
GLUCOSE BLDC GLUCOMTR-MCNC: 286 MG/DL — HIGH (ref 70–99)
GLUCOSE BLDC GLUCOMTR-MCNC: 287 MG/DL — HIGH (ref 70–99)
GLUCOSE BLDC GLUCOMTR-MCNC: 294 MG/DL — HIGH (ref 70–99)
GLUCOSE BLDC GLUCOMTR-MCNC: 305 MG/DL — HIGH (ref 70–99)
GLUCOSE BLDC GLUCOMTR-MCNC: 305 MG/DL — HIGH (ref 70–99)
GLUCOSE BLDC GLUCOMTR-MCNC: 307 MG/DL — HIGH (ref 70–99)
GLUCOSE BLDC GLUCOMTR-MCNC: 314 MG/DL — HIGH (ref 70–99)
GLUCOSE BLDC GLUCOMTR-MCNC: 317 MG/DL — HIGH (ref 70–99)
GLUCOSE BLDC GLUCOMTR-MCNC: 344 MG/DL — HIGH (ref 70–99)
GLUCOSE BLDC GLUCOMTR-MCNC: 346 MG/DL — HIGH (ref 70–99)
GLUCOSE BLDC GLUCOMTR-MCNC: 355 MG/DL — HIGH (ref 70–99)
GLUCOSE SERPL-MCNC: 129 MG/DL — HIGH (ref 70–99)
GLUCOSE SERPL-MCNC: 138 MG/DL — HIGH (ref 70–99)
GLUCOSE SERPL-MCNC: 144 MG/DL — HIGH (ref 70–99)
GLUCOSE SERPL-MCNC: 145 MG/DL — HIGH (ref 70–99)
GLUCOSE SERPL-MCNC: 146 MG/DL — HIGH (ref 70–99)
GLUCOSE SERPL-MCNC: 150 MG/DL — HIGH (ref 70–99)
GLUCOSE SERPL-MCNC: 155 MG/DL — HIGH (ref 70–99)
GLUCOSE SERPL-MCNC: 161 MG/DL — HIGH (ref 70–99)
GLUCOSE SERPL-MCNC: 162 MG/DL — HIGH (ref 70–99)
GLUCOSE SERPL-MCNC: 163 MG/DL — HIGH (ref 70–99)
GLUCOSE SERPL-MCNC: 166 MG/DL — HIGH (ref 70–99)
GLUCOSE SERPL-MCNC: 168 MG/DL — HIGH (ref 70–99)
GLUCOSE SERPL-MCNC: 168 MG/DL — HIGH (ref 70–99)
GLUCOSE SERPL-MCNC: 169 MG/DL — HIGH (ref 70–99)
GLUCOSE SERPL-MCNC: 169 MG/DL — HIGH (ref 70–99)
GLUCOSE SERPL-MCNC: 171 MG/DL — HIGH (ref 70–99)
GLUCOSE SERPL-MCNC: 175 MG/DL — HIGH (ref 70–99)
GLUCOSE SERPL-MCNC: 179 MG/DL — HIGH (ref 70–99)
GLUCOSE SERPL-MCNC: 180 MG/DL — HIGH (ref 70–99)
GLUCOSE SERPL-MCNC: 185 MG/DL — HIGH (ref 70–99)
GLUCOSE SERPL-MCNC: 186 MG/DL — HIGH (ref 70–99)
GLUCOSE SERPL-MCNC: 188 MG/DL — HIGH (ref 70–99)
GLUCOSE SERPL-MCNC: 192 MG/DL — HIGH (ref 70–99)
GLUCOSE SERPL-MCNC: 193 MG/DL — HIGH (ref 70–99)
GLUCOSE SERPL-MCNC: 193 MG/DL — HIGH (ref 70–99)
GLUCOSE SERPL-MCNC: 236 MG/DL — HIGH (ref 70–99)
GLUCOSE SERPL-MCNC: 262 MG/DL — HIGH (ref 70–99)
GLUCOSE SERPL-MCNC: 366 MG/DL — HIGH (ref 70–99)
GLUCOSE UR QL: ABNORMAL
GLUCOSE UR QL: NEGATIVE — SIGNIFICANT CHANGE UP
GRAM STN FLD: SIGNIFICANT CHANGE UP
HAPTOGLOB SERPL-MCNC: 379 MG/DL — HIGH (ref 34–200)
HCO3 BLDV-SCNC: 22 MMOL/L — SIGNIFICANT CHANGE UP (ref 22–29)
HCT VFR BLD CALC: 22.5 % — LOW (ref 42–52)
HCT VFR BLD CALC: 23.8 % — LOW (ref 42–52)
HCT VFR BLD CALC: 23.9 % — LOW (ref 42–52)
HCT VFR BLD CALC: 24 % — LOW (ref 42–52)
HCT VFR BLD CALC: 24.6 % — LOW (ref 42–52)
HCT VFR BLD CALC: 24.8 % — LOW (ref 42–52)
HCT VFR BLD CALC: 24.8 % — LOW (ref 42–52)
HCT VFR BLD CALC: 24.9 % — LOW (ref 42–52)
HCT VFR BLD CALC: 25.2 % — LOW (ref 42–52)
HCT VFR BLD CALC: 25.3 % — LOW (ref 42–52)
HCT VFR BLD CALC: 26.5 % — LOW (ref 42–52)
HCT VFR BLD CALC: 26.5 % — LOW (ref 42–52)
HCT VFR BLD CALC: 26.6 % — LOW (ref 42–52)
HCT VFR BLD CALC: 26.8 % — LOW (ref 42–52)
HCT VFR BLD CALC: 27.3 % — LOW (ref 42–52)
HCT VFR BLD CALC: 27.6 % — LOW (ref 42–52)
HCT VFR BLD CALC: 27.7 % — LOW (ref 42–52)
HCT VFR BLD CALC: 27.9 % — LOW (ref 42–52)
HCT VFR BLD CALC: 27.9 % — LOW (ref 42–52)
HCT VFR BLD CALC: 28.1 % — LOW (ref 42–52)
HCT VFR BLD CALC: 28.4 % — LOW (ref 42–52)
HCT VFR BLD CALC: 28.5 % — LOW (ref 42–52)
HCT VFR BLD CALC: 28.7 % — LOW (ref 42–52)
HCT VFR BLD CALC: 28.9 % — LOW (ref 42–52)
HCT VFR BLD CALC: 29.1 % — LOW (ref 42–52)
HCT VFR BLD CALC: 29.4 % — LOW (ref 42–52)
HCT VFR BLD CALC: 30.3 % — LOW (ref 42–52)
HCT VFR BLD CALC: 30.6 % — LOW (ref 42–52)
HCT VFR BLD CALC: 30.6 % — LOW (ref 42–52)
HCT VFR BLD CALC: 30.8 % — LOW (ref 42–52)
HCT VFR BLDA CALC: 30 % — LOW (ref 39–51)
HEMATOPATHOLOGY REPORT: SIGNIFICANT CHANGE UP
HGB BLD CALC-MCNC: 10 G/DL — LOW (ref 12.6–17.4)
HGB BLD-MCNC: 6.8 G/DL — CRITICAL LOW (ref 14–18)
HGB BLD-MCNC: 7 G/DL — LOW (ref 14–18)
HGB BLD-MCNC: 7 G/DL — LOW (ref 14–18)
HGB BLD-MCNC: 7.1 G/DL — LOW (ref 14–18)
HGB BLD-MCNC: 7.2 G/DL — LOW (ref 14–18)
HGB BLD-MCNC: 7.3 G/DL — LOW (ref 14–18)
HGB BLD-MCNC: 7.3 G/DL — LOW (ref 14–18)
HGB BLD-MCNC: 7.6 G/DL — LOW (ref 14–18)
HGB BLD-MCNC: 7.6 G/DL — LOW (ref 14–18)
HGB BLD-MCNC: 7.8 G/DL — LOW (ref 14–18)
HGB BLD-MCNC: 7.8 G/DL — LOW (ref 14–18)
HGB BLD-MCNC: 8.1 G/DL — LOW (ref 14–18)
HGB BLD-MCNC: 8.2 G/DL — LOW (ref 14–18)
HGB BLD-MCNC: 8.3 G/DL — LOW (ref 14–18)
HGB BLD-MCNC: 8.4 G/DL — LOW (ref 14–18)
HGB BLD-MCNC: 8.5 G/DL — LOW (ref 14–18)
HGB BLD-MCNC: 8.6 G/DL — LOW (ref 14–18)
HGB BLD-MCNC: 8.6 G/DL — LOW (ref 14–18)
HGB BLD-MCNC: 8.8 G/DL — LOW (ref 14–18)
HGB BLD-MCNC: 8.9 G/DL — LOW (ref 14–18)
HGB BLD-MCNC: 9 G/DL — LOW (ref 14–18)
HGB BLD-MCNC: 9.1 G/DL — LOW (ref 14–18)
HGB BLD-MCNC: 9.6 G/DL — LOW (ref 14–18)
HYALINE CASTS # UR AUTO: 1 /LPF — SIGNIFICANT CHANGE UP (ref 0–7)
HYALINE CASTS # UR AUTO: 9 /LPF — HIGH (ref 0–7)
IGA FLD-MCNC: 368 MG/DL — SIGNIFICANT CHANGE UP (ref 84–499)
IGG FLD-MCNC: 1321 MG/DL — SIGNIFICANT CHANGE UP (ref 610–1660)
IGM SERPL-MCNC: 54 MG/DL — SIGNIFICANT CHANGE UP (ref 35–242)
IMM GRANULOCYTES NFR BLD AUTO: 0.3 % — SIGNIFICANT CHANGE UP (ref 0.1–0.3)
IMM GRANULOCYTES NFR BLD AUTO: 0.6 % — HIGH (ref 0.1–0.3)
IMM GRANULOCYTES NFR BLD AUTO: 0.6 % — HIGH (ref 0.1–0.3)
IMM GRANULOCYTES NFR BLD AUTO: 0.8 % — HIGH (ref 0.1–0.3)
IMM GRANULOCYTES NFR BLD AUTO: 0.9 % — HIGH (ref 0.1–0.3)
IMM GRANULOCYTES NFR BLD AUTO: 1 % — HIGH (ref 0.1–0.3)
IMM GRANULOCYTES NFR BLD AUTO: 1.1 % — HIGH (ref 0.1–0.3)
IMM GRANULOCYTES NFR BLD AUTO: 1.1 % — HIGH (ref 0.1–0.3)
IMM GRANULOCYTES NFR BLD AUTO: 2 % — HIGH (ref 0.1–0.3)
IMM GRANULOCYTES NFR BLD AUTO: 2.2 % — HIGH (ref 0.1–0.3)
IMM GRANULOCYTES NFR BLD AUTO: 4.7 % — HIGH (ref 0.1–0.3)
IMM GRANULOCYTES NFR BLD AUTO: 5 % — HIGH (ref 0.1–0.3)
IMM GRANULOCYTES NFR BLD AUTO: 5.2 % — HIGH (ref 0.1–0.3)
INR BLD: 1.43 RATIO — HIGH (ref 0.65–1.3)
INR BLD: 1.46 RATIO — HIGH (ref 0.65–1.3)
INR BLD: 1.49 RATIO — HIGH (ref 0.65–1.3)
INR BLD: 1.54 RATIO — HIGH (ref 0.65–1.3)
INR BLD: 1.55 RATIO — HIGH (ref 0.65–1.3)
INR BLD: 1.77 RATIO — HIGH (ref 0.65–1.3)
INR BLD: 1.81 RATIO — HIGH (ref 0.65–1.3)
INTERPRETATION SERPL IFE-IMP: SIGNIFICANT CHANGE UP
IRON SATN MFR SERPL: 22 UG/DL — LOW (ref 35–150)
IRON SATN MFR SERPL: 23 % — SIGNIFICANT CHANGE UP (ref 15–50)
KAPPA LC SER QL IFE: 8.97 MG/DL — HIGH (ref 0.33–1.94)
KAPPA LC SER QL IFE: 8.97 MG/DL — HIGH (ref 0.33–1.94)
KAPPA/LAMBDA FREE LIGHT CHAIN RATIO, SERUM: 1.02 RATIO — SIGNIFICANT CHANGE UP (ref 0.26–1.65)
KAPPA/LAMBDA FREE LIGHT CHAIN RATIO, SERUM: 1.02 RATIO — SIGNIFICANT CHANGE UP (ref 0.26–1.65)
KETONES UR-MCNC: NEGATIVE — SIGNIFICANT CHANGE UP
LACTATE BLDV-MCNC: 5.6 MMOL/L — CRITICAL HIGH (ref 0.5–2)
LACTATE SERPL-SCNC: 1 MMOL/L — SIGNIFICANT CHANGE UP (ref 0.7–2)
LACTATE SERPL-SCNC: 1.6 MMOL/L — SIGNIFICANT CHANGE UP (ref 0.7–2)
LACTATE SERPL-SCNC: 1.8 MMOL/L — SIGNIFICANT CHANGE UP (ref 0.7–2)
LACTATE SERPL-SCNC: 2.1 MMOL/L — HIGH (ref 0.7–2)
LACTATE SERPL-SCNC: 4.3 MMOL/L — CRITICAL HIGH (ref 0.7–2)
LACTATE SERPL-SCNC: 4.5 MMOL/L — CRITICAL HIGH (ref 0.7–2)
LACTATE SERPL-SCNC: 4.6 MMOL/L — CRITICAL HIGH (ref 0.7–2)
LACTATE SERPL-SCNC: 6.3 MMOL/L — CRITICAL HIGH (ref 0.7–2)
LACTATE SERPL-SCNC: 7.4 MMOL/L — CRITICAL HIGH (ref 0.7–2)
LAMBDA LC SER QL IFE: 8.77 MG/DL — HIGH (ref 0.57–2.63)
LAMBDA LC SER QL IFE: 8.77 MG/DL — HIGH (ref 0.57–2.63)
LDH SERPL L TO P-CCNC: 192 U/L — SIGNIFICANT CHANGE UP (ref 50–242)
LEUKOCYTE ESTERASE UR-ACNC: ABNORMAL
LEUKOCYTE ESTERASE UR-ACNC: NEGATIVE — SIGNIFICANT CHANGE UP
LYMPHOCYTES # BLD AUTO: 0.56 K/UL — LOW (ref 1.2–3.4)
LYMPHOCYTES # BLD AUTO: 0.57 K/UL — LOW (ref 1.2–3.4)
LYMPHOCYTES # BLD AUTO: 0.61 K/UL — LOW (ref 1.2–3.4)
LYMPHOCYTES # BLD AUTO: 0.64 K/UL — LOW (ref 1.2–3.4)
LYMPHOCYTES # BLD AUTO: 0.73 K/UL — LOW (ref 1.2–3.4)
LYMPHOCYTES # BLD AUTO: 0.73 K/UL — LOW (ref 1.2–3.4)
LYMPHOCYTES # BLD AUTO: 0.74 K/UL — LOW (ref 1.2–3.4)
LYMPHOCYTES # BLD AUTO: 0.78 K/UL — LOW (ref 1.2–3.4)
LYMPHOCYTES # BLD AUTO: 0.78 K/UL — LOW (ref 1.2–3.4)
LYMPHOCYTES # BLD AUTO: 0.85 K/UL — LOW (ref 1.2–3.4)
LYMPHOCYTES # BLD AUTO: 0.85 K/UL — LOW (ref 1.2–3.4)
LYMPHOCYTES # BLD AUTO: 0.87 K/UL — LOW (ref 1.2–3.4)
LYMPHOCYTES # BLD AUTO: 0.88 K/UL — LOW (ref 1.2–3.4)
LYMPHOCYTES # BLD AUTO: 0.89 K/UL — LOW (ref 1.2–3.4)
LYMPHOCYTES # BLD AUTO: 0.9 K/UL — LOW (ref 1.2–3.4)
LYMPHOCYTES # BLD AUTO: 0.91 K/UL — LOW (ref 1.2–3.4)
LYMPHOCYTES # BLD AUTO: 0.92 K/UL — LOW (ref 1.2–3.4)
LYMPHOCYTES # BLD AUTO: 0.94 K/UL — LOW (ref 1.2–3.4)
LYMPHOCYTES # BLD AUTO: 0.98 K/UL — LOW (ref 1.2–3.4)
LYMPHOCYTES # BLD AUTO: 1 K/UL — LOW (ref 1.2–3.4)
LYMPHOCYTES # BLD AUTO: 1.06 K/UL — LOW (ref 1.2–3.4)
LYMPHOCYTES # BLD AUTO: 1.12 K/UL — LOW (ref 1.2–3.4)
LYMPHOCYTES # BLD AUTO: 11.9 % — LOW (ref 20.5–51.1)
LYMPHOCYTES # BLD AUTO: 3.5 % — LOW (ref 20.5–51.1)
LYMPHOCYTES # BLD AUTO: 3.5 % — LOW (ref 20.5–51.1)
LYMPHOCYTES # BLD AUTO: 3.9 % — LOW (ref 20.5–51.1)
LYMPHOCYTES # BLD AUTO: 4.6 % — LOW (ref 20.5–51.1)
LYMPHOCYTES # BLD AUTO: 4.6 % — LOW (ref 20.5–51.1)
LYMPHOCYTES # BLD AUTO: 4.7 % — LOW (ref 20.5–51.1)
LYMPHOCYTES # BLD AUTO: 4.9 % — LOW (ref 20.5–51.1)
LYMPHOCYTES # BLD AUTO: 5.1 % — LOW (ref 20.5–51.1)
LYMPHOCYTES # BLD AUTO: 5.3 % — LOW (ref 20.5–51.1)
LYMPHOCYTES # BLD AUTO: 5.4 % — LOW (ref 20.5–51.1)
LYMPHOCYTES # BLD AUTO: 5.4 % — LOW (ref 20.5–51.1)
LYMPHOCYTES # BLD AUTO: 5.6 % — LOW (ref 20.5–51.1)
LYMPHOCYTES # BLD AUTO: 5.7 % — LOW (ref 20.5–51.1)
LYMPHOCYTES # BLD AUTO: 5.7 % — LOW (ref 20.5–51.1)
LYMPHOCYTES # BLD AUTO: 5.8 % — LOW (ref 20.5–51.1)
LYMPHOCYTES # BLD AUTO: 6 % — LOW (ref 20.5–51.1)
LYMPHOCYTES # BLD AUTO: 6.1 % — LOW (ref 20.5–51.1)
LYMPHOCYTES # BLD AUTO: 6.5 % — LOW (ref 20.5–51.1)
LYMPHOCYTES # BLD AUTO: 7.5 % — LOW (ref 20.5–51.1)
LYMPHOCYTES # BLD AUTO: 8.4 % — LOW (ref 20.5–51.1)
LYMPHOCYTES # BLD AUTO: 9.8 % — LOW (ref 20.5–51.1)
MACROCYTES BLD QL: SLIGHT — SIGNIFICANT CHANGE UP
MAGNESIUM SERPL-MCNC: 1.5 MG/DL — LOW (ref 1.8–2.4)
MAGNESIUM SERPL-MCNC: 1.6 MG/DL — LOW (ref 1.8–2.4)
MAGNESIUM SERPL-MCNC: 1.7 MG/DL — LOW (ref 1.8–2.4)
MAGNESIUM SERPL-MCNC: 1.7 MG/DL — LOW (ref 1.8–2.4)
MAGNESIUM SERPL-MCNC: 1.8 MG/DL — SIGNIFICANT CHANGE UP (ref 1.8–2.4)
MAGNESIUM SERPL-MCNC: 1.9 MG/DL — SIGNIFICANT CHANGE UP (ref 1.8–2.4)
MAGNESIUM SERPL-MCNC: 2 MG/DL — SIGNIFICANT CHANGE UP (ref 1.8–2.4)
MAGNESIUM SERPL-MCNC: 2 MG/DL — SIGNIFICANT CHANGE UP (ref 1.8–2.4)
MAGNESIUM SERPL-MCNC: 2.1 MG/DL — SIGNIFICANT CHANGE UP (ref 1.8–2.4)
MAGNESIUM SERPL-MCNC: 2.2 MG/DL — SIGNIFICANT CHANGE UP (ref 1.8–2.4)
MAGNESIUM SERPL-MCNC: 2.5 MG/DL — HIGH (ref 1.8–2.4)
MANUAL SMEAR VERIFICATION: SIGNIFICANT CHANGE UP
MCHC RBC-ENTMCNC: 24.2 PG — LOW (ref 27–31)
MCHC RBC-ENTMCNC: 24.4 PG — LOW (ref 27–31)
MCHC RBC-ENTMCNC: 24.5 PG — LOW (ref 27–31)
MCHC RBC-ENTMCNC: 24.6 PG — LOW (ref 27–31)
MCHC RBC-ENTMCNC: 24.9 PG — LOW (ref 27–31)
MCHC RBC-ENTMCNC: 25.2 PG — LOW (ref 27–31)
MCHC RBC-ENTMCNC: 25.3 PG — LOW (ref 27–31)
MCHC RBC-ENTMCNC: 25.7 PG — LOW (ref 27–31)
MCHC RBC-ENTMCNC: 25.8 PG — LOW (ref 27–31)
MCHC RBC-ENTMCNC: 25.8 PG — LOW (ref 27–31)
MCHC RBC-ENTMCNC: 25.9 PG — LOW (ref 27–31)
MCHC RBC-ENTMCNC: 26 PG — LOW (ref 27–31)
MCHC RBC-ENTMCNC: 26 PG — LOW (ref 27–31)
MCHC RBC-ENTMCNC: 26.1 PG — LOW (ref 27–31)
MCHC RBC-ENTMCNC: 26.2 PG — LOW (ref 27–31)
MCHC RBC-ENTMCNC: 26.3 PG — LOW (ref 27–31)
MCHC RBC-ENTMCNC: 26.4 PG — LOW (ref 27–31)
MCHC RBC-ENTMCNC: 26.5 PG — LOW (ref 27–31)
MCHC RBC-ENTMCNC: 26.6 PG — LOW (ref 27–31)
MCHC RBC-ENTMCNC: 26.8 PG — LOW (ref 27–31)
MCHC RBC-ENTMCNC: 27 G/DL — LOW (ref 32–37)
MCHC RBC-ENTMCNC: 27.1 PG — SIGNIFICANT CHANGE UP (ref 27–31)
MCHC RBC-ENTMCNC: 27.8 G/DL — LOW (ref 32–37)
MCHC RBC-ENTMCNC: 28.2 G/DL — LOW (ref 32–37)
MCHC RBC-ENTMCNC: 28.2 G/DL — LOW (ref 32–37)
MCHC RBC-ENTMCNC: 28.9 G/DL — LOW (ref 32–37)
MCHC RBC-ENTMCNC: 29.2 G/DL — LOW (ref 32–37)
MCHC RBC-ENTMCNC: 29.3 G/DL — LOW (ref 32–37)
MCHC RBC-ENTMCNC: 29.4 G/DL — LOW (ref 32–37)
MCHC RBC-ENTMCNC: 29.4 G/DL — LOW (ref 32–37)
MCHC RBC-ENTMCNC: 29.7 G/DL — LOW (ref 32–37)
MCHC RBC-ENTMCNC: 30 G/DL — LOW (ref 32–37)
MCHC RBC-ENTMCNC: 30 G/DL — LOW (ref 32–37)
MCHC RBC-ENTMCNC: 30.2 G/DL — LOW (ref 32–37)
MCHC RBC-ENTMCNC: 30.3 G/DL — LOW (ref 32–37)
MCHC RBC-ENTMCNC: 30.3 G/DL — LOW (ref 32–37)
MCHC RBC-ENTMCNC: 30.4 G/DL — LOW (ref 32–37)
MCHC RBC-ENTMCNC: 30.4 G/DL — LOW (ref 32–37)
MCHC RBC-ENTMCNC: 30.5 G/DL — LOW (ref 32–37)
MCHC RBC-ENTMCNC: 30.5 G/DL — LOW (ref 32–37)
MCHC RBC-ENTMCNC: 30.6 G/DL — LOW (ref 32–37)
MCHC RBC-ENTMCNC: 30.6 G/DL — LOW (ref 32–37)
MCHC RBC-ENTMCNC: 30.7 G/DL — LOW (ref 32–37)
MCHC RBC-ENTMCNC: 30.8 G/DL — LOW (ref 32–37)
MCHC RBC-ENTMCNC: 30.8 G/DL — LOW (ref 32–37)
MCHC RBC-ENTMCNC: 30.9 G/DL — LOW (ref 32–37)
MCHC RBC-ENTMCNC: 30.9 G/DL — LOW (ref 32–37)
MCHC RBC-ENTMCNC: 31 G/DL — LOW (ref 32–37)
MCHC RBC-ENTMCNC: 31.2 G/DL — LOW (ref 32–37)
MCHC RBC-ENTMCNC: 31.7 G/DL — LOW (ref 32–37)
MCV RBC AUTO: 82.8 FL — SIGNIFICANT CHANGE UP (ref 80–94)
MCV RBC AUTO: 83.8 FL — SIGNIFICANT CHANGE UP (ref 80–94)
MCV RBC AUTO: 84 FL — SIGNIFICANT CHANGE UP (ref 80–94)
MCV RBC AUTO: 84.1 FL — SIGNIFICANT CHANGE UP (ref 80–94)
MCV RBC AUTO: 84.2 FL — SIGNIFICANT CHANGE UP (ref 80–94)
MCV RBC AUTO: 84.5 FL — SIGNIFICANT CHANGE UP (ref 80–94)
MCV RBC AUTO: 84.5 FL — SIGNIFICANT CHANGE UP (ref 80–94)
MCV RBC AUTO: 84.6 FL — SIGNIFICANT CHANGE UP (ref 80–94)
MCV RBC AUTO: 84.7 FL — SIGNIFICANT CHANGE UP (ref 80–94)
MCV RBC AUTO: 84.7 FL — SIGNIFICANT CHANGE UP (ref 80–94)
MCV RBC AUTO: 84.8 FL — SIGNIFICANT CHANGE UP (ref 80–94)
MCV RBC AUTO: 84.9 FL — SIGNIFICANT CHANGE UP (ref 80–94)
MCV RBC AUTO: 85.2 FL — SIGNIFICANT CHANGE UP (ref 80–94)
MCV RBC AUTO: 85.3 FL — SIGNIFICANT CHANGE UP (ref 80–94)
MCV RBC AUTO: 85.8 FL — SIGNIFICANT CHANGE UP (ref 80–94)
MCV RBC AUTO: 85.8 FL — SIGNIFICANT CHANGE UP (ref 80–94)
MCV RBC AUTO: 86 FL — SIGNIFICANT CHANGE UP (ref 80–94)
MCV RBC AUTO: 86 FL — SIGNIFICANT CHANGE UP (ref 80–94)
MCV RBC AUTO: 86.1 FL — SIGNIFICANT CHANGE UP (ref 80–94)
MCV RBC AUTO: 86.5 FL — SIGNIFICANT CHANGE UP (ref 80–94)
MCV RBC AUTO: 86.7 FL — SIGNIFICANT CHANGE UP (ref 80–94)
MCV RBC AUTO: 87 FL — SIGNIFICANT CHANGE UP (ref 80–94)
MCV RBC AUTO: 87.4 FL — SIGNIFICANT CHANGE UP (ref 80–94)
MCV RBC AUTO: 87.5 FL — SIGNIFICANT CHANGE UP (ref 80–94)
MCV RBC AUTO: 87.7 FL — SIGNIFICANT CHANGE UP (ref 80–94)
MCV RBC AUTO: 88 FL — SIGNIFICANT CHANGE UP (ref 80–94)
MCV RBC AUTO: 88.1 FL — SIGNIFICANT CHANGE UP (ref 80–94)
MCV RBC AUTO: 88.4 FL — SIGNIFICANT CHANGE UP (ref 80–94)
MCV RBC AUTO: 88.7 FL — SIGNIFICANT CHANGE UP (ref 80–94)
MCV RBC AUTO: 88.9 FL — SIGNIFICANT CHANGE UP (ref 80–94)
MCV RBC AUTO: 89 FL — SIGNIFICANT CHANGE UP (ref 80–94)
MCV RBC AUTO: 89.2 FL — SIGNIFICANT CHANGE UP (ref 80–94)
MCV RBC AUTO: 89.2 FL — SIGNIFICANT CHANGE UP (ref 80–94)
MCV RBC AUTO: 89.8 FL — SIGNIFICANT CHANGE UP (ref 80–94)
METHOD TYPE: SIGNIFICANT CHANGE UP
METHOD TYPE: SIGNIFICANT CHANGE UP
MICROCYTES BLD QL: SLIGHT — SIGNIFICANT CHANGE UP
MONOCYTES # BLD AUTO: 0 K/UL — LOW (ref 0.1–0.6)
MONOCYTES # BLD AUTO: 0.15 K/UL — SIGNIFICANT CHANGE UP (ref 0.1–0.6)
MONOCYTES # BLD AUTO: 0.18 K/UL — SIGNIFICANT CHANGE UP (ref 0.1–0.6)
MONOCYTES # BLD AUTO: 0.23 K/UL — SIGNIFICANT CHANGE UP (ref 0.1–0.6)
MONOCYTES # BLD AUTO: 0.27 K/UL — SIGNIFICANT CHANGE UP (ref 0.1–0.6)
MONOCYTES # BLD AUTO: 0.29 K/UL — SIGNIFICANT CHANGE UP (ref 0.1–0.6)
MONOCYTES # BLD AUTO: 0.35 K/UL — SIGNIFICANT CHANGE UP (ref 0.1–0.6)
MONOCYTES # BLD AUTO: 0.36 K/UL — SIGNIFICANT CHANGE UP (ref 0.1–0.6)
MONOCYTES # BLD AUTO: 0.42 K/UL — SIGNIFICANT CHANGE UP (ref 0.1–0.6)
MONOCYTES # BLD AUTO: 0.64 K/UL — HIGH (ref 0.1–0.6)
MONOCYTES # BLD AUTO: 0.68 K/UL — HIGH (ref 0.1–0.6)
MONOCYTES # BLD AUTO: 0.69 K/UL — HIGH (ref 0.1–0.6)
MONOCYTES # BLD AUTO: 0.7 K/UL — HIGH (ref 0.1–0.6)
MONOCYTES # BLD AUTO: 0.71 K/UL — HIGH (ref 0.1–0.6)
MONOCYTES # BLD AUTO: 0.72 K/UL — HIGH (ref 0.1–0.6)
MONOCYTES # BLD AUTO: 0.74 K/UL — HIGH (ref 0.1–0.6)
MONOCYTES # BLD AUTO: 0.79 K/UL — HIGH (ref 0.1–0.6)
MONOCYTES # BLD AUTO: 0.81 K/UL — HIGH (ref 0.1–0.6)
MONOCYTES # BLD AUTO: 0.82 K/UL — HIGH (ref 0.1–0.6)
MONOCYTES # BLD AUTO: 0.82 K/UL — HIGH (ref 0.1–0.6)
MONOCYTES # BLD AUTO: 0.86 K/UL — HIGH (ref 0.1–0.6)
MONOCYTES # BLD AUTO: 0.89 K/UL — HIGH (ref 0.1–0.6)
MONOCYTES # BLD AUTO: 0.9 K/UL — HIGH (ref 0.1–0.6)
MONOCYTES # BLD AUTO: 0.96 K/UL — HIGH (ref 0.1–0.6)
MONOCYTES NFR BLD AUTO: 0 % — LOW (ref 1.7–9.3)
MONOCYTES NFR BLD AUTO: 1.4 % — LOW (ref 1.7–9.3)
MONOCYTES NFR BLD AUTO: 1.8 % — SIGNIFICANT CHANGE UP (ref 1.7–9.3)
MONOCYTES NFR BLD AUTO: 1.8 % — SIGNIFICANT CHANGE UP (ref 1.7–9.3)
MONOCYTES NFR BLD AUTO: 1.9 % — SIGNIFICANT CHANGE UP (ref 1.7–9.3)
MONOCYTES NFR BLD AUTO: 2.6 % — SIGNIFICANT CHANGE UP (ref 1.7–9.3)
MONOCYTES NFR BLD AUTO: 3.4 % — SIGNIFICANT CHANGE UP (ref 1.7–9.3)
MONOCYTES NFR BLD AUTO: 3.6 % — SIGNIFICANT CHANGE UP (ref 1.7–9.3)
MONOCYTES NFR BLD AUTO: 3.6 % — SIGNIFICANT CHANGE UP (ref 1.7–9.3)
MONOCYTES NFR BLD AUTO: 3.7 % — SIGNIFICANT CHANGE UP (ref 1.7–9.3)
MONOCYTES NFR BLD AUTO: 3.8 % — SIGNIFICANT CHANGE UP (ref 1.7–9.3)
MONOCYTES NFR BLD AUTO: 3.8 % — SIGNIFICANT CHANGE UP (ref 1.7–9.3)
MONOCYTES NFR BLD AUTO: 4.1 % — SIGNIFICANT CHANGE UP (ref 1.7–9.3)
MONOCYTES NFR BLD AUTO: 4.3 % — SIGNIFICANT CHANGE UP (ref 1.7–9.3)
MONOCYTES NFR BLD AUTO: 4.6 % — SIGNIFICANT CHANGE UP (ref 1.7–9.3)
MONOCYTES NFR BLD AUTO: 4.6 % — SIGNIFICANT CHANGE UP (ref 1.7–9.3)
MONOCYTES NFR BLD AUTO: 4.9 % — SIGNIFICANT CHANGE UP (ref 1.7–9.3)
MONOCYTES NFR BLD AUTO: 5 % — SIGNIFICANT CHANGE UP (ref 1.7–9.3)
MONOCYTES NFR BLD AUTO: 5.3 % — SIGNIFICANT CHANGE UP (ref 1.7–9.3)
MONOCYTES NFR BLD AUTO: 5.3 % — SIGNIFICANT CHANGE UP (ref 1.7–9.3)
MONOCYTES NFR BLD AUTO: 5.5 % — SIGNIFICANT CHANGE UP (ref 1.7–9.3)
MONOCYTES NFR BLD AUTO: 6 % — SIGNIFICANT CHANGE UP (ref 1.7–9.3)
MRSA PCR RESULT.: NEGATIVE — SIGNIFICANT CHANGE UP
MYELOCYTES NFR BLD: 0.9 % — HIGH (ref 0–0)
NEUTROPHILS # BLD AUTO: 11.04 K/UL — HIGH (ref 1.4–6.5)
NEUTROPHILS # BLD AUTO: 11.4 K/UL — HIGH (ref 1.4–6.5)
NEUTROPHILS # BLD AUTO: 11.87 K/UL — HIGH (ref 1.4–6.5)
NEUTROPHILS # BLD AUTO: 12.26 K/UL — HIGH (ref 1.4–6.5)
NEUTROPHILS # BLD AUTO: 13.45 K/UL — HIGH (ref 1.4–6.5)
NEUTROPHILS # BLD AUTO: 13.65 K/UL — HIGH (ref 1.4–6.5)
NEUTROPHILS # BLD AUTO: 14 K/UL — HIGH (ref 1.4–6.5)
NEUTROPHILS # BLD AUTO: 14.05 K/UL — HIGH (ref 1.4–6.5)
NEUTROPHILS # BLD AUTO: 14.41 K/UL — HIGH (ref 1.4–6.5)
NEUTROPHILS # BLD AUTO: 14.74 K/UL — HIGH (ref 1.4–6.5)
NEUTROPHILS # BLD AUTO: 15.08 K/UL — HIGH (ref 1.4–6.5)
NEUTROPHILS # BLD AUTO: 15.67 K/UL — HIGH (ref 1.4–6.5)
NEUTROPHILS # BLD AUTO: 15.75 K/UL — HIGH (ref 1.4–6.5)
NEUTROPHILS # BLD AUTO: 16.02 K/UL — HIGH (ref 1.4–6.5)
NEUTROPHILS # BLD AUTO: 17.32 K/UL — HIGH (ref 1.4–6.5)
NEUTROPHILS # BLD AUTO: 17.41 K/UL — HIGH (ref 1.4–6.5)
NEUTROPHILS # BLD AUTO: 17.43 K/UL — HIGH (ref 1.4–6.5)
NEUTROPHILS # BLD AUTO: 18.08 K/UL — HIGH (ref 1.4–6.5)
NEUTROPHILS # BLD AUTO: 18.53 K/UL — HIGH (ref 1.4–6.5)
NEUTROPHILS # BLD AUTO: 6.02 K/UL — SIGNIFICANT CHANGE UP (ref 1.4–6.5)
NEUTROPHILS # BLD AUTO: 7.14 K/UL — HIGH (ref 1.4–6.5)
NEUTROPHILS # BLD AUTO: 8.3 K/UL — HIGH (ref 1.4–6.5)
NEUTROPHILS # BLD AUTO: 8.33 K/UL — HIGH (ref 1.4–6.5)
NEUTROPHILS # BLD AUTO: 9.96 K/UL — HIGH (ref 1.4–6.5)
NEUTROPHILS NFR BLD AUTO: 76.6 % — HIGH (ref 42.2–75.2)
NEUTROPHILS NFR BLD AUTO: 77.2 % — HIGH (ref 42.2–75.2)
NEUTROPHILS NFR BLD AUTO: 81.9 % — HIGH (ref 42.2–75.2)
NEUTROPHILS NFR BLD AUTO: 86.6 % — HIGH (ref 42.2–75.2)
NEUTROPHILS NFR BLD AUTO: 86.8 % — HIGH (ref 42.2–75.2)
NEUTROPHILS NFR BLD AUTO: 86.9 % — HIGH (ref 42.2–75.2)
NEUTROPHILS NFR BLD AUTO: 87.3 % — HIGH (ref 42.2–75.2)
NEUTROPHILS NFR BLD AUTO: 87.4 % — HIGH (ref 42.2–75.2)
NEUTROPHILS NFR BLD AUTO: 87.6 % — HIGH (ref 42.2–75.2)
NEUTROPHILS NFR BLD AUTO: 87.9 % — HIGH (ref 42.2–75.2)
NEUTROPHILS NFR BLD AUTO: 88.1 % — HIGH (ref 42.2–75.2)
NEUTROPHILS NFR BLD AUTO: 88.5 % — HIGH (ref 42.2–75.2)
NEUTROPHILS NFR BLD AUTO: 88.5 % — HIGH (ref 42.2–75.2)
NEUTROPHILS NFR BLD AUTO: 88.6 % — HIGH (ref 42.2–75.2)
NEUTROPHILS NFR BLD AUTO: 88.7 % — HIGH (ref 42.2–75.2)
NEUTROPHILS NFR BLD AUTO: 89 % — HIGH (ref 42.2–75.2)
NEUTROPHILS NFR BLD AUTO: 89.2 % — HIGH (ref 42.2–75.2)
NEUTROPHILS NFR BLD AUTO: 89.6 % — HIGH (ref 42.2–75.2)
NEUTROPHILS NFR BLD AUTO: 90.2 % — HIGH (ref 42.2–75.2)
NEUTROPHILS NFR BLD AUTO: 90.8 % — HIGH (ref 42.2–75.2)
NEUTROPHILS NFR BLD AUTO: 92.7 % — HIGH (ref 42.2–75.2)
NEUTROPHILS NFR BLD AUTO: 92.7 % — HIGH (ref 42.2–75.2)
NEUTROPHILS NFR BLD AUTO: 93.9 % — HIGH (ref 42.2–75.2)
NEUTROPHILS NFR BLD AUTO: 93.9 % — HIGH (ref 42.2–75.2)
NEUTS BAND # BLD: 1.7 % — SIGNIFICANT CHANGE UP (ref 0–6)
NITRITE UR-MCNC: NEGATIVE — SIGNIFICANT CHANGE UP
NRBC # BLD: 0 /100 WBCS — SIGNIFICANT CHANGE UP (ref 0–0)
NRBC # BLD: 1 /100 — HIGH (ref 0–0)
NRBC # BLD: SIGNIFICANT CHANGE UP /100 WBCS (ref 0–0)
ORGANISM # SPEC MICROSCOPIC CNT: SIGNIFICANT CHANGE UP
PCO2 BLDV: 41 MMHG — LOW (ref 42–55)
PH BLDV: 7.33 — SIGNIFICANT CHANGE UP (ref 7.32–7.43)
PH UR: 5.5 — SIGNIFICANT CHANGE UP (ref 5–8)
PH UR: 6 — SIGNIFICANT CHANGE UP (ref 5–8)
PHOSPHATE SERPL-MCNC: 2.5 MG/DL — SIGNIFICANT CHANGE UP (ref 2.1–4.9)
PHOSPHATE SERPL-MCNC: 3.7 MG/DL — SIGNIFICANT CHANGE UP (ref 2.1–4.9)
PHOSPHATE SERPL-MCNC: 3.8 MG/DL — SIGNIFICANT CHANGE UP (ref 2.1–4.9)
PHOSPHATE SERPL-MCNC: 5.7 MG/DL — HIGH (ref 2.1–4.9)
PHOSPHATE SERPL-MCNC: 5.8 MG/DL — HIGH (ref 2.1–4.9)
PLAT MORPH BLD: NORMAL — SIGNIFICANT CHANGE UP
PLAT MORPH BLD: SIGNIFICANT CHANGE UP
PLATELET # BLD AUTO: 108 K/UL — LOW (ref 130–400)
PLATELET # BLD AUTO: 123 K/UL — LOW (ref 130–400)
PLATELET # BLD AUTO: 123 K/UL — LOW (ref 130–400)
PLATELET # BLD AUTO: 126 K/UL — LOW (ref 130–400)
PLATELET # BLD AUTO: 168 K/UL — SIGNIFICANT CHANGE UP (ref 130–400)
PLATELET # BLD AUTO: 173 K/UL — SIGNIFICANT CHANGE UP (ref 130–400)
PLATELET # BLD AUTO: 178 K/UL — SIGNIFICANT CHANGE UP (ref 130–400)
PLATELET # BLD AUTO: 212 K/UL — SIGNIFICANT CHANGE UP (ref 130–400)
PLATELET # BLD AUTO: 220 K/UL — SIGNIFICANT CHANGE UP (ref 130–400)
PLATELET # BLD AUTO: 239 K/UL — SIGNIFICANT CHANGE UP (ref 130–400)
PLATELET # BLD AUTO: 275 K/UL — SIGNIFICANT CHANGE UP (ref 130–400)
PLATELET # BLD AUTO: 284 K/UL — SIGNIFICANT CHANGE UP (ref 130–400)
PLATELET # BLD AUTO: 291 K/UL — SIGNIFICANT CHANGE UP (ref 130–400)
PLATELET # BLD AUTO: 294 K/UL — SIGNIFICANT CHANGE UP (ref 130–400)
PLATELET # BLD AUTO: 296 K/UL — SIGNIFICANT CHANGE UP (ref 130–400)
PLATELET # BLD AUTO: 316 K/UL — SIGNIFICANT CHANGE UP (ref 130–400)
PLATELET # BLD AUTO: 325 K/UL — SIGNIFICANT CHANGE UP (ref 130–400)
PLATELET # BLD AUTO: 330 K/UL — SIGNIFICANT CHANGE UP (ref 130–400)
PLATELET # BLD AUTO: 332 K/UL — SIGNIFICANT CHANGE UP (ref 130–400)
PLATELET # BLD AUTO: 347 K/UL — SIGNIFICANT CHANGE UP (ref 130–400)
PLATELET # BLD AUTO: 349 K/UL — SIGNIFICANT CHANGE UP (ref 130–400)
PLATELET # BLD AUTO: 352 K/UL — SIGNIFICANT CHANGE UP (ref 130–400)
PLATELET # BLD AUTO: 362 K/UL — SIGNIFICANT CHANGE UP (ref 130–400)
PLATELET # BLD AUTO: 363 K/UL — SIGNIFICANT CHANGE UP (ref 130–400)
PLATELET # BLD AUTO: 378 K/UL — SIGNIFICANT CHANGE UP (ref 130–400)
PLATELET # BLD AUTO: 384 K/UL — SIGNIFICANT CHANGE UP (ref 130–400)
PLATELET # BLD AUTO: 385 K/UL — SIGNIFICANT CHANGE UP (ref 130–400)
PLATELET # BLD AUTO: 386 K/UL — SIGNIFICANT CHANGE UP (ref 130–400)
PLATELET # BLD AUTO: 388 K/UL — SIGNIFICANT CHANGE UP (ref 130–400)
PLATELET # BLD AUTO: 392 K/UL — SIGNIFICANT CHANGE UP (ref 130–400)
PLATELET # BLD AUTO: 392 K/UL — SIGNIFICANT CHANGE UP (ref 130–400)
PLATELET # BLD AUTO: 423 K/UL — HIGH (ref 130–400)
PO2 BLDV: 54 MMHG — SIGNIFICANT CHANGE UP
POIKILOCYTOSIS BLD QL AUTO: SLIGHT — SIGNIFICANT CHANGE UP
POLYCHROMASIA BLD QL SMEAR: SLIGHT — SIGNIFICANT CHANGE UP
POLYCHROMASIA BLD QL SMEAR: SLIGHT — SIGNIFICANT CHANGE UP
POTASSIUM BLDV-SCNC: 4.5 MMOL/L — SIGNIFICANT CHANGE UP (ref 3.5–5.1)
POTASSIUM SERPL-MCNC: 3.8 MMOL/L — SIGNIFICANT CHANGE UP (ref 3.5–5)
POTASSIUM SERPL-MCNC: 4 MMOL/L — SIGNIFICANT CHANGE UP (ref 3.5–5)
POTASSIUM SERPL-MCNC: 4.1 MMOL/L — SIGNIFICANT CHANGE UP (ref 3.5–5)
POTASSIUM SERPL-MCNC: 4.2 MMOL/L — SIGNIFICANT CHANGE UP (ref 3.5–5)
POTASSIUM SERPL-MCNC: 4.3 MMOL/L — SIGNIFICANT CHANGE UP (ref 3.5–5)
POTASSIUM SERPL-MCNC: 4.4 MMOL/L — SIGNIFICANT CHANGE UP (ref 3.5–5)
POTASSIUM SERPL-MCNC: 4.5 MMOL/L — SIGNIFICANT CHANGE UP (ref 3.5–5)
POTASSIUM SERPL-MCNC: 4.6 MMOL/L — SIGNIFICANT CHANGE UP (ref 3.5–5)
POTASSIUM SERPL-MCNC: 4.7 MMOL/L — SIGNIFICANT CHANGE UP (ref 3.5–5)
POTASSIUM SERPL-MCNC: 4.8 MMOL/L — SIGNIFICANT CHANGE UP (ref 3.5–5)
POTASSIUM SERPL-MCNC: 4.9 MMOL/L — SIGNIFICANT CHANGE UP (ref 3.5–5)
POTASSIUM SERPL-MCNC: 5.3 MMOL/L — HIGH (ref 3.5–5)
POTASSIUM SERPL-SCNC: 3.8 MMOL/L — SIGNIFICANT CHANGE UP (ref 3.5–5)
POTASSIUM SERPL-SCNC: 4 MMOL/L — SIGNIFICANT CHANGE UP (ref 3.5–5)
POTASSIUM SERPL-SCNC: 4.1 MMOL/L — SIGNIFICANT CHANGE UP (ref 3.5–5)
POTASSIUM SERPL-SCNC: 4.2 MMOL/L — SIGNIFICANT CHANGE UP (ref 3.5–5)
POTASSIUM SERPL-SCNC: 4.3 MMOL/L — SIGNIFICANT CHANGE UP (ref 3.5–5)
POTASSIUM SERPL-SCNC: 4.4 MMOL/L — SIGNIFICANT CHANGE UP (ref 3.5–5)
POTASSIUM SERPL-SCNC: 4.5 MMOL/L — SIGNIFICANT CHANGE UP (ref 3.5–5)
POTASSIUM SERPL-SCNC: 4.6 MMOL/L — SIGNIFICANT CHANGE UP (ref 3.5–5)
POTASSIUM SERPL-SCNC: 4.7 MMOL/L — SIGNIFICANT CHANGE UP (ref 3.5–5)
POTASSIUM SERPL-SCNC: 4.8 MMOL/L — SIGNIFICANT CHANGE UP (ref 3.5–5)
POTASSIUM SERPL-SCNC: 4.9 MMOL/L — SIGNIFICANT CHANGE UP (ref 3.5–5)
POTASSIUM SERPL-SCNC: 5.3 MMOL/L — HIGH (ref 3.5–5)
PROCALCITONIN SERPL-MCNC: 0.36 NG/ML — HIGH (ref 0.02–0.1)
PROT PATTERN SERPL ELPH-IMP: SIGNIFICANT CHANGE UP
PROT SERPL-MCNC: 4.2 G/DL — LOW (ref 6–8)
PROT SERPL-MCNC: 4.5 G/DL — LOW (ref 6–8)
PROT SERPL-MCNC: 4.6 G/DL — LOW (ref 6–8)
PROT SERPL-MCNC: 4.6 G/DL — LOW (ref 6–8)
PROT SERPL-MCNC: 4.7 G/DL — LOW (ref 6–8)
PROT SERPL-MCNC: 4.8 G/DL — LOW (ref 6–8)
PROT SERPL-MCNC: 4.9 G/DL — LOW (ref 6–8)
PROT SERPL-MCNC: 5 G/DL — LOW (ref 6–8)
PROT SERPL-MCNC: 5.1 G/DL — LOW (ref 6–8)
PROT SERPL-MCNC: 5.1 G/DL — LOW (ref 6–8.3)
PROT SERPL-MCNC: 5.2 G/DL — LOW (ref 6–8)
PROT SERPL-MCNC: 5.3 G/DL — LOW (ref 6–8)
PROT SERPL-MCNC: 5.3 G/DL — LOW (ref 6–8)
PROT SERPL-MCNC: 5.4 G/DL — LOW (ref 6–8)
PROT SERPL-MCNC: 5.4 G/DL — LOW (ref 6–8)
PROT SERPL-MCNC: 5.6 G/DL — LOW (ref 6–8)
PROT SERPL-MCNC: 5.6 G/DL — LOW (ref 6–8)
PROT SERPL-MCNC: 5.8 G/DL — LOW (ref 6–8)
PROT SERPL-MCNC: 6 G/DL — SIGNIFICANT CHANGE UP (ref 6–8)
PROT SERPL-MCNC: 6.2 G/DL — SIGNIFICANT CHANGE UP (ref 6–8)
PROT SERPL-MCNC: 6.4 G/DL — SIGNIFICANT CHANGE UP (ref 6–8)
PROT UR-MCNC: ABNORMAL
PROTHROM AB SERPL-ACNC: 16.4 SEC — HIGH (ref 9.95–12.87)
PROTHROM AB SERPL-ACNC: 16.7 SEC — HIGH (ref 9.95–12.87)
PROTHROM AB SERPL-ACNC: 17.1 SEC — HIGH (ref 9.95–12.87)
PROTHROM AB SERPL-ACNC: 17.6 SEC — HIGH (ref 9.95–12.87)
PROTHROM AB SERPL-ACNC: 17.8 SEC — HIGH (ref 9.95–12.87)
PROTHROM AB SERPL-ACNC: 20.2 SEC — HIGH (ref 9.95–12.87)
PROTHROM AB SERPL-ACNC: 20.7 SEC — HIGH (ref 9.95–12.87)
RAPID RVP RESULT: SIGNIFICANT CHANGE UP
RBC # BLD: 2.57 M/UL — LOW (ref 4.7–6.1)
RBC # BLD: 2.68 M/UL — LOW (ref 4.7–6.1)
RBC # BLD: 2.69 M/UL — LOW (ref 4.7–6.1)
RBC # BLD: 2.81 M/UL — LOW (ref 4.7–6.1)
RBC # BLD: 2.86 M/UL — LOW (ref 4.7–6.1)
RBC # BLD: 2.89 M/UL — LOW (ref 4.7–6.1)
RBC # BLD: 2.93 M/UL — LOW (ref 4.7–6.1)
RBC # BLD: 2.94 M/UL — LOW (ref 4.7–6.1)
RBC # BLD: 2.98 M/UL — LOW (ref 4.7–6.1)
RBC # BLD: 3.01 M/UL — LOW (ref 4.7–6.1)
RBC # BLD: 3.07 M/UL — LOW (ref 4.7–6.1)
RBC # BLD: 3.08 M/UL — LOW (ref 4.7–6.1)
RBC # BLD: 3.09 M/UL — LOW (ref 4.7–6.1)
RBC # BLD: 3.1 M/UL — LOW (ref 4.7–6.1)
RBC # BLD: 3.1 M/UL — LOW (ref 4.7–6.1)
RBC # BLD: 3.16 M/UL — LOW (ref 4.7–6.1)
RBC # BLD: 3.16 M/UL — LOW (ref 4.7–6.1)
RBC # BLD: 3.17 M/UL — LOW (ref 4.7–6.1)
RBC # BLD: 3.22 M/UL — LOW (ref 4.7–6.1)
RBC # BLD: 3.22 M/UL — LOW (ref 4.7–6.1)
RBC # BLD: 3.23 M/UL — LOW (ref 4.7–6.1)
RBC # BLD: 3.23 M/UL — LOW (ref 4.7–6.1)
RBC # BLD: 3.24 M/UL — LOW (ref 4.7–6.1)
RBC # BLD: 3.24 M/UL — LOW (ref 4.7–6.1)
RBC # BLD: 3.25 M/UL — LOW (ref 4.7–6.1)
RBC # BLD: 3.26 M/UL — LOW (ref 4.7–6.1)
RBC # BLD: 3.33 M/UL — LOW (ref 4.7–6.1)
RBC # BLD: 3.33 M/UL — LOW (ref 4.7–6.1)
RBC # BLD: 3.38 M/UL — LOW (ref 4.7–6.1)
RBC # BLD: 3.4 M/UL — LOW (ref 4.7–6.1)
RBC # BLD: 3.43 M/UL — LOW (ref 4.7–6.1)
RBC # BLD: 3.45 M/UL — LOW (ref 4.7–6.1)
RBC # BLD: 3.46 M/UL — LOW (ref 4.7–6.1)
RBC # BLD: 3.5 M/UL — LOW (ref 4.7–6.1)
RBC # BLD: 3.66 M/UL — LOW (ref 4.7–6.1)
RBC # FLD: 15.7 % — HIGH (ref 11.5–14.5)
RBC # FLD: 15.7 % — HIGH (ref 11.5–14.5)
RBC # FLD: 15.8 % — HIGH (ref 11.5–14.5)
RBC # FLD: 16 % — HIGH (ref 11.5–14.5)
RBC # FLD: 16.2 % — HIGH (ref 11.5–14.5)
RBC # FLD: 16.4 % — HIGH (ref 11.5–14.5)
RBC # FLD: 16.6 % — HIGH (ref 11.5–14.5)
RBC # FLD: 16.6 % — HIGH (ref 11.5–14.5)
RBC # FLD: 16.8 % — HIGH (ref 11.5–14.5)
RBC # FLD: 17.1 % — HIGH (ref 11.5–14.5)
RBC # FLD: 17.4 % — HIGH (ref 11.5–14.5)
RBC # FLD: 17.4 % — HIGH (ref 11.5–14.5)
RBC # FLD: 17.7 % — HIGH (ref 11.5–14.5)
RBC # FLD: 17.8 % — HIGH (ref 11.5–14.5)
RBC # FLD: 18.2 % — HIGH (ref 11.5–14.5)
RBC # FLD: 18.3 % — HIGH (ref 11.5–14.5)
RBC # FLD: 18.5 % — HIGH (ref 11.5–14.5)
RBC # FLD: 18.7 % — HIGH (ref 11.5–14.5)
RBC # FLD: 18.7 % — HIGH (ref 11.5–14.5)
RBC # FLD: 18.8 % — HIGH (ref 11.5–14.5)
RBC # FLD: 19 % — HIGH (ref 11.5–14.5)
RBC # FLD: 19.1 % — HIGH (ref 11.5–14.5)
RBC # FLD: 19.1 % — HIGH (ref 11.5–14.5)
RBC # FLD: 19.3 % — HIGH (ref 11.5–14.5)
RBC # FLD: 19.4 % — HIGH (ref 11.5–14.5)
RBC BLD AUTO: ABNORMAL
RBC BLD AUTO: NORMAL — SIGNIFICANT CHANGE UP
RBC CASTS # UR COMP ASSIST: 3 /HPF — SIGNIFICANT CHANGE UP (ref 0–4)
RBC CASTS # UR COMP ASSIST: 48 /HPF — HIGH (ref 0–4)
RBC CASTS # UR COMP ASSIST: 6 /HPF — HIGH (ref 0–4)
RBC CASTS # UR COMP ASSIST: 7 /HPF — HIGH (ref 0–4)
RETICS #: 128.6 K/UL — HIGH (ref 25–125)
RETICS/RBC NFR: 3.8 % — HIGH (ref 0.5–1.5)
SAO2 % BLDV: 78.8 % — SIGNIFICANT CHANGE UP
SARS-COV-2 RNA SPEC QL NAA+PROBE: SIGNIFICANT CHANGE UP
SODIUM SERPL-SCNC: 130 MMOL/L — LOW (ref 135–146)
SODIUM SERPL-SCNC: 132 MMOL/L — LOW (ref 135–146)
SODIUM SERPL-SCNC: 133 MMOL/L — LOW (ref 135–146)
SODIUM SERPL-SCNC: 134 MMOL/L — LOW (ref 135–146)
SODIUM SERPL-SCNC: 135 MMOL/L — SIGNIFICANT CHANGE UP (ref 135–146)
SODIUM SERPL-SCNC: 136 MMOL/L — SIGNIFICANT CHANGE UP (ref 135–146)
SODIUM SERPL-SCNC: 137 MMOL/L — SIGNIFICANT CHANGE UP (ref 135–146)
SODIUM SERPL-SCNC: 137 MMOL/L — SIGNIFICANT CHANGE UP (ref 135–146)
SODIUM SERPL-SCNC: 138 MMOL/L — SIGNIFICANT CHANGE UP (ref 135–146)
SODIUM SERPL-SCNC: 138 MMOL/L — SIGNIFICANT CHANGE UP (ref 135–146)
SODIUM SERPL-SCNC: 139 MMOL/L — SIGNIFICANT CHANGE UP (ref 135–146)
SODIUM SERPL-SCNC: 139 MMOL/L — SIGNIFICANT CHANGE UP (ref 135–146)
SODIUM SERPL-SCNC: 140 MMOL/L — SIGNIFICANT CHANGE UP (ref 135–146)
SODIUM SERPL-SCNC: 141 MMOL/L — SIGNIFICANT CHANGE UP (ref 135–146)
SODIUM SERPL-SCNC: 146 MMOL/L — SIGNIFICANT CHANGE UP (ref 135–146)
SODIUM SERPL-SCNC: 147 MMOL/L — HIGH (ref 135–146)
SODIUM SERPL-SCNC: 148 MMOL/L — HIGH (ref 135–146)
SODIUM SERPL-SCNC: 153 MMOL/L — HIGH (ref 135–146)
SODIUM UR-SCNC: 72 MMOL/L — SIGNIFICANT CHANGE UP
SP GR SPEC: 1.02 — SIGNIFICANT CHANGE UP (ref 1.01–1.03)
SP GR SPEC: 1.03 — SIGNIFICANT CHANGE UP (ref 1.01–1.03)
SPECIMEN SOURCE: SIGNIFICANT CHANGE UP
TIBC SERPL-MCNC: 97 UG/DL — LOW (ref 220–430)
TM INTERPRETATION: SIGNIFICANT CHANGE UP
TRANSFERRIN SERPL-MCNC: 71 MG/DL — LOW (ref 200–360)
TSH SERPL-MCNC: 2.36 UIU/ML — SIGNIFICANT CHANGE UP (ref 0.27–4.2)
UIBC SERPL-MCNC: 75 UG/DL — LOW (ref 110–370)
UROBILINOGEN FLD QL: ABNORMAL
UROBILINOGEN FLD QL: SIGNIFICANT CHANGE UP
VIT B12 SERPL-MCNC: 1224 PG/ML — SIGNIFICANT CHANGE UP (ref 232–1245)
WBC # BLD: 10.13 K/UL — SIGNIFICANT CHANGE UP (ref 4.8–10.8)
WBC # BLD: 10.54 K/UL — SIGNIFICANT CHANGE UP (ref 4.8–10.8)
WBC # BLD: 10.74 K/UL — SIGNIFICANT CHANGE UP (ref 4.8–10.8)
WBC # BLD: 12.71 K/UL — HIGH (ref 4.8–10.8)
WBC # BLD: 13.01 K/UL — HIGH (ref 4.8–10.8)
WBC # BLD: 13.61 K/UL — HIGH (ref 4.8–10.8)
WBC # BLD: 13.84 K/UL — HIGH (ref 4.8–10.8)
WBC # BLD: 13.87 K/UL — HIGH (ref 4.8–10.8)
WBC # BLD: 14.04 K/UL — HIGH (ref 4.8–10.8)
WBC # BLD: 14.43 K/UL — HIGH (ref 4.8–10.8)
WBC # BLD: 14.65 K/UL — HIGH (ref 4.8–10.8)
WBC # BLD: 15.47 K/UL — HIGH (ref 4.8–10.8)
WBC # BLD: 15.6 K/UL — HIGH (ref 4.8–10.8)
WBC # BLD: 15.87 K/UL — HIGH (ref 4.8–10.8)
WBC # BLD: 15.89 K/UL — HIGH (ref 4.8–10.8)
WBC # BLD: 15.91 K/UL — HIGH (ref 4.8–10.8)
WBC # BLD: 16.06 K/UL — HIGH (ref 4.8–10.8)
WBC # BLD: 16.31 K/UL — HIGH (ref 4.8–10.8)
WBC # BLD: 16.65 K/UL — HIGH (ref 4.8–10.8)
WBC # BLD: 17.49 K/UL — HIGH (ref 4.8–10.8)
WBC # BLD: 17.63 K/UL — HIGH (ref 4.8–10.8)
WBC # BLD: 17.66 K/UL — HIGH (ref 4.8–10.8)
WBC # BLD: 17.81 K/UL — HIGH (ref 4.8–10.8)
WBC # BLD: 17.9 K/UL — HIGH (ref 4.8–10.8)
WBC # BLD: 18.04 K/UL — HIGH (ref 4.8–10.8)
WBC # BLD: 18.21 K/UL — HIGH (ref 4.8–10.8)
WBC # BLD: 18.23 K/UL — HIGH (ref 4.8–10.8)
WBC # BLD: 19.32 K/UL — HIGH (ref 4.8–10.8)
WBC # BLD: 19.47 K/UL — HIGH (ref 4.8–10.8)
WBC # BLD: 19.89 K/UL — HIGH (ref 4.8–10.8)
WBC # BLD: 20.87 K/UL — HIGH (ref 4.8–10.8)
WBC # BLD: 7.87 K/UL — SIGNIFICANT CHANGE UP (ref 4.8–10.8)
WBC # BLD: 9.25 K/UL — SIGNIFICANT CHANGE UP (ref 4.8–10.8)
WBC # BLD: 9.42 K/UL — SIGNIFICANT CHANGE UP (ref 4.8–10.8)
WBC # FLD AUTO: 10.13 K/UL — SIGNIFICANT CHANGE UP (ref 4.8–10.8)
WBC # FLD AUTO: 10.54 K/UL — SIGNIFICANT CHANGE UP (ref 4.8–10.8)
WBC # FLD AUTO: 10.74 K/UL — SIGNIFICANT CHANGE UP (ref 4.8–10.8)
WBC # FLD AUTO: 12.71 K/UL — HIGH (ref 4.8–10.8)
WBC # FLD AUTO: 13.01 K/UL — HIGH (ref 4.8–10.8)
WBC # FLD AUTO: 13.61 K/UL — HIGH (ref 4.8–10.8)
WBC # FLD AUTO: 13.84 K/UL — HIGH (ref 4.8–10.8)
WBC # FLD AUTO: 13.87 K/UL — HIGH (ref 4.8–10.8)
WBC # FLD AUTO: 14.04 K/UL — HIGH (ref 4.8–10.8)
WBC # FLD AUTO: 14.43 K/UL — HIGH (ref 4.8–10.8)
WBC # FLD AUTO: 14.65 K/UL — HIGH (ref 4.8–10.8)
WBC # FLD AUTO: 15.47 K/UL — HIGH (ref 4.8–10.8)
WBC # FLD AUTO: 15.6 K/UL — HIGH (ref 4.8–10.8)
WBC # FLD AUTO: 15.87 K/UL — HIGH (ref 4.8–10.8)
WBC # FLD AUTO: 15.89 K/UL — HIGH (ref 4.8–10.8)
WBC # FLD AUTO: 15.91 K/UL — HIGH (ref 4.8–10.8)
WBC # FLD AUTO: 16.06 K/UL — HIGH (ref 4.8–10.8)
WBC # FLD AUTO: 16.31 K/UL — HIGH (ref 4.8–10.8)
WBC # FLD AUTO: 16.65 K/UL — HIGH (ref 4.8–10.8)
WBC # FLD AUTO: 17.49 K/UL — HIGH (ref 4.8–10.8)
WBC # FLD AUTO: 17.63 K/UL — HIGH (ref 4.8–10.8)
WBC # FLD AUTO: 17.66 K/UL — HIGH (ref 4.8–10.8)
WBC # FLD AUTO: 17.81 K/UL — HIGH (ref 4.8–10.8)
WBC # FLD AUTO: 17.9 K/UL — HIGH (ref 4.8–10.8)
WBC # FLD AUTO: 18.04 K/UL — HIGH (ref 4.8–10.8)
WBC # FLD AUTO: 18.21 K/UL — HIGH (ref 4.8–10.8)
WBC # FLD AUTO: 18.23 K/UL — HIGH (ref 4.8–10.8)
WBC # FLD AUTO: 19.32 K/UL — HIGH (ref 4.8–10.8)
WBC # FLD AUTO: 19.47 K/UL — HIGH (ref 4.8–10.8)
WBC # FLD AUTO: 19.89 K/UL — HIGH (ref 4.8–10.8)
WBC # FLD AUTO: 20.87 K/UL — HIGH (ref 4.8–10.8)
WBC # FLD AUTO: 7.87 K/UL — SIGNIFICANT CHANGE UP (ref 4.8–10.8)
WBC # FLD AUTO: 9.25 K/UL — SIGNIFICANT CHANGE UP (ref 4.8–10.8)
WBC # FLD AUTO: 9.42 K/UL — SIGNIFICANT CHANGE UP (ref 4.8–10.8)
WBC UR QL: 1 /HPF — SIGNIFICANT CHANGE UP (ref 0–5)
WBC UR QL: 14 /HPF — HIGH (ref 0–5)
WBC UR QL: 7 /HPF — HIGH (ref 0–5)
WBC UR QL: 9 /HPF — HIGH (ref 0–5)

## 2022-01-01 PROCEDURE — 99233 SBSQ HOSP IP/OBS HIGH 50: CPT

## 2022-01-01 PROCEDURE — 99232 SBSQ HOSP IP/OBS MODERATE 35: CPT

## 2022-01-01 PROCEDURE — 93010 ELECTROCARDIOGRAM REPORT: CPT

## 2022-01-01 PROCEDURE — 99239 HOSP IP/OBS DSCHRG MGMT >30: CPT

## 2022-01-01 PROCEDURE — 71045 X-RAY EXAM CHEST 1 VIEW: CPT | Mod: 26

## 2022-01-01 PROCEDURE — 99223 1ST HOSP IP/OBS HIGH 75: CPT

## 2022-01-01 PROCEDURE — 88341 IMHCHEM/IMCYTCHM EA ADD ANTB: CPT | Mod: 26,59

## 2022-01-01 PROCEDURE — 78803 RP LOCLZJ TUM SPECT 1 AREA: CPT | Mod: 26

## 2022-01-01 PROCEDURE — 88311 DECALCIFY TISSUE: CPT | Mod: 26

## 2022-01-01 PROCEDURE — 99497 ADVNCD CARE PLAN 30 MIN: CPT | Mod: 25

## 2022-01-01 PROCEDURE — 76775 US EXAM ABDO BACK WALL LIM: CPT | Mod: 26

## 2022-01-01 PROCEDURE — 38222 DX BONE MARROW BX & ASPIR: CPT

## 2022-01-01 PROCEDURE — 99222 1ST HOSP IP/OBS MODERATE 55: CPT

## 2022-01-01 PROCEDURE — 99497 ADVNCD CARE PLAN 30 MIN: CPT

## 2022-01-01 PROCEDURE — 88313 SPECIAL STAINS GROUP 2: CPT | Mod: 26

## 2022-01-01 PROCEDURE — 88360 TUMOR IMMUNOHISTOCHEM/MANUAL: CPT | Mod: 26

## 2022-01-01 PROCEDURE — 93306 TTE W/DOPPLER COMPLETE: CPT | Mod: 26

## 2022-01-01 PROCEDURE — 71260 CT THORAX DX C+: CPT | Mod: 26

## 2022-01-01 PROCEDURE — 99232 SBSQ HOSP IP/OBS MODERATE 35: CPT | Mod: 25

## 2022-01-01 PROCEDURE — 88305 TISSUE EXAM BY PATHOLOGIST: CPT | Mod: 26

## 2022-01-01 PROCEDURE — 74177 CT ABD & PELVIS W/CONTRAST: CPT | Mod: 26

## 2022-01-01 PROCEDURE — 99285 EMERGENCY DEPT VISIT HI MDM: CPT

## 2022-01-01 PROCEDURE — 88342 IMHCHEM/IMCYTCHM 1ST ANTB: CPT | Mod: 26,59

## 2022-01-01 PROCEDURE — 70450 CT HEAD/BRAIN W/O DYE: CPT | Mod: 26,MA

## 2022-01-01 PROCEDURE — 78801 RP LOCLZJ TUM 2+AREA 1+D IMG: CPT | Mod: 26,59

## 2022-01-01 PROCEDURE — 99238 HOSP IP/OBS DSCHRG MGMT 30/<: CPT | Mod: 25

## 2022-01-01 PROCEDURE — 88365 INSITU HYBRIDIZATION (FISH): CPT | Mod: 26,59

## 2022-01-01 PROCEDURE — 88364 INSITU HYBRIDIZATION (FISH): CPT | Mod: 26

## 2022-01-01 PROCEDURE — 76705 ECHO EXAM OF ABDOMEN: CPT | Mod: 26

## 2022-01-01 PROCEDURE — 71045 X-RAY EXAM CHEST 1 VIEW: CPT | Mod: 26,77

## 2022-01-01 PROCEDURE — 88189 FLOWCYTOMETRY/READ 16 & >: CPT

## 2022-01-01 PROCEDURE — 78226 HEPATOBILIARY SYSTEM IMAGING: CPT | Mod: 26

## 2022-01-01 PROCEDURE — 99291 CRITICAL CARE FIRST HOUR: CPT

## 2022-01-01 RX ORDER — LISINOPRIL 2.5 MG/1
20 TABLET ORAL DAILY
Refills: 0 | Status: DISCONTINUED | OUTPATIENT
Start: 2022-01-01 | End: 2022-01-01

## 2022-01-01 RX ORDER — INSULIN LISPRO 100/ML
VIAL (ML) SUBCUTANEOUS EVERY 6 HOURS
Refills: 0 | Status: DISCONTINUED | OUTPATIENT
Start: 2022-01-01 | End: 2022-01-01

## 2022-01-01 RX ORDER — INSULIN LISPRO 100/ML
VIAL (ML) SUBCUTANEOUS
Refills: 0 | Status: DISCONTINUED | OUTPATIENT
Start: 2022-01-01 | End: 2022-01-01

## 2022-01-01 RX ORDER — GUAIFENESIN/DEXTROMETHORPHAN 600MG-30MG
10 TABLET, EXTENDED RELEASE 12 HR ORAL EVERY 4 HOURS
Refills: 0 | Status: DISCONTINUED | OUTPATIENT
Start: 2022-01-01 | End: 2022-01-01

## 2022-01-01 RX ORDER — CEFEPIME 1 G/1
2000 INJECTION, POWDER, FOR SOLUTION INTRAMUSCULAR; INTRAVENOUS ONCE
Refills: 0 | Status: COMPLETED | OUTPATIENT
Start: 2022-01-01 | End: 2022-01-01

## 2022-01-01 RX ORDER — DEXTROSE 50 % IN WATER 50 %
25 SYRINGE (ML) INTRAVENOUS ONCE
Refills: 0 | Status: DISCONTINUED | OUTPATIENT
Start: 2022-01-01 | End: 2022-01-01

## 2022-01-01 RX ORDER — ACETAMINOPHEN 500 MG
2 TABLET ORAL
Qty: 0 | Refills: 0 | DISCHARGE
Start: 2022-01-01

## 2022-01-01 RX ORDER — MAGNESIUM SULFATE 500 MG/ML
2 VIAL (ML) INJECTION ONCE
Refills: 0 | Status: COMPLETED | OUTPATIENT
Start: 2022-01-01 | End: 2022-01-01

## 2022-01-01 RX ORDER — MORPHINE SULFATE 50 MG/1
2 CAPSULE, EXTENDED RELEASE ORAL ONCE
Refills: 0 | Status: DISCONTINUED | OUTPATIENT
Start: 2022-01-01 | End: 2022-01-01

## 2022-01-01 RX ORDER — ONDANSETRON 8 MG/1
4 TABLET, FILM COATED ORAL EVERY 8 HOURS
Refills: 0 | Status: DISCONTINUED | OUTPATIENT
Start: 2022-01-01 | End: 2022-01-01

## 2022-01-01 RX ORDER — ENOXAPARIN SODIUM 100 MG/ML
40 INJECTION SUBCUTANEOUS DAILY
Refills: 0 | Status: DISCONTINUED | OUTPATIENT
Start: 2022-01-01 | End: 2022-01-01

## 2022-01-01 RX ORDER — SODIUM CHLORIDE 9 MG/ML
1000 INJECTION, SOLUTION INTRAVENOUS
Refills: 0 | Status: DISCONTINUED | OUTPATIENT
Start: 2022-01-01 | End: 2022-01-01

## 2022-01-01 RX ORDER — VANCOMYCIN HCL 1 G
VIAL (EA) INTRAVENOUS
Refills: 0 | Status: DISCONTINUED | OUTPATIENT
Start: 2022-01-01 | End: 2022-01-01

## 2022-01-01 RX ORDER — FUROSEMIDE 40 MG
1 TABLET ORAL
Qty: 0 | Refills: 0 | DISCHARGE

## 2022-01-01 RX ORDER — ZINC OXIDE 200 MG/G
1 OINTMENT TOPICAL
Qty: 0 | Refills: 0 | DISCHARGE

## 2022-01-01 RX ORDER — LORATADINE 10 MG/1
10 TABLET ORAL DAILY
Refills: 0 | Status: DISCONTINUED | OUTPATIENT
Start: 2022-01-01 | End: 2022-01-01

## 2022-01-01 RX ORDER — COLLAGENASE CLOSTRIDIUM HIST. 250 UNIT/G
1 OINTMENT (GRAM) TOPICAL
Refills: 0 | Status: DISCONTINUED | OUTPATIENT
Start: 2022-01-01 | End: 2022-01-01

## 2022-01-01 RX ORDER — ACETAMINOPHEN 500 MG
650 TABLET ORAL EVERY 6 HOURS
Refills: 0 | Status: DISCONTINUED | OUTPATIENT
Start: 2022-01-01 | End: 2022-01-01

## 2022-01-01 RX ORDER — SODIUM CHLORIDE 9 MG/ML
1000 INJECTION, SOLUTION INTRAVENOUS
Refills: 0 | Status: COMPLETED | OUTPATIENT
Start: 2022-01-01 | End: 2022-01-01

## 2022-01-01 RX ORDER — CEFTRIAXONE 500 MG/1
1000 INJECTION, POWDER, FOR SOLUTION INTRAMUSCULAR; INTRAVENOUS EVERY 24 HOURS
Refills: 0 | Status: COMPLETED | OUTPATIENT
Start: 2022-01-01 | End: 2022-01-01

## 2022-01-01 RX ORDER — SIMVASTATIN 20 MG/1
40 TABLET, FILM COATED ORAL AT BEDTIME
Refills: 0 | Status: DISCONTINUED | OUTPATIENT
Start: 2022-01-01 | End: 2022-01-01

## 2022-01-01 RX ORDER — MAGNESIUM SULFATE 500 MG/ML
2 VIAL (ML) INJECTION
Refills: 0 | Status: COMPLETED | OUTPATIENT
Start: 2022-01-01 | End: 2022-01-01

## 2022-01-01 RX ORDER — SODIUM CHLORIDE 9 MG/ML
2000 INJECTION, SOLUTION INTRAVENOUS ONCE
Refills: 0 | Status: COMPLETED | OUTPATIENT
Start: 2022-01-01 | End: 2022-01-01

## 2022-01-01 RX ORDER — PIPERACILLIN AND TAZOBACTAM 4; .5 G/20ML; G/20ML
2.25 INJECTION, POWDER, LYOPHILIZED, FOR SOLUTION INTRAVENOUS EVERY 6 HOURS
Refills: 0 | Status: DISCONTINUED | OUTPATIENT
Start: 2022-01-01 | End: 2022-01-01

## 2022-01-01 RX ORDER — SIMVASTATIN 20 MG/1
1 TABLET, FILM COATED ORAL
Qty: 0 | Refills: 0 | DISCHARGE

## 2022-01-01 RX ORDER — GLUCAGON INJECTION, SOLUTION 0.5 MG/.1ML
1 INJECTION, SOLUTION SUBCUTANEOUS ONCE
Refills: 0 | Status: DISCONTINUED | OUTPATIENT
Start: 2022-01-01 | End: 2022-01-01

## 2022-01-01 RX ORDER — ATENOLOL 25 MG/1
50 TABLET ORAL DAILY
Refills: 0 | Status: DISCONTINUED | OUTPATIENT
Start: 2022-01-01 | End: 2022-01-01

## 2022-01-01 RX ORDER — ASCORBIC ACID 60 MG
500 TABLET,CHEWABLE ORAL DAILY
Refills: 0 | Status: DISCONTINUED | OUTPATIENT
Start: 2022-01-01 | End: 2022-01-01

## 2022-01-01 RX ORDER — LANOLIN ALCOHOL/MO/W.PET/CERES
3 CREAM (GRAM) TOPICAL AT BEDTIME
Refills: 0 | Status: DISCONTINUED | OUTPATIENT
Start: 2022-01-01 | End: 2022-01-01

## 2022-01-01 RX ORDER — ACETAMINOPHEN 500 MG
975 TABLET ORAL ONCE
Refills: 0 | Status: COMPLETED | OUTPATIENT
Start: 2022-01-01 | End: 2022-01-01

## 2022-01-01 RX ORDER — MULTIVIT-MIN/FERROUS GLUCONATE 9 MG/15 ML
1 LIQUID (ML) ORAL DAILY
Refills: 0 | Status: DISCONTINUED | OUTPATIENT
Start: 2022-01-01 | End: 2022-01-01

## 2022-01-01 RX ORDER — LEVOTHYROXINE SODIUM 125 MCG
50 TABLET ORAL DAILY
Refills: 0 | Status: DISCONTINUED | OUTPATIENT
Start: 2022-01-01 | End: 2022-01-01

## 2022-01-01 RX ORDER — KETOROLAC TROMETHAMINE 30 MG/ML
15 SYRINGE (ML) INJECTION EVERY 6 HOURS
Refills: 0 | Status: DISCONTINUED | OUTPATIENT
Start: 2022-01-01 | End: 2022-01-01

## 2022-01-01 RX ORDER — ZINC SULFATE TAB 220 MG (50 MG ZINC EQUIVALENT) 220 (50 ZN) MG
220 TAB ORAL DAILY
Refills: 0 | Status: COMPLETED | OUTPATIENT
Start: 2022-01-01 | End: 2022-01-01

## 2022-01-01 RX ORDER — PIPERACILLIN AND TAZOBACTAM 4; .5 G/20ML; G/20ML
2.25 INJECTION, POWDER, LYOPHILIZED, FOR SOLUTION INTRAVENOUS ONCE
Refills: 0 | Status: DISCONTINUED | OUTPATIENT
Start: 2022-01-01 | End: 2022-01-01

## 2022-01-01 RX ORDER — DEXTROSE 50 % IN WATER 50 %
50 SYRINGE (ML) INTRAVENOUS ONCE
Refills: 0 | Status: DISCONTINUED | OUTPATIENT
Start: 2022-01-01 | End: 2022-01-01

## 2022-01-01 RX ORDER — CEFEPIME 1 G/1
2000 INJECTION, POWDER, FOR SOLUTION INTRAMUSCULAR; INTRAVENOUS ONCE
Refills: 0 | Status: DISCONTINUED | OUTPATIENT
Start: 2022-01-01 | End: 2022-01-01

## 2022-01-01 RX ORDER — LISINOPRIL 2.5 MG/1
1 TABLET ORAL
Qty: 0 | Refills: 0 | DISCHARGE

## 2022-01-01 RX ORDER — METRONIDAZOLE 500 MG
500 TABLET ORAL EVERY 8 HOURS
Refills: 0 | Status: DISCONTINUED | OUTPATIENT
Start: 2022-01-01 | End: 2022-01-01

## 2022-01-01 RX ORDER — SODIUM HYPOCHLORITE 0.125 %
1 SOLUTION, NON-ORAL MISCELLANEOUS
Refills: 0 | Status: DISCONTINUED | OUTPATIENT
Start: 2022-01-01 | End: 2022-01-01

## 2022-01-01 RX ORDER — GLYBURIDE-METFORMIN HYDROCHLORIDE 1.25; 25 MG/1; MG/1
1 TABLET ORAL
Qty: 0 | Refills: 0 | DISCHARGE

## 2022-01-01 RX ORDER — SODIUM CHLORIDE 9 MG/ML
1000 INJECTION INTRAMUSCULAR; INTRAVENOUS; SUBCUTANEOUS ONCE
Refills: 0 | Status: COMPLETED | OUTPATIENT
Start: 2022-01-01 | End: 2022-01-01

## 2022-01-01 RX ORDER — GUAIFENESIN/DEXTROMETHORPHAN 600MG-30MG
20 TABLET, EXTENDED RELEASE 12 HR ORAL EVERY 6 HOURS
Refills: 0 | Status: DISCONTINUED | OUTPATIENT
Start: 2022-01-01 | End: 2022-01-01

## 2022-01-01 RX ORDER — VANCOMYCIN HCL 1 G
1250 VIAL (EA) INTRAVENOUS EVERY 24 HOURS
Refills: 0 | Status: DISCONTINUED | OUTPATIENT
Start: 2022-01-01 | End: 2022-01-01

## 2022-01-01 RX ORDER — DEXTROSE 50 % IN WATER 50 %
15 SYRINGE (ML) INTRAVENOUS ONCE
Refills: 0 | Status: DISCONTINUED | OUTPATIENT
Start: 2022-01-01 | End: 2022-01-01

## 2022-01-01 RX ORDER — LORATADINE 10 MG/1
10 TABLET ORAL
Qty: 0 | Refills: 0 | DISCHARGE
Start: 2022-01-01

## 2022-01-01 RX ORDER — VANCOMYCIN HCL 1 G
1250 VIAL (EA) INTRAVENOUS ONCE
Refills: 0 | Status: COMPLETED | OUTPATIENT
Start: 2022-01-01 | End: 2022-01-01

## 2022-01-01 RX ORDER — SODIUM CHLORIDE 9 MG/ML
1000 INJECTION INTRAMUSCULAR; INTRAVENOUS; SUBCUTANEOUS
Refills: 0 | Status: DISCONTINUED | OUTPATIENT
Start: 2022-01-01 | End: 2022-01-01

## 2022-01-01 RX ORDER — MAGNESIUM SULFATE 500 MG/ML
2 VIAL (ML) INJECTION ONCE
Refills: 0 | Status: DISCONTINUED | OUTPATIENT
Start: 2022-01-01 | End: 2022-01-01

## 2022-01-01 RX ORDER — LEVOTHYROXINE SODIUM 125 MCG
1 TABLET ORAL
Qty: 0 | Refills: 0 | DISCHARGE

## 2022-01-01 RX ORDER — GUAIFENESIN/DEXTROMETHORPHAN 600MG-30MG
10 TABLET, EXTENDED RELEASE 12 HR ORAL EVERY 6 HOURS
Refills: 0 | Status: COMPLETED | OUTPATIENT
Start: 2022-01-01 | End: 2022-01-01

## 2022-01-01 RX ORDER — PIPERACILLIN AND TAZOBACTAM 4; .5 G/20ML; G/20ML
3.38 INJECTION, POWDER, LYOPHILIZED, FOR SOLUTION INTRAVENOUS ONCE
Refills: 0 | Status: COMPLETED | OUTPATIENT
Start: 2022-01-01 | End: 2022-01-01

## 2022-01-01 RX ORDER — PIPERACILLIN AND TAZOBACTAM 4; .5 G/20ML; G/20ML
3.38 INJECTION, POWDER, LYOPHILIZED, FOR SOLUTION INTRAVENOUS EVERY 8 HOURS
Refills: 0 | Status: DISCONTINUED | OUTPATIENT
Start: 2022-01-01 | End: 2022-01-01

## 2022-01-01 RX ORDER — METFORMIN HYDROCHLORIDE 850 MG/1
1 TABLET ORAL
Qty: 0 | Refills: 0 | DISCHARGE

## 2022-01-01 RX ORDER — IOHEXOL 300 MG/ML
30 INJECTION, SOLUTION INTRAVENOUS ONCE
Refills: 0 | Status: COMPLETED | OUTPATIENT
Start: 2022-01-01 | End: 2022-01-01

## 2022-01-01 RX ORDER — CHLORHEXIDINE GLUCONATE 213 G/1000ML
1 SOLUTION TOPICAL
Refills: 0 | Status: DISCONTINUED | OUTPATIENT
Start: 2022-01-01 | End: 2022-01-01

## 2022-01-01 RX ORDER — SODIUM CHLORIDE 9 MG/ML
500 INJECTION, SOLUTION INTRAVENOUS ONCE
Refills: 0 | Status: COMPLETED | OUTPATIENT
Start: 2022-01-01 | End: 2022-01-01

## 2022-01-01 RX ORDER — CEFEPIME 1 G/1
500 INJECTION, POWDER, FOR SOLUTION INTRAMUSCULAR; INTRAVENOUS EVERY 24 HOURS
Refills: 0 | Status: DISCONTINUED | OUTPATIENT
Start: 2022-01-01 | End: 2022-01-01

## 2022-01-01 RX ORDER — COLLAGENASE CLOSTRIDIUM HIST. 250 UNIT/G
1 OINTMENT (GRAM) TOPICAL
Qty: 0 | Refills: 0 | DISCHARGE

## 2022-01-01 RX ORDER — SODIUM CHLORIDE 9 MG/ML
1000 INJECTION, SOLUTION INTRAVENOUS ONCE
Refills: 0 | Status: COMPLETED | OUTPATIENT
Start: 2022-01-01 | End: 2022-01-01

## 2022-01-01 RX ORDER — HEPARIN SODIUM 5000 [USP'U]/ML
5000 INJECTION INTRAVENOUS; SUBCUTANEOUS EVERY 8 HOURS
Refills: 0 | Status: DISCONTINUED | OUTPATIENT
Start: 2022-01-01 | End: 2022-01-01

## 2022-01-01 RX ORDER — DEXTROSE 50 % IN WATER 50 %
12.5 SYRINGE (ML) INTRAVENOUS ONCE
Refills: 0 | Status: DISCONTINUED | OUTPATIENT
Start: 2022-01-01 | End: 2022-01-01

## 2022-01-01 RX ORDER — ATENOLOL 25 MG/1
1 TABLET ORAL
Qty: 0 | Refills: 0 | DISCHARGE

## 2022-01-01 RX ADMIN — Medication 1 TABLET(S): at 13:23

## 2022-01-01 RX ADMIN — Medication 1 TABLET(S): at 11:59

## 2022-01-01 RX ADMIN — CHLORHEXIDINE GLUCONATE 1 APPLICATION(S): 213 SOLUTION TOPICAL at 06:22

## 2022-01-01 RX ADMIN — ATENOLOL 50 MILLIGRAM(S): 25 TABLET ORAL at 05:13

## 2022-01-01 RX ADMIN — LISINOPRIL 20 MILLIGRAM(S): 2.5 TABLET ORAL at 05:39

## 2022-01-01 RX ADMIN — ENOXAPARIN SODIUM 40 MILLIGRAM(S): 100 INJECTION SUBCUTANEOUS at 12:29

## 2022-01-01 RX ADMIN — HEPARIN SODIUM 5000 UNIT(S): 5000 INJECTION INTRAVENOUS; SUBCUTANEOUS at 06:21

## 2022-01-01 RX ADMIN — LISINOPRIL 20 MILLIGRAM(S): 2.5 TABLET ORAL at 05:42

## 2022-01-01 RX ADMIN — Medication 50 MICROGRAM(S): at 06:09

## 2022-01-01 RX ADMIN — Medication 1 APPLICATION(S): at 18:17

## 2022-01-01 RX ADMIN — Medication 1 APPLICATION(S): at 17:34

## 2022-01-01 RX ADMIN — CEFTRIAXONE 100 MILLIGRAM(S): 500 INJECTION, POWDER, FOR SOLUTION INTRAMUSCULAR; INTRAVENOUS at 00:37

## 2022-01-01 RX ADMIN — Medication 1: at 00:09

## 2022-01-01 RX ADMIN — Medication 2: at 08:17

## 2022-01-01 RX ADMIN — Medication 10 MILLILITER(S): at 22:47

## 2022-01-01 RX ADMIN — CEFEPIME 100 MILLIGRAM(S): 1 INJECTION, POWDER, FOR SOLUTION INTRAMUSCULAR; INTRAVENOUS at 06:11

## 2022-01-01 RX ADMIN — Medication 4: at 12:01

## 2022-01-01 RX ADMIN — ATENOLOL 50 MILLIGRAM(S): 25 TABLET ORAL at 06:10

## 2022-01-01 RX ADMIN — PIPERACILLIN AND TAZOBACTAM 25 GRAM(S): 4; .5 INJECTION, POWDER, LYOPHILIZED, FOR SOLUTION INTRAVENOUS at 22:43

## 2022-01-01 RX ADMIN — LISINOPRIL 20 MILLIGRAM(S): 2.5 TABLET ORAL at 06:10

## 2022-01-01 RX ADMIN — ATENOLOL 50 MILLIGRAM(S): 25 TABLET ORAL at 06:29

## 2022-01-01 RX ADMIN — Medication 1: at 06:16

## 2022-01-01 RX ADMIN — ATENOLOL 50 MILLIGRAM(S): 25 TABLET ORAL at 05:39

## 2022-01-01 RX ADMIN — HEPARIN SODIUM 5000 UNIT(S): 5000 INJECTION INTRAVENOUS; SUBCUTANEOUS at 13:35

## 2022-01-01 RX ADMIN — Medication 166.67 MILLIGRAM(S): at 21:57

## 2022-01-01 RX ADMIN — Medication 500 MILLIGRAM(S): at 05:13

## 2022-01-01 RX ADMIN — SODIUM CHLORIDE 60 MILLILITER(S): 9 INJECTION, SOLUTION INTRAVENOUS at 12:42

## 2022-01-01 RX ADMIN — PIPERACILLIN AND TAZOBACTAM 200 GRAM(S): 4; .5 INJECTION, POWDER, LYOPHILIZED, FOR SOLUTION INTRAVENOUS at 02:47

## 2022-01-01 RX ADMIN — Medication 4: at 09:16

## 2022-01-01 RX ADMIN — Medication 2: at 08:04

## 2022-01-01 RX ADMIN — Medication 2: at 16:58

## 2022-01-01 RX ADMIN — Medication 1 DROP(S): at 22:48

## 2022-01-01 RX ADMIN — Medication 1 APPLICATION(S): at 17:43

## 2022-01-01 RX ADMIN — SIMVASTATIN 40 MILLIGRAM(S): 20 TABLET, FILM COATED ORAL at 22:43

## 2022-01-01 RX ADMIN — Medication 1 TABLET(S): at 12:01

## 2022-01-01 RX ADMIN — HEPARIN SODIUM 5000 UNIT(S): 5000 INJECTION INTRAVENOUS; SUBCUTANEOUS at 13:59

## 2022-01-01 RX ADMIN — Medication 1 DROP(S): at 21:58

## 2022-01-01 RX ADMIN — HEPARIN SODIUM 5000 UNIT(S): 5000 INJECTION INTRAVENOUS; SUBCUTANEOUS at 21:39

## 2022-01-01 RX ADMIN — Medication 500 MILLIGRAM(S): at 12:02

## 2022-01-01 RX ADMIN — Medication 1: at 00:14

## 2022-01-01 RX ADMIN — Medication 500 MILLIGRAM(S): at 11:31

## 2022-01-01 RX ADMIN — Medication 8: at 16:37

## 2022-01-01 RX ADMIN — ENOXAPARIN SODIUM 40 MILLIGRAM(S): 100 INJECTION SUBCUTANEOUS at 14:32

## 2022-01-01 RX ADMIN — HEPARIN SODIUM 5000 UNIT(S): 5000 INJECTION INTRAVENOUS; SUBCUTANEOUS at 22:08

## 2022-01-01 RX ADMIN — Medication 25 GRAM(S): at 17:33

## 2022-01-01 RX ADMIN — Medication 500 MILLIGRAM(S): at 13:24

## 2022-01-01 RX ADMIN — ZINC SULFATE TAB 220 MG (50 MG ZINC EQUIVALENT) 220 MILLIGRAM(S): 220 (50 ZN) TAB at 14:31

## 2022-01-01 RX ADMIN — Medication 2: at 17:00

## 2022-01-01 RX ADMIN — CEFEPIME 100 MILLIGRAM(S): 1 INJECTION, POWDER, FOR SOLUTION INTRAMUSCULAR; INTRAVENOUS at 06:05

## 2022-01-01 RX ADMIN — ATENOLOL 50 MILLIGRAM(S): 25 TABLET ORAL at 05:36

## 2022-01-01 RX ADMIN — Medication 1 APPLICATION(S): at 05:39

## 2022-01-01 RX ADMIN — Medication 2: at 08:08

## 2022-01-01 RX ADMIN — Medication 500 MILLIGRAM(S): at 12:16

## 2022-01-01 RX ADMIN — ENOXAPARIN SODIUM 40 MILLIGRAM(S): 100 INJECTION SUBCUTANEOUS at 11:48

## 2022-01-01 RX ADMIN — Medication 4: at 12:17

## 2022-01-01 RX ADMIN — Medication 1 DROP(S): at 14:09

## 2022-01-01 RX ADMIN — Medication 1 APPLICATION(S): at 17:45

## 2022-01-01 RX ADMIN — ENOXAPARIN SODIUM 40 MILLIGRAM(S): 100 INJECTION SUBCUTANEOUS at 11:50

## 2022-01-01 RX ADMIN — Medication 1 DROP(S): at 14:46

## 2022-01-01 RX ADMIN — SODIUM CHLORIDE 100 MILLILITER(S): 9 INJECTION, SOLUTION INTRAVENOUS at 22:13

## 2022-01-01 RX ADMIN — Medication 2: at 12:07

## 2022-01-01 RX ADMIN — Medication 8: at 11:58

## 2022-01-01 RX ADMIN — ENOXAPARIN SODIUM 40 MILLIGRAM(S): 100 INJECTION SUBCUTANEOUS at 12:02

## 2022-01-01 RX ADMIN — ENOXAPARIN SODIUM 40 MILLIGRAM(S): 100 INJECTION SUBCUTANEOUS at 12:01

## 2022-01-01 RX ADMIN — PIPERACILLIN AND TAZOBACTAM 25 GRAM(S): 4; .5 INJECTION, POWDER, LYOPHILIZED, FOR SOLUTION INTRAVENOUS at 05:55

## 2022-01-01 RX ADMIN — Medication 2: at 07:39

## 2022-01-01 RX ADMIN — Medication 25 GRAM(S): at 12:16

## 2022-01-01 RX ADMIN — LISINOPRIL 20 MILLIGRAM(S): 2.5 TABLET ORAL at 05:05

## 2022-01-01 RX ADMIN — Medication 1 TABLET(S): at 12:54

## 2022-01-01 RX ADMIN — Medication 2: at 09:07

## 2022-01-01 RX ADMIN — Medication 500 MILLIGRAM(S): at 12:01

## 2022-01-01 RX ADMIN — CEFEPIME 100 MILLIGRAM(S): 1 INJECTION, POWDER, FOR SOLUTION INTRAMUSCULAR; INTRAVENOUS at 05:38

## 2022-01-01 RX ADMIN — Medication 2: at 08:12

## 2022-01-01 RX ADMIN — ATENOLOL 50 MILLIGRAM(S): 25 TABLET ORAL at 06:21

## 2022-01-01 RX ADMIN — Medication 50 MICROGRAM(S): at 05:46

## 2022-01-01 RX ADMIN — Medication 4: at 11:48

## 2022-01-01 RX ADMIN — Medication 8: at 11:46

## 2022-01-01 RX ADMIN — Medication 3 MILLIGRAM(S): at 22:47

## 2022-01-01 RX ADMIN — SODIUM CHLORIDE 100 MILLILITER(S): 9 INJECTION, SOLUTION INTRAVENOUS at 05:47

## 2022-01-01 RX ADMIN — HEPARIN SODIUM 5000 UNIT(S): 5000 INJECTION INTRAVENOUS; SUBCUTANEOUS at 06:10

## 2022-01-01 RX ADMIN — Medication 500 MILLIGRAM(S): at 12:54

## 2022-01-01 RX ADMIN — SIMVASTATIN 40 MILLIGRAM(S): 20 TABLET, FILM COATED ORAL at 22:09

## 2022-01-01 RX ADMIN — CEFEPIME 100 MILLIGRAM(S): 1 INJECTION, POWDER, FOR SOLUTION INTRAMUSCULAR; INTRAVENOUS at 05:25

## 2022-01-01 RX ADMIN — SODIUM CHLORIDE 75 MILLILITER(S): 9 INJECTION INTRAMUSCULAR; INTRAVENOUS; SUBCUTANEOUS at 17:47

## 2022-01-01 RX ADMIN — SIMVASTATIN 40 MILLIGRAM(S): 20 TABLET, FILM COATED ORAL at 22:08

## 2022-01-01 RX ADMIN — Medication 1 DROP(S): at 22:01

## 2022-01-01 RX ADMIN — Medication 500 MILLIGRAM(S): at 22:05

## 2022-01-01 RX ADMIN — Medication 1 DROP(S): at 05:45

## 2022-01-01 RX ADMIN — ATENOLOL 50 MILLIGRAM(S): 25 TABLET ORAL at 05:06

## 2022-01-01 RX ADMIN — Medication 1 APPLICATION(S): at 17:53

## 2022-01-01 RX ADMIN — CHLORHEXIDINE GLUCONATE 1 APPLICATION(S): 213 SOLUTION TOPICAL at 05:37

## 2022-01-01 RX ADMIN — Medication 500 MILLIGRAM(S): at 06:41

## 2022-01-01 RX ADMIN — HEPARIN SODIUM 5000 UNIT(S): 5000 INJECTION INTRAVENOUS; SUBCUTANEOUS at 05:06

## 2022-01-01 RX ADMIN — HEPARIN SODIUM 5000 UNIT(S): 5000 INJECTION INTRAVENOUS; SUBCUTANEOUS at 13:36

## 2022-01-01 RX ADMIN — Medication 500 MILLIGRAM(S): at 12:31

## 2022-01-01 RX ADMIN — Medication 1 DROP(S): at 13:29

## 2022-01-01 RX ADMIN — ATENOLOL 50 MILLIGRAM(S): 25 TABLET ORAL at 05:45

## 2022-01-01 RX ADMIN — Medication 1 APPLICATION(S): at 05:45

## 2022-01-01 RX ADMIN — Medication 1 APPLICATION(S): at 06:28

## 2022-01-01 RX ADMIN — SIMVASTATIN 40 MILLIGRAM(S): 20 TABLET, FILM COATED ORAL at 22:24

## 2022-01-01 RX ADMIN — Medication 50 MICROGRAM(S): at 05:37

## 2022-01-01 RX ADMIN — Medication 2: at 08:10

## 2022-01-01 RX ADMIN — LISINOPRIL 20 MILLIGRAM(S): 2.5 TABLET ORAL at 05:59

## 2022-01-01 RX ADMIN — ZINC SULFATE TAB 220 MG (50 MG ZINC EQUIVALENT) 220 MILLIGRAM(S): 220 (50 ZN) TAB at 12:01

## 2022-01-01 RX ADMIN — Medication 50 MICROGRAM(S): at 06:21

## 2022-01-01 RX ADMIN — SIMVASTATIN 40 MILLIGRAM(S): 20 TABLET, FILM COATED ORAL at 22:00

## 2022-01-01 RX ADMIN — Medication 500 MILLIGRAM(S): at 05:39

## 2022-01-01 RX ADMIN — CEFTRIAXONE 100 MILLIGRAM(S): 500 INJECTION, POWDER, FOR SOLUTION INTRAMUSCULAR; INTRAVENOUS at 00:16

## 2022-01-01 RX ADMIN — Medication 166.67 MILLIGRAM(S): at 22:01

## 2022-01-01 RX ADMIN — Medication 1 TABLET(S): at 11:31

## 2022-01-01 RX ADMIN — Medication 650 MILLIGRAM(S): at 16:42

## 2022-01-01 RX ADMIN — Medication 500 MILLIGRAM(S): at 14:31

## 2022-01-01 RX ADMIN — HEPARIN SODIUM 5000 UNIT(S): 5000 INJECTION INTRAVENOUS; SUBCUTANEOUS at 21:56

## 2022-01-01 RX ADMIN — Medication 500 MILLIGRAM(S): at 05:44

## 2022-01-01 RX ADMIN — ZINC SULFATE TAB 220 MG (50 MG ZINC EQUIVALENT) 220 MILLIGRAM(S): 220 (50 ZN) TAB at 12:22

## 2022-01-01 RX ADMIN — ATENOLOL 50 MILLIGRAM(S): 25 TABLET ORAL at 05:37

## 2022-01-01 RX ADMIN — Medication 500 MILLIGRAM(S): at 11:49

## 2022-01-01 RX ADMIN — Medication 500 MILLIGRAM(S): at 13:19

## 2022-01-01 RX ADMIN — SODIUM CHLORIDE 1000 MILLILITER(S): 9 INJECTION INTRAMUSCULAR; INTRAVENOUS; SUBCUTANEOUS at 20:53

## 2022-01-01 RX ADMIN — PIPERACILLIN AND TAZOBACTAM 25 GRAM(S): 4; .5 INJECTION, POWDER, LYOPHILIZED, FOR SOLUTION INTRAVENOUS at 13:01

## 2022-01-01 RX ADMIN — Medication 1 TABLET(S): at 13:20

## 2022-01-01 RX ADMIN — Medication 500 MILLIGRAM(S): at 12:17

## 2022-01-01 RX ADMIN — Medication 6: at 11:38

## 2022-01-01 RX ADMIN — ENOXAPARIN SODIUM 40 MILLIGRAM(S): 100 INJECTION SUBCUTANEOUS at 11:37

## 2022-01-01 RX ADMIN — Medication 1 DROP(S): at 15:08

## 2022-01-01 RX ADMIN — Medication 10 MILLILITER(S): at 13:24

## 2022-01-01 RX ADMIN — Medication 10 MILLILITER(S): at 17:12

## 2022-01-01 RX ADMIN — ZINC SULFATE TAB 220 MG (50 MG ZINC EQUIVALENT) 220 MILLIGRAM(S): 220 (50 ZN) TAB at 11:31

## 2022-01-01 RX ADMIN — Medication 1 TABLET(S): at 13:24

## 2022-01-01 RX ADMIN — Medication 500 MILLIGRAM(S): at 14:54

## 2022-01-01 RX ADMIN — ATENOLOL 50 MILLIGRAM(S): 25 TABLET ORAL at 05:42

## 2022-01-01 RX ADMIN — Medication 10 MILLILITER(S): at 17:00

## 2022-01-01 RX ADMIN — Medication 1 DROP(S): at 05:40

## 2022-01-01 RX ADMIN — Medication 650 MILLIGRAM(S): at 14:07

## 2022-01-01 RX ADMIN — SIMVASTATIN 40 MILLIGRAM(S): 20 TABLET, FILM COATED ORAL at 22:05

## 2022-01-01 RX ADMIN — Medication 2: at 16:53

## 2022-01-01 RX ADMIN — CEFEPIME 100 MILLIGRAM(S): 1 INJECTION, POWDER, FOR SOLUTION INTRAMUSCULAR; INTRAVENOUS at 16:23

## 2022-01-01 RX ADMIN — Medication 2: at 16:37

## 2022-01-01 RX ADMIN — ENOXAPARIN SODIUM 40 MILLIGRAM(S): 100 INJECTION SUBCUTANEOUS at 11:40

## 2022-01-01 RX ADMIN — Medication 1 DROP(S): at 13:58

## 2022-01-01 RX ADMIN — SIMVASTATIN 40 MILLIGRAM(S): 20 TABLET, FILM COATED ORAL at 21:57

## 2022-01-01 RX ADMIN — Medication 500 MILLIGRAM(S): at 22:24

## 2022-01-01 RX ADMIN — LISINOPRIL 20 MILLIGRAM(S): 2.5 TABLET ORAL at 06:35

## 2022-01-01 RX ADMIN — SODIUM CHLORIDE 2000 MILLILITER(S): 9 INJECTION, SOLUTION INTRAVENOUS at 16:47

## 2022-01-01 RX ADMIN — HEPARIN SODIUM 5000 UNIT(S): 5000 INJECTION INTRAVENOUS; SUBCUTANEOUS at 13:23

## 2022-01-01 RX ADMIN — Medication 2: at 07:52

## 2022-01-01 RX ADMIN — Medication 1 DROP(S): at 13:22

## 2022-01-01 RX ADMIN — LISINOPRIL 20 MILLIGRAM(S): 2.5 TABLET ORAL at 06:41

## 2022-01-01 RX ADMIN — Medication 1 APPLICATION(S): at 05:44

## 2022-01-01 RX ADMIN — SODIUM CHLORIDE 100 MILLILITER(S): 9 INJECTION, SOLUTION INTRAVENOUS at 14:44

## 2022-01-01 RX ADMIN — HEPARIN SODIUM 5000 UNIT(S): 5000 INJECTION INTRAVENOUS; SUBCUTANEOUS at 05:26

## 2022-01-01 RX ADMIN — Medication 2: at 12:34

## 2022-01-01 RX ADMIN — SODIUM CHLORIDE 50 MILLILITER(S): 9 INJECTION, SOLUTION INTRAVENOUS at 10:45

## 2022-01-01 RX ADMIN — Medication 2: at 23:52

## 2022-01-01 RX ADMIN — HEPARIN SODIUM 5000 UNIT(S): 5000 INJECTION INTRAVENOUS; SUBCUTANEOUS at 14:30

## 2022-01-01 RX ADMIN — Medication 1 DROP(S): at 04:59

## 2022-01-01 RX ADMIN — Medication 1 APPLICATION(S): at 06:12

## 2022-01-01 RX ADMIN — Medication 1 APPLICATION(S): at 17:09

## 2022-01-01 RX ADMIN — Medication 500 MILLIGRAM(S): at 13:07

## 2022-01-01 RX ADMIN — Medication 4: at 11:31

## 2022-01-01 RX ADMIN — ENOXAPARIN SODIUM 40 MILLIGRAM(S): 100 INJECTION SUBCUTANEOUS at 11:31

## 2022-01-01 RX ADMIN — HEPARIN SODIUM 5000 UNIT(S): 5000 INJECTION INTRAVENOUS; SUBCUTANEOUS at 15:08

## 2022-01-01 RX ADMIN — Medication 4: at 18:12

## 2022-01-01 RX ADMIN — Medication 1 TABLET(S): at 12:17

## 2022-01-01 RX ADMIN — Medication 25 GRAM(S): at 16:39

## 2022-01-01 RX ADMIN — Medication 500 MILLIGRAM(S): at 12:22

## 2022-01-01 RX ADMIN — HEPARIN SODIUM 5000 UNIT(S): 5000 INJECTION INTRAVENOUS; SUBCUTANEOUS at 05:45

## 2022-01-01 RX ADMIN — ENOXAPARIN SODIUM 40 MILLIGRAM(S): 100 INJECTION SUBCUTANEOUS at 11:06

## 2022-01-01 RX ADMIN — Medication 500 MILLIGRAM(S): at 12:28

## 2022-01-01 RX ADMIN — Medication 4: at 08:21

## 2022-01-01 RX ADMIN — PIPERACILLIN AND TAZOBACTAM 25 GRAM(S): 4; .5 INJECTION, POWDER, LYOPHILIZED, FOR SOLUTION INTRAVENOUS at 06:51

## 2022-01-01 RX ADMIN — CHLORHEXIDINE GLUCONATE 1 APPLICATION(S): 213 SOLUTION TOPICAL at 05:46

## 2022-01-01 RX ADMIN — Medication 2: at 16:34

## 2022-01-01 RX ADMIN — Medication 2: at 17:10

## 2022-01-01 RX ADMIN — MORPHINE SULFATE 2 MILLIGRAM(S): 50 CAPSULE, EXTENDED RELEASE ORAL at 19:58

## 2022-01-01 RX ADMIN — Medication 2: at 17:12

## 2022-01-01 RX ADMIN — Medication 1 DROP(S): at 06:10

## 2022-01-01 RX ADMIN — SIMVASTATIN 40 MILLIGRAM(S): 20 TABLET, FILM COATED ORAL at 22:48

## 2022-01-01 RX ADMIN — ATENOLOL 50 MILLIGRAM(S): 25 TABLET ORAL at 06:34

## 2022-01-01 RX ADMIN — Medication 1 APPLICATION(S): at 06:15

## 2022-01-01 RX ADMIN — Medication 25 GRAM(S): at 12:17

## 2022-01-01 RX ADMIN — ENOXAPARIN SODIUM 40 MILLIGRAM(S): 100 INJECTION SUBCUTANEOUS at 12:18

## 2022-01-01 RX ADMIN — Medication 2: at 07:49

## 2022-01-01 RX ADMIN — Medication 500 MILLIGRAM(S): at 05:54

## 2022-01-01 RX ADMIN — Medication 10 MILLILITER(S): at 23:02

## 2022-01-01 RX ADMIN — Medication 1 TABLET(S): at 21:58

## 2022-01-01 RX ADMIN — Medication 8: at 11:36

## 2022-01-01 RX ADMIN — Medication 1 APPLICATION(S): at 06:23

## 2022-01-01 RX ADMIN — Medication 8: at 11:55

## 2022-01-01 RX ADMIN — Medication 10 MILLILITER(S): at 11:25

## 2022-01-01 RX ADMIN — ENOXAPARIN SODIUM 40 MILLIGRAM(S): 100 INJECTION SUBCUTANEOUS at 11:25

## 2022-01-01 RX ADMIN — Medication 650 MILLIGRAM(S): at 14:06

## 2022-01-01 RX ADMIN — Medication 6: at 12:09

## 2022-01-01 RX ADMIN — HEPARIN SODIUM 5000 UNIT(S): 5000 INJECTION INTRAVENOUS; SUBCUTANEOUS at 21:10

## 2022-01-01 RX ADMIN — Medication 1 DROP(S): at 06:22

## 2022-01-01 RX ADMIN — Medication 1 TABLET(S): at 12:30

## 2022-01-01 RX ADMIN — Medication 1 DROP(S): at 22:07

## 2022-01-01 RX ADMIN — Medication 25 GRAM(S): at 14:15

## 2022-01-01 RX ADMIN — Medication 1 TABLET(S): at 11:49

## 2022-01-01 RX ADMIN — Medication 500 MILLIGRAM(S): at 13:38

## 2022-01-01 RX ADMIN — Medication 50 MICROGRAM(S): at 05:26

## 2022-01-01 RX ADMIN — Medication 1 DROP(S): at 14:32

## 2022-01-01 RX ADMIN — Medication 10: at 11:22

## 2022-01-01 RX ADMIN — ZINC SULFATE TAB 220 MG (50 MG ZINC EQUIVALENT) 220 MILLIGRAM(S): 220 (50 ZN) TAB at 12:02

## 2022-01-01 RX ADMIN — Medication 1 TABLET(S): at 12:03

## 2022-01-01 RX ADMIN — Medication 500 MILLIGRAM(S): at 11:06

## 2022-01-01 RX ADMIN — Medication 1 APPLICATION(S): at 17:54

## 2022-01-01 RX ADMIN — ZINC SULFATE TAB 220 MG (50 MG ZINC EQUIVALENT) 220 MILLIGRAM(S): 220 (50 ZN) TAB at 11:39

## 2022-01-01 RX ADMIN — CHLORHEXIDINE GLUCONATE 1 APPLICATION(S): 213 SOLUTION TOPICAL at 05:25

## 2022-01-01 RX ADMIN — Medication 10 MILLILITER(S): at 06:52

## 2022-01-01 RX ADMIN — Medication 2: at 07:56

## 2022-01-01 RX ADMIN — Medication 1 APPLICATION(S): at 18:09

## 2022-01-01 RX ADMIN — Medication 500 MILLIGRAM(S): at 13:00

## 2022-01-01 RX ADMIN — Medication 500 MILLIGRAM(S): at 14:32

## 2022-01-01 RX ADMIN — Medication 4: at 16:52

## 2022-01-01 RX ADMIN — SIMVASTATIN 40 MILLIGRAM(S): 20 TABLET, FILM COATED ORAL at 21:17

## 2022-01-01 RX ADMIN — PIPERACILLIN AND TAZOBACTAM 25 GRAM(S): 4; .5 INJECTION, POWDER, LYOPHILIZED, FOR SOLUTION INTRAVENOUS at 05:05

## 2022-01-01 RX ADMIN — CEFEPIME 100 MILLIGRAM(S): 1 INJECTION, POWDER, FOR SOLUTION INTRAMUSCULAR; INTRAVENOUS at 06:28

## 2022-01-01 RX ADMIN — Medication 10 MILLILITER(S): at 05:45

## 2022-01-01 RX ADMIN — Medication 500 MILLIGRAM(S): at 11:39

## 2022-01-01 RX ADMIN — Medication 2: at 17:58

## 2022-01-01 RX ADMIN — CHLORHEXIDINE GLUCONATE 1 APPLICATION(S): 213 SOLUTION TOPICAL at 06:14

## 2022-01-01 RX ADMIN — Medication 1 TABLET(S): at 11:07

## 2022-01-01 RX ADMIN — Medication 166.67 MILLIGRAM(S): at 01:47

## 2022-01-01 RX ADMIN — Medication 1 APPLICATION(S): at 05:48

## 2022-01-01 RX ADMIN — LISINOPRIL 20 MILLIGRAM(S): 2.5 TABLET ORAL at 05:13

## 2022-01-01 RX ADMIN — SODIUM CHLORIDE 500 MILLILITER(S): 9 INJECTION, SOLUTION INTRAVENOUS at 21:25

## 2022-01-01 RX ADMIN — ENOXAPARIN SODIUM 40 MILLIGRAM(S): 100 INJECTION SUBCUTANEOUS at 12:22

## 2022-01-01 RX ADMIN — SIMVASTATIN 40 MILLIGRAM(S): 20 TABLET, FILM COATED ORAL at 21:40

## 2022-01-01 RX ADMIN — SIMVASTATIN 40 MILLIGRAM(S): 20 TABLET, FILM COATED ORAL at 22:28

## 2022-01-01 RX ADMIN — CHLORHEXIDINE GLUCONATE 1 APPLICATION(S): 213 SOLUTION TOPICAL at 06:10

## 2022-01-01 RX ADMIN — Medication 6: at 07:51

## 2022-01-01 RX ADMIN — CEFEPIME 2000 MILLIGRAM(S): 1 INJECTION, POWDER, FOR SOLUTION INTRAMUSCULAR; INTRAVENOUS at 16:47

## 2022-01-01 RX ADMIN — Medication 500 MILLIGRAM(S): at 12:00

## 2022-01-01 RX ADMIN — Medication 1 DROP(S): at 05:50

## 2022-01-01 RX ADMIN — Medication 975 MILLIGRAM(S): at 21:55

## 2022-01-01 RX ADMIN — SIMVASTATIN 40 MILLIGRAM(S): 20 TABLET, FILM COATED ORAL at 21:58

## 2022-01-01 RX ADMIN — SODIUM CHLORIDE 100 MILLILITER(S): 9 INJECTION, SOLUTION INTRAVENOUS at 21:59

## 2022-01-01 RX ADMIN — ATENOLOL 50 MILLIGRAM(S): 25 TABLET ORAL at 06:05

## 2022-01-01 RX ADMIN — SODIUM CHLORIDE 60 MILLILITER(S): 9 INJECTION, SOLUTION INTRAVENOUS at 08:23

## 2022-01-01 RX ADMIN — LISINOPRIL 20 MILLIGRAM(S): 2.5 TABLET ORAL at 05:53

## 2022-01-01 RX ADMIN — ATENOLOL 50 MILLIGRAM(S): 25 TABLET ORAL at 06:52

## 2022-01-01 RX ADMIN — Medication 1 TABLET(S): at 11:48

## 2022-01-01 RX ADMIN — ATENOLOL 50 MILLIGRAM(S): 25 TABLET ORAL at 05:59

## 2022-01-01 RX ADMIN — MORPHINE SULFATE 2 MILLIGRAM(S): 50 CAPSULE, EXTENDED RELEASE ORAL at 20:13

## 2022-01-01 RX ADMIN — SIMVASTATIN 40 MILLIGRAM(S): 20 TABLET, FILM COATED ORAL at 21:47

## 2022-01-01 RX ADMIN — Medication 500 MILLIGRAM(S): at 12:44

## 2022-01-01 RX ADMIN — Medication 1 TABLET(S): at 11:39

## 2022-01-01 RX ADMIN — SODIUM CHLORIDE 75 MILLILITER(S): 9 INJECTION INTRAMUSCULAR; INTRAVENOUS; SUBCUTANEOUS at 05:54

## 2022-01-01 RX ADMIN — Medication 500 MILLIGRAM(S): at 12:08

## 2022-01-01 RX ADMIN — SODIUM CHLORIDE 75 MILLILITER(S): 9 INJECTION INTRAMUSCULAR; INTRAVENOUS; SUBCUTANEOUS at 08:02

## 2022-01-01 RX ADMIN — SIMVASTATIN 40 MILLIGRAM(S): 20 TABLET, FILM COATED ORAL at 21:16

## 2022-01-01 RX ADMIN — HEPARIN SODIUM 5000 UNIT(S): 5000 INJECTION INTRAVENOUS; SUBCUTANEOUS at 14:47

## 2022-01-01 RX ADMIN — ATENOLOL 50 MILLIGRAM(S): 25 TABLET ORAL at 05:55

## 2022-01-01 RX ADMIN — SIMVASTATIN 40 MILLIGRAM(S): 20 TABLET, FILM COATED ORAL at 21:59

## 2022-01-01 RX ADMIN — CHLORHEXIDINE GLUCONATE 1 APPLICATION(S): 213 SOLUTION TOPICAL at 05:06

## 2022-01-01 RX ADMIN — Medication 4: at 17:24

## 2022-01-01 RX ADMIN — Medication 1 DROP(S): at 06:12

## 2022-01-01 RX ADMIN — Medication 4: at 08:09

## 2022-01-01 RX ADMIN — Medication 1 TABLET(S): at 14:31

## 2022-01-01 RX ADMIN — Medication 500 MILLIGRAM(S): at 22:09

## 2022-01-01 RX ADMIN — ATENOLOL 50 MILLIGRAM(S): 25 TABLET ORAL at 05:05

## 2022-01-01 RX ADMIN — Medication 50 MICROGRAM(S): at 05:06

## 2022-01-01 RX ADMIN — CHLORHEXIDINE GLUCONATE 1 APPLICATION(S): 213 SOLUTION TOPICAL at 05:01

## 2022-01-01 RX ADMIN — Medication 1 APPLICATION(S): at 05:07

## 2022-01-01 RX ADMIN — Medication 1: at 11:59

## 2022-01-01 RX ADMIN — HEPARIN SODIUM 5000 UNIT(S): 5000 INJECTION INTRAVENOUS; SUBCUTANEOUS at 22:48

## 2022-01-01 RX ADMIN — Medication 1 TABLET(S): at 11:38

## 2022-01-01 RX ADMIN — HEPARIN SODIUM 5000 UNIT(S): 5000 INJECTION INTRAVENOUS; SUBCUTANEOUS at 05:38

## 2022-01-01 RX ADMIN — CEFTRIAXONE 100 MILLIGRAM(S): 500 INJECTION, POWDER, FOR SOLUTION INTRAMUSCULAR; INTRAVENOUS at 23:36

## 2022-01-01 RX ADMIN — HEPARIN SODIUM 5000 UNIT(S): 5000 INJECTION INTRAVENOUS; SUBCUTANEOUS at 21:58

## 2022-01-01 RX ADMIN — SIMVASTATIN 40 MILLIGRAM(S): 20 TABLET, FILM COATED ORAL at 21:31

## 2022-01-01 RX ADMIN — Medication 1 APPLICATION(S): at 05:24

## 2022-01-01 RX ADMIN — Medication 500 MILLIGRAM(S): at 13:22

## 2022-01-01 RX ADMIN — SIMVASTATIN 40 MILLIGRAM(S): 20 TABLET, FILM COATED ORAL at 21:49

## 2022-01-01 RX ADMIN — LISINOPRIL 20 MILLIGRAM(S): 2.5 TABLET ORAL at 05:37

## 2022-01-01 RX ADMIN — Medication 2: at 11:59

## 2022-01-01 RX ADMIN — SIMVASTATIN 40 MILLIGRAM(S): 20 TABLET, FILM COATED ORAL at 22:10

## 2022-01-01 RX ADMIN — Medication 10 MILLILITER(S): at 23:06

## 2022-01-01 RX ADMIN — CEFEPIME 100 MILLIGRAM(S): 1 INJECTION, POWDER, FOR SOLUTION INTRAMUSCULAR; INTRAVENOUS at 04:59

## 2022-01-01 RX ADMIN — Medication 1 APPLICATION(S): at 18:00

## 2022-01-01 RX ADMIN — Medication 1 TABLET(S): at 12:28

## 2022-01-01 RX ADMIN — LISINOPRIL 20 MILLIGRAM(S): 2.5 TABLET ORAL at 06:52

## 2022-01-01 RX ADMIN — ZINC SULFATE TAB 220 MG (50 MG ZINC EQUIVALENT) 220 MILLIGRAM(S): 220 (50 ZN) TAB at 12:19

## 2022-01-01 RX ADMIN — ZINC SULFATE TAB 220 MG (50 MG ZINC EQUIVALENT) 220 MILLIGRAM(S): 220 (50 ZN) TAB at 13:19

## 2022-01-01 RX ADMIN — ENOXAPARIN SODIUM 40 MILLIGRAM(S): 100 INJECTION SUBCUTANEOUS at 12:45

## 2022-01-01 RX ADMIN — Medication 500 MILLIGRAM(S): at 21:17

## 2022-01-01 RX ADMIN — Medication 25 GRAM(S): at 17:14

## 2022-01-01 RX ADMIN — ZINC SULFATE TAB 220 MG (50 MG ZINC EQUIVALENT) 220 MILLIGRAM(S): 220 (50 ZN) TAB at 11:49

## 2022-01-01 RX ADMIN — IOHEXOL 30 MILLILITER(S): 300 INJECTION, SOLUTION INTRAVENOUS at 21:30

## 2022-01-01 RX ADMIN — Medication 650 MILLIGRAM(S): at 14:43

## 2022-01-01 RX ADMIN — Medication 650 MILLIGRAM(S): at 22:28

## 2022-01-01 RX ADMIN — PIPERACILLIN AND TAZOBACTAM 25 GRAM(S): 4; .5 INJECTION, POWDER, LYOPHILIZED, FOR SOLUTION INTRAVENOUS at 13:24

## 2022-01-01 RX ADMIN — Medication 1 APPLICATION(S): at 05:49

## 2022-01-01 RX ADMIN — Medication 2: at 23:28

## 2022-01-01 RX ADMIN — Medication 4: at 11:42

## 2022-01-01 RX ADMIN — Medication 500 MILLIGRAM(S): at 05:06

## 2022-01-01 RX ADMIN — CEFEPIME 100 MILLIGRAM(S): 1 INJECTION, POWDER, FOR SOLUTION INTRAMUSCULAR; INTRAVENOUS at 05:47

## 2022-01-01 RX ADMIN — ZINC SULFATE TAB 220 MG (50 MG ZINC EQUIVALENT) 220 MILLIGRAM(S): 220 (50 ZN) TAB at 12:45

## 2022-01-01 RX ADMIN — Medication 1 DROP(S): at 21:39

## 2022-01-01 RX ADMIN — Medication 1 APPLICATION(S): at 06:10

## 2022-01-01 RX ADMIN — Medication 1 TABLET(S): at 12:44

## 2022-01-01 RX ADMIN — LISINOPRIL 20 MILLIGRAM(S): 2.5 TABLET ORAL at 05:44

## 2022-01-01 RX ADMIN — SODIUM CHLORIDE 75 MILLILITER(S): 9 INJECTION INTRAMUSCULAR; INTRAVENOUS; SUBCUTANEOUS at 19:17

## 2022-01-01 RX ADMIN — Medication 6: at 11:24

## 2022-01-01 RX ADMIN — CEFTRIAXONE 100 MILLIGRAM(S): 500 INJECTION, POWDER, FOR SOLUTION INTRAMUSCULAR; INTRAVENOUS at 01:46

## 2022-01-01 RX ADMIN — Medication 500 MILLIGRAM(S): at 21:47

## 2022-01-01 RX ADMIN — SIMVASTATIN 40 MILLIGRAM(S): 20 TABLET, FILM COATED ORAL at 21:26

## 2022-01-01 RX ADMIN — Medication 10 MILLILITER(S): at 19:01

## 2022-01-01 RX ADMIN — Medication 1: at 06:15

## 2022-01-01 RX ADMIN — ENOXAPARIN SODIUM 40 MILLIGRAM(S): 100 INJECTION SUBCUTANEOUS at 12:08

## 2022-01-01 RX ADMIN — Medication 1 TABLET(S): at 12:08

## 2022-01-01 RX ADMIN — HEPARIN SODIUM 5000 UNIT(S): 5000 INJECTION INTRAVENOUS; SUBCUTANEOUS at 04:59

## 2022-01-01 RX ADMIN — SODIUM CHLORIDE 333.33 MILLILITER(S): 9 INJECTION, SOLUTION INTRAVENOUS at 09:25

## 2022-01-01 RX ADMIN — Medication 1 APPLICATION(S): at 05:00

## 2022-01-01 RX ADMIN — HEPARIN SODIUM 5000 UNIT(S): 5000 INJECTION INTRAVENOUS; SUBCUTANEOUS at 22:00

## 2022-01-01 RX ADMIN — LORATADINE 10 MILLIGRAM(S): 10 TABLET ORAL at 12:28

## 2022-01-01 RX ADMIN — Medication 650 MILLIGRAM(S): at 17:26

## 2022-01-01 RX ADMIN — Medication 1: at 18:25

## 2022-01-01 RX ADMIN — Medication 500 MILLIGRAM(S): at 11:37

## 2022-01-01 RX ADMIN — Medication 8: at 11:59

## 2022-01-01 RX ADMIN — Medication 6: at 16:50

## 2022-01-01 RX ADMIN — Medication 1 DROP(S): at 13:25

## 2022-01-01 RX ADMIN — Medication 1 TABLET(S): at 12:22

## 2022-01-01 RX ADMIN — Medication 1 DROP(S): at 05:23

## 2022-01-01 RX ADMIN — Medication 4: at 16:34

## 2022-01-01 RX ADMIN — CEFTRIAXONE 100 MILLIGRAM(S): 500 INJECTION, POWDER, FOR SOLUTION INTRAMUSCULAR; INTRAVENOUS at 01:00

## 2022-01-01 RX ADMIN — Medication 2: at 08:03

## 2022-01-01 RX ADMIN — SODIUM CHLORIDE 2000 MILLILITER(S): 9 INJECTION, SOLUTION INTRAVENOUS at 16:20

## 2022-01-01 RX ADMIN — Medication 1: at 18:42

## 2022-01-01 RX ADMIN — ATENOLOL 50 MILLIGRAM(S): 25 TABLET ORAL at 05:53

## 2022-01-01 RX ADMIN — Medication 2: at 08:48

## 2022-01-01 RX ADMIN — Medication 8: at 11:29

## 2022-01-01 RX ADMIN — CEFTRIAXONE 100 MILLIGRAM(S): 500 INJECTION, POWDER, FOR SOLUTION INTRAMUSCULAR; INTRAVENOUS at 01:18

## 2022-01-01 RX ADMIN — Medication 500 MILLIGRAM(S): at 14:03

## 2022-01-01 RX ADMIN — SIMVASTATIN 40 MILLIGRAM(S): 20 TABLET, FILM COATED ORAL at 21:44

## 2022-01-01 RX ADMIN — Medication 6: at 17:23

## 2022-01-01 RX ADMIN — ATENOLOL 50 MILLIGRAM(S): 25 TABLET ORAL at 06:09

## 2022-01-01 RX ADMIN — ZINC SULFATE TAB 220 MG (50 MG ZINC EQUIVALENT) 220 MILLIGRAM(S): 220 (50 ZN) TAB at 12:08

## 2022-01-01 RX ADMIN — Medication 1 DROP(S): at 21:50

## 2022-01-01 RX ADMIN — LISINOPRIL 20 MILLIGRAM(S): 2.5 TABLET ORAL at 06:30

## 2022-01-01 RX ADMIN — Medication 975 MILLIGRAM(S): at 21:25

## 2022-01-01 RX ADMIN — LISINOPRIL 20 MILLIGRAM(S): 2.5 TABLET ORAL at 06:29

## 2022-01-01 RX ADMIN — PIPERACILLIN AND TAZOBACTAM 25 GRAM(S): 4; .5 INJECTION, POWDER, LYOPHILIZED, FOR SOLUTION INTRAVENOUS at 21:59

## 2022-01-01 RX ADMIN — SODIUM CHLORIDE 75 MILLILITER(S): 9 INJECTION INTRAMUSCULAR; INTRAVENOUS; SUBCUTANEOUS at 00:01

## 2022-01-01 RX ADMIN — SIMVASTATIN 40 MILLIGRAM(S): 20 TABLET, FILM COATED ORAL at 21:10

## 2022-01-01 RX ADMIN — Medication 4: at 13:35

## 2022-01-01 RX ADMIN — HEPARIN SODIUM 5000 UNIT(S): 5000 INJECTION INTRAVENOUS; SUBCUTANEOUS at 13:24

## 2022-01-01 RX ADMIN — Medication 500 MILLIGRAM(S): at 06:52

## 2022-01-01 RX ADMIN — HEPARIN SODIUM 5000 UNIT(S): 5000 INJECTION INTRAVENOUS; SUBCUTANEOUS at 06:15

## 2022-01-01 RX ADMIN — ENOXAPARIN SODIUM 40 MILLIGRAM(S): 100 INJECTION SUBCUTANEOUS at 12:19

## 2022-01-01 RX ADMIN — ZINC SULFATE TAB 220 MG (50 MG ZINC EQUIVALENT) 220 MILLIGRAM(S): 220 (50 ZN) TAB at 13:24

## 2022-01-01 RX ADMIN — Medication 4: at 17:02

## 2022-01-01 RX ADMIN — Medication 2: at 17:37

## 2022-01-01 RX ADMIN — Medication 2: at 12:31

## 2022-01-01 RX ADMIN — ZINC SULFATE TAB 220 MG (50 MG ZINC EQUIVALENT) 220 MILLIGRAM(S): 220 (50 ZN) TAB at 11:06

## 2022-01-01 RX ADMIN — SODIUM CHLORIDE 75 MILLILITER(S): 9 INJECTION INTRAMUSCULAR; INTRAVENOUS; SUBCUTANEOUS at 22:29

## 2022-01-01 RX ADMIN — Medication 6: at 12:21

## 2022-01-01 RX ADMIN — ENOXAPARIN SODIUM 40 MILLIGRAM(S): 100 INJECTION SUBCUTANEOUS at 13:20

## 2022-01-01 RX ADMIN — LISINOPRIL 20 MILLIGRAM(S): 2.5 TABLET ORAL at 05:55

## 2022-01-01 RX ADMIN — Medication 1: at 05:50

## 2022-01-01 RX ADMIN — Medication 1: at 07:49

## 2022-01-01 RX ADMIN — SIMVASTATIN 40 MILLIGRAM(S): 20 TABLET, FILM COATED ORAL at 21:07

## 2022-01-01 RX ADMIN — Medication 1 TABLET(S): at 12:31

## 2022-01-01 RX ADMIN — PIPERACILLIN AND TAZOBACTAM 25 GRAM(S): 4; .5 INJECTION, POWDER, LYOPHILIZED, FOR SOLUTION INTRAVENOUS at 13:20

## 2022-01-01 RX ADMIN — Medication 650 MILLIGRAM(S): at 14:16

## 2022-01-01 RX ADMIN — ATENOLOL 50 MILLIGRAM(S): 25 TABLET ORAL at 06:41

## 2022-01-16 NOTE — ED PROVIDER NOTE - NS ED ROS FT
Constitutional: (-) fever  Eyes/ENT: (-) blurry vision, (-) epistaxis  Cardiovascular: (-) chest pain, (-) syncope  Respiratory: (+) cough, (-) shortness of breath  Gastrointestinal: (-) vomiting, (-) diarrhea  Musculoskeletal: (-) neck pain, (-) back pain, (-) joint pain  Integumentary: (-) rash, (-) edema  Neurological: (-) headache, (+) altered mental status  Psychiatric: (-) hallucinations  Allergic/Immunologic: (-) pruritus

## 2022-01-16 NOTE — ED ADULT TRIAGE NOTE - CHIEF COMPLAINT QUOTE
BIBA from [facility] with reports of elevated temperature and heart rate. Patient currently receiving antibiotics for pneumonia.

## 2022-01-16 NOTE — ED PROVIDER NOTE - CLINICAL SUMMARY MEDICAL DECISION MAKING FREE TEXT BOX
81-year-old male with past medical history of hypertension hyperlipidemia diabetes asbestosis presenting from nursing home for evaluation of fever and cough for close to 10 days.  Patient is currently on antibiotics for presumed pneumonia but persistently febrile which prompted ED visit.  He complains of generalized weakness and difficulties ambulating which of note is not a new problem.  He denies any chest pain vomiting abdominal pain no headache no leg pain or swelling.  Elderly male appears tired resting on the stretcher in no acute distress, he is awake and alert, tachypneic, coarse breath sounds present bilaterally, no accessory muscle use, normal auscultation of the heart, well-perfused extremities, abdomen soft nontender nondistended, no leg edema or unilateral calf tenderness to palpation, skin warm to the touch.  Plan chest x-ray, labs, admit.

## 2022-01-16 NOTE — ED PROVIDER NOTE - PROGRESS NOTE DETAILS
Og: fever of unclear source. pt last took his doxycycline and cefuroxime today in NH . will hold off on abx for now, defer to medical team. pt's fever improved with tylenol.

## 2022-01-16 NOTE — ED PROVIDER NOTE - OBJECTIVE STATEMENT
Pt is a 81 year old Male with past medical history of HTN, HLD, DM, chronic absestos lung disease  ( w/ bilateral calcified plaques) presenting from NH w/ fever and baseline cough. + associated w/ weakness and a decline in energy levels. per hx from family and NH paperwork pt is currently being treated oupt for presumed PNA as he has had fever and worsening leukocytosis for the past several days.  He is currently on a 7 day course of Doxycycline and cefuroxime ( started on 1/11/2022 to be completed on 1/18/2022, last dose today )  but per family has been on multiple abx courses over the past several weeks all for presumed PNA.  pt reports for the last few days he has been feeling more weak and tired and thinks his cough may have worsened slightly over the last few days. denies any SOB or fever. no episodes of hypoxia at NH .

## 2022-01-16 NOTE — ED PROVIDER NOTE - PHYSICAL EXAMINATION
CONSTITUTIONAL: Well-developed; well-nourished; in no acute distress.   SKIN: warm, dry  HEAD: Normocephalic; atraumatic.  EYES: PERRL, EOMI, normal sclera and conjunctiva   ENT: No nasal discharge; airway clear.  NECK: Supple; non tender.  CARD:  Regular rate and rhythm.   RESP:  + b/l ronchi, pt remains 95-97% on RA, Tachypneic   ABD: soft ntnd  EXT: Normal ROM.  no LE edema no unilateral leg swelling  NEURO: Alert, oriented, grossly unremarkable, slow to respond .   PSYCH: Cooperative, appropriate.

## 2022-01-17 NOTE — H&P ADULT - NSCORESITESY/N_GEN_A_CORE_RD
Sent Dr Iyer the below message  Pt had large dark brown emesis, has not eaten much today, not urinated, bladder scan 163ml. will restart iv fluids, please clarify if you want NS or a different one. pt has severe pain at DVT site. consider increase iv pain dose and order other iv meds available d/t emesis?    No

## 2022-01-17 NOTE — H&P ADULT - ATTENDING COMMENTS
***My note supersedes any discrepancies that may be above in the resident's note****    81 year old Male with past medical history of HTN, HLD, DM, chronic absestos lung diseasepresenting from NH w/ fever and baseline cough associated w/ weakness and a decline in energy levels in the setting of leukocytosis, tachycardia, fevers, and transaminitis    #Persistent fever  #Cough, R/O PNA  #Transaminitis  # Leukocytosis  - febrile tmax 101F  - WBC 18k-->16k, , ALT 74-->75 and 58 respectively. UA negative.   - CT head negative. CXR presence of bilateral opacities.   -NM Hepatobiliary Imaging- NO DEFINITE VISUALIZATION OF THE GALLBLADDER THROUGH 4 HOURS POST-INJECTION, CONSISTENT WITH CHOLECYSTITIS  - US Abdomen Upper Quadrant Right- Cholelithiasis without cholecystitis.  - unsure etiology at this time but given the chest imaging findings will treat for pneumonia  - Pip/Tazo for now  - Fu blood culture, ucx, crp, procal   - TTE, r/o IE.   - Consider CT chest/abd/pelvis if fever doesn't resolve, ro abscess, ro necrotizing/ pneumonia   - ID consult.   - FU LFTs, trending down.   - IVF NS 75 cc/hr    # Essential Hypertension    - Monitor Vital Signs    - DASH diet   - Continue with Atenolol , Lisinopril    # Mixed Hyperlipidemia  - DASH diet   - Continue with home Simvastatin     # Diabetes Mellitus  - Monitor Fingersticks  - Diabetic carbohydrate consistent diet   - Glyburide-Metformin is non formulary, will give insulin sliding scale     Activity: As tolerated  Diet: DASH  Dispo: Acute  DVT ppx: Lovenox  Full code    #Progress Note Handoff  Pending (specify):  ID consult, clinical improvement  Family discussion: patient updated and in agreemtn of plan  Disposition: Unknown at this time________

## 2022-01-17 NOTE — H&P ADULT - NSHPPHYSICALEXAM_GEN_ALL_CORE
T(C): 36.4 (01-17-22 @ 00:02), Max: 38.4 (01-16-22 @ 20:20)  HR: 101 (01-17-22 @ 00:02) (101 - 122)  BP: 124/66 (01-17-22 @ 00:02) (124/66 - 135/60)  RR: 18 (01-17-22 @ 00:02) (18 - 18)  SpO2: 98% (01-17-22 @ 00:02) (96% - 98%)    PHYSICAL EXAM:  GENERAL: NAD  HEAD:  Atraumatic, Normocephalic  EYES: EOMI, PERRLA, conjunctiva and sclera clear  ENT:No nasal obstruction or discharge. No tonsillar exudate, swelling or erythema.  NECK: Supple, No JVD  CHEST/LUNG: +bibasilar crackles, No wheeze  HEART: Regular rate and rhythm; No murmurs, rubs, or gallops  ABDOMEN: Soft, Nontender, Nondistended; Bowel sounds present  EXTREMITIES:  2+ Peripheral Pulses, No clubbing, cyanosis, or edema  PSYCH: AAOx3  NEUROLOGY: non-focal  SKIN: No rashes or lesions

## 2022-01-17 NOTE — H&P ADULT - NSHPLABSRESULTS_GEN_ALL_CORE
9.6    18.23 )-----------( 388      ( 2022 20:32 )             30.3           130<L>  |  96<L>  |  27<H>  ----------------------------<  192<H>  4.9   |  19  |  0.9    Ca    8.4<L>      2022 20:32    TPro  6.4  /  Alb  2.5<L>  /  TBili  0.4  /  DBili  x   /  AST  111<H>  /  ALT  74<H>  /  AlkPhos  286<H>                Urinalysis Basic - ( 2022 23:29 )    Color: Yellow / Appearance: Clear / S.020 / pH: x  Gluc: x / Ketone: Negative  / Bili: Negative / Urobili: <2 mg/dL   Blood: x / Protein: 30 mg/dL / Nitrite: Negative   Leuk Esterase: Negative / RBC: 3 /HPF / WBC 1 /HPF   Sq Epi: x / Non Sq Epi: 0 /HPF / Bacteria: Negative        PT/INR - ( 2022 20:32 )   PT: 17.80 sec;   INR: 1.55 ratio         PTT - ( 2022 20:32 )  PTT:29.9 sec    Lactate Trend   @ 20:33 Lactate:1.0             CAPILLARY BLOOD GLUCOSE            < from: CT Head No Cont (22 @ 00:08) >      No CT evidence for acute intracranial hemorrhage, mass effect or large   territorial infarct.    < end of copied text >

## 2022-01-17 NOTE — H&P ADULT - ASSESSMENT
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 + associated w/ weakness and a decline in energy levels. per hx from family and NH paperwork pt is currently being treated oupt for presumed PNA as he has had fever and worsening leukocytosis for the past several days.  He is currently on a 7 day course of Doxycycline and cefuroxime ( started on 1/11/2022 to be completed on 1/18/2022, last dose today )  but per family has been on multiple abx courses over the past several weeks all for presumed PNA.  pt reports for the last few days he has been feeling more weak and tired and thinks his cough may have worsened slightly over the last few days. denies any SOB or fever. no episodes of hypoxia at NH.    #Persistent fever  #Cough, RO PNA  #Transaminitis  - pt is febrile on admission, labs done showed WBC 18 K N 87%. hb 9.6 ( previously 11), , ALT 74 ( both trending down). UA negative.   - CT head negative. CXR presence of bilateral opacities.   -< from: [facility] Hepatobiliary Imaging (11.19.21 @ 16:45) >  NO DEFINITE VISUALIZATION OF THE GALLBLADDER THROUGH 4 HOURS POST-INJECTION, CONSISTENT WITH CHOLECYSTITIS, AS DESCRIBED ABOVE. CLINICAL CORRELATION IS SUGGESTED.  < from: US Abdomen Upper Quadrant Right (11.17.21 @ 17:11) >  Cholelithiasis without cholecystitis.  < from: CT Sinuses No Cont (11.15.21 @ 08:56) >  1.  Trace scattered paranasal sinus mucosal thickening. No air-fluid levelto suggest acute sinusitis.  2.  Severe deviation of the osseous nasal septum to the left with a 4 mm left-sided septal spur impinging the left nasal cavity. Right-sided lula bullosa.  3.  Left maxillary periapical lucencies. Recommend follow-up with dental exam.  -Previous cultures reviewed, negative .   - Will treat for pneumonia, pt was on multiple courses of antibiotics cefuroxime, doxycycline, augmentin.   - Will give Pip/Tazo for now.   - Fu blood culture, ucx, crp, procal   - TTE, ro IE.   - Consider CT chest/abd/pelvis if fever doesn't resolve, ro abscess, ro necrotizing/ pneumonia   - ID consult.   - FU LFTs, trending down.     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 + associated w/ weakness and a decline in energy levels. per hx from family and NH paperwork pt is currently being treated oupt for presumed PNA as he has had fever and worsening leukocytosis for the past several days.  He is currently on a 7 day course of Doxycycline and cefuroxime ( started on 1/11/2022 to be completed on 1/18/2022, last dose today )  but per family has been on multiple abx courses over the past several weeks all for presumed PNA.  pt reports for the last few days he has been feeling more weak and tired and thinks his cough may have worsened slightly over the last few days. denies any SOB or fever. no episodes of hypoxia at NH.    #Persistent fever  #Cough, RO PNA  #Transaminitis  - pt is febrile on admission, labs done showed WBC 18 K N 87%. hb 9.6 ( previously 11), , ALT 74 ( both trending down). UA negative.   - CT head negative. CXR presence of bilateral opacities.   -< from: [facility] Hepatobiliary Imaging (11.19.21 @ 16:45) >  NO DEFINITE VISUALIZATION OF THE GALLBLADDER THROUGH 4 HOURS POST-INJECTION, CONSISTENT WITH CHOLECYSTITIS, AS DESCRIBED ABOVE. CLINICAL CORRELATION IS SUGGESTED.  < from: US Abdomen Upper Quadrant Right (11.17.21 @ 17:11) >  Cholelithiasis without cholecystitis.  < from: CT Sinuses No Cont (11.15.21 @ 08:56) >  1.  Trace scattered paranasal sinus mucosal thickening. No air-fluid levelto suggest acute sinusitis.  2.  Severe deviation of the osseous nasal septum to the left with a 4 mm left-sided septal spur impinging the left nasal cavity. Right-sided lula bullosa.  3.  Left maxillary periapical lucencies. Recommend follow-up with dental exam.  -Previous cultures reviewed, negative .   - Will treat for pneumonia, pt was on multiple courses of antibiotics cefuroxime, doxycycline, augmentin.   - Will give Pip/Tazo for now.   - Fu blood culture, ucx, crp, procal   - TTE, ro IE.   - Consider CT chest/abd/pelvis if fever doesn't resolve, ro abscess, ro necrotizing/ pneumonia   - ID consult.   - FU LFTs, trending down.   - IVF NS 75 cc/hr    # Essential Hypertension    - Monitor Vital Signs    - DASH diet   - Continue with Atenolol , Lisinopril    # Mixed Hyperlipidemia  - DASH diet   - Continue with home Simvastatin     # Diabetes Mellitus  - Monitor Fingersticks  - Diabetic carbohydrate consistent diet   - Glyburide-Metformin is nonformulary, will give insulin sliding scale     Activity: As tolerated  Diet: DASH  Dispo: Acute  DVT ppx: Lovenox  Full code

## 2022-01-17 NOTE — CONSULT NOTE ADULT - SUBJECTIVE AND OBJECTIVE BOX
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 + associated w/ weakness and a decline in energy levels. per hx from family and NH paperwork pt is currently being treated oupt for presumed PNA as he has had fever and worsening leukocytosis for the past several days.  He is currently on a 7 day course of Doxycycline and cefuroxime ( started on 2022 to be completed on 2022, last dose today )  but per family has been on multiple abx courses over the past several weeks all for presumed PNA.  pt reports for the last few days he has been feeling more weak and tired and thinks his cough may have worsened slightly over the last few days. denies any SOB or fever. no episodes of hypoxia at NH.  in the ED,pt's VS T(C): 36.4 (22 @ 00:02), Max: 38.4 (22 @ 20:20)  HR: 101 (22 @ 00:02) (101 - 122)  BP: 124/66 (22 @ 00:02) (124/66 - 135/60)  RR: 18 (22 @ 00:02) (18 - 18)  SpO2: 98% (22 @ 00:02) (96% - 98%), labs done showed WBC 18 K. hb 9.6 ( previously 11), , ALT 74 ( both trending down). UA negative.   CT head negative. CXR showed Bilateral opacities.   pt received 500 ml LR.   pt is admitted for workup/management (2022 01:21)      INFECTIOUS DISEASE HISTORY:  History as above.   Previously seen in 2021 for pneumonia.   Treated with empiric course of antibiotics, but contniued to have worsening LFTs.   Asymptomatic, but HIDA scan reavealing cholecystitis.   He recently completed course of doxy + cefuroxime.    PAST MEDICAL & SURGICAL HISTORY:  Hypertension    Diabetes mellitus        FAMILY HISTORY  No pertinent family history in first degree relatives        SOCIAL HISTORY  Social History:  resides in a NH.   no smoking no alcohol  no drugs. (2022 01:21)        ROS  General: Denies rigors, nightsweats  HEENT: Denies headache, rhinorrhea, sore throat, eye pain  CV: Denies CP, palpitations  PULM: Denies wheezing, hemoptysis  GI: Denies hematemesis, hematochezia, melena  : Denies discharge, hematuria  MSK: Denies arthralgias, myalgias  SKIN: Denies rash, lesions  NEURO: Denies paresthesias, weakness  PSYCH: Denies depression, anxiety    VITALS:  T(F): 96.5, Max: 101.1 (22 @ 20:20)  HR: 94  BP: 140/65  RR: 20Vital Signs Last 24 Hrs  T(C): 35.8 (2022 06:11), Max: 38.4 (2022 20:20)  T(F): 96.5 (2022 06:11), Max: 101.1 (2022 20:20)  HR: 94 (2022 06:11) (94 - 122)  BP: 140/65 (2022 06:11) (124/66 - 146/62)  BP(mean): --  RR: 20 (2022 06:11) (18 - 20)  SpO2: 97% (2022 06:11) (96% - 98%)    PHYSICAL EXAM:  Gen: NAD, resting in bed  HEENT: Normocephalic, atraumatic  Neck: supple, no lymphadenopathy  CV: Regular rate & regular rhythm  Lungs: decreased BS at bases, no fremitus  Abdomen: Soft, BS present  Ext: Warm, well perfused  Neuro: non focal, awake  Skin: no rash, no erythema  Lines: no phlebitis    TESTS & MEASUREMENTS:                        9.6    18.23 )-----------( 388      ( 2022 20:32 )             30.3     01-16    130<L>  |  96<L>  |  27<H>  ----------------------------<  192<H>  4.9   |  19  |  0.9    Ca    8.4<L>      2022 20:32    TPro  6.4  /  Alb  2.5<L>  /  TBili  0.4  /  DBili  x   /  AST  111<H>  /  ALT  74<H>  /  AlkPhos  286<H>      eGFR if Non African American: 79 mL/min/1.73M2 (22 @ 20:32)  eGFR if : 91 mL/min/1.73M2 (22 @ 20:32)    LIVER FUNCTIONS - ( 2022 20:32 )  Alb: 2.5 g/dL / Pro: 6.4 g/dL / ALK PHOS: 286 U/L / ALT: 74 U/L / AST: 111 U/L / GGT: x           Urinalysis Basic - ( 2022 23:29 )    Color: Yellow / Appearance: Clear / S.020 / pH: x  Gluc: x / Ketone: Negative  / Bili: Negative / Urobili: <2 mg/dL   Blood: x / Protein: 30 mg/dL / Nitrite: Negative   Leuk Esterase: Negative / RBC: 3 /HPF / WBC 1 /HPF   Sq Epi: x / Non Sq Epi: 0 /HPF / Bacteria: Negative        Culture - Blood (collected 21 @ 11:47)  Source: .Blood None  Final Report (21 @ 22:01):    No Growth Final    Culture - Blood (collected 21 @ 11:47)  Source: .Blood None  Final Report (21 @ 22:01):    No Growth Final    Culture - Stool (collected 21 @ 14:00)  Source: .Stool Feces  Final Report (21 @ 18:23):    No enteric pathogens isolated.    (Stool culture examined for Salmonella,    Shigella, Campylobacter, Aeromonas, Plesiomonas,    Vibrio, E.coli O157 and Yersinia)    Culture - Blood (collected 21 @ 11:20)  Source: .Blood Blood  Final Report (21 @ 19:01):    No Growth Final    Culture - Blood (collected 21 @ 11:20)  Source: .Blood Blood  Final Report (21 @ 19:00):    No Growth Final    Culture - Urine (collected 21 @ 11:20)  Source: Clean Catch Clean Catch (Midstream)  Final Report (11-15-21 @ 13:37):    <10,000 CFU/mL Normal Urogenital Keiko        Lactate, Blood: 1.0 mmol/L (22 @ 20:33)      INFECTIOUS DISEASES TESTING  COVID-19 PCR: NotDetec (21 @ 11:19)  Procalcitonin, Serum: 0.33 ng/mL (21 @ 07:20)  Procalcitonin, Serum: 0.35 ng/mL (21 @ 06:26)  Clostridium difficile Toxin by PCR: RESULT INTERPRETATION:    Not Detected - No Clostridium difficile toxins detected by amplified DNA  PCR    C. difficile PCR test results should be interpreted only with  consideration of the patient's clinical situation and history.  This test  will detect the presence of toxigenic C. difficle.  However it cannot be  used as the sole criteria  for the diagnosis of antibiotic associated diarrhea, antibiotic  associated colitis, or pseudomembranous colitis.  Colonization with  C.difficile may exceed 20% in hospital patients, the majority of whom are  without Toxigenic  Clostridium Diffidisease.  Testing is generally not recommended in  children below the age of 1 year, as up to half of healthy infants are  asymptomatically colonized withC.difficile.  In addition, C.difficile  PCR testing  cannot be used as a "test of cure" as dead organism nucleic acids will  persist and be detected after treatment.  Successful treatment of  C.difficile disease is determined by resolution of clinical symptoms. Method: CDPCR  TOXIGENIC CLOST.DIFFICILE NEGATIVE;  027-NAP1-B1 PRESUMPTIVE NEGATIVE.  By: Real-Time PCR (Polymerase Chain Reaction)  NOTE: This method detects the Toxin B gene (tCdB), the  binary toxin gene (CDT), and the single-base-pair  deletion at nucleotide 117 within the gene encoding  a negative regulator of toxin production (tcdC 117).  The combined presence of genes encoding Toxin B and  binary toxin and the tcdC 117 deletion has been  associated with hypervirulent C. difficile strain  known as 027/NAP1/B1, which has been associated with  severe disease outbreaks in healthcare facilities  worldwide. (21 @ 14:00)  Hepatitis B Surface Antigen: Nonreact (21 @ 09:17)  Hepatitis C Virus Interpretation: Nonreact (21 @ 09:17)  Procalcitonin, Serum: 0.38 ng/mL (21 @ 09:58)  COVID-19 PCR: NotDetec (21 @ 08:50)  MRSA PCR Result.: Negative (11-15-21 @ 13:30)  Procalcitonin, Serum: 0.22 ng/mL (11-15-21 @ 06:59)      RADIOLOGY & ADDITIONAL TESTS:  I have personally reviewed the last Chest xray  CXR      CT      CARDIOLOGY TESTING      MEDICATIONS  ATENolol  Tablet 50 Oral daily  dextrose 40% Gel 15 Oral once  dextrose 5%. 1000 IV Continuous <Continuous>  dextrose 5%. 1000 IV Continuous <Continuous>  dextrose 50% Injectable 25 IV Push once  dextrose 50% Injectable 12.5 IV Push once  dextrose 50% Injectable 25 IV Push once  enoxaparin Injectable 40 SubCutaneous daily  glucagon  Injectable 1 IntraMuscular once  insulin lispro (ADMELOG) corrective regimen sliding scale  SubCutaneous three times a day before meals  lisinopril 20 Oral daily  piperacillin/tazobactam IVPB.. 3.375 IV Intermittent every 8 hours  simvastatin 40 Oral at bedtime  sodium chloride 0.9%. 1000 IV Continuous <Continuous>      Weight  Weight (kg): 66.2 (22 @ 20:20)    ANTIBIOTICS:  piperacillin/tazobactam IVPB.. 3.375 Gram(s) IV Intermittent every 8 hours      ALLERGIES:  No Known Allergies

## 2022-01-17 NOTE — PATIENT PROFILE ADULT - FALL HARM RISK - HARM RISK INTERVENTIONS
Assistance with ambulation/Assistance OOB with selected safe patient handling equipment/Communicate Risk of Fall with Harm to all staff/Discuss with provider need for PT consult/Monitor gait and stability/Provide patient with walking aids - walker, cane, crutches/Reinforce activity limits and safety measures with patient and family/Review medications for side effects contributing to fall risk/Sit up slowly, dangle for a short time, stand at bedside before walking/Tailored Fall Risk Interventions/Toileting schedule using arm’s reach rule for commode and bathroom/Use of alarms - bed, chair and/or voice tab/Visual Cue: Yellow wristband and red socks/Bed in lowest position, wheels locked, appropriate side rails in place/Call bell, personal items and telephone in reach/Instruct patient to call for assistance before getting out of bed or chair/Non-slip footwear when patient is out of bed/[place] to call system/Physically safe environment - no spills, clutter or unnecessary equipment/Purposeful Proactive Rounding/Room/bathroom lighting operational, light cord in reach

## 2022-01-17 NOTE — ED ADULT NURSE NOTE - IN THE PAST 6 MONTHS, HAVE YOU HEARD GUNS BEING SHOT OR HAD SOMEONE PULL A GUN ON YOU?
Operative Report


Operative Report: 





OPERATIVE REPORT - EGD





DATE 8/21/17





SURGERY:  Upper endoscopy.





SURGEON:  Kush Kelley M.D.





ASSISTANT: n/a





PROCEDURE: 1. EGD, 2. gastric mucosal biopsy





PRE OP DX: epigastric pain





POST OP DX: normal banded gastric anatomy





TYPE OF ANESTHESIA:  MAC.





ESTIMATED BLOOD LOSS:  None.





COMPLICATIONS:  None.





SPECIMENS REMOVED:  antral mucosal biopsy





FINDINGS:


1.  Small hiatal hernia.


2.  Otherwise, normal esophagus, stomach and first portion of duodenum.





INDICATIONS:INDICATION FOR PROCEDURE:  Patient is a 46-year-old female with a 

history of gastric banding who complains of epigastric pain. she is here to be 

evaluated for the etiology of her pain. 





PROCEDURE DETAILS: After consent was reviewed, patient was taken back to


the operating room where patient was placed in the left lateral decubitus


position and a bite block was placed in the mouth.  After a time-out was


called, MAC anesthesia was initiated.  I then passed the endoscope into her


oropharynx, into her esophagus, visualized the entire esophagus, which was all


within normal limits. Once past the z-line there was an expected proximal 

stomach narrowing from external compression of the band.  I then visualized the 

stomach and the first portion of


the duodenum and there were no abnormalities I could clearly visualize.  I


then retroflexed the scope in the stomach and visualized the underside of the 

banded portion and could so no signs of band erosion or other pathology. A cold 

biopsy was taken of the antral mucosal to send pathology to rule h. pylori,  I 

then desufflated the stomach and


removed the endoscope.  Patient tolerated procedure well and was transferred


to recovery room in good and stable condition.
No

## 2022-01-17 NOTE — H&P ADULT - HISTORY OF PRESENT ILLNESS
Patient is an 81 year old Male with past medical history of HTN, HLD, DM, presented to the ED for persistent fever.     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 + associated w/ weakness and a decline in energy levels. per hx from family and NH paperwork pt is currently being treated oupt for presumed PNA as he has had fever and worsening leukocytosis for the past several days.  He is currently on a 7 day course of Doxycycline and cefuroxime ( started on 1/11/2022 to be completed on 1/18/2022, last dose today )  but per family has been on multiple abx courses over the past several weeks all for presumed PNA.  pt reports for the last few days he has been feeling more weak and tired and thinks his cough may have worsened slightly over the last few days. denies any SOB or fever. no episodes of hypoxia at NH.  in the ED,pt's VS T(C): 36.4 (01-17-22 @ 00:02), Max: 38.4 (01-16-22 @ 20:20)  HR: 101 (01-17-22 @ 00:02) (101 - 122)  BP: 124/66 (01-17-22 @ 00:02) (124/66 - 135/60)  RR: 18 (01-17-22 @ 00:02) (18 - 18)  SpO2: 98% (01-17-22 @ 00:02) (96% - 98%), labs done showed WBC 18 K. hb 9.6 ( previously 11), , ALT 74 ( both trending down). UA negative.   CT head negative.   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 + associated w/ weakness and a decline in energy levels. per hx from family and NH paperwork pt is currently being treated oupt for presumed PNA as he has had fever and worsening leukocytosis for the past several days.  He is currently on a 7 day course of Doxycycline and cefuroxime ( started on 1/11/2022 to be completed on 1/18/2022, last dose today )  but per family has been on multiple abx courses over the past several weeks all for presumed PNA.  pt reports for the last few days he has been feeling more weak and tired and thinks his cough may have worsened slightly over the last few days. denies any SOB or fever. no episodes of hypoxia at NH.  in the ED,pt's VS T(C): 36.4 (01-17-22 @ 00:02), Max: 38.4 (01-16-22 @ 20:20)  HR: 101 (01-17-22 @ 00:02) (101 - 122)  BP: 124/66 (01-17-22 @ 00:02) (124/66 - 135/60)  RR: 18 (01-17-22 @ 00:02) (18 - 18)  SpO2: 98% (01-17-22 @ 00:02) (96% - 98%), labs done showed WBC 18 K. hb 9.6 ( previously 11), , ALT 74 ( both trending down). UA negative.   CT head negative. CXR showed Bilateral opacities.   pt received 500 ml LR.   pt is admitted for workup/management

## 2022-01-17 NOTE — ED ADULT NURSE NOTE - CHIEF COMPLAINT QUOTE
BIBA from [facility] with reports of elevated temperature and heart rate. Patient currently receiving antibiotics for pneumonia.

## 2022-01-17 NOTE — ED ADULT NURSE NOTE - OBJECTIVE STATEMENT
Pt presented to the ED BIBA from [facility] with reports of elevated temperature and heart rate. Patient currently receiving antibiotics for pneumonia. x3 months. Pt reports having cough and congestion "for awhile now" Pt is non ambulatory currently. Pink blanchable skin noted to the sacrum.

## 2022-01-17 NOTE — PATIENT PROFILE ADULT - NSPROHMDIABETBLDGLCTARGET_GEN_A_NUR
Patient is a 77y old  Male who presents with a chief complaint of acute renal failure (05 Mar 2021 13:01)      SUBJECTIVE / OVERNIGHT EVENTS: remains comfortable. HD MWF, PAC placed 3/4, unable to be DCed home 2/2 safety at home. nobody to assume responsibility wife is ill    MEDICATIONS  (STANDING):  aspirin  chewable 81 milliGRAM(s) Oral daily  atorvastatin 80 milliGRAM(s) Oral at bedtime  chlorhexidine 4% Liquid 1 Application(s) Topical <User Schedule>  dextrose 40% Gel 15 Gram(s) Oral once  dextrose 5%. 1000 milliLiter(s) (50 mL/Hr) IV Continuous <Continuous>  dextrose 5%. 1000 milliLiter(s) (100 mL/Hr) IV Continuous <Continuous>  dextrose 50% Injectable 25 Gram(s) IV Push once  dextrose 50% Injectable 12.5 Gram(s) IV Push once  dextrose 50% Injectable 25 Gram(s) IV Push once  glucagon  Injectable 1 milliGRAM(s) IntraMuscular once  heparin   Injectable 5000 Unit(s) SubCutaneous every 12 hours  influenza   Vaccine 0.5 milliLiter(s) IntraMuscular once  insulin lispro (ADMELOG) corrective regimen sliding scale   SubCutaneous three times a day before meals  insulin lispro (ADMELOG) corrective regimen sliding scale   SubCutaneous at bedtime  metoprolol tartrate 25 milliGRAM(s) Oral every 12 hours  midodrine. 5 milliGRAM(s) Oral three times a day  midodrine. 10 milliGRAM(s) Oral <User Schedule>  pantoprazole    Tablet 40 milliGRAM(s) Oral before breakfast  polyethylene glycol 3350 17 Gram(s) Oral daily  prasugrel 10 milliGRAM(s) Oral daily    MEDICATIONS  (PRN):  sodium chloride 0.9% lock flush 10 milliLiter(s) IV Push every 1 hour PRN Pre/post blood products, medications, blood draw, and to maintain line patency      Vital Signs Last 24 Hrs  T(F): 98 (03-06-21 @ 12:53), Max: 98 (03-06-21 @ 12:53)  HR: 66 (03-06-21 @ 12:53) (63 - 71)  BP: 93/63 (03-06-21 @ 12:53) (93/63 - 100/54)  RR: 18 (03-06-21 @ 16:20) (16 - 18)  SpO2: 88% (03-06-21 @ 16:20) (86% - 100%)  Telemetry:   CAPILLARY BLOOD GLUCOSE      POCT Blood Glucose.: 200 mg/dL (06 Mar 2021 16:39)  POCT Blood Glucose.: 213 mg/dL (06 Mar 2021 12:04)  POCT Blood Glucose.: 89 mg/dL (06 Mar 2021 07:44)  POCT Blood Glucose.: 162 mg/dL (05 Mar 2021 21:08)    I&O's Summary    05 Mar 2021 07:01  -  06 Mar 2021 07:00  --------------------------------------------------------  IN: 240 mL / OUT: 2400 mL / NET: -2160 mL    06 Mar 2021 07:01  -  06 Mar 2021 17:53  --------------------------------------------------------  IN: 120 mL / OUT: 0 mL / NET: 120 mL        PHYSICAL EXAM:  GENERAL: NAD, well-developed  HEAD:  Atraumatic, Normocephalic  EYES: EOMI, PERRLA, conjunctiva and sclera clear  NECK: Supple, No JVD  CHEST/LUNG: Clear to auscultation bilaterally; No wheeze  HEART: Regular rate and rhythm; No murmurs, rubs, or gallops  ABDOMEN: Soft, Nontender, Nondistended; Bowel sounds present  EXTREMITIES:  2+ Peripheral Pulses, No clubbing, cyanosis, or edema  PSYCH: AAOx3  NEUROLOGY: non-focal  SKIN: No rashes or lesions    LABS:                        9.2    5.95  )-----------( 124      ( 06 Mar 2021 06:51 )             29.5     03-06    135  |  99  |  41<H>  ----------------------------<  102<H>  4.3   |  25  |  3.62<H>    Ca    8.9      06 Mar 2021 06:45  Phos  4.3     03-06    TPro  6.3  /  Alb  2.6<L>  /  TBili  1.4<H>  /  DBili  x   /  AST  45<H>  /  ALT  30  /  AlkPhos  221<H>  03-06              RADIOLOGY & ADDITIONAL TESTS:    Imaging Personally Reviewed:    Consultant(s) Notes Reviewed:      Care Discussed with Consultants/Other Providers:   unknown

## 2022-01-17 NOTE — CONSULT NOTE ADULT - ASSESSMENT
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 CLINICAL CORRELATION IS SUGGESTED.    Recommendations  This is a preliminary incomplete pended note, all final recommendations to follow after interview and examination of the patient.    Please call or message on Microsoft Teams if with any questions.  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 CLINICAL CORRELATION IS SUGGESTED.    Recommendations  - previously seen at [facility] -- Low suspicion for pneumonia -- possible cholecystitis found on HIDA scan on 11/19 potentially contributing to fevers and generalized malaise (also seen on Nov 2021 and had no abdominal pain at that time, although with elevated LFTs  - check GGT  - repeat RUQ US   - follow-up blood cultures  - continue zosyn 3.375 g q 8 hours   - GI evaluation      Please call or message on Microsoft Teams if with any questions.  Spectra [phone]

## 2022-01-18 NOTE — PROGRESS NOTE ADULT - ATTENDING COMMENTS
***My note supersedes any discrepancies that may be above in the resident's note****    81 year old Male with past medical history of HTN, HLD, DM, chronic absestos lung diseasepresenting from NH w/ fever and baseline cough associated w/ weakness and a decline in energy levels in the setting of leukocytosis, tachycardia, fevers, and transaminitis    #Persistent fever  #Cough, R/O PNA  #Transaminitis  # Leukocytosis  - febrile tmax 101F; currently tmax 100.5F and HDS satting well on RA  - WBC 18k-->16k, , ALT 74-->75 and 58 respectively. UA negative.   - CT head negative. CXR presence of bilateral opacities.   - NM Hepatobiliary Imaging- NO DEFINITE VISUALIZATION OF THE GALLBLADDER THROUGH 4 HOURS POST-INJECTION, CONSISTENT WITH CHOLECYSTITIS  - US Abdomen Upper Quadrant Right- Cholelithiasis without cholecystitis.  - unsure etiology at this time but given the chest imaging findings will treat for pneumonia  - Pip/Tazo for now  - Fu blood culture, ucx, crp, procal   - TTE, r/o IE.   - Consider CT chest/abd/pelvis if fever doesn't resolve, ro abscess, ro necrotizing/ pneumonia   - ID consult      check GGT      repeat RUQ US       follow-up blood cultures     continue zosyn 3.375 g q 8 hours     # Essential Hypertension    - Monitor Vital Signs    - DASH diet   - Continue with Atenolol , Lisinopril    # Mixed Hyperlipidemia  - DASH diet   - Continue with home Simvastatin     # Diabetes Mellitus  - Monitor Fingersticks  - Diabetic carbohydrate consistent diet   - Glyburide-Metformin is non formulary, will give insulin sliding scale     Activity: As tolerated  Diet: DASH  Dispo: Acute  DVT ppx: Lovenox  Full code    #Progress Note Handoff  Pending (specify):  ID consult, clinical improvement  Family discussion: patient updated and in agreemtn of plan  Disposition: Unknown at this time________ .

## 2022-01-18 NOTE — PROGRESS NOTE ADULT - ASSESSMENT
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 + associated w/ weakness and a decline in energy levels. per hx from family and NH paperwork pt is currently being treated oupt for presumed PNA as he has had fever and worsening leukocytosis for the past several days.  He is currently on a 7 day course of Doxycycline and cefuroxime ( started on 1/11/2022 to be completed on 1/18/2022, last dose today )  but per family has been on multiple abx courses over the past several weeks all for presumed PNA.  pt reports for the last few days he has been feeling more weak and tired and thinks his cough may have worsened slightly over the last few days. denies any SOB or fever. no episodes of hypoxia at NH.    #Persistent fever  #Cough, RO PNA  #Transaminitis  - pt is febrile on admission, labs done showed WBC 18 K N 87%. hb 9.6 ( previously 11), , ALT 74 ( both trending down). UA negative.   - Continue zosyn 3.375 g q 8 hours   - TTE resulted- not indicative of IE  - Will consider CT chest/abd/pelvis if fever doesn't resolve, ro abscess, ro necrotizing/ pneumonia   - ID consult note appreciated  - pending US of RUQ and GGT  - will order GI consult   - FU LFTs, trending down.   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 + associated w/ weakness and a decline in energy levels. per hx from family and NH paperwork pt is currently being treated oupt for presumed PNA as he has had fever and worsening leukocytosis for the past several days.  He is currently on a 7 day course of Doxycycline and cefuroxime ( started on 1/11/2022 to be completed on 1/18/2022, last dose today )  but per family has been on multiple abx courses over the past several weeks all for presumed PNA.  pt reports for the last few days he has been feeling more weak and tired and thinks his cough may have worsened slightly over the last few days. denies any SOB or fever. no episodes of hypoxia at NH.    #Persistent fever  #Cough, RO PNA  #Transaminitis  - pt is febrile on admission, labs done showed WBC 18 K N 87%. hb 9.6 ( previously 11), , ALT 74 ( both trending down). UA negative.   - Continue zosyn 3.375 g q 8 hours   - TTE resulted- not indicative of IE  - Will consider CT chest/abd/pelvis if fever doesn't resolve, ro abscess, ro necrotizing/ pneumonia   - ID consult note appreciated  - pending US of RUQ and GGT  - will consider GI and/or surgery consult if no improvement  - FU LFTs, trending down.   - IVF NS 75 cc/hr    # Essential Hypertension    - Monitor Vital Signs    - DASH diet   - Continue with Atenolol , Lisinopril    # Mixed Hyperlipidemia  - DASH diet   - Continue with home Simvastatin     # Diabetes Mellitus  - Monitor Fingersticks  - Diabetic carbohydrate consistent diet   - insulin sliding scale      Activity: As tolerated  Diet: DASH/Diabetic carbohydrate consistent diet   DVT ppx: Lovenox  Full code    Dispo- acute- will continue to monitor

## 2022-01-19 NOTE — DIETITIAN INITIAL EVALUATION ADULT. - OTHER INFO
Ntr Hx Cont:   Patient with good appetite and po intake this morning. He receives feeding assistance at meals. RD observed that 100% of breakfast meal was consumed. LBM x2 days per patient report.    Pertinent Medical Information:  Patient is 82 yo male with PMHx of HTN, HLD, DM, chronic asbestos lung disease (w/bilateral calcified plaques) presenting from NH w/fever and baseline cough. He is being treated for the following:  #Persistent Fever  #Cough RO PNA  #Transaminitis  #Essential HTN  #Mixed Hyperlipidemia  #DM

## 2022-01-19 NOTE — DIETITIAN INITIAL EVALUATION ADULT. - RD TO REMAIN AVAILABLE
Interventions: meals and snacks, medical food supplements, vitamins and minerals, coordination of care; Monitoring and Evaluation: diet order, energy intake, weight, labs (see above), skin status, NFPF/yes

## 2022-01-19 NOTE — DIETITIAN INITIAL EVALUATION ADULT. - OTHER CALCULATIONS
Based on dosing weight 66.2 kg (1/16); Energy: 8909-8952 kcal/day (MSJ x 1.1-1.2 SF); Protein: 79 - 99 gm/day (1.2-1.5gm/kg); Fluids: 7645-9891 (25-30 ml/kg) or per LIP - P/U and age considered

## 2022-01-19 NOTE — PROGRESS NOTE ADULT - ASSESSMENT
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 + associated w/ weakness and a decline in energy levels. per hx from family and NH paperwork pt is currently being treated oupt for presumed PNA as he has had fever and worsening leukocytosis for the past several days.  He is currently on a 7 day course of Doxycycline and cefuroxime ( started on 1/11/2022 to be completed on 1/18/2022, last dose today )  but per family has been on multiple abx courses over the past several weeks all for presumed PNA.  pt reports for the last few days he has been feeling more weak and tired and thinks his cough may have worsened slightly over the last few days. denies any SOB or fever. no episodes of hypoxia at NH.  in the ED,pt's VS T(C): 36.4 (01-17-22 @ 00:02), Max: 38.4 (01-16-22 @ 20:20)  HR: 101 (01-17-22 @ 00:02) (101 - 122)  BP: 124/66 (01-17-22 @ 00:02) (124/66 - 135/60)  RR: 18 (01-17-22 @ 00:02) (18 - 18)  SpO2: 98% (01-17-22 @ 00:02) (96% - 98%), labs done showed WBC 18 K. hb 9.6 ( previously 11), , ALT 74 ( both trending down). UA negative.   CT head negative. CXR showed Bilateral opacities.   pt received 500 ml LR.   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 + associated w/ weakness and a decline in energy levels. per hx from family and NH paperwork pt is currently being treated oupt for presumed PNA as he has had fever and worsening leukocytosis for the past several days.      # Fever of unknown origin, resolved  # Low suspicion for PNA  # Chronic asbestos lung disease  - CXR w/ b/l opacities  - febrile on admission, now afebrile. Leukocytosis, but improving.  - chronic cough at baseline, however pt reports improvement in cough since admission  - BCx x2 NGTD, UCx NG, Sputum Cx normal resp ric, Procal 0.36  - s/p ID eval - unclear etiology of fever, low suspicion for PNA, possible cholecystitis  - cont zosyn 3.375g q8h  - GI consult as below    # Transaminitis, cholestatic pattern, improving  - AST 63, ALT 41, ,   - HIDA scan 11/2021 suspicious for cholecystitis  - abdominal exam benign  - RUQ U/S w/ cholelithiasis w/o ductal dilatation  - continues to improve, however may consider imaging if worsens  - GI consult for ?erum, transaminitis    # High AG w/o acidosis  - AG increased from 15 to 21 today  - Bicarb remains wnl 18  - possibly related to decreased PO intake in setting of acute illness  - cont to trend    # HLD  - cont home simvastatin 40mg qhs    # DM2  - FS: 287, 161, 144  - cont ISS  - FS qac, qhs    # HTN  - BP: 177/75 (128/59 - 177/75)  - cont atenolol, lisinopril    PT/REHAB: PT  ACTIVITY: Activity - Ambulate with Assistance  DVT PPX: enoxaparin Injectable  GI PPX:  Not indicated  DIET: Diet, Consistent Carbohydrate w/Evening Snack  CODE: Full code   Disposition: from NH; acute  Pending: GI consult

## 2022-01-19 NOTE — DIETITIAN INITIAL EVALUATION ADULT. - PHYSCIAL ASSESSMENT
well nourished Appearance: Thin, but well nourished, no over s/s of wasting  GI/Bowel: LBM 1/17 per patient report and flowsheet  Skin: P/U - medial sacral spine - stage 2; Edema - 2+ scrotal edema

## 2022-01-19 NOTE — PROGRESS NOTE ADULT - SUBJECTIVE AND OBJECTIVE BOX
MIGUEL ANGEL GARZA MRN#: [id number]  CODE STATUS: FULL CODE     SUBJECTIVE   Chief complaint: fever    Currently admitted to medicine with the primary diagnosis of FEVER;LEUKOCYTOSIS      Today is hospital day 2d,   INTERVAL HPI/OVERNIGHT EVENTS: No acute events overnight    This morning, he is laying in bed comfortably. Denies chest pain, shortness of breath, abdominal pain, nausea, vomiting or changes in bowel habits. He has no complaints today.     Urinating and stooling appropriately.    Present Today:           Colbert Catheter (x)No/ ()Yes?   Indication:             Central Line (x)No/ ()Yes?  Indication:          IV Fluids (x)No/ ()Yes?  Indication:     OBJECTIVE  PAST MEDICAL & SURGICAL HISTORY  Hypertension    Diabetes mellitus      ALLERGIES:  No Known Allergies    HOME MEDICATIONS:  Home Medications:  atenolol 50 mg oral tablet: 1 tab(s) orally once a day (2022 01:45)  glyburide-metformin 2.5 mg-500 mg oral tablet: 1 tab(s) orally once a day (2022 01:45)  lisinopril 20 mg oral tablet: 1 tab(s) orally once a day (2022 01:45)  simvastatin 40 mg oral tablet: 1 tab(s) orally once a day (at bedtime) (2022 01:45)    MEDICATIONS:  STANDING MEDICATIONS  MEDICATIONS  (STANDING):  ascorbic acid 500 milliGRAM(s) Oral daily  ATENolol  Tablet 50 milliGRAM(s) Oral daily  dextrose 40% Gel 15 Gram(s) Oral once  dextrose 5%. 1000 milliLiter(s) (50 mL/Hr) IV Continuous <Continuous>  dextrose 5%. 1000 milliLiter(s) (100 mL/Hr) IV Continuous <Continuous>  dextrose 50% Injectable 25 Gram(s) IV Push once  dextrose 50% Injectable 12.5 Gram(s) IV Push once  dextrose 50% Injectable 25 Gram(s) IV Push once  enoxaparin Injectable 40 milliGRAM(s) SubCutaneous daily  glucagon  Injectable 1 milliGRAM(s) IntraMuscular once  guaifenesin/dextromethorphan Oral Liquid 10 milliLiter(s) Oral every 6 hours  insulin lispro (ADMELOG) corrective regimen sliding scale   SubCutaneous three times a day before meals  lisinopril 20 milliGRAM(s) Oral daily  multivitamin 1 Tablet(s) Oral daily  piperacillin/tazobactam IVPB.. 3.375 Gram(s) IV Intermittent every 8 hours  simvastatin 40 milliGRAM(s) Oral at bedtime  sodium chloride 0.9%. 1000 milliLiter(s) (75 mL/Hr) IV Continuous <Continuous>  zinc sulfate 220 milliGRAM(s) Oral daily    PRN MEDICATIONS  MEDICATIONS  (PRN):  acetaminophen     Tablet .. 650 milliGRAM(s) Oral every 6 hours PRN Temp greater or equal to 38C (100.4F), Mild Pain (1 - 3)  aluminum hydroxide/magnesium hydroxide/simethicone Suspension 30 milliLiter(s) Oral every 4 hours PRN Dyspepsia  melatonin 3 milliGRAM(s) Oral at bedtime PRN Insomnia  ondansetron Injectable 4 milliGRAM(s) IV Push every 8 hours PRN Nausea and/or Vomiting      VITAL SIGNS  T(F): 97.3 ( @ 06:00), Max: 97.3 ( @ 06:00)  HR: 92 ( @ 06:00) (85 - 97)  BP: 177/75 ( @ 06:00) (128/59 - 177/75)  RR: 18 ( @ 06:00) (18 - 18)  SpO2: 97% ( @ 06:00) (97% - 97%)    LABS:                        8.2    15.47 )-----------( 352      ( 2022 08:10 )             26.5         137  |  98  |  18  ----------------------------<  150<H>  4.3   |  18  |  0.8    Ca    7.7<L>      2022 08:10    TPro  5.2<L>  /  Alb  2.0<L>  /  TBili  0.4  /  DBili  x   /  AST  57<H>  /  ALT  34  /  AlkPhos  204<H>        Urinalysis Basic - ( 2022 18:03 )    Color: Yellow / Appearance: Slightly Turbid / S.021 / pH: x  Gluc: x / Ketone: Negative  / Bili: Negative / Urobili: <2 mg/dL   Blood: x / Protein: 30 mg/dL / Nitrite: Negative   Leuk Esterase: Moderate / RBC: 48 /HPF / WBC 14 /HPF   Sq Epi: x / Non Sq Epi: 1 /HPF / Bacteria: Negative              Culture - Sputum (collected 2022 18:50)  Source: .Sputum Sputum  Gram Stain:    Moderate polymorphonuclear leukocytes per low power field    Rare Squamous epithelial cells per low power field    No organisms seen per oil power field  Preliminary Report:    Normal Respiratory Keiko present    Culture - Urine (collected 2022 18:03)  Source: Clean Catch Clean Catch (Midstream)  Final Report:    No growth    Culture - Blood (collected 2022 11:00)  Source: .Blood Blood  Preliminary Report:    No growth to date.    Culture - Blood (collected 2022 04:30)  Source: .Blood Blood  Preliminary Report:    No growth to date.    Culture - Urine (collected 2022 23:38)  Source: Clean Catch Clean Catch (Midstream)  Final Report:    No growth    Culture - Blood (collected 2022 20:32)  Source: .Blood Blood-Peripheral  Preliminary Report:    No growth to date.    Culture - Blood (collected 2022 20:32)  Source: .Blood Blood-Peripheral  Preliminary Report:    No growth to date.        RADIOLOGY:   Reviewed    PHYSICAL EXAM:  General: Comfortably laying on the hospital bed. NAD. AAOx3.  HEENT: Atraumatic. Normocephalic. EOMI. Conjunctiva and sclera clear.  Cardiac: Normal S1, S2. RRR. No murmurs, rubs, or gallops.  Pulm: CTA b/l no wheezes, rhonchi, or rales.  GI: Soft, nontender, nondistended. BSx4q  Ext: 2+ pulses. Moving all 4 extremities. No edema, cyanosis.  Neuro: CN II-XII grossly intact  Skin: No lesions or rashes

## 2022-01-19 NOTE — PROGRESS NOTE ADULT - ATTENDING COMMENTS
***My note supersedes any discrepancies that may be above in the resident's note****    81 year old Male with past medical history of HTN, HLD, DM, chronic asbestos lung disease, presenting from NH w/ fever and baseline cough associated w/ weakness and a decline in energy levels in the setting of leukocytosis, tachycardia, fevers, and transaminitis    #Persistent fever  #Cough, R/O PNA  #Transaminitis  # Leukocytosis  - fever curve improving and currently afebrile and HDS satting well on RA  - WBC 18k-->16k-->15k and stable, , ALT 74-->57 and 34 respectively. UA negative.   - CT head negative. CXR presence of bilateral opacities.   - NM Hepatobiliary Imaging- NO DEFINITE VISUALIZATION OF THE GALLBLADDER THROUGH 4 HOURS POST-INJECTION, CONSISTENT WITH CHOLECYSTITIS  - US Abdomen Upper Quadrant Right- Cholelithiasis without cholecystitis.  - unsure etiology at this time but given the chest imaging findings will treat for pneumonia  - Pip/Tazo for now  - Fu blood culture, ucx, crp, procal   - TTE, r/o IE.   - Consider CT chest/abd/pelvis if fever doesn't resolve, ro abscess, ro necrotizing/ pneumonia   - ID consult      follow-up blood cultures      continue zosyn 3.375 g q 8 hours   - GI consult placed and pending    # Essential Hypertension    - Monitor Vital Signs    - DASH diet   - Continue with Atenolol , Lisinopril    # Mixed Hyperlipidemia  - DASH diet   - Continue with home Simvastatin     # Diabetes Mellitus  - Monitor Fingersticks  - Diabetic carbohydrate consistent diet   - Glyburide-Metformin is non formulary, will give insulin sliding scale     Activity: As tolerated  Diet: DASH  Dispo: Acute  DVT ppx: Lovenox  Full code    #Progress Note Handoff  Pending (specify):  ID follow up, GI consult  Family discussion: patient updated and in agreement of plan  Disposition: Unknown at this time________ . ***My note supersedes any discrepancies that may be above in the resident's note****    81 year old Male with past medical history of HTN, HLD, DM, chronic asbestos lung disease, presenting from NH w/ fever and baseline cough associated w/ weakness and a decline in energy levels in the setting of leukocytosis, tachycardia, fevers, and transaminitis    #Persistent fever  #Cough, R/O PNA  #Transaminitis  # Leukocytosis  - fever curve improving and currently afebrile and HDS satting well on RA  - WBC 18k-->16k-->15k and stable, , ALT 74-->57 and 34 respectively. UA negative.   - CT head negative. CXR presence of bilateral opacities.   - NM Hepatobiliary Imaging- NO DEFINITE VISUALIZATION OF THE GALLBLADDER THROUGH 4 HOURS POST-INJECTION, CONSISTENT WITH CHOLECYSTITIS  - US Abdomen Upper Quadrant Right- Cholelithiasis without cholecystitis.  - unsure etiology at this time but given the chest imaging findings will treat for pneumonia  - Pip/Tazo for now  - Fu blood culture, ucx, crp, procal   - TTE, r/o IE.   - Consider CT chest/abd/pelvis if fever doesn't resolve, ro abscess, ro necrotizing/ pneumonia   - ID consult      follow-up blood cultures      continue zosyn 3.375 g q 8 hours   - GI consult placed and pending    # HAGMA  - increased to 21 today  - possible 2/2 to ketones in the setting of decreased po intake  - continue to monitor  - nutrition consult placed    # Essential Hypertension    - Monitor Vital Signs    - DASH diet   - Continue with Atenolol , Lisinopril    # Mixed Hyperlipidemia  - DASH diet   - Continue with home Simvastatin     # Diabetes Mellitus  - Monitor Fingersticks  - Diabetic carbohydrate consistent diet   - Glyburide-Metformin is non formulary, will give insulin sliding scale     Activity: As tolerated  Diet: DASH  Dispo: Acute  DVT ppx: Lovenox  Full code    #Progress Note Handoff  Pending (specify):  ID follow up, GI consult  Family discussion: patient updated and in agreement of plan  Disposition: Unknown at this time________ .

## 2022-01-19 NOTE — DIETITIAN INITIAL EVALUATION ADULT. - ORAL INTAKE PTA/DIET HISTORY
PTA Patient reports good appetite and po intake. No chewing/swallowing difficulties. He did not follow any particular diet. He was taking MVI. UBWR 175 - 189 lbs ~6 months ago. No c/o n/v/d/c. NKFA.

## 2022-01-19 NOTE — PROGRESS NOTE ADULT - SUBJECTIVE AND OBJECTIVE BOX
KAYLAMIGUEL ANGEL LYNCH  81y, Male  Allergy: No Known Allergies      LOS  2d    CHIEF COMPLAINT: fever (2022 12:13)      INTERVAL EVENTS/HPI  - No acute events overnight  - T(F): , Max: 99.2 (22 @ 12:53)  - Denies any worsening symptoms  - Tolerating medication  - WBC Count: 15.47 (22 @ 08:10)  WBC Count: 15.60 (22 @ 17:23)     - Creatinine, Serum: 0.8 (22 @ 08:10)  Creatinine, Serum: 0.8 (22 @ 17:23)       ROS  General: Denies rigors, nightsweats  HEENT: Denies headache, rhinorrhea, sore throat, eye pain  CV: Denies CP, palpitations  PULM: Denies wheezing, hemoptysis  GI: Denies hematemesis, hematochezia, melena  : Denies discharge, hematuria  MSK: Denies arthralgias, myalgias  SKIN: Denies rash, lesions  NEURO: Denies paresthesias, weakness  PSYCH: Denies depression, anxiety    VITALS:  T(F): 99.2, Max: 99.2 (22 @ 12:53)  HR: 97  BP: 148/67  RR: 18Vital Signs Last 24 Hrs  T(C): 37.3 (2022 12:53), Max: 37.3 (2022 12:53)  T(F): 99.2 (2022 12:53), Max: 99.2 (2022 12:53)  HR: 97 (2022 12:53) (92 - 97)  BP: 148/67 (2022 12:53) (128/59 - 177/75)  BP(mean): --  RR: 18 (2022 12:53) (18 - 18)  SpO2: 97% (2022 06:00) (97% - 97%)    PHYSICAL EXAM:  Gen: NAD, resting in bed  HEENT: Normocephalic, atraumatic  Neck: supple, no lymphadenopathy  CV: Regular rate & regular rhythm  Lungs: decreased BS at bases, no fremitus  Abdomen: Soft, BS present  Ext: Warm, well perfused  Neuro: non focal, awake  Skin: no rash, no erythema  Lines: no phlebitis    FH: Non-contributory  Social Hx: Non-contributory    TESTS & MEASUREMENTS:                        8.2    15.47 )-----------( 352      ( 2022 08:10 )             26.5         137  |  98  |  18  ----------------------------<  150<H>  4.3   |  18  |  0.8    Ca    7.7<L>      2022 08:10    TPro  5.2<L>  /  Alb  2.0<L>  /  TBili  0.4  /  DBili  x   /  AST  57<H>  /  ALT  34  /  AlkPhos  204<H>      eGFR if Non African American: 83 mL/min/1.73M2 (22 @ 08:10)  eGFR if : 96 mL/min/1.73M2 (22 @ 08:10)  eGFR if Non African American: 83 mL/min/1.73M2 (22 @ 17:23)  eGFR if : 96 mL/min/1.73M2 (22 @ 17:23)    LIVER FUNCTIONS - ( 2022 08:10 )  Alb: 2.0 g/dL / Pro: 5.2 g/dL / ALK PHOS: 204 U/L / ALT: 34 U/L / AST: 57 U/L / GGT: x           Urinalysis Basic - ( 2022 18:03 )    Color: Yellow / Appearance: Slightly Turbid / S.021 / pH: x  Gluc: x / Ketone: Negative  / Bili: Negative / Urobili: <2 mg/dL   Blood: x / Protein: 30 mg/dL / Nitrite: Negative   Leuk Esterase: Moderate / RBC: 48 /HPF / WBC 14 /HPF   Sq Epi: x / Non Sq Epi: 1 /HPF / Bacteria: Negative        Culture - Sputum (collected 22 @ 18:50)  Source: .Sputum Sputum  Gram Stain (22 @ 07:33):    Moderate polymorphonuclear leukocytes per low power field    Rare Squamous epithelial cells per low power field    No organisms seen per oil power field  Preliminary Report (22 @ 21:52):    Normal Respiratory Keiko present    Culture - Urine (collected 22 @ 18:03)  Source: Clean Catch Clean Catch (Midstream)  Final Report (22 @ 17:48):    No growth    Culture - Blood (collected 22 @ 11:00)  Source: .Blood Blood  Preliminary Report (22 @ 22:02):    No growth to date.    Culture - Blood (collected 22 @ 04:30)  Source: .Blood Blood  Preliminary Report (22 @ 14:01):    No growth to date.    Culture - Urine (collected 22 @ 23:38)  Source: Clean Catch Clean Catch (Midstream)  Final Report (22 @ 06:10):    No growth    Culture - Blood (collected 22 @ 20:32)  Source: .Blood Blood-Peripheral  Preliminary Report (22 @ 06:01):    No growth to date.    Culture - Blood (collected 22 @ 20:32)  Source: .Blood Blood-Peripheral  Preliminary Report (22 @ 06:01):    No growth to date.        Lactate, Blood: 1.0 mmol/L (22 @ 20:33)      INFECTIOUS DISEASES TESTING  Procalcitonin, Serum: 0.36 (22 @ 04:30)  Rapid RVP Result: NotDetec (22 @ 20:54)  COVID-19 PCR: NotDetec (21 @ 11:19)  Procalcitonin, Serum: 0.33 (21 @ 07:20)  Procalcitonin, Serum: 0.35 (21 @ 06:26)  Procalcitonin, Serum: 0.38 (21 @ 09:58)  COVID-19 PCR: NotDetec (21 @ 08:50)  MRSA PCR Result.: Negative (11-15-21 @ 13:30)  Procalcitonin, Serum: 0.22 (11-15-21 @ 06:59)  Rapid RVP Result: NotDetec (21 @ 09:55)      INFLAMMATORY MARKERS  C-Reactive Protein, Serum: 194 mg/L (22 @ 04:30)      RADIOLOGY & ADDITIONAL TESTS:  I have personally reviewed the last available Chest xray  CXR  Xray Chest 1 View- PORTABLE-Urgent:   ACC: [id number] EXAM:  XR CHEST PORTABLE URGENT 1V                          PROCEDURE DATE:  2022          INTERPRETATION:  CLINICAL HISTORY: Sepsis.    COMPARISON: 2021.    TECHNIQUE/POSITIONING: Frontal view of the chest.    FINDINGS:    Support devices: None.    Cardiac/mediastinum/hilum: Unchanged.    Lung parenchyma/Pleura: Bilateral calcified pleural plaques. Bibasilar   pleural thickening/trace effusions, unchanged. No new focal consolidation.    Skeleton/soft tissues: Degenerative change in the shoulders.      IMPRESSION:    Stable exam without radiographic evidence of acute cardiopulmonary   disease.      --- End of Report ---            PARISH HAGAN MD; Attending Radiologist  This document has been electronically signed. 2022  7:48AM (22 @ 20:48)      CT      CARDIOLOGY TESTING  12 Lead ECG:   Ventricular Rate 109 BPM    Atrial Rate 109 BPM    P-R Interval 118 ms    QRS Duration 74 ms    Q-T Interval 328 ms    QTC Calculation(Bazett) 441 ms    P Axis 45 degrees    R Axis 33 degrees    T Axis 4 degrees    Diagnosis Line Sinus tachycardia  Minimal voltage criteria for LVH, may be normal variant  Inferior infarct , age undetermined  Abnormal ECG    Confirmed by Ashley Vick MD ([id number]) on 2022 10:36:38 AM (22 @ 21:50)      MEDICATIONS  ascorbic acid 500 Oral daily  ATENolol  Tablet 50 Oral daily  dextrose 40% Gel 15 Oral once  dextrose 5%. 1000 IV Continuous <Continuous>  dextrose 5%. 1000 IV Continuous <Continuous>  dextrose 50% Injectable 25 IV Push once  dextrose 50% Injectable 12.5 IV Push once  dextrose 50% Injectable 25 IV Push once  enoxaparin Injectable 40 SubCutaneous daily  glucagon  Injectable 1 IntraMuscular once  guaifenesin/dextromethorphan Oral Liquid 10 Oral every 6 hours  insulin lispro (ADMELOG) corrective regimen sliding scale  SubCutaneous three times a day before meals  lisinopril 20 Oral daily  multivitamin 1 Oral daily  piperacillin/tazobactam IVPB.. 3.375 IV Intermittent every 8 hours  simvastatin 40 Oral at bedtime  sodium chloride 0.9%. 1000 IV Continuous <Continuous>  zinc sulfate 220 Oral daily      WEIGHT  Weight (kg): 66.2 (22 @ 20:20)  Creatinine, Serum: 0.8 mg/dL (22 @ 08:10)  Creatinine, Serum: 0.8 mg/dL (22 @ 17:23)      ANTIBIOTICS:  piperacillin/tazobactam IVPB.. 3.375 Gram(s) IV Intermittent every 8 hours      All available historical records have been reviewed

## 2022-01-19 NOTE — DIETITIAN INITIAL EVALUATION ADULT. - PERTINENT LABORATORY DATA
1/19: H/H 8.2/26.5 (L), Na 134 (L), K+ 4.3, Chloride 100, BUn 19, Cr 0.8, Gluc 144 (H), Ca 7.5 (L), Alb 2.2 (L), Alk Phos 226 (H), AST/SGOT 63 (H);   1/17: HgbA1c 6.8 (H)    CAPILLARY BLOOD GLUCOSE      POCT Blood Glucose.: 161 mg/dL (19 Jan 2022 07:28)  POCT Blood Glucose.: 144 mg/dL (18 Jan 2022 17:10)  POCT Blood Glucose.: 250 mg/dL (18 Jan 2022 11:47)

## 2022-01-19 NOTE — DIETITIAN INITIAL EVALUATION ADULT. - ADD RECOMMEND
1) Recommend adding Consistent CHO diet modifier to current diet order 2) Recommend Glucerna Shake BID and Prosource Gelatein Sugar Free BID 3) Consider supplementation to aid in promoting wound healing: MVI q24hrs, Zinc Sulfate 220 mg q24hrs (x14 days), Vitamin C 500 mg  q24hrs; Patient is at moderate nutrition risk, RD to f/u in 4-6 days

## 2022-01-19 NOTE — DIETITIAN INITIAL EVALUATION ADULT. - PERTINENT MEDS FT
MEDICATIONS  (STANDING):  ATENolol  Tablet 50 milliGRAM(s) Oral daily  dextrose 40% Gel 15 Gram(s) Oral once  dextrose 5%. 1000 milliLiter(s) (50 mL/Hr) IV Continuous <Continuous>  dextrose 5%. 1000 milliLiter(s) (100 mL/Hr) IV Continuous <Continuous>  dextrose 50% Injectable 25 Gram(s) IV Push once  dextrose 50% Injectable 12.5 Gram(s) IV Push once  dextrose 50% Injectable 25 Gram(s) IV Push once  enoxaparin Injectable 40 milliGRAM(s) SubCutaneous daily  glucagon  Injectable 1 milliGRAM(s) IntraMuscular once  guaifenesin/dextromethorphan Oral Liquid 10 milliLiter(s) Oral every 6 hours  insulin lispro (ADMELOG) corrective regimen sliding scale   SubCutaneous three times a day before meals  lisinopril 20 milliGRAM(s) Oral daily  piperacillin/tazobactam IVPB.. 3.375 Gram(s) IV Intermittent every 8 hours  simvastatin 40 milliGRAM(s) Oral at bedtime  sodium chloride 0.9%. 1000 milliLiter(s) (75 mL/Hr) IV Continuous <Continuous>    MEDICATIONS  (PRN):  acetaminophen     Tablet .. 650 milliGRAM(s) Oral every 6 hours PRN Temp greater or equal to 38C (100.4F), Mild Pain (1 - 3)  aluminum hydroxide/magnesium hydroxide/simethicone Suspension 30 milliLiter(s) Oral every 4 hours PRN Dyspepsia  melatonin 3 milliGRAM(s) Oral at bedtime PRN Insomnia  ondansetron Injectable 4 milliGRAM(s) IV Push every 8 hours PRN Nausea and/or Vomiting

## 2022-01-19 NOTE — PROGRESS NOTE ADULT - ASSESSMENT
81 year old Male with past medical history of HTN, HLD, DM, chronic absestos lung disease  ( w/ bilateral calcified plaques) presenting from NH w/ fever and baseline   cough withe generalized malaise     #Fevers - possibly from cholecystitis/bilary pathology   - RVP 1/16 negative   - BLood CX 1/16 NG  - Urine Cx 1/16 NG     #Chronic asbestos Lung disease  #Transaminitis - Cholecystitis- HIDA scan positive 11/19/2021  - NM Hepatobiliary Imaging (11.19.21 @ 16:45): NO DEFINITE VISUALIZATION OF THE GALLBLADDER THROUGH 4 HOURS POST-INJECTION, CONSISTENT WITH CHOLECYSTITIS, AS DESCRIBED ABOVE. CLINICAL CORRELATION IS SUGGESTED.  -  US Abdomen Upper Quadrant Right (01.18.22 @ 11:33): Cholelithiasis without ductal dilatation.    Recommendations  - LFTs and fever curve improving  - narrow zosyn to ceftriaxone 1g daily and PO flagyl 500 mg TID   - Reviewed RUQ US - no signs of cholecystitis  - GI/Surgery evaluation -- can medically manage with antibiotics if this is cholecystitis again, but at risk for recurrence     Please call or message on Microsoft Teams if with any questions.  Spectra [id number]

## 2022-01-19 NOTE — DIETITIAN INITIAL EVALUATION ADULT. - ORAL NUTRITION SUPPLEMENTS
Glucerna Shake BID (440 kcal, 20 gm Protein) and Prosource Gelatein Sugar Free BID (300 kcal, 40 gm Protein)

## 2022-01-20 NOTE — PROGRESS NOTE ADULT - ASSESSMENT
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 + associated w/ weakness and a decline in energy levels. per hx from family and NH paperwork pt is currently being treated oupt for presumed PNA as he has had fever and worsening leukocytosis for the past several days.      # Fever of unknown origin, resolved  # Low suspicion for PNA  # Chronic asbestos lung disease  - CXR w/ b/l opacities  - febrile on admission, now afebrile. Leukocytosis, initially improved now worsening  - chronic cough at baseline, however pt reports improvement in cough since admission  - BCx x2 NGTD, UCx NG, Sputum Cx normal resp ric, Procal 0.36  - s/p ID eval - unclear etiology of fever, low suspicion for PNA, possible cholecystitis  - d/c zosyn, start ceftriaxone 1g qd, flagyl PO 500mg TID per ID  - GI consult pending    # Transaminitis, cholestatic pattern, improving  - AST 64, ALT 36, ,   - HIDA scan 11/2021 suspicious for cholecystitis  - abdominal exam benign  - RUQ U/S w/ cholelithiasis w/o ductal dilatation  - continues to improve, however may consider imaging if worsens  - GI consult pending for ?erum, transaminitis    # High AG w/o acidosis, resolved  - AG increased from 15 to 21, now 14  - Bicarb remains wnl 18  - possibly related to decreased PO intake in setting of acute illness  - cont to trend    # HLD  - cont home simvastatin 40mg qhs    # DM2  - FS: 259, 189, 199, 169  - cont ISS  - FS qac, qhs    # HTN  - BP: 134/61 (122/56 - 156/75)  - cont atenolol, lisinopril    PT/REHAB: PT  ACTIVITY: Activity - Ambulate with Assistance  DVT PPX: enoxaparin Injectable  GI PPX:  Not indicated  DIET: Diet, Consistent Carbohydrate w/Evening Snack  CODE: Full code   Disposition: from NH; acute  Pending: GI consult

## 2022-01-20 NOTE — CONSULT NOTE ADULT - ASSESSMENT
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 Gi was called for elevated LFT.     #)Elevated liver enzymes mixed pattern likely due to DILI  -LFT in 11/2021 0.7, 318, 120. 91 at that time he was found to have + gall stones, HIDA + for cholecystitis evaluated by surgery they though its likely due to cystic duct obstruction   -CLD work up in 11/2021 was unrevealing   -Currently admitted 0.4, 286, 111, 74 trending down LFT today 0.3, 206, 64, 36  -US 1/18: Cholelithiasis without any ductal dilatation CBD 4mm, no GB wall thickening, no pericholecystic fluid  -On examination no tenderness  -Hemodynamically stable    Recs:   Trend LFT, INR  Avoid hepatotoxic medications   Physical examination and imaging is not suggestive of cholecystitis

## 2022-01-20 NOTE — PROGRESS NOTE ADULT - SUBJECTIVE AND OBJECTIVE BOX
MIGUEL ANGEL GARZA MRN#: [id number]  CODE STATUS: FULL CODE     SUBJECTIVE   Chief complaint: fever    Currently admitted to medicine with the primary diagnosis of FEVER;LEUKOCYTOSIS      Today is hospital day 3d,   INTERVAL HPI/OVERNIGHT EVENTS: No acute events overnight    This morning, he is laying in bed. Denies chest pain, shortness of breath, abdominal pain, nausea, vomiting or changes in bowel habits. He has no complaints today.     Urinating and stooling appropriately.    Present Today:           Colbert Catheter (x)No/ ()Yes?   Indication:             Central Line (x)No/ ()Yes?  Indication:          IV Fluids ()No/ (x)Yes? NS@75 Indication:     OBJECTIVE  PAST MEDICAL & SURGICAL HISTORY  Hypertension    Diabetes mellitus      ALLERGIES:  No Known Allergies    HOME MEDICATIONS:  Home Medications:  atenolol 50 mg oral tablet: 1 tab(s) orally once a day (17 Jan 2022 01:45)  glyburide-metformin 2.5 mg-500 mg oral tablet: 1 tab(s) orally once a day (17 Jan 2022 01:45)  lisinopril 20 mg oral tablet: 1 tab(s) orally once a day (17 Jan 2022 01:45)  simvastatin 40 mg oral tablet: 1 tab(s) orally once a day (at bedtime) (17 Jan 2022 01:45)    MEDICATIONS:  STANDING MEDICATIONS  MEDICATIONS  (STANDING):  ascorbic acid 500 milliGRAM(s) Oral daily  ATENolol  Tablet 50 milliGRAM(s) Oral daily  cefTRIAXone   IVPB 1000 milliGRAM(s) IV Intermittent every 24 hours  dextrose 40% Gel 15 Gram(s) Oral once  dextrose 5%. 1000 milliLiter(s) (100 mL/Hr) IV Continuous <Continuous>  dextrose 5%. 1000 milliLiter(s) (50 mL/Hr) IV Continuous <Continuous>  dextrose 50% Injectable 25 Gram(s) IV Push once  dextrose 50% Injectable 12.5 Gram(s) IV Push once  dextrose 50% Injectable 25 Gram(s) IV Push once  enoxaparin Injectable 40 milliGRAM(s) SubCutaneous daily  glucagon  Injectable 1 milliGRAM(s) IntraMuscular once  guaifenesin/dextromethorphan Oral Liquid 10 milliLiter(s) Oral every 6 hours  insulin lispro (ADMELOG) corrective regimen sliding scale   SubCutaneous three times a day before meals  lisinopril 20 milliGRAM(s) Oral daily  magnesium sulfate  IVPB 2 Gram(s) IV Intermittent every 2 hours  metroNIDAZOLE    Tablet 500 milliGRAM(s) Oral every 8 hours  multivitamin 1 Tablet(s) Oral daily  simvastatin 40 milliGRAM(s) Oral at bedtime  sodium chloride 0.9%. 1000 milliLiter(s) (75 mL/Hr) IV Continuous <Continuous>  zinc sulfate 220 milliGRAM(s) Oral daily    PRN MEDICATIONS  MEDICATIONS  (PRN):  acetaminophen     Tablet .. 650 milliGRAM(s) Oral every 6 hours PRN Temp greater or equal to 38C (100.4F), Mild Pain (1 - 3)  aluminum hydroxide/magnesium hydroxide/simethicone Suspension 30 milliLiter(s) Oral every 4 hours PRN Dyspepsia  melatonin 3 milliGRAM(s) Oral at bedtime PRN Insomnia  ondansetron Injectable 4 milliGRAM(s) IV Push every 8 hours PRN Nausea and/or Vomiting      VITAL SIGNS  T(F): 96.7 (01-20 @ 13:13), Max: 99.7 (01-19 @ 19:39)  HR: 98 (01-20 @ 13:13) (92 - 119)  BP: 134/61 (01-20 @ 13:13) (122/56 - 156/75)  RR: 18 (01-20 @ 13:13) (18 - 18)  SpO2: --    LABS:                        7.6    17.66 )-----------( 325      ( 20 Jan 2022 04:30 )             24.9     01-20    133<L>  |  101  |  15  ----------------------------<  162<H>  4.0   |  18  |  0.7    Ca    7.8<L>      20 Jan 2022 04:30  Mg     1.5     01-20    TPro  4.9<L>  /  Alb  2.1<L>  /  TBili  0.3  /  DBili  x   /  AST  64<H>  /  ALT  36  /  AlkPhos  206<H>  01-20        Culture - Sputum (collected 17 Jan 2022 18:50)  Source: .Sputum Sputum  Gram Stain:    Moderate polymorphonuclear leukocytes per low power field    Rare Squamous epithelial cells per low power field    No organisms seen per oil power field  Final Report:    Normal Respiratory Keiko present    Culture - Urine (collected 17 Jan 2022 18:03)  Source: Clean Catch Clean Catch (Midstream)  Final Report:    No growth        RADIOLOGY:   Reviewed  RUQ US cholelithiasis      PHYSICAL EXAM:  General: Comfortably laying on the hospital bed. NAD. AAOx3.  HEENT: Atraumatic. Normocephalic. EOMI. Conjunctiva and sclera clear.  Cardiac: Normal S1, S2. RRR. No murmurs, rubs, or gallops.  Pulm: CTA b/l no wheezes, rhonchi, or rales.  GI: Soft, nontender, nondistended. BSx4q  Ext: 2+ pulses. Moving all 4 extremities. No edema, cyanosis.  Neuro: CN II-XII grossly intact  Skin: No lesions or rashes

## 2022-01-20 NOTE — CONSULT NOTE ADULT - ATTENDING COMMENTS
81 year old  male with hx of recurrent febrile illness and antibiotic use seen for elevated liver tests.  Hx of similar bout in November and acute cholecystitis was considered but felt unlikely.   No abdominal pain.  Abdomen soft non tender no hepatomegaly.  AST 2 x ULN ALT normal ALP 1.5 x ULN  Acute viral hepatitis auto immune testing negative.  US abdomen normal liver.   Mixed pattern of liver injury likely DILI due to antibiotics.   More likely cefuroxime than doxycycline   Consider CT chest and abdomen to evaluate febrile illness.   Monitor liver tests. 81 year old  male with hx of recurrent febrile illness and antibiotic use seen for elevated liver tests.  Hx of similar bout in November and acute cholecystitis was considered but felt unlikely.   No abdominal pain.  Abdomen soft non tender no hepatomegaly.  AST 2 x ULN ALT 2 x ULN initially but now normal ALP 1.5 x ULN  Acute viral hepatitis auto immune testing negative.  US abdomen normal liver.   Mixed pattern of liver injury likely DILI due to antibiotics.   More likely cefuroxime than doxycycline   Consider CT chest and abdomen to evaluate febrile illness.   Monitor liver tests.

## 2022-01-20 NOTE — CONSULT NOTE ADULT - SUBJECTIVE AND OBJECTIVE BOX
Gastroenterology Consultation:    Patient is a 81y old  Male who presents with a chief complaint of fever (20 Jan 2022 13:54)      Admitted on: 01-17-22  HPI:  Patient is an 81 year old Male with past medical history of HTN, HLD, DM, chronic absestos lung disease  ( w/ bilateral calcified plaques) presenting from NH w/ fever and baseline cough. + associated w/ weakness and a decline in energy levels. per hx from family and NH paperwork pt is currently being treated oupt for presumed PNA as he has had fever and worsening leukocytosis for the past several days.  He is currently on a 7 day course of Doxycycline and cefuroxime ( started on 1/11/2022 to be completed on 1/18/2022, last dose today )  but per family has been on multiple abx courses over the past several weeks all for presumed PNA.   Admitted to medicine for work up for fever.   GI was called for elevated LFT.       Prior EGD: None on chart   Prior Colonoscopy: None on chart       PAST MEDICAL & SURGICAL HISTORY:  Hypertension    Diabetes mellitus        FAMILY HISTORY:  No pertinent family history in first degree relatives        Social History:  Tobacco: N  Alcohol: N  Drugs: N    Home Medications:  atenolol 50 mg oral tablet: 1 tab(s) orally once a day (17 Jan 2022 01:45)  glyburide-metformin 2.5 mg-500 mg oral tablet: 1 tab(s) orally once a day (17 Jan 2022 01:45)  lisinopril 20 mg oral tablet: 1 tab(s) orally once a day (17 Jan 2022 01:45)  simvastatin 40 mg oral tablet: 1 tab(s) orally once a day (at bedtime) (17 Jan 2022 01:45)    MEDICATIONS  (STANDING):  ascorbic acid 500 milliGRAM(s) Oral daily  ATENolol  Tablet 50 milliGRAM(s) Oral daily  cefTRIAXone   IVPB 1000 milliGRAM(s) IV Intermittent every 24 hours  dextrose 40% Gel 15 Gram(s) Oral once  dextrose 5%. 1000 milliLiter(s) (100 mL/Hr) IV Continuous <Continuous>  dextrose 5%. 1000 milliLiter(s) (50 mL/Hr) IV Continuous <Continuous>  dextrose 50% Injectable 25 Gram(s) IV Push once  dextrose 50% Injectable 12.5 Gram(s) IV Push once  dextrose 50% Injectable 25 Gram(s) IV Push once  enoxaparin Injectable 40 milliGRAM(s) SubCutaneous daily  glucagon  Injectable 1 milliGRAM(s) IntraMuscular once  guaifenesin/dextromethorphan Oral Liquid 10 milliLiter(s) Oral every 6 hours  insulin lispro (ADMELOG) corrective regimen sliding scale   SubCutaneous three times a day before meals  lisinopril 20 milliGRAM(s) Oral daily  magnesium sulfate  IVPB 2 Gram(s) IV Intermittent every 2 hours  metroNIDAZOLE    Tablet 500 milliGRAM(s) Oral every 8 hours  multivitamin 1 Tablet(s) Oral daily  simvastatin 40 milliGRAM(s) Oral at bedtime  sodium chloride 0.9%. 1000 milliLiter(s) (75 mL/Hr) IV Continuous <Continuous>  zinc sulfate 220 milliGRAM(s) Oral daily    MEDICATIONS  (PRN):  acetaminophen     Tablet .. 650 milliGRAM(s) Oral every 6 hours PRN Temp greater or equal to 38C (100.4F), Mild Pain (1 - 3)  aluminum hydroxide/magnesium hydroxide/simethicone Suspension 30 milliLiter(s) Oral every 4 hours PRN Dyspepsia  melatonin 3 milliGRAM(s) Oral at bedtime PRN Insomnia  ondansetron Injectable 4 milliGRAM(s) IV Push every 8 hours PRN Nausea and/or Vomiting      Allergies  No Known Allergies      Review of Systems:   Constitutional:  No Fever, No Chills  ENT/Mouth:  No Hearing Changes,  No Difficulty Swallowing  Eyes:  No Eye Pain, No Vision Changes  Cardiovascular:  No Chest Pain, No Palpitations  Respiratory:  No Cough, No Dyspnea  Gastrointestinal:  As described in HPI  Musculoskeletal:  No Joint Swelling, No Back Pain  Skin:  No Skin Lesions, No Jaundice  Neuro:  No Syncope, No Dizziness  Heme/Lymph:  No Bruising, No Bleeding.          Physical Examination:  T(C): 35.9 (01-20-22 @ 13:13), Max: 37.6 (01-19-22 @ 19:39)  HR: 98 (01-20-22 @ 13:13) (92 - 119)  BP: 134/61 (01-20-22 @ 13:13) (122/56 - 156/75)  RR: 18 (01-20-22 @ 13:13) (18 - 18)  SpO2: --      01-18-22 @ 07:01  -  01-19-22 @ 07:00  --------------------------------------------------------  IN: 240 mL / OUT: 0 mL / NET: 240 mL    01-19-22 @ 07:01  -  01-20-22 @ 07:00  --------------------------------------------------------  IN: 225 mL / OUT: 0 mL / NET: 225 mL    01-20-22 @ 07:01  -  01-20-22 @ 16:45  --------------------------------------------------------  IN: 240 mL / OUT: 0 mL / NET: 240 mL        Constitutional: No acute distress.  Eyes:. Conjunctivae are clear, Sclera is non-icteric.  Ears Nose and Throat: The external ears are normal appearing,  Oral mucosa is pink and moist.  Respiratory:  No signs of respiratory distress. Lung sounds are clear bilaterally.  Cardiovascular:  S1 S2, Regular rate and rhythm.  GI: Abdomen is soft, symmetric, and non-tender without distention.   Neuro: No Tremor, No involuntary movements  Skin: No rashes, No Jaundice.          Data:                        7.6    17.66 )-----------( 325      ( 20 Jan 2022 04:30 )             24.9     Hgb Trend:  7.6  01-20-22 @ 04:30  8.2  01-19-22 @ 08:10  8.9  01-18-22 @ 17:23        01-20    133<L>  |  101  |  15  ----------------------------<  162<H>  4.0   |  18  |  0.7    Ca    7.8<L>      20 Jan 2022 04:30  Mg     1.5     01-20    TPro  4.9<L>  /  Alb  2.1<L>  /  TBili  0.3  /  DBili  x   /  AST  64<H>  /  ALT  36  /  AlkPhos  206<H>  01-20    Liver panel trend:  TBili 0.3   /   AST 64   /   ALT 36   /   AlkP 206   /   Tptn 4.9   /   Alb 2.1    /   DBili --      01-20  TBili 0.4   /   AST 57   /   ALT 34   /   AlkP 204   /   Tptn 5.2   /   Alb 2.0    /   DBili --      01-19  TBili 0.3   /   AST 63   /   ALT 41   /   AlkP 226   /   Tptn 5.6   /   Alb 2.2    /   DBili --      01-18  TBili 0.6   /   AST 75   /   ALT 58   /   AlkP 250   /   Tptn 6.2   /   Alb 2.3    /   DBili --      01-17  TBili 0.4   /      /   ALT 74   /   AlkP 286   /   Tptn 6.4   /   Alb 2.5    /   DBili --      01-16          Culture - Sputum (collected 17 Jan 2022 18:50)  Source: .Sputum Sputum  Gram Stain (18 Jan 2022 07:33):    Moderate polymorphonuclear leukocytes per low power field    Rare Squamous epithelial cells per low power field    No organisms seen per oil power field  Final Report (19 Jan 2022 17:31):    Normal Respiratory Keiko present    Culture - Urine (collected 17 Jan 2022 18:03)  Source: Clean Catch Clean Catch (Midstream)  Final Report (18 Jan 2022 17:48):    No growth          Radiology:  < from: US Abdomen Upper Quadrant Right (01.18.22 @ 11:33) >  FINDINGS:    Liver: Within normal limits.  Bile ducts: Normal caliber. Common bile duct measures 4 mm.  Gallbladder: Stones are seen within the gallbladder.  Pancreas: Visualized portions are within normal limits.  Right kidney: 10.2 cm. No hydronephrosis.  Ascites: None.  IVC: Visualized portions are within normal limits.    IMPRESSION:    Cholelithiasis without ductal dilatation.    < end of copied text >

## 2022-01-20 NOTE — PROGRESS NOTE ADULT - ATTENDING COMMENTS
***My note supersedes any discrepancies that may be above in the resident's note****    81 year old Male with past medical history of HTN, HLD, DM, chronic asbestos lung disease, presenting from NH w/ fever and baseline cough associated w/ weakness and a decline in energy levels in the setting of leukocytosis, tachycardia, fevers, and transaminitis. Remains afebrile. Remains tachycardic    #Persistent fever  #Cough, R/O PNA  #Transaminitis  # Leukocytosis  - fever curve improving and currently afebrile and HDS satting well on RA  - WBC 18k-->16k-->15k-->17  - , ALT 74-->downtrending and approaching normal limits  - UA negative.   - CT head negative. CXR presence of bilateral opacities.   - NM Hepatobiliary Imaging- NO DEFINITE VISUALIZATION OF THE GALLBLADDER THROUGH 4 HOURS POST-INJECTION, CONSISTENT WITH CHOLECYSTITIS  - US Abdomen Upper Quadrant Right- Cholelithiasis without cholecystitis.  - unsure etiology at this time but given the chest imaging findings will treat for pneumonia?  - Pip/Tazo for now  - Fu blood culture, ucx, crp, procal   - TTE, r/o IE.   - Consider CT chest/abd/pelvis if fever doesn't resolve, ro abscess, ro necrotizing/ pneumonia   - ID consult      follow-up blood cultures      continue zosyn 3.375 g q 8 hours   - GI consult placed and no concerns noted that the gall bladder is etiology  - awaiting ID follow up for further recommendations.     # HAGMA, RESOLVED  - increased to 21 today-->14  - possible 2/2 to ketones in the setting of decreased po intake  - continue to monitor  - nutrition consult placed    # Essential Hypertension    - Monitor Vital Signs    - DASH diet   - Continue with Atenolol , Lisinopril    # Mixed Hyperlipidemia  - DASH diet   - Continue with home Simvastatin     # Diabetes Mellitus  - Monitor Fingersticks  - Diabetic carbohydrate consistent diet   - Glyburide-Metformin is non formulary, will give insulin sliding scale     Activity: As tolerated  Diet: DASH  Dispo: Acute  DVT ppx: Lovenox  Full code    #Progress Note Handoff  Pending (specify):  ID follow up  Family discussion: patient updated and in agreement of plan  Disposition: Unknown at this time________ .

## 2022-01-21 NOTE — PROGRESS NOTE ADULT - SUBJECTIVE AND OBJECTIVE BOX
MIGUEL ANGEL GARZA MRN#: [id number]  CODE STATUS: FULL CODE     SUBJECTIVE   Chief complaint: fever    Currently admitted to medicine with the primary diagnosis of FEVER;LEUKOCYTOSIS      Today is hospital day 4d,   INTERVAL HPI/OVERNIGHT EVENTS: No acute events overnight    This morning, he is laying in bed comfortably. Denies chest pain, shortness of breath, abdominal pain, nausea, vomiting or changes in bowel habits. He has no complaints today.     Urinating and stooling appropriately.    Present Today:           Colbert Catheter (x)No/ ()Yes?   Indication:             Central Line (x)No/ ()Yes?  Indication:          IV Fluids (x)No/ ()Yes?  Indication:     OBJECTIVE  PAST MEDICAL & SURGICAL HISTORY  Hypertension    Diabetes mellitus      ALLERGIES:  No Known Allergies    HOME MEDICATIONS:  Home Medications:  atenolol 50 mg oral tablet: 1 tab(s) orally once a day (17 Jan 2022 01:45)  glyburide-metformin 2.5 mg-500 mg oral tablet: 1 tab(s) orally once a day (17 Jan 2022 01:45)  lisinopril 20 mg oral tablet: 1 tab(s) orally once a day (17 Jan 2022 01:45)  simvastatin 40 mg oral tablet: 1 tab(s) orally once a day (at bedtime) (17 Jan 2022 01:45)    MEDICATIONS:  STANDING MEDICATIONS  MEDICATIONS  (STANDING):  ascorbic acid 500 milliGRAM(s) Oral daily  ATENolol  Tablet 50 milliGRAM(s) Oral daily  cefTRIAXone   IVPB 1000 milliGRAM(s) IV Intermittent every 24 hours  dextrose 40% Gel 15 Gram(s) Oral once  dextrose 5%. 1000 milliLiter(s) (50 mL/Hr) IV Continuous <Continuous>  dextrose 5%. 1000 milliLiter(s) (100 mL/Hr) IV Continuous <Continuous>  dextrose 50% Injectable 25 Gram(s) IV Push once  dextrose 50% Injectable 12.5 Gram(s) IV Push once  dextrose 50% Injectable 25 Gram(s) IV Push once  enoxaparin Injectable 40 milliGRAM(s) SubCutaneous daily  glucagon  Injectable 1 milliGRAM(s) IntraMuscular once  insulin lispro (ADMELOG) corrective regimen sliding scale   SubCutaneous three times a day before meals  lisinopril 20 milliGRAM(s) Oral daily  metroNIDAZOLE    Tablet 500 milliGRAM(s) Oral every 8 hours  multivitamin 1 Tablet(s) Oral daily  simvastatin 40 milliGRAM(s) Oral at bedtime  sodium chloride 0.9%. 1000 milliLiter(s) (75 mL/Hr) IV Continuous <Continuous>  zinc sulfate 220 milliGRAM(s) Oral daily    PRN MEDICATIONS  MEDICATIONS  (PRN):  acetaminophen     Tablet .. 650 milliGRAM(s) Oral every 6 hours PRN Temp greater or equal to 38C (100.4F), Mild Pain (1 - 3)  aluminum hydroxide/magnesium hydroxide/simethicone Suspension 30 milliLiter(s) Oral every 4 hours PRN Dyspepsia  melatonin 3 milliGRAM(s) Oral at bedtime PRN Insomnia  ondansetron Injectable 4 milliGRAM(s) IV Push every 8 hours PRN Nausea and/or Vomiting      VITAL SIGNS  T(F): 99.7 (01-21 @ 05:31), Max: 99.7 (01-21 @ 05:31)  HR: 102 (01-21 @ 05:31) (91 - 102)  BP: 139/65 (01-21 @ 05:31) (134/61 - 149/65)  RR: 18 (01-21 @ 05:31) (18 - 18)  SpO2: --    LABS:                        7.6    17.66 )-----------( 325      ( 20 Jan 2022 04:30 )             24.9     01-20    133<L>  |  101  |  15  ----------------------------<  162<H>  4.0   |  18  |  0.7    Ca    7.8<L>      20 Jan 2022 04:30  Mg     1.5     01-20    TPro  4.9<L>  /  Alb  2.1<L>  /  TBili  0.3  /  DBili  x   /  AST  64<H>  /  ALT  36  /  AlkPhos  206<H>  01-20      RADIOLOGY:   Reviewed  RUQ US cholelithiasis, no cholecystitis  prior HIDA cholecystitis       PHYSICAL EXAM:  General: Comfortably laying on the hospital bed. NAD. AAOx3.  HEENT: Atraumatic. Normocephalic. EOMI. Conjunctiva and sclera clear.  Cardiac: Normal S1, S2. RRR. No murmurs, rubs, or gallops.  Pulm: CTA b/l no wheezes, rhonchi, or rales.  GI: Soft, nontender, nondistended. BSx4q  Ext: 2+ pulses. Moving all 4 extremities. No edema, cyanosis.  Neuro: CN II-XII grossly intact  Skin: No lesions or rashes

## 2022-01-21 NOTE — PROGRESS NOTE ADULT - ASSESSMENT
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 + associated w/ weakness and a decline in energy levels. per hx from family and NH paperwork pt is currently being treated oupt for presumed PNA as he has had fever and worsening leukocytosis for the past several days.      # Fever of unknown origin, resolved  # Low suspicion for PNA  # Chronic asbestos lung disease  - CXR w/ b/l opacities  - febrile on admission, now afebrile. Leukocytosis, initially improved now worsening  - chronic cough at baseline, however pt reports improvement in cough since admission  - BCx x2 NGTD, UCx NG, Sputum Cx normal resp ric, Procal 0.36  - s/p ID eval - unclear etiology of fever, low suspicion for PNA, possible cholecystitis  - d/c zosyn, start ceftriaxone 1g qd, flagyl PO 500mg TID per ID  - GI consult pending    # Transaminitis, cholestatic pattern, improving  - AST 64, ALT 36, ,   - HIDA scan 11/2021 suspicious for cholecystitis  - abdominal exam benign  - RUQ U/S w/ cholelithiasis w/o ductal dilatation  - continues to improve, however may consider imaging if worsens  - GI consult pending for ?erum, transaminitis    # High AG w/o acidosis, resolved  - AG increased from 15 to 21, now 14  - Bicarb remains wnl 18  - possibly related to decreased PO intake in setting of acute illness  - cont to trend    # HLD  - cont home simvastatin 40mg qhs    # DM2  - FS: 259, 189, 199, 169  - cont ISS  - FS qac, qhs    # HTN  - BP: 134/61 (122/56 - 156/75)  - cont atenolol, lisinopril    PT/REHAB: PT  ACTIVITY: Activity - Ambulate with Assistance  DVT PPX: enoxaparin Injectable  GI PPX:  Not indicated  DIET: Diet, Consistent Carbohydrate w/Evening Snack  CODE: Full code   Disposition: from NH; acute  Pending: GI consult   ASSESSMENT & PLAN  Patient is an 81 year old Male with past medical history of HTN, HLD, DM, chronic absestos lung disease  ( w/ bilateral calcified plaques) presenting from NH w/ fever and baseline cough. + associated w/ weakness and a decline in energy levels. per hx from family and NH paperwork pt is currently being treated oupt for presumed PNA as he has had fever and worsening leukocytosis for the past several days.      # Fever of unknown origin, resolved  # Low suspicion for PNA  # Chronic asbestos lung disease  - CXR w/ b/l opacities  - febrile on admission, now afebrile. Leukocytosis, initially improved now worsening  - chronic cough at baseline, however pt reports improvement in cough since admission  - BCx x2 NGTD, UCx NG, Sputum Cx normal resp ric, Procal 0.36  - s/p ID eval - unclear etiology of fever, low suspicion for PNA, possible cholecystitis  - cont ceftriaxone 1g qd, flagyl PO 500mg TID per ID  - s/p hepatology eval - rec obtain HIDA, poss CT A/P to evaluate source of fever, leukocytosis    # Transaminitis, mixed pattern, improving  - AST 64, ALT 36, ,   - HIDA scan 11/2021 suspicious for cholecystitis  - abdominal exam benign  - RUQ U/S w/ cholelithiasis w/o ductal dilatation  - continues to improve, however may consider imaging if worsens  - s/p hepatology eval - suspect DILI from cefuroxime  - INR 1.55->1.54, cont to trend    # High AG w/o acidosis, resolved  - AG increased from 15 to 21, now 14  - Bicarb remains wnl 18  - possibly related to decreased PO intake in setting of acute illness  - cont to trend    # HLD  - cont home simvastatin 40mg qhs  - consider holding statin for now in setting of DILI    # DM2  - FS: 170, 189, 180, 259  - cont ISS  - FS qac, qhs    # HTN  - BP: 139/65 (134/61 - 149/65)  - cont atenolol, lisinopril    PT/REHAB: PT  ACTIVITY: Activity - Ambulate with Assistance  DVT PPX: enoxaparin Injectable  GI PPX:  Not indicated  DIET: Diet, Consistent Carbohydrate w/Evening Snack  CODE: Full code   Disposition: from NH; acute  Pending: HIDA, 11 AM Labs

## 2022-01-21 NOTE — PROGRESS NOTE ADULT - ASSESSMENT
81 year old Male with past medical history of HTN, HLD, DM, chronic absestos lung disease  ( w/ bilateral calcified plaques) presenting from NH w/ fever and baseline   cough withe generalized malaise     #Fevers - possibly from cholecystitis/bilary pathology?  - RVP 1/16 negative   - BLood CX 1/16 NG  - Urine Cx 1/16 NG     #Chronic asbestos Lung disease    #Transaminitis - Cholecystitis- HIDA scan positive 11/19/2021  - NM Hepatobiliary Imaging (11.19.21 @ 16:45): NO DEFINITE VISUALIZATION OF THE GALLBLADDER THROUGH 4 HOURS POST-INJECTION, CONSISTENT WITH CHOLECYSTITIS, AS DESCRIBED ABOVE. CLINICAL CORRELATION IS SUGGESTED.  -  US Abdomen Upper Quadrant Right (01.18.22 @ 11:33): Cholelithiasis without ductal dilatation.    Recommendations  - LFTs and fever curve improving from admission   - ceftriaxone 1g daily and PO flagyl 500 mg TID   - appreciate GI evaluation   - repeat HIDA scan     I will be unavailable by [employer] over the weekend.   Can reach via Teams Messenger.   For urgent matters, please call ID attending on call.

## 2022-01-21 NOTE — PROGRESS NOTE ADULT - ATTENDING COMMENTS
***My note supersedes any discrepancies that may be above in the resident's note****    81 year old Male with past medical history of HTN, HLD, DM, chronic asbestos lung disease, presenting from NH w/ fever and baseline cough associated w/ weakness and a decline in energy levels in the setting of leukocytosis, tachycardia, fevers, and transaminitis. Remains afebrile. Remains tachycardic    #Persistent fever  #Cough, R/O PNA  #Transaminitis  # Leukocytosis  - fever curve improving and currently afebrile and HDS satting well on RA  - WBC 18k-->16k-->15k-->17k; pending this AM  - , ALT 74-->downtrending and approaching normal limits  - UA negative.   - CT head negative. CXR presence of bilateral opacities.   - NM Hepatobiliary Imaging- NO DEFINITE VISUALIZATION OF THE GALLBLADDER THROUGH 4 HOURS POST-INJECTION, CONSISTENT WITH CHOLECYSTITIS  - US Abdomen Upper Quadrant Right- Cholelithiasis without cholecystitis.  - unsure etiology at this time but given the chest imaging findings will treat for pneumonia?  - Pip/Tazo for now  - Fu blood culture, ucx, crp, procal   - TTE, r/o IE.   - Consider CT chest/abd/pelvis if fever doesn't resolve, ro abscess, ro necrotizing/ pneumonia   - ID consult      follow-up blood cultures      continue zosyn 3.375 g q 8 hours   - GI consult placed and no concerns noted that the gall bladder is etiology, however ID spoke to GI about atypical presentation of acute erum. agreed to obtain a HIDA scan to asses further.  - awaiting ID follow up for further recommendations.     # HAGMA, RESOLVED  - increased to 21-->14  - possible 2/2 to ketones in the setting of decreased po intake  - continue to monitor  - nutrition consult placed    # Essential Hypertension    - Monitor Vital Signs    - DASH diet   - Continue with Atenolol , Lisinopril    # Mixed Hyperlipidemia  - DASH diet   - Continue with home Simvastatin     # Diabetes Mellitus  - Monitor Fingersticks  - Diabetic carbohydrate consistent diet   - Glyburide-Metformin is non formulary, will give insulin sliding scale     Activity: As tolerated  Diet: DASH  Dispo: Acute  DVT ppx: Lovenox  Full code    #Progress Note Handoff  Pending (specify):  ID follow up, HIDA scan today. GI follow up  Family discussion: patient updated and in agreement of plan  Disposition: Unknown at this time________ .

## 2022-01-21 NOTE — PROGRESS NOTE ADULT - SUBJECTIVE AND OBJECTIVE BOX
KAYLAMIGUEL ANGEL  81y, Male  Allergy: No Known Allergies      LOS  4d    CHIEF COMPLAINT: fever (21 Jan 2022 11:15)      INTERVAL EVENTS/HPI  - No acute events overnight  - T(F): , Max: 99.7 (01-21-22 @ 05:31)  - Denies any worsening symptoms  - Tolerating medication  - WBC Count: 17.66 (01-20-22 @ 04:30)  WBC Count: 15.47 (01-19-22 @ 08:10)     - Creatinine, Serum: 0.7 (01-20-22 @ 04:30)       ROS  General: Denies rigors, nightsweats  HEENT: Denies headache, rhinorrhea, sore throat, eye pain  CV: Denies CP, palpitations  PULM: Denies wheezing, hemoptysis  GI: Denies hematemesis, hematochezia, melena  : Denies discharge, hematuria  MSK: Denies arthralgias, myalgias  SKIN: Denies rash, lesions  NEURO: Denies paresthesias, weakness  PSYCH: Denies depression, anxiety    VITALS:  T(F): 99.7, Max: 99.7 (01-21-22 @ 05:31)  HR: 102  BP: 139/65  RR: 18Vital Signs Last 24 Hrs  T(C): 37.6 (21 Jan 2022 05:31), Max: 37.6 (21 Jan 2022 05:31)  T(F): 99.7 (21 Jan 2022 05:31), Max: 99.7 (21 Jan 2022 05:31)  HR: 102 (21 Jan 2022 05:31) (91 - 102)  BP: 139/65 (21 Jan 2022 05:31) (139/65 - 149/65)  BP(mean): --  RR: 18 (21 Jan 2022 05:31) (18 - 18)  SpO2: --    PHYSICAL EXAM:  Gen: NAD, resting in bed  HEENT: Normocephalic, atraumatic  Neck: supple, no lymphadenopathy  CV: Regular rate & regular rhythm  Lungs: decreased BS at bases, no fremitus  Abdomen: Soft, BS present  Ext: Warm, well perfused  Neuro: non focal, awake  Skin: no rash, no erythema  Lines: no phlebitis    FH: Non-contributory  Social Hx: Non-contributory    TESTS & MEASUREMENTS:                        7.6    17.66 )-----------( 325      ( 20 Jan 2022 04:30 )             24.9     01-20    133<L>  |  101  |  15  ----------------------------<  162<H>  4.0   |  18  |  0.7    Ca    7.8<L>      20 Jan 2022 04:30  Mg     1.5     01-20    TPro  4.9<L>  /  Alb  2.1<L>  /  TBili  0.3  /  DBili  x   /  AST  64<H>  /  ALT  36  /  AlkPhos  206<H>  01-20      LIVER FUNCTIONS - ( 20 Jan 2022 04:30 )  Alb: 2.1 g/dL / Pro: 4.9 g/dL / ALK PHOS: 206 U/L / ALT: 36 U/L / AST: 64 U/L / GGT: x               Culture - Sputum (collected 01-17-22 @ 18:50)  Source: .Sputum Sputum  Gram Stain (01-18-22 @ 07:33):    Moderate polymorphonuclear leukocytes per low power field    Rare Squamous epithelial cells per low power field    No organisms seen per oil power field  Final Report (01-19-22 @ 17:31):    Normal Respiratory Keiko present    Culture - Urine (collected 01-17-22 @ 18:03)  Source: Clean Catch Clean Catch (Midstream)  Final Report (01-18-22 @ 17:48):    No growth    Culture - Blood (collected 01-17-22 @ 11:00)  Source: .Blood Blood  Preliminary Report (01-18-22 @ 22:02):    No growth to date.    Culture - Blood (collected 01-17-22 @ 04:30)  Source: .Blood Blood  Preliminary Report (01-18-22 @ 14:01):    No growth to date.    Culture - Urine (collected 01-16-22 @ 23:38)  Source: Clean Catch Clean Catch (Midstream)  Final Report (01-18-22 @ 06:10):    No growth    Culture - Blood (collected 01-16-22 @ 20:32)  Source: .Blood Blood-Peripheral  Preliminary Report (01-18-22 @ 06:01):    No growth to date.    Culture - Blood (collected 01-16-22 @ 20:32)  Source: .Blood Blood-Peripheral  Preliminary Report (01-18-22 @ 06:01):    No growth to date.        Lactate, Blood: 1.0 mmol/L (01-16-22 @ 20:33)      INFECTIOUS DISEASES TESTING  Procalcitonin, Serum: 0.36 (01-17-22 @ 04:30)  Rapid RVP Result: NotDetec (01-16-22 @ 20:54)  COVID-19 PCR: NotDetec (11-26-21 @ 11:19)  Procalcitonin, Serum: 0.33 (11-24-21 @ 07:20)  Procalcitonin, Serum: 0.35 (11-22-21 @ 06:26)  Procalcitonin, Serum: 0.38 (11-18-21 @ 09:58)  COVID-19 PCR: NotDetec (11-18-21 @ 08:50)  MRSA PCR Result.: Negative (11-15-21 @ 13:30)  Procalcitonin, Serum: 0.22 (11-15-21 @ 06:59)  Rapid RVP Result: NotDetec (11-14-21 @ 09:55)      INFLAMMATORY MARKERS  C-Reactive Protein, Serum: 194 mg/L (01-17-22 @ 04:30)      RADIOLOGY & ADDITIONAL TESTS:  I have personally reviewed the last available Chest xray  CXR      CT      CARDIOLOGY TESTING  12 Lead ECG:   Ventricular Rate 109 BPM    Atrial Rate 109 BPM    P-R Interval 118 ms    QRS Duration 74 ms    Q-T Interval 328 ms    QTC Calculation(Bazett) 441 ms    P Axis 45 degrees    R Axis 33 degrees    T Axis 4 degrees    Diagnosis Line Sinus tachycardia  Minimal voltage criteria for LVH, may be normal variant  Inferior infarct , age undetermined  Abnormal ECG    Confirmed by Ashley Vick MD ([id number]) on 1/17/2022 10:36:38 AM (01-16-22 @ 21:50)      MEDICATIONS  ascorbic acid 500 Oral daily  ATENolol  Tablet 50 Oral daily  cefTRIAXone   IVPB 1000 IV Intermittent every 24 hours  dextrose 40% Gel 15 Oral once  dextrose 5%. 1000 IV Continuous <Continuous>  dextrose 5%. 1000 IV Continuous <Continuous>  dextrose 50% Injectable 25 IV Push once  dextrose 50% Injectable 12.5 IV Push once  dextrose 50% Injectable 25 IV Push once  enoxaparin Injectable 40 SubCutaneous daily  glucagon  Injectable 1 IntraMuscular once  insulin lispro (ADMELOG) corrective regimen sliding scale  SubCutaneous three times a day before meals  lisinopril 20 Oral daily  metroNIDAZOLE    Tablet 500 Oral every 8 hours  multivitamin 1 Oral daily  simvastatin 40 Oral at bedtime  zinc sulfate 220 Oral daily      WEIGHT  Weight (kg): 66.2 (01-16-22 @ 20:20)      ANTIBIOTICS:  cefTRIAXone   IVPB 1000 milliGRAM(s) IV Intermittent every 24 hours  metroNIDAZOLE    Tablet 500 milliGRAM(s) Oral every 8 hours      All available historical records have been reviewed

## 2022-01-21 NOTE — CHART NOTE - NSCHARTNOTEFT_GEN_A_CORE
Spoke w/ pt's spouse Dorcas regarding pt's current status, pending HIDA scan, and current plan including abx vs surgery consult if HIDA scan reveals cholecystitis. Reports concern that even after abx during prior hospitalization, pt's fevers and WBC count never fully resolved.    She expressed understanding and agreement in plan.    --  Jayme Chew, PGY-1  Department of Medicine

## 2022-01-22 NOTE — PROGRESS NOTE ADULT - ASSESSMENT
1 year old Male with past medical history of HTN, HLD, DM, chronic asbestos lung disease, presenting from NH w/ fever and baseline cough associated w/ weakness and a decline in energy levels in the setting of leukocytosis, tachycardia, fevers, and transaminitis. Remains afebrile. Remains tachycardic    #Persistent fever  #Cough, R/O PNA  #Transaminitis  # Leukocytosis  - fever curve improving and currently afebrile and   - WBC 18k-->16k-->15k-->17k-->20k  - , ALT 74-->downtrending and approaching normal limits  - UA negative.   - CT head negative. CXR presence of bilateral opacities.   - US Abdomen Upper Quadrant Right- Cholelithiasis without cholecystitis  - HIDA scan as above- no acute pathology  - unsure etiology at this time  - Pip/Tazo for now  - Fu blood culture, ucx, crp, procal   - TTE, r/o IE.   - ID consult      follow-up blood cultures      continue zosyn 3.375 g q 8 hours   - white count worsening but fevers absent for last 48hrs. Re-engage with ID today about further work up      # HAGMA, RESOLVED  - increased to 21-->14  - possible 2/2 to ketones in the setting of decreased po intake  - continue to monitor  - nutrition consult placed    # Essential Hypertension    - Monitor Vital Signs    - DASH diet   - Continue with Atenolol , Lisinopril    # Mixed Hyperlipidemia  - DASH diet   - Continue with home Simvastatin       # Diabetes Mellitus  - Monitor Fingersticks  - Diabetic carbohydrate consistent diet   - Glyburide-Metformin is non formulary, will give insulin sliding scale     Activity: As tolerated  Diet: DASH  Dispo: Acute  DVT ppx: Lovenox  Full code    #Progress Note Handoff  Pending (specify):  ID follow up  Family discussion: patient updated and in agreement of plan  Disposition: Unknown at this time________ .

## 2022-01-22 NOTE — OCCUPATIONAL THERAPY INITIAL EVALUATION ADULT - VISUAL ASSESSMENT: TRACKING
Pt was able to track target in all planes excluding superior field binocularly, decreased visual attention noted during session to L peripheral field, will continue to assess

## 2022-01-22 NOTE — PHYSICAL THERAPY INITIAL EVALUATION ADULT - PERTINENT HX OF CURRENT PROBLEM, REHAB EVAL
81 year old Male with past medical history of HTN, HLD, DM, chronic asbestos lung disease, presenting from NH w/ fever and baseline cough associated w/ weakness and a decline in energy levels in the setting of leukocytosis, tachycardia, fevers, and transaminitis. Remains afebrile. Remains tachycardic

## 2022-01-22 NOTE — OCCUPATIONAL THERAPY INITIAL EVALUATION ADULT - IMPAIRMENTS CONTRIBUTING IMPAIRED BED MOBILITY, REHAB EVAL
impaired balance/cognition/impaired coordination/decreased flexibility/impaired motor control/decreased ROM/decreased strength

## 2022-01-22 NOTE — OCCUPATIONAL THERAPY INITIAL EVALUATION ADULT - RANGE OF MOTION EXAMINATION, UPPER EXTREMITY
except b/l shoulders ~90* flexion with +facial grimacing at end range/bilateral UE Active ROM was WFL  (within functional limits)

## 2022-01-22 NOTE — OCCUPATIONAL THERAPY INITIAL EVALUATION ADULT - PERTINENT HX OF CURRENT PROBLEM, REHAB EVAL
Pt is a right hand dominant male admitted 1/17 from NH with c/o fever and nd baseline cough. + associated w/ weakness and a decline in energy levels. per hx from family and NH paperwork pt is currently being treated oupt for presumed PNA as he has had fever and worsening leukocytosis for the past several days.

## 2022-01-22 NOTE — OCCUPATIONAL THERAPY INITIAL EVALUATION ADULT - BED MOBILITY LIMITATIONS, REHAB EVAL
pt unable to maintain static sitting balance unsupported +lean to R/decreased ability to use arms for pushing/pulling/decreased ability to use legs for bridging/pushing

## 2022-01-22 NOTE — PROGRESS NOTE ADULT - SUBJECTIVE AND OBJECTIVE BOX
MIGUEL ANGEL GARZA  81y  Male      Patient is a 81y old  Male who presents with a chief complaint of fever (21 Jan 2022 16:30)      INTERVAL HPI/OVERNIGHT EVENTS: no acute events overnight. no fevers. no complaints of abdominal pain or any other issues. no nursing concerns.       REVIEW OF SYSTEMS:  CONSTITUTIONAL: No fever, weight loss, or fatigue  RESPIRATORY: No cough, wheezing, chills or hemoptysis; No shortness of breath  CARDIOVASCULAR: No chest pain, palpitations, dizziness, or leg swelling  GASTROINTESTINAL: No abdominal or epigastric pain. No nausea, vomiting, or hematemesis; No diarrhea or constipation. No melena or hematochezia.  GENITOURINARY: No dysuria, frequency, hematuria, or incontinence  NEUROLOGICAL: No headaches, memory loss, loss of strength, numbness, or tremors  SKIN: No itching, burning, rashes, or lesions   MUSCULOSKELETAL: No joint pain or swelling; No muscle, back, or extremity pain  PSYCHIATRIC: No depression, anxiety, mood swings, or difficulty sleeping  All other review of systems negative    VITALS  T(C): 36.8 (01-22-22 @ 04:55), Max: 36.8 (01-22-22 @ 04:55)  HR: 105 (01-22-22 @ 04:55) (96 - 105)  BP: 140/66 (01-22-22 @ 04:55) (140/66 - 151/70)  RR: 18 (01-22-22 @ 04:55) (18 - 18)  SpO2: 91% (01-22-22 @ 04:55) (91% - 91%)  Wt(kg): --Vital Signs Last 24 Hrs  T(C): 36.8 (22 Jan 2022 04:55), Max: 36.8 (22 Jan 2022 04:55)  T(F): 98.3 (22 Jan 2022 04:55), Max: 98.3 (22 Jan 2022 04:55)  HR: 105 (22 Jan 2022 04:55) (96 - 105)  BP: 140/66 (22 Jan 2022 04:55) (140/66 - 151/70)  BP(mean): --  RR: 18 (22 Jan 2022 04:55) (18 - 18)  SpO2: 91% (22 Jan 2022 04:55) (91% - 91%)        PHYSICAL EXAM:  GENERAL: elderly M, NAD, non toxic appearing, resting comfortably in bed  EYES: anicteric sclera, non injectected conjunctiva, EOMI  ENT: hearing grossly intact, oropharynx clear, MMM  PSYCH: no agitation, baseline mentation  NERVOUS SYSTEM:  Alert & Oriented X3, Cn 2-12 grossly intact  PULMONARY: Clear to percussion bilaterally; No rales, rhonchi, wheezing, or rubs  CARDIOVASCULAR: Regular rate and rhythm; No murmurs, rubs, or gallops  GI: Soft, Nontender, Nondistended; Bowel sounds present  EXTREMITIES:  2+ Peripheral Pulses, No clubbing, cyanosis, spontaneously moving all 4 ext  LYMPH: No lymphadenopathy noted  SKIN: No rashes or lesions    Consultant(s) Notes Reviewed:  [x ] YES  [ ] NO    Discussed with Consultants/Other Providers [ x] YES     LABS                          8.4    19.89 )-----------( 284      ( 22 Jan 2022 04:30 )             27.3     01-22    138  |  103  |  20  ----------------------------<  168<H>  4.7   |  20  |  0.8    Ca    7.9<L>      22 Jan 2022 04:30  Mg     1.9     01-22    TPro  5.6<L>  /  Alb  2.2<L>  /  TBili  0.3  /  DBili  x   /  AST  61<H>  /  ALT  28  /  AlkPhos  180<H>  01-22    POCT Blood Glucose.: 199 mg/dL (21 Jan 2022 21:17)    RADIOLOGY & ADDITIONAL TESTS:  NM Hepatobiliary Imaging (01.21.22 @ 19:15) >  postinjection, demonstrating patency of the cystic duct, nonspecific.    Imaging Personally Reviewed:  [ ] YES  [ ] NO    HEALTH ISSUES - PROBLEM Dx:      MEDICATIONS  (STANDING):  ascorbic acid 500 milliGRAM(s) Oral daily  ATENolol  Tablet 50 milliGRAM(s) Oral daily  cefTRIAXone   IVPB 1000 milliGRAM(s) IV Intermittent every 24 hours  dextrose 40% Gel 15 Gram(s) Oral once  dextrose 5%. 1000 milliLiter(s) (50 mL/Hr) IV Continuous <Continuous>  dextrose 5%. 1000 milliLiter(s) (100 mL/Hr) IV Continuous <Continuous>  dextrose 50% Injectable 25 Gram(s) IV Push once  dextrose 50% Injectable 12.5 Gram(s) IV Push once  dextrose 50% Injectable 25 Gram(s) IV Push once  enoxaparin Injectable 40 milliGRAM(s) SubCutaneous daily  glucagon  Injectable 1 milliGRAM(s) IntraMuscular once  insulin lispro (ADMELOG) corrective regimen sliding scale   SubCutaneous three times a day before meals  lisinopril 20 milliGRAM(s) Oral daily  metroNIDAZOLE    Tablet 500 milliGRAM(s) Oral every 8 hours  multivitamin 1 Tablet(s) Oral daily  simvastatin 40 milliGRAM(s) Oral at bedtime  zinc sulfate 220 milliGRAM(s) Oral daily    MEDICATIONS  (PRN):  acetaminophen     Tablet .. 650 milliGRAM(s) Oral every 6 hours PRN Temp greater or equal to 38C (100.4F), Mild Pain (1 - 3)  aluminum hydroxide/magnesium hydroxide/simethicone Suspension 30 milliLiter(s) Oral every 4 hours PRN Dyspepsia  melatonin 3 milliGRAM(s) Oral at bedtime PRN Insomnia  ondansetron Injectable 4 milliGRAM(s) IV Push every 8 hours PRN Nausea and/or Vomiting

## 2022-01-22 NOTE — OCCUPATIONAL THERAPY INITIAL EVALUATION ADULT - FINE MOTOR COORDINATION, RIGHT HAND, MANIPULATE OBJECTS, OT EVAL
left costovertebral angle/right upper quadrant/right lower quadrant/left lower quadrant/right costovertebral angle
at least min/mod impaired

## 2022-01-22 NOTE — PHYSICAL THERAPY INITIAL EVALUATION ADULT - IMPAIRMENTS FOUND, PT EVAL
decreased midline orientation/ergonomics and body mechanics/gait, locomotion, and balance/gross motor/muscle strength/posture

## 2022-01-22 NOTE — PHYSICAL THERAPY INITIAL EVALUATION ADULT - GENERAL OBSERVATIONS, REHAB EVAL
[id number] [phone] Pt rec semifowler in bed with +bed alarm, in NAD. PCA Thierno assisted with cleaning pt at start of session.

## 2022-01-22 NOTE — OCCUPATIONAL THERAPY INITIAL EVALUATION ADULT - GENERAL OBSERVATIONS, REHAB EVAL
Pt encountered and left semi coyne in bed. +IV locked. Agreeable to OT IE. Pt presented with generalized weakness and fatigue, limited initiation and joint stiffness. Pt required 2 person assist to transition EOB. +R sided lean, unable to maintain balance unsupported. Pt became pale and more somnolent seated EOB, Pt returned to bed. /63 mmHg, RENETTA Romero present.

## 2022-01-22 NOTE — PHYSICAL THERAPY INITIAL EVALUATION ADULT - MANUAL MUSCLE TESTING RESULTS, REHAB EVAL
Tried calling pt at [phone]. Unable to leave msg.     LM for wife, Le at [phone] for her or pt to call back.     Need to send msg back to Dr. Souht after scheduling follow up.    b/l hip flexion 3-/5, b/l knee ext/flex grossly 3+/5, b/l ankle DF/PF 3+/5.

## 2022-01-22 NOTE — OCCUPATIONAL THERAPY INITIAL EVALUATION ADULT - LIVES WITH, PROFILE
Pt admitted from NH, per pt has been a resident for 1-2 months. Priorto that pt lives in an apartment with his wife within his son's [place]

## 2022-01-22 NOTE — PHYSICAL THERAPY INITIAL EVALUATION ADULT - ADDITIONAL COMMENTS
Pt initially stated that he has not stood up in over a year; however after further PT questioning, pt then reported that he has been working with PT's in [facility] where he has been standing with assist and RW, and only walking a very short distance. Pt appears to be a poor historian, accuracy unclear.

## 2022-01-22 NOTE — PHYSICAL THERAPY INITIAL EVALUATION ADULT - LEVEL OF INDEPENDENCE: SIT/STAND, REHAB EVAL
Stood momentarily from EOB however unable to reach upright standing due to pt's poor postural control and b/l knee buckling. Poor tolerance of transfer training at this time./dependent (less than 25% patients effort)

## 2022-01-22 NOTE — PHYSICAL THERAPY INITIAL EVALUATION ADULT - BED MOBILITY LIMITATIONS, REHAB EVAL
Pt with poor trunk/postural control. Pt sat up at EOB for ~10 minutes however required frequent mod to max PT assist to maintain sitting at EOB due to poor balance. Pt constantly leaning postero-laterally to R side requiring PT correction. Pt able to perform b/l LAQ and marching in place while PT maintained pt's sitting balance./decreased ability to use legs for bridging/pushing/impaired ability to control trunk for mobility

## 2022-01-22 NOTE — PHYSICAL THERAPY INITIAL EVALUATION ADULT - ORIENTATION, REHAB EVAL
Grossly to place and time. Aware he is in a hospital and the year is January 2022, but did not know day or name of hospital./person/place/time

## 2022-01-22 NOTE — OCCUPATIONAL THERAPY INITIAL EVALUATION ADULT - ADDITIONAL COMMENTS
Above status reflective of functions within NH, Before initial hospitalization 2* fall and subsequent d/c to NH, pt reports being I with ADLs and IADLs (+DRIVING). Timeline for initial hospitalization unknown as pt is not a valid historian

## 2022-01-23 NOTE — PROGRESS NOTE ADULT - ATTENDING COMMENTS
81 year old Male with past medical history of HTN, HLD, DM, chronic asbestos lung disease, presenting from NH w/ fever and baseline cough associated w/ weakness and a decline in energy levels in the setting of leukocytosis, tachycardia, fevers, and transaminitis. Remains afebrile. Remains tachycardic    #Persistent fever  #Cough, R/O PNA  #Transaminitis  # Leukocytosis  - afebrile and normotensive. remains intermittently tachycardic  - WBC 18k-->16k-->15k-->17k-->20k-->19k and stable  - --> 60-70s, ALT now within normal limits  - UA negative.   - CT head negative. CXR presence of bilateral opacities.   - US Abdomen Upper Quadrant Right- Cholelithiasis without cholecystitis  - HIDA scan as above- no acute pathology  - unsure etiology at this time  - Pip/Tazo for now  - Fu blood culture, ucx, crp, procal   - TTE, r/o IE.   - ID consult      follow-up blood cultures      continue zosyn 3.375 g q 8 hours   - white count worsening but fevers absent for last 48hrs. Re-engage with ID today about further work up      # HAGMA, RESOLVED  - increased to 21-->14  - possible 2/2 to ketones in the setting of decreased po intake  - continue to monitor  - nutrition consult placed    # Essential Hypertension    - Monitor Vital Signs    - DASH diet   - Continue with Atenolol , Lisinopril    # Mixed Hyperlipidemia  - DASH diet   - Continue with home Simvastatin       # Diabetes Mellitus  - Monitor Fingersticks  - Diabetic carbohydrate consistent diet   - Glyburide-Metformin is non formulary, will give insulin sliding scale     Activity: As tolerated  Diet: DASH  Dispo: Acute  DVT ppx: Lovenox  Full code    #Progress Note Handoff  Pending (specify):  ID follow up, PT evaluation  Family discussion: patient updated and in agreement of plan  Disposition: Unknown at this time________ .

## 2022-01-23 NOTE — PROGRESS NOTE ADULT - ASSESSMENT
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 + associated w/ weakness and a decline in energy levels. per hx from family and NH paperwork pt is currently being treated oupt for presumed PNA as he has had fever and worsening leukocytosis for the past several days.      # Fever of unknown origin, resolved  # Low suspicion for PNA  # Chronic asbestos lung disease  - CXR w/ b/l opacities  - febrile on admission, now afebrile. Leukocytosis, initially improved now worsening  - chronic cough at baseline, however pt reports improvement in cough since admission  - BCx x2 NGTD, UCx NG, Sputum Cx normal resp ric, Procal 0.36  - s/p ID eval - unclear etiology of fever, low suspicion for PNA, possible cholecystitis  - cont ceftriaxone 1g qd, flagyl PO 500mg TID per ID  - s/p hepatology eval - rec obtain HIDA, poss CT A/P to evaluate source of fever, leukocytosis  - HIDA scan revealed delayed visualization of gallbladder which can be seen in chronic cholecystitis, hepatocellular disease, cholestasis, pancreatic disease, and poor nutrition  - consider CT A/P to evaluate for source of fever, leukocytosis     # Transaminitis, mixed pattern, improving  - AST 61, ALT 28, ,   - HIDA scan 11/2021 suspicious for cholecystitis  - abdominal exam benign  - RUQ U/S w/ cholelithiasis w/o ductal dilatation  - continues to improve, however may consider imaging if worsens  - s/p hepatology eval - suspect DILI from cefuroxime  - INR 1.55->1.54, cont to trend    # High AG w/o acidosis, resolved  - AG increased from 15 to 21, now 15  - Bicarb remains wnl 20  - possibly related to decreased PO intake in setting of acute illness  - cont to trend    # HLD  - cont home simvastatin 40mg qhs  - consider holding statin for now in setting of DILI    # DM2  - FS: 218, 247  - cont ISS  - FS qac, qhs    # HTN  - BP: 144/67 (141/68 - 144/67)  - cont atenolol, lisinopril    PT/REHAB: PT  ACTIVITY: Activity - Ambulate with Assistance  DVT PPX: enoxaparin Injectable  GI PPX:  Not indicated  DIET: Diet, Consistent Carbohydrate w/Evening Snack  CODE: Full code   Disposition: from NH; acute  Pending: ID f/u, ?CT A/P

## 2022-01-23 NOTE — PROGRESS NOTE ADULT - SUBJECTIVE AND OBJECTIVE BOX
MIGUEL ANGEL GARZA MRN#: [id number]  CODE STATUS: FULL CODE     SUBJECTIVE   Chief complaint: fever    Currently admitted to medicine with the primary diagnosis of FEVER;LEUKOCYTOSIS      Today is hospital day 6d,   INTERVAL HPI/OVERNIGHT EVENTS: No acute events overnight    This morning, he is laying in bed comfortably. Denies chest pain, shortness of breath, abdominal pain, nausea, vomiting or changes in bowel habits. He has no complaints today.     Urinating and stooling appropriately.    Present Today:           Colbert Catheter (x)No/ ()Yes?   Indication:             Central Line (x)No/ ()Yes?  Indication:          IV Fluids (x)No/ ()Yes?  Indication:     OBJECTIVE  PAST MEDICAL & SURGICAL HISTORY  Hypertension    Diabetes mellitus      ALLERGIES:  No Known Allergies    HOME MEDICATIONS:  Home Medications:  atenolol 50 mg oral tablet: 1 tab(s) orally once a day (17 Jan 2022 01:45)  glyburide-metformin 2.5 mg-500 mg oral tablet: 1 tab(s) orally once a day (17 Jan 2022 01:45)  lisinopril 20 mg oral tablet: 1 tab(s) orally once a day (17 Jan 2022 01:45)  simvastatin 40 mg oral tablet: 1 tab(s) orally once a day (at bedtime) (17 Jan 2022 01:45)    MEDICATIONS:  STANDING MEDICATIONS  MEDICATIONS  (STANDING):  ascorbic acid 500 milliGRAM(s) Oral daily  ATENolol  Tablet 50 milliGRAM(s) Oral daily  cefTRIAXone   IVPB 1000 milliGRAM(s) IV Intermittent every 24 hours  dextrose 40% Gel 15 Gram(s) Oral once  dextrose 5%. 1000 milliLiter(s) (50 mL/Hr) IV Continuous <Continuous>  dextrose 5%. 1000 milliLiter(s) (100 mL/Hr) IV Continuous <Continuous>  dextrose 50% Injectable 25 Gram(s) IV Push once  dextrose 50% Injectable 12.5 Gram(s) IV Push once  dextrose 50% Injectable 25 Gram(s) IV Push once  enoxaparin Injectable 40 milliGRAM(s) SubCutaneous daily  glucagon  Injectable 1 milliGRAM(s) IntraMuscular once  insulin lispro (ADMELOG) corrective regimen sliding scale   SubCutaneous three times a day before meals  lisinopril 20 milliGRAM(s) Oral daily  metroNIDAZOLE    Tablet 500 milliGRAM(s) Oral every 8 hours  multivitamin 1 Tablet(s) Oral daily  simvastatin 40 milliGRAM(s) Oral at bedtime  zinc sulfate 220 milliGRAM(s) Oral daily    PRN MEDICATIONS  MEDICATIONS  (PRN):  acetaminophen     Tablet .. 650 milliGRAM(s) Oral every 6 hours PRN Temp greater or equal to 38C (100.4F), Mild Pain (1 - 3)  aluminum hydroxide/magnesium hydroxide/simethicone Suspension 30 milliLiter(s) Oral every 4 hours PRN Dyspepsia  melatonin 3 milliGRAM(s) Oral at bedtime PRN Insomnia  ondansetron Injectable 4 milliGRAM(s) IV Push every 8 hours PRN Nausea and/or Vomiting      VITAL SIGNS  T(F): 97.6 (01-22 @ 20:44), Max: 97.6 (01-22 @ 20:44)  HR: 95 (01-22 @ 20:44) (91 - 95)  BP: 144/67 (01-22 @ 20:44) (141/68 - 144/67)  RR: 18 (01-22 @ 20:44) (18 - 18)  SpO2: --    LABS:                        8.4    19.89 )-----------( 284      ( 22 Jan 2022 04:30 )             27.3     01-22    138  |  103  |  20  ----------------------------<  168<H>  4.7   |  20  |  0.8    Ca    7.9<L>      22 Jan 2022 04:30  Mg     1.9     01-22    TPro  5.6<L>  /  Alb  2.2<L>  /  TBili  0.3  /  DBili  x   /  AST  61<H>  /  ALT  28  /  AlkPhos  180<H>  01-22        RADIOLOGY:   Reviewed  RUQ US cholelithiasis, no cholecystitis  prior HIDA cholecystitis  current HIDA: Delayed visualization of the gallbladder is a nonspecific finding and may   be seen in chronic cholecystitis, hepatocellular disease, cholestasis,   pancreatic disease and even poor nutrition as examples      PHYSICAL EXAM:  General: Comfortably laying on the hospital bed. NAD. AAOx3.  HEENT: Atraumatic. Normocephalic. EOMI. Conjunctiva and sclera clear.  Cardiac: Normal S1, S2. RRR. No murmurs, rubs, or gallops.  Pulm: CTA b/l no wheezes, rhonchi, or rales.  GI: Soft, nontender, nondistended. BSx4q  Ext: 2+ pulses. Moving all 4 extremities. No edema, cyanosis.  Neuro: CN II-XII grossly intact  Skin: No lesions or rashes

## 2022-01-24 NOTE — PROGRESS NOTE ADULT - ASSESSMENT
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 CLINICAL CORRELATION IS SUGGESTED.  - NM Hepatobiliary Imaging (01.21.22 @ 19:15): Delayed definite visualization of the gallbladder at 6 hours post  injection, demonstrating patency of the cystic duct. Delayed visualization of the gallbladder is a nonspecific finding.   - Anti Nuclear Factor Titer: Negative: Antinuclear AB (TAWANDA), IFA Method (11.19.21 @ 06:41), ANCA labs negative    #Chronic asbestos Lung disease    #Transaminitis - Cholecystitis- HIDA scan positive 11/19/2021    Recommendations  - as repeat HIDA scan is negative and cultures have remained negative, would stop antibiotics altogether -- denies any abdominal pain to point for obvious GI source and sputum Cx negative  - unclear source of leukocytosis - this appears to have been present to some extent since 11/2021 despite antibiotics for possible cholecystitis seen on HIDA scan from 11/19/2021  - can check tagged WBC scan as well as ESR,CRP, LDH, ferritin, HIV as work-up for FUO   - if unremarkable, consider Heme-consult     Please call or message on [employer] Teams if with any questions.  Spectra [phone]

## 2022-01-24 NOTE — PROGRESS NOTE ADULT - SUBJECTIVE AND OBJECTIVE BOX
KAYLAMIGUEL ANGEL  81y, Male  Allergy: No Known Allergies      LOS  7d    CHIEF COMPLAINT: fever (23 Jan 2022 04:55)      INTERVAL EVENTS/HPI  - No acute events overnight  - T(F): , Max: 98.7 (01-23-22 @ 13:03)  - Denies any worsening symptoms  - Tolerating medication  - WBC Count: 19.47 (01-24-22 @ 05:55)  WBC Count: 19.32 (01-23-22 @ 04:30)     - Creatinine, Serum: 0.8 (01-24-22 @ 05:55)  Creatinine, Serum: 0.8 (01-23-22 @ 04:30)       ROS  General: Denies rigors, nightsweats  HEENT: Denies headache, rhinorrhea, sore throat, eye pain  CV: Denies CP, palpitations  PULM: Denies wheezing, hemoptysis  GI: Denies hematemesis, hematochezia, melena  : Denies discharge, hematuria  MSK: Denies arthralgias, myalgias  SKIN: Denies rash, lesions  NEURO: Denies paresthesias, weakness  PSYCH: Denies depression, anxiety    VITALS:  T(F): 97.2, Max: 98.7 (01-23-22 @ 13:03)  HR: 88  BP: 144/65  RR: 18Vital Signs Last 24 Hrs  T(C): 36.2 (24 Jan 2022 06:51), Max: 37.1 (23 Jan 2022 13:03)  T(F): 97.2 (24 Jan 2022 06:51), Max: 98.7 (23 Jan 2022 13:03)  HR: 88 (24 Jan 2022 06:51) (88 - 101)  BP: 144/65 (24 Jan 2022 06:51) (117/73 - 144/65)  BP(mean): --  RR: 18 (24 Jan 2022 06:51) (18 - 18)  SpO2: 95% (24 Jan 2022 06:51) (95% - 95%)    PHYSICAL EXAM:  Gen: NAD, resting in bed  HEENT: Normocephalic, atraumatic  Neck: supple, no lymphadenopathy  CV: Regular rate & regular rhythm  Lungs: decreased BS at bases, no fremitus  Abdomen: Soft, BS present  Ext: Warm, well perfused  Neuro: non focal, awake  Skin: no rash, no erythema  Lines: no phlebitis    FH: Non-contributory  Social Hx: Non-contributory    TESTS & MEASUREMENTS:                        8.2    19.47 )-----------( 347      ( 24 Jan 2022 05:55 )             26.5     01-24    135  |  100  |  25<H>  ----------------------------<  193<H>  4.6   |  22  |  0.8    Ca    7.8<L>      24 Jan 2022 05:55  Mg     1.8     01-24    TPro  5.1<L>  /  Alb  2.2<L>  /  TBili  0.3  /  DBili  x   /  AST  65<H>  /  ALT  24  /  AlkPhos  147<H>  01-24    eGFR if Non African American: 83 mL/min/1.73M2 (01-24-22 @ 05:55)  eGFR if African American: 96 mL/min/1.73M2 (01-24-22 @ 05:55)    LIVER FUNCTIONS - ( 24 Jan 2022 05:55 )  Alb: 2.2 g/dL / Pro: 5.1 g/dL / ALK PHOS: 147 U/L / ALT: 24 U/L / AST: 65 U/L / GGT: x               Culture - Sputum (collected 01-17-22 @ 18:50)  Source: .Sputum Sputum  Gram Stain (01-18-22 @ 07:33):    Moderate polymorphonuclear leukocytes per low power field    Rare Squamous epithelial cells per low power field    No organisms seen per oil power field  Final Report (01-19-22 @ 17:31):    Normal Respiratory Keiko present    Culture - Urine (collected 01-17-22 @ 18:03)  Source: Clean Catch Clean Catch (Midstream)  Final Report (01-18-22 @ 17:48):    No growth    Culture - Blood (collected 01-17-22 @ 11:00)  Source: .Blood Blood  Final Report (01-22-22 @ 22:00):    No Growth Final    Culture - Blood (collected 01-17-22 @ 04:30)  Source: .Blood Blood  Final Report (01-22-22 @ 14:00):    No Growth Final    Culture - Urine (collected 01-16-22 @ 23:38)  Source: Clean Catch Clean Catch (Midstream)  Final Report (01-18-22 @ 06:10):    No growth    Culture - Blood (collected 01-16-22 @ 20:32)  Source: .Blood Blood-Peripheral  Final Report (01-22-22 @ 06:01):    No Growth Final    Culture - Blood (collected 01-16-22 @ 20:32)  Source: .Blood Blood-Peripheral  Final Report (01-22-22 @ 06:01):    No Growth Final            INFECTIOUS DISEASES TESTING  Procalcitonin, Serum: 0.36 (01-17-22 @ 04:30)  Rapid RVP Result: NotDetec (01-16-22 @ 20:54)  COVID-19 PCR: NotDetec (11-26-21 @ 11:19)  Procalcitonin, Serum: 0.33 (11-24-21 @ 07:20)  Procalcitonin, Serum: 0.35 (11-22-21 @ 06:26)  Procalcitonin, Serum: 0.38 (11-18-21 @ 09:58)  COVID-19 PCR: NotDetec (11-18-21 @ 08:50)  MRSA PCR Result.: Negative (11-15-21 @ 13:30)  Procalcitonin, Serum: 0.22 (11-15-21 @ 06:59)  Rapid RVP Result: NotDetec (11-14-21 @ 09:55)      INFLAMMATORY MARKERS  C-Reactive Protein, Serum: 194 mg/L (01-17-22 @ 04:30)      RADIOLOGY & ADDITIONAL TESTS:  I have personally reviewed the last available Chest xray  CXR      CT      CARDIOLOGY TESTING  12 Lead ECG:   Ventricular Rate 109 BPM    Atrial Rate 109 BPM    P-R Interval 118 ms    QRS Duration 74 ms    Q-T Interval 328 ms    QTC Calculation(Bazett) 441 ms    P Axis 45 degrees    R Axis 33 degrees    T Axis 4 degrees    Diagnosis Line Sinus tachycardia  Minimal voltage criteria for LVH, may be normal variant  Inferior infarct , age undetermined  Abnormal ECG    Confirmed by Ashley Vick MD ([id number]) on 1/17/2022 10:36:38 AM (01-16-22 @ 21:50)      MEDICATIONS  ascorbic acid 500 Oral daily  ATENolol  Tablet 50 Oral daily  cefTRIAXone   IVPB 1000 IV Intermittent every 24 hours  dextrose 40% Gel 15 Oral once  dextrose 5%. 1000 IV Continuous <Continuous>  dextrose 5%. 1000 IV Continuous <Continuous>  dextrose 50% Injectable 25 IV Push once  dextrose 50% Injectable 12.5 IV Push once  dextrose 50% Injectable 25 IV Push once  enoxaparin Injectable 40 SubCutaneous daily  glucagon  Injectable 1 IntraMuscular once  insulin lispro (ADMELOG) corrective regimen sliding scale  SubCutaneous three times a day before meals  lisinopril 20 Oral daily  metroNIDAZOLE    Tablet 500 Oral every 8 hours  multivitamin 1 Oral daily  simvastatin 40 Oral at bedtime  zinc sulfate 220 Oral daily      WEIGHT  Weight (kg): 66.2 (01-16-22 @ 20:20)  Creatinine, Serum: 0.8 mg/dL (01-24-22 @ 05:55)      ANTIBIOTICS:  cefTRIAXone   IVPB 1000 milliGRAM(s) IV Intermittent every 24 hours  metroNIDAZOLE    Tablet 500 milliGRAM(s) Oral every 8 hours      All available historical records have been reviewed

## 2022-01-24 NOTE — PROGRESS NOTE ADULT - SUBJECTIVE AND OBJECTIVE BOX
MIGUEL ANGEL GARZA  81y Male    Admitted for leukocytosis and fever    Subjective: No acute events overnight. Pt has no complaints    T(F): 97.2 (01-24-22 @ 06:51), Max: 98.7 (01-23-22 @ 13:03)  HR: 88 (01-24-22 @ 06:51) (88 - 101)  BP: 144/65 (01-24-22 @ 06:51) (117/73 - 144/65)  RR: 18 (01-24-22 @ 06:51) (18 - 18)  SpO2: 95% (01-24-22 @ 06:51) (95% - 95%)    PHYSICAL EXAM:  General: Comfortably laying on the hospital bed. NAD. AAOx3.  HEENT: Atraumatic. Normocephalic. EOMI. Conjunctiva and sclera clear.  Cardiac: Normal S1, S2. RRR. No murmurs, rubs, or gallops.  Pulm: CTA b/l no wheezes, rhonchi, or rales.  GI: Soft, nontender, nondistended. BSx4q  Ext: 2+ pulses. Moving all 4 extremities. No edema, cyanosis.  Neuro: CN II-XII grossly intact  Skin: No lesions or rashes    Consultant(s) Notes Reviewed:  [x ] YES  [ ] NO  Care Discussed with Consultants/Other Providers [ x] YES  [ ] NO    Medications reviewed    LABS:                        8.2    19.47 )-----------( 347      ( 24 Jan 2022 05:55 )             26.5     01-24    135  |  100  |  25<H>  ----------------------------<  193<H>  4.6   |  22  |  0.8    Ca    7.8<L>      24 Jan 2022 05:55  Mg     1.8     01-24    TPro  5.1<L>  /  Alb  2.2<L>  /  TBili  0.3  /  DBili  x   /  AST  65<H>  /  ALT  24  /  AlkPhos  147<H>  01-24    PT/INR - ( 24 Jan 2022 05:55 )   PT: 20.20 sec;   INR: 1.77 ratio               RADIOLOGY & ADDITIONAL TESTS:    Care discussed with pt/family

## 2022-01-24 NOTE — PROGRESS NOTE ADULT - ASSESSMENT
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 + associated w/ weakness and a decline in energy levels. per hx from family and NH paperwork pt is currently being treated oupt for presumed PNA as he has had fever and worsening leukocytosis for the past several days.      # Fever of unknown origin, resolved  # Low suspicion for PNA  # Chronic asbestos lung disease  - CXR w/ b/l opacities  - febrile on admission, now afebrile. Leukocytosis  - chronic cough at baseline, however pt reports improvement in cough since admission  - BCx x2 NGTD, UCx NG, Sputum Cx normal resp ric, Procal 0.36  - s/p ID eval - unclear etiology of fever, low suspicion for PNA, acute cholecystitis  - DC ceftriaxone 1g qd, flagyl PO 500mg TID per ID  - s/p hepatology eval - rec obtain HIDA, poss CT A/P to evaluate source of fever, leukocytosis  - HIDA scan revealed delayed visualization of gallbladder which can be seen in chronic cholecystitis, hepatocellular disease, cholestasis, pancreatic disease, and poor nutrition  - Per ID, obtain tagged WBC scan, ESR, CRP, inflammation markers  - Hematology consult for chronic leukocytosis    # Transaminitis, mixed pattern, improving  - AST 61, ALT 28, ,   - HIDA scan 11/2021 suspicious for cholecystitis  - abdominal exam benign  - RUQ U/S w/ cholelithiasis w/o ductal dilatation  - continues to improve, however may consider imaging if worsens  - s/p hepatology eval - suspect DILI from cefuroxime  - INR 1.55->1.54, cont to trend    # High AG w/o acidosis, resolved  - AG increased from 15 to 21, now 15  - Bicarb remains wnl 20  - possibly related to decreased PO intake in setting of acute illness  - cont to trend    # HLD  - cont home simvastatin 40mg qhs  - consider holding statin for now in setting of DILI    # DM2  - FS: 218, 247  - cont ISS  - FS qac, qhs    # HTN  - BP: 144/67 (141/68 - 144/67)  - cont atenolol, lisinopril    PT/REHAB: PT  ACTIVITY: Activity - Ambulate with Assistance  DVT PPX: enoxaparin Injectable  GI PPX:  Not indicated  DIET: Diet, Consistent Carbohydrate w/Evening Snack  CODE: Full code   Disposition: from NH; acute  Pending: Tagged WBC and hematology consult

## 2022-01-25 NOTE — PROGRESS NOTE ADULT - ATTENDING COMMENTS
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 + associated w/ weakness and a decline in energy levels. per hx from family and NH paperwork pt is currently being treated oupt for presumed PNA as he has had fever and worsening leukocytosis for the past several days.      # Fever of unknown origin, resolved  # Low suspicion for PNA  # Chronic asbestos lung disease  - CXR w/ b/l opacities  - febrile on admission, now afebrile. Leukocytosis  - chronic cough at baseline, however pt reports improvement in cough since admission  - BCx x2 NGTD, UCx NG, Sputum Cx normal resp ric, Procal 0.36  - s/p ID eval - unclear etiology of fever, low suspicion for PNA, acute cholecystitis  - DC ceftriaxone 1g qd, flagyl PO 500mg TID per ID  - s/p hepatology eval - rec obtain HIDA, poss CT A/P to evaluate source of fever, leukocytosis  - HIDA scan revealed delayed visualization of gallbladder which can be seen in chronic cholecystitis, hepatocellular disease, cholestasis, pancreatic disease, and poor nutrition  - Per ID, obtain tagged WBC scan, ESR, CRP, inflammation markers  - f/u Hematology consult for chronic leukocytosis; pending recommendation    # Transaminitis, mixed pattern, improving  - AST 61, ALT 28, ,   - HIDA scan 11/2021 suspicious for cholecystitis  - abdominal exam benign  - RUQ U/S w/ cholelithiasis w/o ductal dilatation  - continues to improve, however may consider imaging if worsens  - s/p hepatology eval - suspect DILI from cefuroxime  - INR 1.55->1.54, cont to trend    # High AG w/o acidosis, resolved  - AG increased from 15 to 21, now 15  - Bicarb remains wnl 20  - possibly related to decreased PO intake in setting of acute illness  - cont to trend    # HLD  - cont home simvastatin 40mg qhs  - consider holding statin for now in setting of DILI    # DM2  - FS: 218, 247  - cont ISS  - FS qac, qhs    # HTN  - BP: 144/67 (141/68 - 144/67)  - cont atenolol, lisinopril    PT/REHAB: PT  ACTIVITY: Activity - Ambulate with Assistance  DVT PPX: enoxaparin Injectable  GI PPX:  Not indicated  DIET: Diet, Consistent Carbohydrate w/Evening Snack  CODE: Full code   Disposition: from NH; acute  Pending: Tagged WBC and hematology consult

## 2022-01-25 NOTE — PROGRESS NOTE ADULT - SUBJECTIVE AND OBJECTIVE BOX
SUBJECTIVE:  Patient is a 81y old Male who presents with a chief complaint of fever (25 Jan 2022 08:20)   Currently admitted to medicine with the primary diagnosis of Fever     Today is hospital day 8d. There are no new issues or overnight events.     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 + associated w/ weakness and a decline in energy levels. per hx from family and NH paperwork pt is currently being treated oupt for presumed PNA as he has had fever and worsening leukocytosis for the past several days.  He is currently on a 7 day course of Doxycycline and cefuroxime ( started on 1/11/2022 to be completed on 1/18/2022, last dose today )  but per family has been on multiple abx courses over the past several weeks all for presumed PNA.  pt reports for the last few days he has been feeling more weak and tired and thinks his cough may have worsened slightly over the last few days. denies any SOB or fever. no episodes of hypoxia at NH.  in the ED,pt's VS T(C): 36.4 (01-17-22 @ 00:02), Max: 38.4 (01-16-22 @ 20:20)  HR: 101 (01-17-22 @ 00:02) (101 - 122)  BP: 124/66 (01-17-22 @ 00:02) (124/66 - 135/60)  RR: 18 (01-17-22 @ 00:02) (18 - 18)  SpO2: 98% (01-17-22 @ 00:02) (96% - 98%), labs done showed WBC 18 K. hb 9.6 ( previously 11), , ALT 74 ( both trending down). UA negative.   CT head negative. CXR showed Bilateral opacities.   pt received 500 ml LR.   pt is admitted for workup/management (17 Jan 2022 01:21)      PAST MEDICAL & SURGICAL HISTORY  Hypertension    Diabetes mellitus      ALLERGIES:  No Known Allergies    MEDICATIONS:  HOME MEDICATIONS  atenolol 50 mg oral tablet: 1 tab(s) orally once a day  glyburide-metformin 2.5 mg-500 mg oral tablet: 1 tab(s) orally once a day  lisinopril 20 mg oral tablet: 1 tab(s) orally once a day  simvastatin 40 mg oral tablet: 1 tab(s) orally once a day (at bedtime)    STANDING MEDICATIONS  ascorbic acid 500 milliGRAM(s) Oral daily  ATENolol  Tablet 50 milliGRAM(s) Oral daily  cefTRIAXone   IVPB 1000 milliGRAM(s) IV Intermittent every 24 hours  dextrose 40% Gel 15 Gram(s) Oral once  dextrose 5%. 1000 milliLiter(s) IV Continuous <Continuous>  dextrose 5%. 1000 milliLiter(s) IV Continuous <Continuous>  dextrose 50% Injectable 25 Gram(s) IV Push once  dextrose 50% Injectable 12.5 Gram(s) IV Push once  dextrose 50% Injectable 25 Gram(s) IV Push once  enoxaparin Injectable 40 milliGRAM(s) SubCutaneous daily  glucagon  Injectable 1 milliGRAM(s) IntraMuscular once  insulin lispro (ADMELOG) corrective regimen sliding scale   SubCutaneous three times a day before meals  lisinopril 20 milliGRAM(s) Oral daily  metroNIDAZOLE    Tablet 500 milliGRAM(s) Oral every 8 hours  multivitamin 1 Tablet(s) Oral daily  simvastatin 40 milliGRAM(s) Oral at bedtime  zinc sulfate 220 milliGRAM(s) Oral daily    PRN MEDICATIONS  acetaminophen     Tablet .. 650 milliGRAM(s) Oral every 6 hours PRN  aluminum hydroxide/magnesium hydroxide/simethicone Suspension 30 milliLiter(s) Oral every 4 hours PRN  guaifenesin/dextromethorphan Oral Liquid 10 milliLiter(s) Oral every 6 hours PRN  melatonin 3 milliGRAM(s) Oral at bedtime PRN  ondansetron Injectable 4 milliGRAM(s) IV Push every 8 hours PRN    VITALS:   T(C): 35.4 (01-25-22 @ 12:44), Max: 37.7 (01-24-22 @ 20:00)  T(F): 95.7 (01-25-22 @ 12:44), Max: 99.8 (01-24-22 @ 20:00)  HR: 86 (01-25-22 @ 12:44) (85 - 86)  BP: 118/61 (01-25-22 @ 12:44) (108/57 - 118/61)  BP(mean): --  ABP: --  ABP(mean): --  RR: 18 (01-25-22 @ 12:44) (18 - 18)  SpO2: --  LABS:                        7.8    18.04 )-----------( 347      ( 25 Jan 2022 05:55 )             25.2     01-25    140  |  105  |  32<H>  ----------------------------<  175<H>  3.8   |  21  |  0.9    Ca    7.7<L>      25 Jan 2022 05:55  Mg     1.8     01-24    TPro  4.5<L>  /  Alb  1.9<L>  /  TBili  0.2  /  DBili  x   /  AST  66<H>  /  ALT  23  /  AlkPhos  118<H>  01-25    PT/INR - ( 24 Jan 2022 05:55 )   PT: 20.20 sec;   INR: 1.77 ratio             I&O's Detail    24 Jan 2022 07:01  -  25 Jan 2022 07:00  --------------------------------------------------------  IN:  Total IN: 0 mL    OUT:    Voided (mL): 400 mL  Total OUT: 400 mL    Total NET: -400 mL                    RADIOLOGY:  EKG  12 Lead ECG:   Ventricular Rate 109 BPM    Atrial Rate 109 BPM    P-R Interval 118 ms    QRS Duration 74 ms    Q-T Interval 328 ms    QTC Calculation(Bazett) 441 ms    P Axis 45 degrees    R Axis 33 degrees    T Axis 4 degrees    Diagnosis Line Sinus tachycardia  Minimal voltage criteria for LVH, may be normal variant  Inferior infarct , age undetermined  Abnormal ECG    Confirmed by Ashley Vick MD ([id number]) on 1/17/2022 10:36:38 AM (01-16-22 @ 21:50)  12 Lead ECG:   Ventricular Rate 83 BPM    Atrial Rate 83 BPM    P-R Interval 154 ms    QRS Duration 76 ms    Q-T Interval 344 ms    QTC Calculation(Bazett) 404 ms    P Axis 41 degrees    R Axis 25 degrees    T Axis 23 degrees    Diagnosis Line Normal sinus rhythm  Nonspecific ST-T changes    Confirmed by MIGUEL ANGEL PALUMBO MD ([id number]) on 11/15/2021 1:58:29 PM (11-14-21 @ 10:53)      PHYSICAL EXAM:  GEN: No acute distress, Contracted  HEENT: Normocephalic  NECK: Supple  LUNGS: Decreased breathe sounds  HEART: Regular  ABD: Soft, non-tender, non-distended.  EXT: NE/2+PP  NEURO: AAOX3  PSYCH: Mood is appropriate, following commands

## 2022-01-25 NOTE — PROGRESS NOTE ADULT - ASSESSMENT
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 + associated w/ weakness and a decline in energy levels. per hx from family and NH paperwork pt is currently being treated oupt for presumed PNA as he has had fever and worsening leukocytosis for the past several days.      # Fever of unknown origin, resolved  # Low suspicion for PNA  # Chronic asbestos lung disease  - CXR w/ b/l opacities  - febrile on admission, now afebrile. Leukocytosis  - chronic cough at baseline, however pt reports improvement in cough since admission  - BCx x2 NGTD, UCx NG, Sputum Cx normal resp ric, Procal 0.36  - s/p ID eval - unclear etiology of fever, low suspicion for PNA, acute cholecystitis  - DC ceftriaxone 1g qd, flagyl PO 500mg TID per ID  - s/p hepatology eval - rec obtain HIDA, poss CT A/P to evaluate source of fever, leukocytosis  - HIDA scan revealed delayed visualization of gallbladder which can be seen in chronic cholecystitis, hepatocellular disease, cholestasis, pancreatic disease, and poor nutrition  - Per ID, obtain tagged WBC scan, ESR, CRP, inflammation markers  - f/u Hematology consult for chronic leukocytosis    # Transaminitis, mixed pattern, improving  - AST 61, ALT 28, ,   - HIDA scan 11/2021 suspicious for cholecystitis  - abdominal exam benign  - RUQ U/S w/ cholelithiasis w/o ductal dilatation  - continues to improve, however may consider imaging if worsens  - s/p hepatology eval - suspect DILI from cefuroxime  - INR 1.55->1.54, cont to trend    # High AG w/o acidosis, resolved  - AG increased from 15 to 21, now 15  - Bicarb remains wnl 20  - possibly related to decreased PO intake in setting of acute illness  - cont to trend    # HLD  - cont home simvastatin 40mg qhs  - consider holding statin for now in setting of DILI    # DM2  - FS: 218, 247  - cont ISS  - FS qac, qhs    # HTN  - BP: 144/67 (141/68 - 144/67)  - cont atenolol, lisinopril    PT/REHAB: PT  ACTIVITY: Activity - Ambulate with Assistance  DVT PPX: enoxaparin Injectable  GI PPX:  Not indicated  DIET: Diet, Consistent Carbohydrate w/Evening Snack  CODE: Full code   Disposition: from NH; acute  Pending: Tagged WBC and hematology consult

## 2022-01-25 NOTE — CONSULT NOTE ADULT - ASSESSMENT
81M w/ PMHx of Chronic asbestosis lung disease, HTN, HLD, and DM presenting from NH w/ fever and baseline cough  associated w/ weakness and a decline in energy levels. Pt has had fever and worsening leukocytosis. He has been treated with multiple abx over past weeks for presumed PNA. Heme/Onc is consulted for evaluation of persistent leukocytosis.     # Leukocytosis  - WBC elevation noted since 11/2021  - Cultures, RVP and COVID19 PCR till the date negative noted      # Normocytic Anemia  - suspect Inflammatory anemia;    81M w/ PMHx of Chronic asbestosis lung disease, HTN, HLD, and DM presenting from NH w/ fever and baseline cough  associated w/ weakness and a decline in energy levels. Pt has had fever and worsening leukocytosis. He has been treated with multiple abx over past weeks for presumed PNA. Heme/Onc is consulted for evaluation of persistent leukocytosis.     # Leukocytosis  - WBC elevation noted since 11/2021  - Cultures, RVP and COVID19 PCR till the date negative noted      # Normocytic Anemia  - Hgb declining trend noted since 11/2021  - Please monitor for s/s of bleeding and r/o any obscure bleeding that might be contributing to the anemia  - send anemia work up including iron studies (Fe, TIBC, %sat, ferritin), reticulocyte count, LDH, haptoglobin, homocystine, vit b12, folate level       81M w/ PMHx of Chronic asbestosis lung disease, HTN, HLD, and DM presenting from NH w/ fever and baseline cough  associated w/ weakness and a decline in energy levels. Pt has had fever and worsening leukocytosis. He has been treated with multiple abx over past weeks for presumed PNA. Heme/Onc is consulted for evaluation of persistent leukocytosis.     # Leukocytosis  - WBC elevation noted since 11/2021  - Cultures, RVP and COVID19 PCR till the date negative noted      # Normocytic Anemia  - Hgb declining trend noted since 11/2021  - Please monitor for s/s of bleeding and r/o any obscure bleeding that might be contributing to the anemia  - send anemia work up including iron studies (Fe, TIBC, %sat, ferritin), reticulocyte count, LDH, haptoglobin, homocystine, vit b12, folate level, Fecal occult blood       81M w/ PMHx of Chronic asbestosis lung disease, HTN, HLD, and DM presenting from NH w/ fever and baseline cough  associated w/ weakness and a decline in energy levels. Pt has had fever and worsening leukocytosis. He has been treated with multiple abx over past weeks for presumed PNA. Heme/Onc is consulted for evaluation of persistent leukocytosis.     # Leukocytosis, r/o leukemia  - WBC elevation noted since 11/2021  - Cultures, RVP and COVID19 PCR till the date negative noted  - send BCR-ABL - if this comes negative then get pulm eval for pleural plaques  - send SPEP, UPEP  - PET scan outpatient      # Normocytic Anemia  - Hgb declining trend noted since 11/2021  - Please monitor for s/s of bleeding and r/o any obscure bleeding that might be contributing to the anemia  - send anemia work up including iron studies (Fe, TIBC, %sat, ferritin), reticulocyte count, LDH, haptoglobin, homocystine, vit b12, folate level, Fecal occult blood       81M w/ PMHx of Chronic asbestosis lung disease, HTN, HLD, and DM presenting from NH w/ fever and baseline cough  associated w/ weakness and a decline in energy levels. Pt has had fever and worsening leukocytosis. He has been treated with multiple abx over past weeks for presumed PNA. Heme/Onc is consulted for evaluation of persistent leukocytosis.     # Leukocytosis, r/o leukemia  - WBC elevation noted since 11/2021  - Cultures, RVP and COVID19 PCR till the date negative noted  - send BCR-ABL - if this comes negative then would recommend pulm eval for pleural plaques  - PET scan outpatient if BCR-ABL is negative  - Will review peripheral smear tomorrow as well.       # Normocytic Anemia- chronic  - Hgb declining trend noted since 11/2021  - Please monitor for s/s of bleeding and r/o any obscure bleeding that might be contributing to the anemia  - send anemia work up including iron studies (Fe, TIBC, %sat, ferritin), reticulocyte count, LDH, haptoglobin, vit b12, folate level  - Send Myeloma panel (SPEP, UPEP, S IF, Serum FLC, Ig levels)

## 2022-01-25 NOTE — CONSULT NOTE ADULT - SUBJECTIVE AND OBJECTIVE BOX
DATE OF ADMISSION: 01-17-22  HOSPITAL DAY: 8d    REASON FOR CONSULT: Leukocytosis, no source of infection    HISTORY OF PRESENT ILLNESS:   Patient is an 81 year old Male with past medical history of HTN, HLD, DM, chronic absestos lung disease  ( w/ bilateral calcified plaques) presenting from NH w/ fever and baseline cough. + associated w/ weakness and a decline in energy levels. per hx from family and NH paperwork pt is currently being treated oupt for presumed PNA as he has had fever and worsening leukocytosis for the past several days.  He is currently on a 7 day course of Doxycycline and cefuroxime ( started on 1/11/2022 to be completed on 1/18/2022, last dose today )  but per family has been on multiple abx courses over the past several weeks all for presumed PNA.  pt reports for the last few days he has been feeling more weak and tired and thinks his cough may have worsened slightly over the last few days. denies any SOB or fever. no episodes of hypoxia at NH.    in the ED,pt's VS T(C): 36.4 (01-17-22 @ 00:02), Max: 38.4 (01-16-22 @ 20:20)  HR: 101 (01-17-22 @ 00:02) (101 - 122)  BP: 124/66 (01-17-22 @ 00:02) (124/66 - 135/60)  RR: 18 (01-17-22 @ 00:02) (18 - 18)  SpO2: 98% (01-17-22 @ 00:02) (96% - 98%)     labs done showed WBC 18 K. hb 9.6 ( previously 11), , ALT 74 ( both trending down). UA negative.   CT head negative. CXR showed Bilateral opacities.   pt received 500 ml LR.   pt is admitted for workup/management (17 Jan 2022 01:21)      PAST MEDICAL & SURGICAL HISTORY:  Hypertension    Diabetes mellitus    ALLERGIES:  No Known Allergies    MEDICATIONS:  MEDICATIONS  (STANDING):  ascorbic acid 500 milliGRAM(s) Oral daily  ATENolol  Tablet 50 milliGRAM(s) Oral daily  cefTRIAXone   IVPB 1000 milliGRAM(s) IV Intermittent every 24 hours  dextrose 40% Gel 15 Gram(s) Oral once  dextrose 5%. 1000 milliLiter(s) (50 mL/Hr) IV Continuous <Continuous>  dextrose 5%. 1000 milliLiter(s) (100 mL/Hr) IV Continuous <Continuous>  dextrose 50% Injectable 25 Gram(s) IV Push once  dextrose 50% Injectable 12.5 Gram(s) IV Push once  dextrose 50% Injectable 25 Gram(s) IV Push once  enoxaparin Injectable 40 milliGRAM(s) SubCutaneous daily  glucagon  Injectable 1 milliGRAM(s) IntraMuscular once  insulin lispro (ADMELOG) corrective regimen sliding scale   SubCutaneous three times a day before meals  lisinopril 20 milliGRAM(s) Oral daily  metroNIDAZOLE    Tablet 500 milliGRAM(s) Oral every 8 hours  multivitamin 1 Tablet(s) Oral daily  simvastatin 40 milliGRAM(s) Oral at bedtime  zinc sulfate 220 milliGRAM(s) Oral daily    MEDICATIONS  (PRN):  acetaminophen     Tablet .. 650 milliGRAM(s) Oral every 6 hours PRN Temp greater or equal to 38C (100.4F), Mild Pain (1 - 3)  aluminum hydroxide/magnesium hydroxide/simethicone Suspension 30 milliLiter(s) Oral every 4 hours PRN Dyspepsia  guaifenesin/dextromethorphan Oral Liquid 10 milliLiter(s) Oral every 6 hours PRN Cough  melatonin 3 milliGRAM(s) Oral at bedtime PRN Insomnia  ondansetron Injectable 4 milliGRAM(s) IV Push every 8 hours PRN Nausea and/or Vomiting    HOME MEDICATIONS:  Home Medications:  atenolol 50 mg oral tablet: 1 tab(s) orally once a day (17 Jan 2022 01:45)  glyburide-metformin 2.5 mg-500 mg oral tablet: 1 tab(s) orally once a day (17 Jan 2022 01:45)  lisinopril 20 mg oral tablet: 1 tab(s) orally once a day (17 Jan 2022 01:45)  simvastatin 40 mg oral tablet: 1 tab(s) orally once a day (at bedtime) (17 Jan 2022 01:45)      VITALS:   T(C): 37.6 (01-25-22 @ 05:06), Max: 37.7 (01-24-22 @ 20:00)  HR: 85 (01-25-22 @ 05:06) (85 - 105)  BP: 118/57 (01-25-22 @ 05:06) (108/57 - 141/80)  RR: 18 (01-25-22 @ 05:06) (18 - 18)  SpO2: --  Wt(kg): --    01-24-22 @ 07:01  -  01-25-22 @ 07:00  --------------------------------------------------------  IN: 0 mL / OUT: 400 mL / NET: -400 mL        PHYSICAL EXAM:  GENERAL: NAD, Resting in bed  HEENT: NCAT  CHEST/LUNG: Clear to auscultation bilaterally; No wheezing or rubs.   HEART: Regular rate and rhythm; No murmurs, rubs, or gallops  ABDOMEN: Bowel sounds present; Soft, Nontender, Nondistended.   EXTREMITIES:  No clubbing, cyanosis, or edema  NERVOUS SYSTEM:  Alert & Oriented X3    LABS:                        7.8    18.04 )-----------( 347      ( 25 Jan 2022 05:55 )             25.2     01-25    140  |  105  |  32<H>  ----------------------------<  175<H>  3.8   |  21  |  0.9    Ca    7.7<L>      25 Jan 2022 05:55  Mg     1.8     01-24    TPro  4.5<L>  /  Alb  1.9<L>  /  TBili  0.2  /  DBili  x   /  AST  66<H>  /  ALT  23  /  AlkPhos  118<H>  01-25        PT/INR - ( 24 Jan 2022 05:55 )   PT: 20.20 sec;   INR: 1.77 ratio         < from: Xray Chest 1 View AP/PA (01.24.22 @ 13:34) >  Impression:  Stable bilateral calcified pleural plaques. No acute parenchymal   opacities or pneumothorax.  --- End of Report ---  < end of copied text >      < from: CT Chest No Cont (11.15.21 @ 08:58) >  IMPRESSION:  1. No CT evidence for acute intrathoracic disease.  2. Extensive bilateral partially calcified pleural plaques and adjacent bibasilar round atelectasis, unchanged since 2013. 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  --- End of Report ---  < end of copied text >   DATE OF ADMISSION: 01-17-22  HOSPITAL DAY: 8d    REASON FOR CONSULT: Leukocytosis, no source of infection    HISTORY OF PRESENT ILLNESS:   Patient is an 81 year old Male with past medical history of HTN, HLD, DM, chronic absestos lung disease  ( w/ bilateral calcified plaques) presenting from NH w/ fever and baseline cough. + associated w/ weakness and a decline in energy levels. per hx from family and NH paperwork pt is currently being treated oupt for presumed PNA as he has had fever and worsening leukocytosis for the past several days.  He is currently on a 7 day course of Doxycycline and cefuroxime ( started on 1/11/2022 to be completed on 1/18/2022, last dose today )  but per family has been on multiple abx courses over the past several weeks all for presumed PNA.  pt reports for the last few days he has been feeling more weak and tired and thinks his cough may have worsened slightly over the last few days. denies any SOB or fever. no episodes of hypoxia at NH.    in the ED,pt's VS T(C): 36.4 (01-17-22 @ 00:02), Max: 38.4 (01-16-22 @ 20:20)  HR: 101 (01-17-22 @ 00:02) (101 - 122)  BP: 124/66 (01-17-22 @ 00:02) (124/66 - 135/60)  RR: 18 (01-17-22 @ 00:02) (18 - 18)  SpO2: 98% (01-17-22 @ 00:02) (96% - 98%)     labs done showed WBC 18 K. hb 9.6 ( previously 11), , ALT 74 ( both trending down). UA negative.   CT head negative. CXR showed Bilateral opacities.   pt received 500 ml LR.   pt is admitted for workup/management (17 Jan 2022 01:21)      PAST MEDICAL & SURGICAL HISTORY:  Hypertension    Diabetes mellitus    ALLERGIES:  No Known Allergies    MEDICATIONS:  MEDICATIONS  (STANDING):  ascorbic acid 500 milliGRAM(s) Oral daily  ATENolol  Tablet 50 milliGRAM(s) Oral daily  cefTRIAXone   IVPB 1000 milliGRAM(s) IV Intermittent every 24 hours  dextrose 40% Gel 15 Gram(s) Oral once  dextrose 5%. 1000 milliLiter(s) (50 mL/Hr) IV Continuous <Continuous>  dextrose 5%. 1000 milliLiter(s) (100 mL/Hr) IV Continuous <Continuous>  dextrose 50% Injectable 25 Gram(s) IV Push once  dextrose 50% Injectable 12.5 Gram(s) IV Push once  dextrose 50% Injectable 25 Gram(s) IV Push once  enoxaparin Injectable 40 milliGRAM(s) SubCutaneous daily  glucagon  Injectable 1 milliGRAM(s) IntraMuscular once  insulin lispro (ADMELOG) corrective regimen sliding scale   SubCutaneous three times a day before meals  lisinopril 20 milliGRAM(s) Oral daily  metroNIDAZOLE    Tablet 500 milliGRAM(s) Oral every 8 hours  multivitamin 1 Tablet(s) Oral daily  simvastatin 40 milliGRAM(s) Oral at bedtime  zinc sulfate 220 milliGRAM(s) Oral daily    MEDICATIONS  (PRN):  acetaminophen     Tablet .. 650 milliGRAM(s) Oral every 6 hours PRN Temp greater or equal to 38C (100.4F), Mild Pain (1 - 3)  aluminum hydroxide/magnesium hydroxide/simethicone Suspension 30 milliLiter(s) Oral every 4 hours PRN Dyspepsia  guaifenesin/dextromethorphan Oral Liquid 10 milliLiter(s) Oral every 6 hours PRN Cough  melatonin 3 milliGRAM(s) Oral at bedtime PRN Insomnia  ondansetron Injectable 4 milliGRAM(s) IV Push every 8 hours PRN Nausea and/or Vomiting    HOME MEDICATIONS:  Home Medications:  atenolol 50 mg oral tablet: 1 tab(s) orally once a day (17 Jan 2022 01:45)  glyburide-metformin 2.5 mg-500 mg oral tablet: 1 tab(s) orally once a day (17 Jan 2022 01:45)  lisinopril 20 mg oral tablet: 1 tab(s) orally once a day (17 Jan 2022 01:45)  simvastatin 40 mg oral tablet: 1 tab(s) orally once a day (at bedtime) (17 Jan 2022 01:45)      VITALS:   T(C): 37.6 (01-25-22 @ 05:06), Max: 37.7 (01-24-22 @ 20:00)  HR: 85 (01-25-22 @ 05:06) (85 - 105)  BP: 118/57 (01-25-22 @ 05:06) (108/57 - 141/80)  RR: 18 (01-25-22 @ 05:06) (18 - 18)  SpO2: --  Wt(kg): --    01-24-22 @ 07:01  -  01-25-22 @ 07:00  --------------------------------------------------------  IN: 0 mL / OUT: 400 mL / NET: -400 mL        PHYSICAL EXAM:  GENERAL: Ill appearing frail, Resting in bed  HEENT: NCAT  CHEST/LUNG: gurguling; No wheezing or rubs.   HEART: Regular rate and rhythm; No murmurs, rubs, or gallops  ABDOMEN: Bowel sounds present; Soft, Nontender, Nondistended.   EXTREMITIES:  No clubbing, cyanosis, or edema  NERVOUS SYSTEM:  Alert & Oriented X3    LABS:                        7.8    18.04 )-----------( 347      ( 25 Jan 2022 05:55 )             25.2     01-25    140  |  105  |  32<H>  ----------------------------<  175<H>  3.8   |  21  |  0.9    Ca    7.7<L>      25 Jan 2022 05:55  Mg     1.8     01-24    TPro  4.5<L>  /  Alb  1.9<L>  /  TBili  0.2  /  DBili  x   /  AST  66<H>  /  ALT  23  /  AlkPhos  118<H>  01-25        PT/INR - ( 24 Jan 2022 05:55 )   PT: 20.20 sec;   INR: 1.77 ratio         < from: Xray Chest 1 View AP/PA (01.24.22 @ 13:34) >  Impression:  Stable bilateral calcified pleural plaques. No acute parenchymal   opacities or pneumothorax.  --- End of Report ---  < end of copied text >      < from: CT Chest No Cont (11.15.21 @ 08:58) >  IMPRESSION:  1. No CT evidence for acute intrathoracic disease.  2. Extensive bilateral partially calcified pleural plaques and adjacent bibasilar round atelectasis, unchanged since 2013. 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  --- End of Report ---  < end of copied text >   DATE OF ADMISSION: 01-17-22  HOSPITAL DAY: 8d    REASON FOR CONSULT: Leukocytosis, no source of infection    HISTORY OF PRESENT ILLNESS:   Patient is an 81 year old Male with past medical history of HTN, HLD, DM, chronic absestos lung disease  ( w/ bilateral calcified plaques) presenting from NH w/ fever and baseline cough. + associated w/ weakness and a decline in energy levels. per hx from family and NH paperwork pt is currently being treated oupt for presumed PNA as he has had fever and worsening leukocytosis for the past several days.  He is currently on a 7 day course of Doxycycline and cefuroxime ( started on 1/11/2022 to be completed on 1/18/2022, last dose today )  but per family has been on multiple abx courses over the past several weeks all for presumed PNA.  pt reports for the last few days he has been feeling more weak and tired and thinks his cough may have worsened slightly over the last few days. denies any SOB or fever. no episodes of hypoxia at NH.    in the ED,pt's VS T(C): 36.4 (01-17-22 @ 00:02), Max: 38.4 (01-16-22 @ 20:20)  HR: 101 (01-17-22 @ 00:02) (101 - 122)  BP: 124/66 (01-17-22 @ 00:02) (124/66 - 135/60)  RR: 18 (01-17-22 @ 00:02) (18 - 18)  SpO2: 98% (01-17-22 @ 00:02) (96% - 98%)     labs done showed WBC 18 K. hb 9.6 ( previously 11), , ALT 74 ( both trending down). UA negative.   CT head negative. CXR showed Bilateral opacities.   pt received 500 ml LR.   pt is admitted for workup/management (17 Jan 2022 01:21)      PAST MEDICAL & SURGICAL HISTORY:  Hypertension  Diabetes mellitus    ALLERGIES:  No Known Allergies    MEDICATIONS:  MEDICATIONS  (STANDING):  ascorbic acid 500 milliGRAM(s) Oral daily  ATENolol  Tablet 50 milliGRAM(s) Oral daily  cefTRIAXone   IVPB 1000 milliGRAM(s) IV Intermittent every 24 hours  dextrose 40% Gel 15 Gram(s) Oral once  dextrose 5%. 1000 milliLiter(s) (50 mL/Hr) IV Continuous <Continuous>  dextrose 5%. 1000 milliLiter(s) (100 mL/Hr) IV Continuous <Continuous>  dextrose 50% Injectable 25 Gram(s) IV Push once  dextrose 50% Injectable 12.5 Gram(s) IV Push once  dextrose 50% Injectable 25 Gram(s) IV Push once  enoxaparin Injectable 40 milliGRAM(s) SubCutaneous daily  glucagon  Injectable 1 milliGRAM(s) IntraMuscular once  insulin lispro (ADMELOG) corrective regimen sliding scale   SubCutaneous three times a day before meals  lisinopril 20 milliGRAM(s) Oral daily  metroNIDAZOLE    Tablet 500 milliGRAM(s) Oral every 8 hours  multivitamin 1 Tablet(s) Oral daily  simvastatin 40 milliGRAM(s) Oral at bedtime  zinc sulfate 220 milliGRAM(s) Oral daily    MEDICATIONS  (PRN):  acetaminophen     Tablet .. 650 milliGRAM(s) Oral every 6 hours PRN Temp greater or equal to 38C (100.4F), Mild Pain (1 - 3)  aluminum hydroxide/magnesium hydroxide/simethicone Suspension 30 milliLiter(s) Oral every 4 hours PRN Dyspepsia  guaifenesin/dextromethorphan Oral Liquid 10 milliLiter(s) Oral every 6 hours PRN Cough  melatonin 3 milliGRAM(s) Oral at bedtime PRN Insomnia  ondansetron Injectable 4 milliGRAM(s) IV Push every 8 hours PRN Nausea and/or Vomiting    HOME MEDICATIONS:  Home Medications:  atenolol 50 mg oral tablet: 1 tab(s) orally once a day (17 Jan 2022 01:45)  glyburide-metformin 2.5 mg-500 mg oral tablet: 1 tab(s) orally once a day (17 Jan 2022 01:45)  lisinopril 20 mg oral tablet: 1 tab(s) orally once a day (17 Jan 2022 01:45)  simvastatin 40 mg oral tablet: 1 tab(s) orally once a day (at bedtime) (17 Jan 2022 01:45)      VITALS:   T(C): 37.6 (01-25-22 @ 05:06), Max: 37.7 (01-24-22 @ 20:00)  HR: 85 (01-25-22 @ 05:06) (85 - 105)  BP: 118/57 (01-25-22 @ 05:06) (108/57 - 141/80)  RR: 18 (01-25-22 @ 05:06) (18 - 18)  SpO2: --  Wt(kg): --    01-24-22 @ 07:01  -  01-25-22 @ 07:00  --------------------------------------------------------  IN: 0 mL / OUT: 400 mL / NET: -400 mL        PHYSICAL EXAM:  GENERAL: Ill appearing frail, Resting in bed  HEENT: NCAT  CHEST/LUNG: gurguling; No wheezing or rubs.   HEART: Regular rate and rhythm; No murmurs, rubs, or gallops  ABDOMEN: Bowel sounds present; Soft, Nontender, Nondistended.   EXTREMITIES:  No clubbing, cyanosis, or edema  NERVOUS SYSTEM:  Alert & Oriented X3    LABS:                        7.8    18.04 )-----------( 347      ( 25 Jan 2022 05:55 )             25.2     01-25    140  |  105  |  32<H>  ----------------------------<  175<H>  3.8   |  21  |  0.9    Ca    7.7<L>      25 Jan 2022 05:55  Mg     1.8     01-24    TPro  4.5<L>  /  Alb  1.9<L>  /  TBili  0.2  /  DBili  x   /  AST  66<H>  /  ALT  23  /  AlkPhos  118<H>  01-25      PT/INR - ( 24 Jan 2022 05:55 )   PT: 20.20 sec;   INR: 1.77 ratio         < from: Xray Chest 1 View AP/PA (01.24.22 @ 13:34) >  Impression:  Stable bilateral calcified pleural plaques. No acute parenchymal   opacities or pneumothorax.  --- End of Report ---  < end of copied text >      < from: CT Chest No Cont (11.15.21 @ 08:58) >  IMPRESSION:  1. No CT evidence for acute intrathoracic disease.  2. Extensive bilateral partially calcified pleural plaques and adjacent bibasilar round atelectasis, unchanged since 2013. 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  --- End of Report ---  < end of copied text >

## 2022-01-26 NOTE — PROGRESS NOTE ADULT - ASSESSMENT
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 + associated w/ weakness and a decline in energy levels. per hx from family and NH paperwork pt is currently being treated oupt for presumed PNA as he has had fever and worsening leukocytosis for the past several days.      # Fever of unknown origin, resolved  # Low suspicion for PNA  # Chronic asbestos lung disease  - CXR w/ b/l opacities  - febrile on admission, now afebrile. Leukocytosis  - chronic cough at baseline, however pt reports improvement in cough since admission  - BCx x2 NGTD, UCx NG, Sputum Cx normal resp ric, Procal 0.36  - s/p ID eval - unclear etiology of fever, low suspicion for PNA, prior HIDA concerning for cholecystitis   - repeat HIDA revealed delayed visualization of gallbladder which can be seen in chronic cholecystitis, hepatocellular disease, cholestasis, pancreatic disease, and poor nutrition  - per ID, dc abx, obtain WBC scan ( unremarkable ) and inflammatory markers   - f/u Hematology consult for chronic leukocytosis; sent BCR-ABL - if this comes negative then would recommend pulm eval for pleural plaques, PET scan outpatient if BCR-ABL is negative, Will review peripheral smear today   - CT C/A/P today for further evaluation of chronic leukocytosis     # Generalized Weakness   - this may be related to deconditioning from recurrent hospitalizations, but given history of a fall prior to onset of weakness would like to evaluate spine as well  - CT C/A/P ordered as above will see if there is any significant C/T/L spine disease, and if needed order an MRI   - PT following     # Transaminitis, mixed pattern, improving  - s/p hepatology eval - suspect DILI from cefuroxime  - AST 61, ALT 28, ,   - HIDA scan 11/2021 suspicious for cholecystitis  - RUQ U/S w/ cholelithiasis w/o ductal dilatation  - repeat HIDA revealed delayed visualization of gallbladder which can be seen in chronic cholecystitis, hepatocellular disease, cholestasis, pancreatic disease, and poor nutrition      # High AG w/o acidosis, resolved  - AG increased from 15 to 21, now 15  - Bicarb remains wnl 20  - possibly related to decreased PO intake in setting of acute illness    # HLD  - cont home simvastatin 40mg qhs    # DM2  - cont ISS    # HTN  - cont atenolol, lisinopril      DVT PPX: enoxaparin Injectable    #Progress Note Handoff:  Pending (specify):  CT C/A/P and heme onc follow up regarding peripheral smear   Family discussion: d/w son Geovani at length   Disposition: Home___/SNF___/Other________/Unknown at this time__x______      Jennifer Padilla DO

## 2022-01-26 NOTE — PROGRESS NOTE ADULT - SUBJECTIVE AND OBJECTIVE BOX
KAYLAMIGUEL ANGEL  81y, Male  Allergy: No Known Allergies    Hospital Day: 9d    Patient seen and examined earlier today. Working with physical therapy this morning.     PMH/PSH:  PAST MEDICAL & SURGICAL HISTORY:  Hypertension    Diabetes mellitus        LAST 24-Hr EVENTS:    VITALS:  T(F): 96.1 (01-26-22 @ 13:49), Max: 99.8 (01-26-22 @ 05:51)  HR: 90 (01-26-22 @ 15:58)  BP: 109/55 (01-26-22 @ 15:58) (97/52 - 122/66)  RR: 18 (01-26-22 @ 13:49)  SpO2: --        TESTS & MEASUREMENTS:  Weight (Kg):       01-24-22 @ 07:01  -  01-25-22 @ 07:00  --------------------------------------------------------  IN: 0 mL / OUT: 400 mL / NET: -400 mL    01-25-22 @ 07:01  -  01-26-22 @ 07:00  --------------------------------------------------------  IN: 120 mL / OUT: 800 mL / NET: -680 mL                            8.5    15.87 )-----------( 212      ( 26 Jan 2022 04:30 )             27.9     PT/INR - ( 26 Jan 2022 04:30 )   PT: 20.70 sec;   INR: 1.81 ratio           01-26    139  |  105  |  33<H>  ----------------------------<  185<H>  4.4   |  21  |  0.9    Ca    7.9<L>      26 Jan 2022 04:30  Mg     1.8     01-26    TPro  5.0<L>  /  Alb  2.1<L>  /  TBili  0.3  /  DBili  x   /  AST  69<H>  /  ALT  27  /  AlkPhos  119<H>  01-26    LIVER FUNCTIONS - ( 26 Jan 2022 04:30 )  Alb: 2.1 g/dL / Pro: 5.0 g/dL / ALK PHOS: 119 U/L / ALT: 27 U/L / AST: 69 U/L / GGT: x                   Procalcitonin, Serum: 0.36 ng/mL (01-17-22 @ 04:30)          COVID-19 PCR: NotDetec (01-23-22 @ 15:17)      RADIOLOGY, ECG, & ADDITIONAL TESTS:      RECENT DIAGNOSTIC ORDERS:  Magnesium, Serum: AM Sched. Collection: 27-Jan-2022 04:30 (01-26-22 @ 14:32)  Comprehensive Metabolic Panel: AM Sched. Collection: 27-Jan-2022 04:30 (01-26-22 @ 14:32)  Complete Blood Count: AM Sched. Collection: 27-Jan-2022 04:30 (01-26-22 @ 14:32)      MEDICATIONS:  MEDICATIONS  (STANDING):  ascorbic acid 500 milliGRAM(s) Oral daily  ATENolol  Tablet 50 milliGRAM(s) Oral daily  dextrose 40% Gel 15 Gram(s) Oral once  dextrose 5%. 1000 milliLiter(s) (50 mL/Hr) IV Continuous <Continuous>  dextrose 5%. 1000 milliLiter(s) (100 mL/Hr) IV Continuous <Continuous>  dextrose 50% Injectable 25 Gram(s) IV Push once  dextrose 50% Injectable 12.5 Gram(s) IV Push once  dextrose 50% Injectable 25 Gram(s) IV Push once  enoxaparin Injectable 40 milliGRAM(s) SubCutaneous daily  glucagon  Injectable 1 milliGRAM(s) IntraMuscular once  insulin lispro (ADMELOG) corrective regimen sliding scale   SubCutaneous three times a day before meals  lisinopril 20 milliGRAM(s) Oral daily  metroNIDAZOLE    Tablet 500 milliGRAM(s) Oral every 8 hours  multivitamin 1 Tablet(s) Oral daily  simvastatin 40 milliGRAM(s) Oral at bedtime  zinc sulfate 220 milliGRAM(s) Oral daily    MEDICATIONS  (PRN):  acetaminophen     Tablet .. 650 milliGRAM(s) Oral every 6 hours PRN Temp greater or equal to 38C (100.4F), Mild Pain (1 - 3)  aluminum hydroxide/magnesium hydroxide/simethicone Suspension 30 milliLiter(s) Oral every 4 hours PRN Dyspepsia  guaifenesin/dextromethorphan Oral Liquid 10 milliLiter(s) Oral every 6 hours PRN Cough  melatonin 3 milliGRAM(s) Oral at bedtime PRN Insomnia  ondansetron Injectable 4 milliGRAM(s) IV Push every 8 hours PRN Nausea and/or Vomiting      HOME MEDICATIONS:  atenolol 50 mg oral tablet (01-17)  glyburide-metformin 2.5 mg-500 mg oral tablet (01-17)  lisinopril 20 mg oral tablet (01-17)  simvastatin 40 mg oral tablet (01-17)      PHYSICAL EXAM:  GEN: No acute distress, Contracted  HEENT: Normocephalic Atraumatic   NECK: Supple, no LAD   LUNGS: Clear to auscultation bilaterally   HEART: RRR no MRG   ABD: Soft, non-tender, non-distended.  EXT: warm well perfused no edema   NEURO: AAOX3, RLE 2+ strength, LLE 1+ strength, B/l UE 3+ strength   PSYCH: Mood is appropriate, following commands

## 2022-01-26 NOTE — CHART NOTE - NSCHARTNOTEFT_GEN_A_CORE
Heme/Onc consulted for chronic leukocytosis.                           8.5    15.87 )-----------( 212      ( 26 Jan 2022 04:30 )             27.9       Reticulocyte Count in AM (01.26.22 @ 04:30)    RBC Count: 3.24 M/uL    Reticulocyte Percent: 3.8 %    Absolute Reticulocytes: 128.6 K/uL      WBC count downtrending.     BCR/ABL sent on 1/25/22 -will follow up results    Anemia w/u:  Vit B12, folate, hapto, LDH, myeloma panel sent -follow up results    PLAN:  -Please follow up above workup  -Send iron studies as well (ordered for today AM but not collected)  -Will review peripheral smear as well today.

## 2022-01-27 NOTE — PROGRESS NOTE ADULT - ASSESSMENT
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 CLINICAL CORRELATION IS SUGGESTED.  - NM Hepatobiliary Imaging (01.21.22 @ 19:15): Delayed definite visualization of the gallbladder at 6 hours post  injection, demonstrating patency of the cystic duct. Delayed visualization of the gallbladder is a nonspecific finding.   - Anti Nuclear Factor Titer: Negative: Antinuclear AB (TAWANDA), IFA Method (11.19.21 @ 06:41), ANCA labs negative  - Ferritin, Serum: 1707 ng/mL (11.20.21 @ 09:17), Lactate Dehydrogenase, Serum: 192 U/L (01.26.22 @ 04:30)  - Tagged WBC scan 1/25 - update diffusely in bilateral lung fields     #Chronic asbestos Lung disease    #Transaminitis - Cholecystitis- HIDA scan positive 11/19/2021    Recommendations  - appreciate heme-onc consult  - will follow-up CT Chest and CT Abd/Pelvis   - monitor off antibiotics    Please call or message on Microsoft Teams if with any questions.  Spectra [phone]

## 2022-01-27 NOTE — SWALLOW FEES ASSESSMENT ADULT - SLP PERTINENT HISTORY OF CURRENT PROBLEM
81 year old Male with past medical history of HTN, HLD, DM, chronic asbestos lung disease  ( w/ bilateral calcified plaques) presenting from NH w/ fever, baseline cough, associated w/ weakness and a decline in energy levels. At NH Pt. was being treated for presumed PNA and worsening leukocytosis for the past several days. CXR 1/24 indicated stable bilateral calcified pleural plaques. Heme/Onc consulted for evaluation of persistent leukocytosis, r/o leukemia.

## 2022-01-27 NOTE — SWALLOW FEES ASSESSMENT ADULT - SLP GENERAL OBSERVATIONS
pt awake alert without c/o pain. Baseline cough noted with audible congestion noted and inability to clear secretions.

## 2022-01-27 NOTE — PROGRESS NOTE ADULT - ATTENDING COMMENTS
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 + associated w/ weakness and a decline in energy levels. per hx from family and NH paperwork pt is currently being treated oupt for presumed PNA as he has had fever and worsening leukocytosis for the past several days.      # Fever of unknown origin, resolved  # Low suspicion for PNA  # Chronic asbestos lung disease  - CXR w/ b/l opacities  - febrile on admission, now afebrile. Leukocytosis  - chronic cough at baseline, however pt reports improvement in cough since admission  - BCx x2 NGTD, UCx NG, Sputum Cx normal resp ric, Procal 0.36  - s/p ID eval - unclear etiology of fever, low suspicion for PNA, prior HIDA concerning for cholecystitis   - repeat HIDA revealed delayed visualization of gallbladder which can be seen in chronic cholecystitis, hepatocellular disease, cholestasis, pancreatic disease, and poor nutrition  - per ID, dc abx, obtain WBC scan ( unremarkable ) and inflammatory markers   - f/u Hematology consult for chronic leukocytosis; sent BCR-ABL - if this comes negative then would recommend pulm eval for pleural plaques, PET scan outpatient if BCR-ABL is negative, Will review peripheral smear ( pending )   - CT C/A/P unremarkable     # Generalized Weakness   - this may be related to deconditioning from recurrent hospitalizations, but given history of a fall prior to onset of weakness would like to evaluate spine as well  - CT C/A/P without any mention of significant spine pathology   - PT following - will need STR     # Transaminitis, mixed pattern, improving  - s/p hepatology eval - suspect DILI from cefuroxime  - AST 61, ALT 28, ,   - HIDA scan 11/2021 suspicious for cholecystitis  - RUQ U/S w/ cholelithiasis w/o ductal dilatation  - repeat HIDA revealed delayed visualization of gallbladder which can be seen in chronic cholecystitis, hepatocellular disease, cholestasis, pancreatic disease, and poor nutrition    # High AG w/o acidosis, resolved  - AG increased from 15 to 21, now 15  - Bicarb remains wnl 20  - possibly related to decreased PO intake in setting of acute illness    # HLD  - cont home simvastatin 40mg qhs    # DM2  - cont ISS    # HTN  - cont atenolol, lisinopril      DVT PPX: enoxaparin Injectable    #Progress Note Handoff:  Pending (specify):  Heme Onc/ ID follow up and then possible dc in 24-48   Family discussion: d/w son Geovani at length   Disposition: Home___/SNF___/Other________/Unknown at this time__x______      Jennifer Padilla, DO

## 2022-01-27 NOTE — PROGRESS NOTE ADULT - SUBJECTIVE AND OBJECTIVE BOX
KAYLAMIGUEL ANGEL  81y, Male  Allergy: No Known Allergies      LOS  10d    CHIEF COMPLAINT: fever (26 Jan 2022 16:02)      INTERVAL EVENTS/HPI  - No acute events overnight  - T(F): , Max: 99.4 (01-26-22 @ 20:29)  - Denies any worsening symptoms  - Tolerating medication  - WBC Count: 15.87 (01-26-22 @ 04:30)  WBC Count: 17.90 (01-25-22 @ 16:00)     - Creatinine, Serum: 0.9 (01-26-22 @ 04:30)       ROS  General: Denies rigors, nightsweats  HEENT: Denies headache, rhinorrhea, sore throat, eye pain  CV: Denies CP, palpitations  PULM: Denies wheezing, hemoptysis  GI: Denies hematemesis, hematochezia, melena  : Denies discharge, hematuria  MSK: Denies arthralgias, myalgias  SKIN: Denies rash, lesions  NEURO: Denies paresthesias, weakness  PSYCH: Denies depression, anxiety    VITALS:  T(F): 96.5, Max: 99.4 (01-26-22 @ 20:29)  HR: 99  BP: 117/61  RR: 20Vital Signs Last 24 Hrs  T(C): 35.8 (27 Jan 2022 05:01), Max: 37.4 (26 Jan 2022 20:29)  T(F): 96.5 (27 Jan 2022 05:01), Max: 99.4 (26 Jan 2022 20:29)  HR: 99 (27 Jan 2022 05:01) (78 - 99)  BP: 117/61 (27 Jan 2022 05:01) (97/52 - 123/73)  BP(mean): --  RR: 20 (27 Jan 2022 05:01) (17 - 20)  SpO2: --    PHYSICAL EXAM:  Gen: NAD, resting in bed  HEENT: Normocephalic, atraumatic  Neck: supple, no lymphadenopathy  CV: Regular rate & regular rhythm  Lungs: decreased BS at bases, no fremitus  Abdomen: Soft, BS present  Ext: Warm, well perfused  Neuro: non focal, awake  Skin: no rash, no erythema  Lines: no phlebitis    FH: Non-contributory  Social Hx: Non-contributory    TESTS & MEASUREMENTS:                        8.5    15.87 )-----------( 212      ( 26 Jan 2022 04:30 )             27.9     01-26    139  |  105  |  33<H>  ----------------------------<  185<H>  4.4   |  21  |  0.9    Ca    7.9<L>      26 Jan 2022 04:30  Mg     1.8     01-26    TPro  5.0<L>  /  Alb  2.1<L>  /  TBili  0.3  /  DBili  x   /  AST  69<H>  /  ALT  27  /  AlkPhos  119<H>  01-26      LIVER FUNCTIONS - ( 26 Jan 2022 04:30 )  Alb: 2.1 g/dL / Pro: 5.0 g/dL / ALK PHOS: 119 U/L / ALT: 27 U/L / AST: 69 U/L / GGT: x               Culture - Sputum (collected 01-17-22 @ 18:50)  Source: .Sputum Sputum  Gram Stain (01-18-22 @ 07:33):    Moderate polymorphonuclear leukocytes per low power field    Rare Squamous epithelial cells per low power field    No organisms seen per oil power field  Final Report (01-19-22 @ 17:31):    Normal Respiratory Keiko present    Culture - Urine (collected 01-17-22 @ 18:03)  Source: Clean Catch Clean Catch (Midstream)  Final Report (01-18-22 @ 17:48):    No growth    Culture - Blood (collected 01-17-22 @ 11:00)  Source: .Blood Blood  Final Report (01-22-22 @ 22:00):    No Growth Final    Culture - Blood (collected 01-17-22 @ 04:30)  Source: .Blood Blood  Final Report (01-22-22 @ 14:00):    No Growth Final    Culture - Urine (collected 01-16-22 @ 23:38)  Source: Clean Catch Clean Catch (Midstream)  Final Report (01-18-22 @ 06:10):    No growth    Culture - Blood (collected 01-16-22 @ 20:32)  Source: .Blood Blood-Peripheral  Final Report (01-22-22 @ 06:01):    No Growth Final    Culture - Blood (collected 01-16-22 @ 20:32)  Source: .Blood Blood-Peripheral  Final Report (01-22-22 @ 06:01):    No Growth Final            INFECTIOUS DISEASES TESTING  COVID-19 PCR: NotDetec (01-23-22 @ 15:17)  Procalcitonin, Serum: 0.36 (01-17-22 @ 04:30)  Rapid RVP Result: NotDetec (01-16-22 @ 20:54)  COVID-19 PCR: NotDetec (11-26-21 @ 11:19)  Procalcitonin, Serum: 0.33 (11-24-21 @ 07:20)  Procalcitonin, Serum: 0.35 (11-22-21 @ 06:26)  Procalcitonin, Serum: 0.38 (11-18-21 @ 09:58)  COVID-19 PCR: NotDetec (11-18-21 @ 08:50)  MRSA PCR Result.: Negative (11-15-21 @ 13:30)  Procalcitonin, Serum: 0.22 (11-15-21 @ 06:59)  Rapid RVP Result: NotDetec (11-14-21 @ 09:55)      INFLAMMATORY MARKERS  C-Reactive Protein, Serum: 194 mg/L (01-17-22 @ 04:30)      RADIOLOGY & ADDITIONAL TESTS:  I have personally reviewed the last available Chest xray  CXR  Xray Chest 1 View AP/PA:   ACC: [id number] EXAM:  XR CHEST 1 VIEW                          PROCEDURE DATE:  01/24/2022          INTERPRETATION:  Clinical History/Reason for Exam:  Cough    Technique:  Frontal view the chest.    Comparison: Chest x-ray 1/16/2022.    Findings:    Support devices:  none    Cardiac/mediastinum/hilum: Stable mild cardiomegaly    Lung parenchyma/ Pleura: Stable bilateral calcified pleural plaques. No   acute parenchymal opacities or pneumothorax.      Skeleton/soft tissues: No focal skeletal lesions are identified.      Impression:    Stable bilateral calcified pleural plaques. No acute parenchymal   opacities or pneumothorax.    --- End of Report ---            LUIS MOSQUEDA MD; Attending Radiologist  This document has been electronically signed. Jan 24 2022  2:31PM (01-24-22 @ 13:34)      CT      CARDIOLOGY TESTING  12 Lead ECG:   Ventricular Rate 109 BPM    Atrial Rate 109 BPM    P-R Interval 118 ms    QRS Duration 74 ms    Q-T Interval 328 ms    QTC Calculation(Bazett) 441 ms    P Axis 45 degrees    R Axis 33 degrees    T Axis 4 degrees    Diagnosis Line Sinus tachycardia  Minimal voltage criteria for LVH, may be normal variant  Inferior infarct , age undetermined  Abnormal ECG    Confirmed by Ashley Vick MD ([id number]) on 1/17/2022 10:36:38 AM (01-16-22 @ 21:50)      MEDICATIONS  ascorbic acid 500 Oral daily  ATENolol  Tablet 50 Oral daily  dextrose 40% Gel 15 Oral once  dextrose 5%. 1000 IV Continuous <Continuous>  dextrose 5%. 1000 IV Continuous <Continuous>  dextrose 50% Injectable 25 IV Push once  dextrose 50% Injectable 12.5 IV Push once  dextrose 50% Injectable 25 IV Push once  enoxaparin Injectable 40 SubCutaneous daily  glucagon  Injectable 1 IntraMuscular once  insulin lispro (ADMELOG) corrective regimen sliding scale  SubCutaneous three times a day before meals  lisinopril 20 Oral daily  multivitamin 1 Oral daily  simvastatin 40 Oral at bedtime  zinc sulfate 220 Oral daily      WEIGHT  Weight (kg): 66.2 (01-16-22 @ 20:20)      ANTIBIOTICS:      All available historical records have been reviewed

## 2022-01-27 NOTE — PROGRESS NOTE ADULT - SUBJECTIVE AND OBJECTIVE BOX
MIGUEL ANGEL GARZA 81y Male  MRN#: [id number]   Hospital Day: 10d    SUBJECTIVE  Patient is a 81y old Male who presents with a chief complaint of fever (27 Jan 2022 07:54)  Currently admitted to medicine with the primary diagnosis of Fever    INTERVAL HPI AND OVERNIGHT EVENTS:  Patient was examined and seen at bedside. This morning he is resting comfortably in bed and reports no issues or overnight events.    OBJECTIVE  PAST MEDICAL & SURGICAL HISTORY  Hypertension  Diabetes mellitus    ALLERGIES:  No Known Allergies    MEDICATIONS:  STANDING MEDICATIONS  ascorbic acid 500 milliGRAM(s) Oral daily  ATENolol  Tablet 50 milliGRAM(s) Oral daily  dextrose 40% Gel 15 Gram(s) Oral once  dextrose 5%. 1000 milliLiter(s) IV Continuous <Continuous>  dextrose 5%. 1000 milliLiter(s) IV Continuous <Continuous>  dextrose 50% Injectable 12.5 Gram(s) IV Push once  dextrose 50% Injectable 25 Gram(s) IV Push once  dextrose 50% Injectable 25 Gram(s) IV Push once  enoxaparin Injectable 40 milliGRAM(s) SubCutaneous daily  glucagon  Injectable 1 milliGRAM(s) IntraMuscular once  insulin lispro (ADMELOG) corrective regimen sliding scale   SubCutaneous three times a day before meals  lisinopril 20 milliGRAM(s) Oral daily  magnesium sulfate  IVPB 2 Gram(s) IV Intermittent once  multivitamin 1 Tablet(s) Oral daily  simvastatin 40 milliGRAM(s) Oral at bedtime  zinc sulfate 220 milliGRAM(s) Oral daily    PRN MEDICATIONS  acetaminophen     Tablet .. 650 milliGRAM(s) Oral every 6 hours PRN  aluminum hydroxide/magnesium hydroxide/simethicone Suspension 30 milliLiter(s) Oral every 4 hours PRN  guaifenesin/dextromethorphan Oral Liquid 10 milliLiter(s) Oral every 6 hours PRN  ketorolac   Injectable 15 milliGRAM(s) IV Push every 6 hours PRN  melatonin 3 milliGRAM(s) Oral at bedtime PRN  ondansetron Injectable 4 milliGRAM(s) IV Push every 8 hours PRN      VITAL SIGNS: Last 24 Hours  T(C): 36.3 (27 Jan 2022 13:27), Max: 37.4 (26 Jan 2022 20:29)  T(F): 97.3 (27 Jan 2022 13:27), Max: 99.4 (26 Jan 2022 20:29)  HR: 92 (27 Jan 2022 13:27) (90 - 99)  BP: 131/62 (27 Jan 2022 13:27) (109/55 - 131/62)  BP(mean): --  RR: 18 (27 Jan 2022 13:27) (17 - 20)  SpO2: --    LABS:                        8.5    14.65 )-----------( 332      ( 27 Jan 2022 04:30 )             28.1     01-27    136  |  101  |  31<H>  ----------------------------<  171<H>  4.2   |  20  |  0.8    Ca    7.7<L>      27 Jan 2022 04:30  Mg     1.7     01-27    TPro  5.0<L>  /  Alb  1.9<L>  /  TBili  0.3  /  DBili  x   /  AST  81<H>  /  ALT  29  /  AlkPhos  113  01-27    PT/INR - ( 26 Jan 2022 04:30 )   PT: 20.70 sec;   INR: 1.81 ratio      PHYSICAL EXAM:  CONSTITUTIONAL: No acute distress, well-developed, well-groomed, AAOx3  HEAD: Atraumatic, normocephalic  EYES: EOM intact, PERRLA, conjunctiva and sclera clear  PULMONARY: Clear to auscultation bilaterally  CARDIOVASCULAR: Regular rate and rhythm  GASTROINTESTINAL: Soft, non-tender, non-distended  MUSCULOSKELETAL: no edema    ASSESSMENT & PLAN  Patient is an 81 year old Male with past medical history of HTN, HLD, DM, chronic absestos lung disease  ( w/ bilateral calcified plaques) presenting from NH w/ fever and baseline cough. + associated w/ weakness and a decline in energy levels. per hx from family and NH paperwork pt is currently being treated oupt for presumed PNA as he has had fever and worsening leukocytosis for the past several days.      # Fever of unknown origin, resolved  # Low suspicion for PNA  # Chronic asbestos lung disease  - CXR w/ b/l opacities  - febrile on admission, now afebrile. Leukocytosis  - chronic cough at baseline, however pt reports improvement in cough since admission  - BCx x2 NGTD, UCx NG, Sputum Cx normal resp ric, Procal 0.36  - s/p ID eval - unclear etiology of fever, low suspicion for PNA, acute cholecystitis  - DC ceftriaxone 1g qd, flagyl PO 500mg TID per ID  - s/p hepatology eval - rec obtain HIDA, poss CT A/P to evaluate source of fever, leukocytosis  - HIDA scan revealed delayed visualization of gallbladder which can be seen in chronic cholecystitis, hepatocellular disease, cholestasis, pancreatic disease, and poor nutrition  - Per ID, obtain tagged WBC scan (negative),  ESR, CRP, inflammation markers  - f/u Hematology consult for chronic leukocytosis--> follow up recommendations   - follow up CT Ab/Pelvis/Chest-  - follow up ID recomendations     # Transaminitis, mixed pattern, improving  - AST 61, ALT 28, ,   - HIDA scan 11/2021 suspicious for cholecystitis  - abdominal exam benign  - RUQ U/S w/ cholelithiasis w/o ductal dilatation  - continues to improve, however may consider imaging if worsens  - s/p hepatology eval - suspect DILI from cefuroxime  - INR 1.55->1.54, cont to trend    # High AG w/o acidosis, resolved  - AG increased from 15 to 21, now 15  - Bicarb remains wnl 20  - possibly related to decreased PO intake in setting of acute illness  - cont to trend    # HLD  - cont home simvastatin 40mg qhs  - consider holding statin for now in setting of DILI    # DM2  - FS: 218, 247  - cont ISS  - FS qac, qhs    # HTN  - BP: 144/67 (141/68 - 144/67)  - cont atenolol, lisinopril    PT/REHAB: PT  ACTIVITY: Activity - Ambulate with Assistance  DVT PPX: enoxaparin Injectable  GI PPX:  Not indicated  DIET: Diet, Consistent Carbohydrate w/Evening Snack  CODE: Full code   Disposition: from NH; acute  Pending: Tagged WBC and hematology consult

## 2022-01-28 NOTE — PROGRESS NOTE ADULT - ASSESSMENT
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 + associated w/ weakness and a decline in energy levels. per hx from family and NH paperwork pt is currently being treated oupt for presumed PNA as he has had fever and worsening leukocytosis for the past several days.      # Fever of unknown origin, resolved  # Low suspicion for PNA  # Chronic asbestos lung disease  - CXR w/ b/l opacities  - febrile on admission, now afebrile. Leukocytosis  - chronic cough at baseline, however pt reports improvement in cough since admission  - BCx x2 NGTD, UCx NG, Sputum Cx normal resp ric, Procal 0.36  - s/p ID eval - unclear etiology of fever, low suspicion for PNA, prior HIDA concerning for cholecystitis   - repeat HIDA revealed delayed visualization of gallbladder which can be seen in chronic cholecystitis, hepatocellular disease, cholestasis, pancreatic disease, and poor nutrition  - per ID, dc abx, obtain WBC scan ( unremarkable ) and inflammatory markers   - f/u Hematology consult for chronic leukocytosis; sent BCR-ABL - if this comes negative then would recommend pulm eval for pleural plaques, PET scan outpatient if BCR-ABL is negative, Will review peripheral smear ( pending )   - CT C/A/P unremarkable     # Generalized Weakness   - this may be related to deconditioning from recurrent hospitalizations, but given history of a fall prior to onset of weakness would like to evaluate spine as well  - CT C/A/P without any mention of significant spine pathology   - PT following - will need STR     # Transaminitis, mixed pattern, improving  - s/p hepatology eval - suspect DILI from cefuroxime  - AST 61, ALT 28, ,   - HIDA scan 11/2021 suspicious for cholecystitis  - RUQ U/S w/ cholelithiasis w/o ductal dilatation  - repeat HIDA revealed delayed visualization of gallbladder which can be seen in chronic cholecystitis, hepatocellular disease, cholestasis, pancreatic disease, and poor nutrition    # High AG w/o acidosis, resolved  - AG increased from 15 to 21, now 15  - Bicarb remains wnl 20  - possibly related to decreased PO intake in setting of acute illness    # HLD  - cont home simvastatin 40mg qhs    # DM2  - cont ISS    # HTN  - cont atenolol, lisinopril      DVT PPX: enoxaparin Injectable    #Progress Note Handoff:  Pending (specify):  pending heme onc, and authorization, dc to [facility] likely monday   Family discussion: d/w son Geovani at length   Disposition: Home___/[facility]___/Other________/Unknown at this time__x______      Jennifer Padilla DO .

## 2022-01-28 NOTE — PROGRESS NOTE ADULT - SUBJECTIVE AND OBJECTIVE BOX
KAYLAMIGUEL ANGEL  81y, Male  Allergy: No Known Allergies      LOS  11d    CHIEF COMPLAINT: fever (27 Jan 2022 15:09)      INTERVAL EVENTS/HPI  - No acute events overnight  - T(F): , Max: 98.9 (01-27-22 @ 19:48)  - Denies any worsening symptoms  - Tolerating medication  - WBC Count: 14.65 (01-27-22 @ 04:30)  WBC Count: 15.87 (01-26-22 @ 04:30)     - Creatinine, Serum: 0.8 (01-28-22 @ 04:30)  Creatinine, Serum: 0.8 (01-27-22 @ 04:30)       ROS  General: Denies rigors, nightsweats  HEENT: Denies headache, rhinorrhea, sore throat, eye pain  CV: Denies CP, palpitations  PULM: Denies wheezing, hemoptysis  GI: Denies hematemesis, hematochezia, melena  : Denies discharge, hematuria  MSK: Denies arthralgias, myalgias  SKIN: Denies rash, lesions  NEURO: Denies paresthesias, weakness  PSYCH: Denies depression, anxiety    VITALS:  T(F): 97.7, Max: 98.9 (01-27-22 @ 19:48)  HR: 88  BP: 117/59  RR: 18Vital Signs Last 24 Hrs  T(C): 36.5 (28 Jan 2022 13:12), Max: 37.2 (27 Jan 2022 19:48)  T(F): 97.7 (28 Jan 2022 13:12), Max: 98.9 (27 Jan 2022 19:48)  HR: 88 (28 Jan 2022 13:12) (88 - 107)  BP: 117/59 (28 Jan 2022 13:12) (112/59 - 133/75)  BP(mean): --  RR: 18 (28 Jan 2022 13:12) (18 - 18)  SpO2: --    PHYSICAL EXAM:  Gen: NAD, resting in bed  HEENT: Normocephalic, atraumatic  Neck: supple, no lymphadenopathy  CV: Regular rate & regular rhythm  Lungs: decreased BS at bases, no fremitus  Abdomen: Soft, BS present  Ext: Warm, well perfused  Neuro: non focal, awake  Skin: no rash, no erythema  Lines: no phlebitis    FH: Non-contributory  Social Hx: Non-contributory    TESTS & MEASUREMENTS:                        8.5    14.65 )-----------( 332      ( 27 Jan 2022 04:30 )             28.1     01-28    141  |  105  |  30<H>  ----------------------------<  155<H>  4.8   |  16<L>  |  0.8    Ca    7.7<L>      28 Jan 2022 04:30  Mg     2.1     01-28    TPro  5.1<L>  /  Alb  2.3<L>  /  TBili  0.4  /  DBili  x   /  AST  78<H>  /  ALT  28  /  AlkPhos  113  01-28    eGFR if : 96 mL/min/1.73M2 (01-28-22 @ 04:30)  eGFR if Non African American: 83 mL/min/1.73M2 (01-28-22 @ 04:30)    LIVER FUNCTIONS - ( 28 Jan 2022 04:30 )  Alb: 2.3 g/dL / Pro: 5.1 g/dL / ALK PHOS: 113 U/L / ALT: 28 U/L / AST: 78 U/L / GGT: x               Culture - Sputum (collected 01-17-22 @ 18:50)  Source: .Sputum Sputum  Gram Stain (01-18-22 @ 07:33):    Moderate polymorphonuclear leukocytes per low power field    Rare Squamous epithelial cells per low power field    No organisms seen per oil power field  Final Report (01-19-22 @ 17:31):    Normal Respiratory Keiko present    Culture - Urine (collected 01-17-22 @ 18:03)  Source: Clean Catch Clean Catch (Midstream)  Final Report (01-18-22 @ 17:48):    No growth    Culture - Blood (collected 01-17-22 @ 11:00)  Source: .Blood Blood  Final Report (01-22-22 @ 22:00):    No Growth Final    Culture - Blood (collected 01-17-22 @ 04:30)  Source: .Blood Blood  Final Report (01-22-22 @ 14:00):    No Growth Final    Culture - Urine (collected 01-16-22 @ 23:38)  Source: Clean Catch Clean Catch (Midstream)  Final Report (01-18-22 @ 06:10):    No growth    Culture - Blood (collected 01-16-22 @ 20:32)  Source: .Blood Blood-Peripheral  Final Report (01-22-22 @ 06:01):    No Growth Final    Culture - Blood (collected 01-16-22 @ 20:32)  Source: .Blood Blood-Peripheral  Final Report (01-22-22 @ 06:01):    No Growth Final            INFECTIOUS DISEASES TESTING  COVID-19 PCR: NotDetec (01-23-22 @ 15:17)  Procalcitonin, Serum: 0.36 (01-17-22 @ 04:30)  Rapid RVP Result: NotDetec (01-16-22 @ 20:54)  COVID-19 PCR: NotDetec (11-26-21 @ 11:19)  Procalcitonin, Serum: 0.33 (11-24-21 @ 07:20)  Procalcitonin, Serum: 0.35 (11-22-21 @ 06:26)  Procalcitonin, Serum: 0.38 (11-18-21 @ 09:58)  COVID-19 PCR: NotDetec (11-18-21 @ 08:50)  MRSA PCR Result.: Negative (11-15-21 @ 13:30)  Procalcitonin, Serum: 0.22 (11-15-21 @ 06:59)  Rapid RVP Result: NotDetec (11-14-21 @ 09:55)      INFLAMMATORY MARKERS  C-Reactive Protein, Serum: 194 mg/L (01-17-22 @ 04:30)      RADIOLOGY & ADDITIONAL TESTS:  I have personally reviewed the last available Chest xray  CXR      CT  CT Chest w/ IV Cont:   ACC: [id number] EXAM:  CT CHEST IC                          PROCEDURE DATE:  01/27/2022          INTERPRETATION:  CLINICAL STATEMENT: Fever of unknown origin.    TECHNIQUE: CT of the thorax was performed after administration of   intravenous contrast. Sagittal and coronal reformats were performed as   well as MIP reconstructions.    COMPARISON: CT chest 11/15/2021, 5/31/2013    FINDINGS:    LUNGS, PLEURA, AND AIRWAYS: There are opacities in the central bronchi   compatible with secretions. There is no pneumothorax. There is an azygos   lobe, normal variant. Stable scattered subcentimeter left lung pulmonary   nodules. Extensive bilateral partially calcified pleural plaques with   stable adjacent areas of bibasilar round atelectasis. Trace left pleural   effusion/pleural thickening.    MEDIASTINUM: There are no enlarged mediastinal, hilar or axillary lymph   nodes.    HEART/GREAT VESSELS: Heart size is normal. No pericardial effusion.   Coronary artery and aortic calcifications. The aorta is normal in caliber.    BONES/SOFT TISSUES: Degenerative changes of the visualized spine..    VISUALIZED UPPER ABDOMEN: Please see concurrent CT of the abdomen.    IMPRESSION:  No focal consolidation. No definite evidence of acute pathology within   the chest.    --- End of Report ---          GASTON MORRISON DO; Resident Radiologist  This document has been electronically signed.  PRICE OSBORN MD; Attending Radiologist  This document has been electronically signed. Jan 27 2022  3:24PM (01-27-22 @ 00:41)      CARDIOLOGY TESTING  12 Lead ECG:   Ventricular Rate 109 BPM    Atrial Rate 109 BPM    P-R Interval 118 ms    QRS Duration 74 ms    Q-T Interval 328 ms    QTC Calculation(Bazett) 441 ms    P Axis 45 degrees    R Axis 33 degrees    T Axis 4 degrees    Diagnosis Line Sinus tachycardia  Minimal voltage criteria for LVH, may be normal variant  Inferior infarct , age undetermined  Abnormal ECG    Confirmed by Ashley Vick MD ([id number]) on 1/17/2022 10:36:38 AM (01-16-22 @ 21:50)      MEDICATIONS  ascorbic acid 500 Oral daily  ATENolol  Tablet 50 Oral daily  dextrose 40% Gel 15 Oral once  dextrose 5%. 1000 IV Continuous <Continuous>  dextrose 5%. 1000 IV Continuous <Continuous>  dextrose 50% Injectable 25 IV Push once  dextrose 50% Injectable 12.5 IV Push once  dextrose 50% Injectable 25 IV Push once  enoxaparin Injectable 40 SubCutaneous daily  glucagon  Injectable 1 IntraMuscular once  insulin lispro (ADMELOG) corrective regimen sliding scale  SubCutaneous three times a day before meals  lisinopril 20 Oral daily  multivitamin 1 Oral daily  simvastatin 40 Oral at bedtime  zinc sulfate 220 Oral daily      WEIGHT  Weight (kg): 66.2 (01-16-22 @ 20:20)  Creatinine, Serum: 0.8 mg/dL (01-28-22 @ 04:30)      ANTIBIOTICS:      All available historical records have been reviewed

## 2022-01-28 NOTE — PROGRESS NOTE ADULT - ASSESSMENT
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 CLINICAL CORRELATION IS SUGGESTED.  - NM Hepatobiliary Imaging (01.21.22 @ 19:15): Delayed definite visualization of the gallbladder at 6 hours post  injection, demonstrating patency of the cystic duct. Delayed visualization of the gallbladder is a nonspecific finding.   - Anti Nuclear Factor Titer: Negative: Antinuclear AB (TAWANDA), IFA Method (11.19.21 @ 06:41), ANCA labs negative  - Ferritin, Serum: 1707 ng/mL (11.20.21 @ 09:17), Lactate Dehydrogenase, Serum: 192 U/L (01.26.22 @ 04:30)  - Tagged WBC scan 1/25 - update diffusely in bilateral lung fields   - CT CHest 1/27 - no focal consolidations  - CT Abd/pelvis 1/27 - no acute inflammatory process     #Chronic asbestos Lung disease    #Transaminitis - Cholecystitis- HIDA scan positive 11/19/2021    Recommendations  - reviewed CT imaging - no signs of infection   - appreciate heme-onc consult  - monitor off antibiotics    Please call or message on Microsoft Teams if with any questions.  Spectra [id number]

## 2022-01-28 NOTE — PROGRESS NOTE ADULT - SUBJECTIVE AND OBJECTIVE BOX
KAYLAMIGUEL ANGEL  81y, Male  Allergy: No Known Allergies    Hospital Day: 11d    Patient seen and examined earlier today. Feeling well, able to tolerate soft foods this morning well.     PMH/PSH:  PAST MEDICAL & SURGICAL HISTORY:  Hypertension    Diabetes mellitus        LAST 24-Hr EVENTS:    VITALS:  T(F): 97.7 (01-28-22 @ 13:12), Max: 98.9 (01-27-22 @ 19:48)  HR: 88 (01-28-22 @ 13:12)  BP: 117/59 (01-28-22 @ 13:12) (112/59 - 133/75)  RR: 18 (01-28-22 @ 13:12)  SpO2: --        TESTS & MEASUREMENTS:  Weight (Kg):       01-26-22 @ 07:01  -  01-27-22 @ 07:00  --------------------------------------------------------  IN: 0 mL / OUT: 100 mL / NET: -100 mL    01-27-22 @ 07:01  -  01-28-22 @ 07:00  --------------------------------------------------------  IN: 120 mL / OUT: 300 mL / NET: -180 mL                            8.5    14.65 )-----------( 332      ( 27 Jan 2022 04:30 )             28.1       01-28    141  |  105  |  30<H>  ----------------------------<  155<H>  4.8   |  16<L>  |  0.8    Ca    7.7<L>      28 Jan 2022 04:30  Mg     2.1     01-28    TPro  5.1<L>  /  Alb  2.3<L>  /  TBili  0.4  /  DBili  x   /  AST  78<H>  /  ALT  28  /  AlkPhos  113  01-28    LIVER FUNCTIONS - ( 28 Jan 2022 04:30 )  Alb: 2.3 g/dL / Pro: 5.1 g/dL / ALK PHOS: 113 U/L / ALT: 28 U/L / AST: 78 U/L / GGT: x                           COVID-19 PCR: NotDetec (01-23-22 @ 15:17)      RADIOLOGY, ECG, & ADDITIONAL TESTS:      RECENT DIAGNOSTIC ORDERS:  Magnesium, Serum: AM Sched. Collection: 29-Jan-2022 04:30 (01-28-22 @ 17:01)  Comprehensive Metabolic Panel: AM Sched. Collection: 29-Jan-2022 04:30 (01-28-22 @ 17:01)  Complete Blood Count + Automated Diff: AM Sched. Collection: 29-Jan-2022 04:30 (01-28-22 @ 17:01)  Ferritin, Serum: 11:00 (01-28-22 @ 08:23)  Diet, Soft and Bite Sized (01-27-22 @ 18:22)      MEDICATIONS:  MEDICATIONS  (STANDING):  ascorbic acid 500 milliGRAM(s) Oral daily  ATENolol  Tablet 50 milliGRAM(s) Oral daily  dextrose 40% Gel 15 Gram(s) Oral once  dextrose 5%. 1000 milliLiter(s) (50 mL/Hr) IV Continuous <Continuous>  dextrose 5%. 1000 milliLiter(s) (100 mL/Hr) IV Continuous <Continuous>  dextrose 50% Injectable 25 Gram(s) IV Push once  dextrose 50% Injectable 12.5 Gram(s) IV Push once  dextrose 50% Injectable 25 Gram(s) IV Push once  enoxaparin Injectable 40 milliGRAM(s) SubCutaneous daily  glucagon  Injectable 1 milliGRAM(s) IntraMuscular once  insulin lispro (ADMELOG) corrective regimen sliding scale   SubCutaneous three times a day before meals  lisinopril 20 milliGRAM(s) Oral daily  multivitamin 1 Tablet(s) Oral daily  simvastatin 40 milliGRAM(s) Oral at bedtime  zinc sulfate 220 milliGRAM(s) Oral daily    MEDICATIONS  (PRN):  acetaminophen     Tablet .. 650 milliGRAM(s) Oral every 6 hours PRN Temp greater or equal to 38C (100.4F), Mild Pain (1 - 3)  aluminum hydroxide/magnesium hydroxide/simethicone Suspension 30 milliLiter(s) Oral every 4 hours PRN Dyspepsia  ketorolac   Injectable 15 milliGRAM(s) IV Push every 6 hours PRN Moderate Pain (4 - 6)  melatonin 3 milliGRAM(s) Oral at bedtime PRN Insomnia  ondansetron Injectable 4 milliGRAM(s) IV Push every 8 hours PRN Nausea and/or Vomiting      HOME MEDICATIONS:  atenolol 50 mg oral tablet (01-17)  glyburide-metformin 2.5 mg-500 mg oral tablet (01-17)  lisinopril 20 mg oral tablet (01-17)  simvastatin 40 mg oral tablet (01-17)      PHYSICAL EXAM:  CONSTITUTIONAL: No acute distress, well-developed, well-groomed, AAOx3  HEAD: Atraumatic, normocephalic  EYES: EOM intact, PERRLA, conjunctiva and sclera clear  PULMONARY: Clear to auscultation bilaterally  CARDIOVASCULAR: Regular rate and rhythm  GASTROINTESTINAL: Soft, non-tender, non-distended  MUSCULOSKELETAL: no edema

## 2022-01-29 NOTE — PROGRESS NOTE ADULT - ASSESSMENT
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 + associated w/ weakness and a decline in energy levels. per hx from family and NH paperwork pt is currently being treated oupt for presumed PNA as he has had fever and worsening leukocytosis for the past several days.      # Fever of unknown origin, resolved  # Low suspicion for PNA  # Chronic asbestos lung disease  - CXR w/ b/l opacities  - febrile on admission, now afebrile. Leukocytosis  - chronic cough at baseline, however pt reports improvement in cough since admission  - BCx x2 NGTD, UCx NG, Sputum Cx normal resp ric, Procal 0.36  - s/p ID eval - unclear etiology of fever, low suspicion for PNA, prior HIDA concerning for cholecystitis   - repeat HIDA revealed delayed visualization of gallbladder which can be seen in chronic cholecystitis, hepatocellular disease, cholestasis, pancreatic disease, and poor nutrition  - per ID, dc abx, obtain WBC scan ( unremarkable ) and inflammatory markers   - f/u Hematology consult for chronic leukocytosis; sent BCR-ABL - if this comes negative then would recommend pulm eval for pleural plaques, PET scan outpatient if BCR-ABL is negative, Will review peripheral smear ( pending )   - CT C/A/P unremarkable     # Generalized Weakness   - this may be related to deconditioning from recurrent hospitalizations, but given history of a fall prior to onset of weakness would like to evaluate spine as well  - CT C/A/P without any mention of significant spine pathology   - PT following - will need STR     # Transaminitis, mixed pattern, improving  - s/p hepatology eval - suspect DILI from cefuroxime  - AST 61, ALT 28, ,   - HIDA scan 11/2021 suspicious for cholecystitis  - RUQ U/S w/ cholelithiasis w/o ductal dilatation  - repeat HIDA revealed delayed visualization of gallbladder which can be seen in chronic cholecystitis, hepatocellular disease, cholestasis, pancreatic disease, and poor nutrition    # High AG w/o acidosis, resolved  - AG increased from 15 to 21, now 15  - Bicarb remains wnl 20  - possibly related to decreased PO intake in setting of acute illness    # HLD  - cont home simvastatin 40mg qhs    # DM2  - cont ISS    # HTN  - cont atenolol, lisinopril      DVT PPX: enoxaparin Injectable    #Progress Note Handoff:  Pending (specify):  pending authroization, likely dc monday   Disposition: Home___/SNF___/Other________/Unknown at this time__x______      Jennifer Padilla DO .

## 2022-01-29 NOTE — PROGRESS NOTE ADULT - SUBJECTIVE AND OBJECTIVE BOX
T H I S   I S    N O  T   A    F I N A L I Z E D   N O T E      MIGUEL ANGEL GARZA  81y, Male  Allergy: No Known Allergies    Hospital Day: 12d    Patient seen and examined earlier today. Feeling well, eating well, no complaints.     PMH/PSH:  PAST MEDICAL & SURGICAL HISTORY:  Hypertension    Diabetes mellitus        LAST 24-Hr EVENTS:    VITALS:  T(F): 97.1 (01-29-22 @ 13:16), Max: 98.7 (01-29-22 @ 05:28)  HR: 88 (01-29-22 @ 13:16)  BP: 132/62 (01-29-22 @ 13:16) (111/59 - 132/62)  RR: 20 (01-29-22 @ 13:16)  SpO2: --        TESTS & MEASUREMENTS:  Weight (Kg):       01-27-22 @ 07:01  -  01-28-22 @ 07:00  --------------------------------------------------------  IN: 120 mL / OUT: 300 mL / NET: -180 mL    01-28-22 @ 07:01  -  01-29-22 @ 07:00  --------------------------------------------------------  IN: 0 mL / OUT: 500 mL / NET: -500 mL    01-29-22 @ 07:01  -  01-29-22 @ 17:41  --------------------------------------------------------  IN: 0 mL / OUT: 300 mL / NET: -300 mL                            8.4    17.49 )-----------( 423      ( 29 Jan 2022 04:30 )             27.7       01-29    134<L>  |  100  |  27<H>  ----------------------------<  186<H>  4.3   |  23  |  0.8    Ca    7.9<L>      29 Jan 2022 04:30  Mg     1.9     01-29    TPro  5.1<L>  /  Alb  2.0<L>  /  TBili  0.4  /  DBili  x   /  AST  102<H>  /  ALT  33  /  AlkPhos  115  01-29    LIVER FUNCTIONS - ( 29 Jan 2022 04:30 )  Alb: 2.0 g/dL / Pro: 5.1 g/dL / ALK PHOS: 115 U/L / ALT: 33 U/L / AST: 102 U/L / GGT: x                       Ferritin, Serum: 1203 ng/mL (01-28-22 @ 11:00)      COVID-19 PCR: NotDetec (01-29-22 @ 04:30)  COVID-19 PCR: NotDetec (01-23-22 @ 15:17)      RADIOLOGY, ECG, & ADDITIONAL TESTS:      RECENT DIAGNOSTIC ORDERS:      MEDICATIONS:  MEDICATIONS  (STANDING):  ascorbic acid 500 milliGRAM(s) Oral daily  ATENolol  Tablet 50 milliGRAM(s) Oral daily  dextrose 40% Gel 15 Gram(s) Oral once  dextrose 5%. 1000 milliLiter(s) (50 mL/Hr) IV Continuous <Continuous>  dextrose 5%. 1000 milliLiter(s) (100 mL/Hr) IV Continuous <Continuous>  dextrose 50% Injectable 25 Gram(s) IV Push once  dextrose 50% Injectable 12.5 Gram(s) IV Push once  dextrose 50% Injectable 25 Gram(s) IV Push once  enoxaparin Injectable 40 milliGRAM(s) SubCutaneous daily  glucagon  Injectable 1 milliGRAM(s) IntraMuscular once  insulin lispro (ADMELOG) corrective regimen sliding scale   SubCutaneous three times a day before meals  lisinopril 20 milliGRAM(s) Oral daily  multivitamin 1 Tablet(s) Oral daily  simvastatin 40 milliGRAM(s) Oral at bedtime  zinc sulfate 220 milliGRAM(s) Oral daily    MEDICATIONS  (PRN):  acetaminophen     Tablet .. 650 milliGRAM(s) Oral every 6 hours PRN Temp greater or equal to 38C (100.4F), Mild Pain (1 - 3)  aluminum hydroxide/magnesium hydroxide/simethicone Suspension 30 milliLiter(s) Oral every 4 hours PRN Dyspepsia  ketorolac   Injectable 15 milliGRAM(s) IV Push every 6 hours PRN Moderate Pain (4 - 6)  melatonin 3 milliGRAM(s) Oral at bedtime PRN Insomnia  ondansetron Injectable 4 milliGRAM(s) IV Push every 8 hours PRN Nausea and/or Vomiting      HOME MEDICATIONS:  atenolol 50 mg oral tablet (01-17)  glyburide-metformin 2.5 mg-500 mg oral tablet (01-17)  lisinopril 20 mg oral tablet (01-17)  simvastatin 40 mg oral tablet (01-17)      PHYSICAL EXAM:  CONSTITUTIONAL: No acute distress, well-developed, well-groomed, AAOx3  HEAD: Atraumatic, normocephalic  EYES: EOM intact, PERRLA, conjunctiva and sclera clear  PULMONARY: Clear to auscultation bilaterally  CARDIOVASCULAR: Regular rate and rhythm  GASTROINTESTINAL: Soft, non-tender, non-distended  MUSCULOSKELETAL: no edema           KAYLAMIGUEL ANGEL  81y, Male  Allergy: No Known Allergies    Hospital Day: 12d    Patient seen and examined earlier today. Feeling well, eating well, no complaints.     PMH/PSH:  PAST MEDICAL & SURGICAL HISTORY:  Hypertension    Diabetes mellitus        LAST 24-Hr EVENTS:    VITALS:  T(F): 97.1 (01-29-22 @ 13:16), Max: 98.7 (01-29-22 @ 05:28)  HR: 88 (01-29-22 @ 13:16)  BP: 132/62 (01-29-22 @ 13:16) (111/59 - 132/62)  RR: 20 (01-29-22 @ 13:16)  SpO2: --        TESTS & MEASUREMENTS:  Weight (Kg):       01-27-22 @ 07:01  -  01-28-22 @ 07:00  --------------------------------------------------------  IN: 120 mL / OUT: 300 mL / NET: -180 mL    01-28-22 @ 07:01  -  01-29-22 @ 07:00  --------------------------------------------------------  IN: 0 mL / OUT: 500 mL / NET: -500 mL    01-29-22 @ 07:01  -  01-29-22 @ 17:41  --------------------------------------------------------  IN: 0 mL / OUT: 300 mL / NET: -300 mL                            8.4    17.49 )-----------( 423      ( 29 Jan 2022 04:30 )             27.7       01-29    134<L>  |  100  |  27<H>  ----------------------------<  186<H>  4.3   |  23  |  0.8    Ca    7.9<L>      29 Jan 2022 04:30  Mg     1.9     01-29    TPro  5.1<L>  /  Alb  2.0<L>  /  TBili  0.4  /  DBili  x   /  AST  102<H>  /  ALT  33  /  AlkPhos  115  01-29    LIVER FUNCTIONS - ( 29 Jan 2022 04:30 )  Alb: 2.0 g/dL / Pro: 5.1 g/dL / ALK PHOS: 115 U/L / ALT: 33 U/L / AST: 102 U/L / GGT: x                       Ferritin, Serum: 1203 ng/mL (01-28-22 @ 11:00)      COVID-19 PCR: NotDetec (01-29-22 @ 04:30)  COVID-19 PCR: NotDetec (01-23-22 @ 15:17)      RADIOLOGY, ECG, & ADDITIONAL TESTS:      RECENT DIAGNOSTIC ORDERS:      MEDICATIONS:  MEDICATIONS  (STANDING):  ascorbic acid 500 milliGRAM(s) Oral daily  ATENolol  Tablet 50 milliGRAM(s) Oral daily  dextrose 40% Gel 15 Gram(s) Oral once  dextrose 5%. 1000 milliLiter(s) (50 mL/Hr) IV Continuous <Continuous>  dextrose 5%. 1000 milliLiter(s) (100 mL/Hr) IV Continuous <Continuous>  dextrose 50% Injectable 25 Gram(s) IV Push once  dextrose 50% Injectable 12.5 Gram(s) IV Push once  dextrose 50% Injectable 25 Gram(s) IV Push once  enoxaparin Injectable 40 milliGRAM(s) SubCutaneous daily  glucagon  Injectable 1 milliGRAM(s) IntraMuscular once  insulin lispro (ADMELOG) corrective regimen sliding scale   SubCutaneous three times a day before meals  lisinopril 20 milliGRAM(s) Oral daily  multivitamin 1 Tablet(s) Oral daily  simvastatin 40 milliGRAM(s) Oral at bedtime  zinc sulfate 220 milliGRAM(s) Oral daily    MEDICATIONS  (PRN):  acetaminophen     Tablet .. 650 milliGRAM(s) Oral every 6 hours PRN Temp greater or equal to 38C (100.4F), Mild Pain (1 - 3)  aluminum hydroxide/magnesium hydroxide/simethicone Suspension 30 milliLiter(s) Oral every 4 hours PRN Dyspepsia  ketorolac   Injectable 15 milliGRAM(s) IV Push every 6 hours PRN Moderate Pain (4 - 6)  melatonin 3 milliGRAM(s) Oral at bedtime PRN Insomnia  ondansetron Injectable 4 milliGRAM(s) IV Push every 8 hours PRN Nausea and/or Vomiting      HOME MEDICATIONS:  atenolol 50 mg oral tablet (01-17)  glyburide-metformin 2.5 mg-500 mg oral tablet (01-17)  lisinopril 20 mg oral tablet (01-17)  simvastatin 40 mg oral tablet (01-17)      PHYSICAL EXAM:  CONSTITUTIONAL: No acute distress, well-developed, well-groomed, AAOx3  HEAD: Atraumatic, normocephalic  EYES: EOM intact, PERRLA, conjunctiva and sclera clear  PULMONARY: Clear to auscultation bilaterally  CARDIOVASCULAR: Regular rate and rhythm  GASTROINTESTINAL: Soft, non-tender, non-distended  MUSCULOSKELETAL: no edema

## 2022-01-30 NOTE — PROGRESS NOTE ADULT - ASSESSMENT
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 + associated w/ weakness and a decline in energy levels. per hx from family and NH paperwork pt is currently being treated oupt for presumed PNA as he has had fever and worsening leukocytosis for the past several days.      # Fever of unknown origin, resolved  # Low suspicion for PNA  # Chronic asbestos lung disease  - CXR w/ b/l opacities  - febrile on admission, now afebrile. Leukocytosis  - chronic cough at baseline, however pt reports improvement in cough since admission  - BCx x2 NGTD, UCx NG, Sputum Cx normal resp ric, Procal 0.36  - s/p ID eval - unclear etiology of fever, low suspicion for PNA, prior HIDA concerning for cholecystitis   - repeat HIDA revealed delayed visualization of gallbladder which can be seen in chronic cholecystitis, hepatocellular disease, cholestasis, pancreatic disease, and poor nutrition  - CT C/A/P unremarkable   - per ID, dc abx, obtain WBC scan ( unremarkable ) and inflammatory markers     #Chronic Leukocytosis   #Normocytic Anemia   - BCR- ABL negative   - Anemia w/u: Vit B12/folate/LDH WNL, hapto elevated, SPEP pending, FLC ratio normal    - f/u Hematology consult, appreciate results of peripheral smear and final reccs     # Generalized Weakness   - likely related to deconditioning from recurrent hospitalizations  - CT C/A/P without any mention of significant spine pathology   - PT following - will need STR     # Transaminitis, mixed pattern, improving  - s/p hepatology eval - suspect DILI from cefuroxime  - AST 61, ALT 28, ,   - HIDA scan 11/2021 suspicious for cholecystitis  - RUQ U/S w/ cholelithiasis w/o ductal dilatation  - repeat HIDA revealed delayed visualization of gallbladder which can be seen in chronic cholecystitis, hepatocellular disease, cholestasis, pancreatic disease, and poor nutrition    # High AG w/o acidosis, resolved    # HLD  - cont home simvastatin 40mg qhs    # DM2  - cont ISS    # HTN  - cont atenolol, lisinopril      DVT PPX: enoxaparin Injectable    #Progress Note Handoff:  Pending (specify):  pending authorization likely dc monday   Disposition: Home___/SNF___/Other________/Unknown at this time__x______      Jennifer Padilla DO .         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 + associated w/ weakness and a decline in energy levels. per hx from family and NH paperwork pt is currently being treated oupt for presumed PNA as he has had fever and worsening leukocytosis for the past several days.      # Fever of unknown origin  # Low suspicion for PNA  # Chronic asbestos lung disease  - CXR w/ b/l opacities  - febrile on admission, now afebrile. Leukocytosis  - chronic cough at baseline, however pt reports improvement in cough since admission  - BCx x2 NGTD, UCx NG, Sputum Cx normal resp ric, Procal 0.36  - s/p ID eval - unclear etiology of fever, low suspicion for PNA, prior HIDA concerning for cholecystitis   - repeat HIDA revealed delayed visualization of gallbladder which can be seen in chronic cholecystitis, hepatocellular disease, cholestasis, pancreatic disease, and poor nutrition  - CT C/A/P unremarkable   - per ID, dc abx, obtain WBC scan ( unremarkable ) and inflammatory markers   - pt febrile today 1/30 Tmax 101, repeat UA/ CXR/ Blood Cx and f/u ID     #Chronic Leukocytosis   #Normocytic Anemia   - BCR- ABL negative   - Anemia w/u: Vit B12/folate/LDH WNL, hapto elevated, SPEP pending, FLC ratio normal    - f/u Hematology consult, appreciate results of peripheral smear and final reccs     # Generalized Weakness   - likely related to deconditioning from recurrent hospitalizations  - CT C/A/P without any mention of significant spine pathology   - PT following - will need STR     # Transaminitis, mixed pattern, improving  - s/p hepatology eval - suspect DILI from cefuroxime  - AST 61, ALT 28, ,   - HIDA scan 11/2021 suspicious for cholecystitis  - RUQ U/S w/ cholelithiasis w/o ductal dilatation  - repeat HIDA revealed delayed visualization of gallbladder which can be seen in chronic cholecystitis, hepatocellular disease, cholestasis, pancreatic disease, and poor nutrition    # High AG w/o acidosis, resolved    # HLD  - cont home simvastatin 40mg qhs    # DM2  - cont ISS    # HTN  - cont atenolol, lisinopril      DVT PPX: enoxaparin Injectable    #Progress Note Handoff:  Pending (specify):  pending authorization likely dc monday   Disposition: Home___/SNF___/Other________/Unknown at this time__x______      Jennifer Padilla DO .

## 2022-01-30 NOTE — PROGRESS NOTE ADULT - SUBJECTIVE AND OBJECTIVE BOX
KAYLAMIGUEL ANGEL  81y, Male  Allergy: No Known Allergies    Hospital Day: 13d    Patient seen and examined earlier today. Feeling well, no complaints this am.     PMH/PSH:  PAST MEDICAL & SURGICAL HISTORY:  Hypertension    Diabetes mellitus        LAST 24-Hr EVENTS:    VITALS:  T(F): 101 (01-30-22 @ 14:42), Max: 101 (01-30-22 @ 14:42)  HR: 101 (01-30-22 @ 14:42)  BP: 138/61 (01-30-22 @ 14:42) (132/62 - 142/67)  RR: 18 (01-30-22 @ 14:42)  SpO2: 97% (01-30-22 @ 14:42)        TESTS & MEASUREMENTS:  Weight (Kg):       01-28-22 @ 07:01  -  01-29-22 @ 07:00  --------------------------------------------------------  IN: 0 mL / OUT: 500 mL / NET: -500 mL    01-29-22 @ 07:01  -  01-30-22 @ 07:00  --------------------------------------------------------  IN: 0 mL / OUT: 300 mL / NET: -300 mL                            9.1    16.31 )-----------( 392      ( 30 Jan 2022 07:52 )             30.6       01-30    138  |  104  |  24<H>  ----------------------------<  171<H>  4.7   |  20  |  0.8    Ca    8.2<L>      30 Jan 2022 07:52  Mg     1.9     01-30    TPro  5.4<L>  /  Alb  2.4<L>  /  TBili  0.4  /  DBili  x   /  AST  85<H>  /  ALT  35  /  AlkPhos  127<H>  01-30    LIVER FUNCTIONS - ( 30 Jan 2022 07:52 )  Alb: 2.4 g/dL / Pro: 5.4 g/dL / ALK PHOS: 127 U/L / ALT: 35 U/L / AST: 85 U/L / GGT: x                       Ferritin, Serum: 1203 ng/mL (01-28-22 @ 11:00)      COVID-19 PCR: NotDetec (01-29-22 @ 04:30)      RADIOLOGY, ECG, & ADDITIONAL TESTS:      RECENT DIAGNOSTIC ORDERS:  COVID-19 PCR: Routine  Specimen Source: Nasopharyngeal (01-30-22 @ 12:45)      MEDICATIONS:  MEDICATIONS  (STANDING):  ascorbic acid 500 milliGRAM(s) Oral daily  ATENolol  Tablet 50 milliGRAM(s) Oral daily  dextrose 40% Gel 15 Gram(s) Oral once  dextrose 5%. 1000 milliLiter(s) (50 mL/Hr) IV Continuous <Continuous>  dextrose 5%. 1000 milliLiter(s) (100 mL/Hr) IV Continuous <Continuous>  dextrose 50% Injectable 25 Gram(s) IV Push once  dextrose 50% Injectable 12.5 Gram(s) IV Push once  dextrose 50% Injectable 25 Gram(s) IV Push once  enoxaparin Injectable 40 milliGRAM(s) SubCutaneous daily  glucagon  Injectable 1 milliGRAM(s) IntraMuscular once  insulin lispro (ADMELOG) corrective regimen sliding scale   SubCutaneous three times a day before meals  lisinopril 20 milliGRAM(s) Oral daily  multivitamin 1 Tablet(s) Oral daily  simvastatin 40 milliGRAM(s) Oral at bedtime  zinc sulfate 220 milliGRAM(s) Oral daily    MEDICATIONS  (PRN):  acetaminophen     Tablet .. 650 milliGRAM(s) Oral every 6 hours PRN Temp greater or equal to 38C (100.4F), Mild Pain (1 - 3)  aluminum hydroxide/magnesium hydroxide/simethicone Suspension 30 milliLiter(s) Oral every 4 hours PRN Dyspepsia  melatonin 3 milliGRAM(s) Oral at bedtime PRN Insomnia  ondansetron Injectable 4 milliGRAM(s) IV Push every 8 hours PRN Nausea and/or Vomiting      HOME MEDICATIONS:  atenolol 50 mg oral tablet (01-17)  glyburide-metformin 2.5 mg-500 mg oral tablet (01-17)  lisinopril 20 mg oral tablet (01-17)  simvastatin 40 mg oral tablet (01-17)      PHYSICAL EXAM:  CONSTITUTIONAL: No acute distress, well-developed, well-groomed, AAOx3  HEAD: Atraumatic, normocephalic  EYES: EOM intact, PERRLA, conjunctiva and sclera clear  PULMONARY: Clear to auscultation bilaterally  CARDIOVASCULAR: Regular rate and rhythm  GASTROINTESTINAL: Soft, non-tender, non-distended  MUSCULOSKELETAL: no edema

## 2022-01-31 NOTE — CHART NOTE - NSCHARTNOTEFT_GEN_A_CORE
Registered Dietitian Follow-Up     Patient Profile Reviewed                           Yes [x]   No []     Nutrition History Previously Obtained        Yes [x]  No []       Pertinent Subjective Information:  Patient with no c/o n/v/d/c. Per RN, patient only ate ~25% of breakfast and 100% of applesauce. LBM yesterday per RN. Patient requires assistance for feeding.     Pertinent Medical Interventions:  #Intermittent Fever of unknown origin 2/2 Chronic cholecystitis  #Chronic asbestos lung disease  #HLD  #DM2  #HTN    Diet order:   Diet, Soft and Bite Sized (01-27-22 @ 18:22) [Active]    Anthropometrics:  Height: 172.72 cm  Weight: 66.2 kg; No new weights  BMI: 22.1  IBW: 69.8 kg      MEDICATIONS  (STANDING):  ascorbic acid 500 milliGRAM(s) Oral daily  ATENolol  Tablet 50 milliGRAM(s) Oral daily  dextrose 40% Gel 15 Gram(s) Oral once  dextrose 5%. 1000 milliLiter(s) (50 mL/Hr) IV Continuous <Continuous>  dextrose 5%. 1000 milliLiter(s) (100 mL/Hr) IV Continuous <Continuous>  dextrose 50% Injectable 25 Gram(s) IV Push once  dextrose 50% Injectable 12.5 Gram(s) IV Push once  dextrose 50% Injectable 25 Gram(s) IV Push once  enoxaparin Injectable 40 milliGRAM(s) SubCutaneous daily  glucagon  Injectable 1 milliGRAM(s) IntraMuscular once  insulin lispro (ADMELOG) corrective regimen sliding scale   SubCutaneous three times a day before meals  lisinopril 20 milliGRAM(s) Oral daily  multivitamin 1 Tablet(s) Oral daily  simvastatin 40 milliGRAM(s) Oral at bedtime  zinc sulfate 220 milliGRAM(s) Oral daily    MEDICATIONS  (PRN):  acetaminophen     Tablet .. 650 milliGRAM(s) Oral every 6 hours PRN Temp greater or equal to 38C (100.4F), Mild Pain (1 - 3)  aluminum hydroxide/magnesium hydroxide/simethicone Suspension 30 milliLiter(s) Oral every 4 hours PRN Dyspepsia  melatonin 3 milliGRAM(s) Oral at bedtime PRN Insomnia  ondansetron Injectable 4 milliGRAM(s) IV Push every 8 hours PRN Nausea and/or Vomiting    Pertinent Labs: 01-31 @ 04:30: Na 135, BUN 23<H>, Cr 0.7, <H>, K+ 4.1    Finger Sticks:  POCT Blood Glucose.: 233 mg/dL (01-31 @ 11:05)  POCT Blood Glucose.: 184 mg/dL (01-31 @ 07:37)  POCT Blood Glucose.: 196 mg/dL (01-30 @ 21:41)  POCT Blood Glucose.: 183 mg/dL (01-30 @ 16:54)  POCT Blood Glucose.: 190 mg/dL (01-30 @ 16:40)    Physical Findings:  - Appearance: AAOx4  - GI function: LBM 1/30  - Tubes: n/a  - Oral/Mouth cavity: diet texture Soft and Bite Sized, thin consistency  - Skin: stage 2 - sacrum; edema  - 2+ scrotal edema     Nutrition Requirements:  Weight Used: 66.2 kg (1/16) - dosing weight - P/UI and age considered     Estimated Energy Needs    Continue [x]  Adjust []  1471 - 1605  kcal/day (MSJ x 1.1 - 1.2 SF)     Estimated Protein Needs    Continue [x]  Adjust []  79 - 99  gm/day (1.2 - 1.5 gm/kg)     Estimated Fluid Needs        Continue [x]  Adjust []  1655 - 1986 (25-30 ml/kg) or per LIP      Nutrient Intake: ~25% of meals per RN     [] Previous Nutrition Diagnosis:  Increased nutrient needs (protein, kcal) r/t Wounds AEB sacral P/U - stage 2            [x] Ongoing          [] Resolved    Goal/Expected Outcome: Achieve po intake of meals, supplements  >50% within 4 days     Indicator/Monitoring: RD to monitor energy intake, weight, labs, skin status, NFPF     Recommendation:  1) Continue current diet order  2) Obtain new weight  3) Please provide maximum assistance and encouragement with all meals  4) Consider 3 day calorie count if po intake does not improve within 4-6 days.    Patient is at high nutrition risk. RD to f/u in 4 days Registered Dietitian Follow-Up     Patient Profile Reviewed                           Yes [x]   No []     Nutrition History Previously Obtained        Yes [x]  No []       Pertinent Subjective Information:  Patient with no c/o n/v/d/c. Per RN, patient only ate ~25% of breakfast and 100% of applesauce. LBM yesterday per RN. Patient requires assistance for feeding.     Pertinent Medical Interventions:  #Intermittent Fever of unknown origin 2/2 Chronic cholecystitis  #Chronic asbestos lung disease  #HLD  #DM2  #HTN    Diet order:   Diet, Soft and Bite Sized (01-27-22 @ 18:22) [Active]    Anthropometrics:  Height: 172.72 cm  Weight: 66.2 kg; No new weights  BMI: 22.1  IBW: 69.8 kg      MEDICATIONS  (STANDING):  ascorbic acid 500 milliGRAM(s) Oral daily  ATENolol  Tablet 50 milliGRAM(s) Oral daily  dextrose 40% Gel 15 Gram(s) Oral once  dextrose 5%. 1000 milliLiter(s) (50 mL/Hr) IV Continuous <Continuous>  dextrose 5%. 1000 milliLiter(s) (100 mL/Hr) IV Continuous <Continuous>  dextrose 50% Injectable 25 Gram(s) IV Push once  dextrose 50% Injectable 12.5 Gram(s) IV Push once  dextrose 50% Injectable 25 Gram(s) IV Push once  enoxaparin Injectable 40 milliGRAM(s) SubCutaneous daily  glucagon  Injectable 1 milliGRAM(s) IntraMuscular once  insulin lispro (ADMELOG) corrective regimen sliding scale   SubCutaneous three times a day before meals  lisinopril 20 milliGRAM(s) Oral daily  multivitamin 1 Tablet(s) Oral daily  simvastatin 40 milliGRAM(s) Oral at bedtime  zinc sulfate 220 milliGRAM(s) Oral daily    MEDICATIONS  (PRN):  acetaminophen     Tablet .. 650 milliGRAM(s) Oral every 6 hours PRN Temp greater or equal to 38C (100.4F), Mild Pain (1 - 3)  aluminum hydroxide/magnesium hydroxide/simethicone Suspension 30 milliLiter(s) Oral every 4 hours PRN Dyspepsia  melatonin 3 milliGRAM(s) Oral at bedtime PRN Insomnia  ondansetron Injectable 4 milliGRAM(s) IV Push every 8 hours PRN Nausea and/or Vomiting    Pertinent Labs: 01-31 @ 04:30: Na 135, BUN 23<H>, Cr 0.7, <H>, K+ 4.1    Finger Sticks:  POCT Blood Glucose.: 233 mg/dL (01-31 @ 11:05)  POCT Blood Glucose.: 184 mg/dL (01-31 @ 07:37)  POCT Blood Glucose.: 196 mg/dL (01-30 @ 21:41)  POCT Blood Glucose.: 183 mg/dL (01-30 @ 16:54)  POCT Blood Glucose.: 190 mg/dL (01-30 @ 16:40)    Physical Findings:  - Appearance: AAOx4  - GI function: LBM 1/30  - Tubes: n/a  - Oral/Mouth cavity: diet texture Soft and Bite Sized, thin consistency  - Skin: stage 2 - sacrum; edema  - 2+ scrotal edema     Nutrition Requirements:  Weight Used: 66.2 kg (1/16) - dosing weight - P/UI and age considered     Estimated Energy Needs    Continue [x]  Adjust []  1471 - 1605  kcal/day (MSJ x 1.1 - 1.2 SF)     Estimated Protein Needs    Continue [x]  Adjust []  79 - 99  gm/day (1.2 - 1.5 gm/kg)     Estimated Fluid Needs        Continue [x]  Adjust []  1655 - 1986 (25-30 ml/kg) or per LIP      Nutrient Intake: ~25% of meals per RN    Nutrition Intervention: Recommend Glucerna Shake BID (440 kcal, 20 gm Protein) and Prosource Gelatein SF BID (300 kcal, 40 gm Protein) to aid in meeting estimated nutrient needs.     [] Previous Nutrition Diagnosis:  Increased nutrient needs (protein, kcal) r/t Wounds AEB sacral P/U - stage 2            [x] Ongoing          [] Resolved    Goal/Expected Outcome: Achieve po intake of meals, supplements  >50% within 4 days     Indicator/Monitoring: RD to monitor energy intake, weight, labs, skin status, NFPF     Recommendation:  1) Continue current diet order  2) Recommend Glucerna Shake BID (440 kcal, 20 gm Protein) and Prosource Gelatein SF BID (300 kcal, 40 gm Protein)  3) Obtain new weight  4) Please provide maximum assistance and encouragement with all meals  5) Consider 3 day calorie count if po intake does not improve within 4-6 days.    Patient is at high nutrition risk. RD to f/u in 4 days

## 2022-01-31 NOTE — PROGRESS NOTE ADULT - SUBJECTIVE AND OBJECTIVE BOX
Patient is a 81y old  Male who presents with a chief complaint of fever (30 Jan 2022 15:34)      Subjective:      Vital Signs Last 24 Hrs  T(C): 35.6 (30 Jan 2022 22:20), Max: 38.3 (30 Jan 2022 14:42)  T(F): 96 (30 Jan 2022 22:20), Max: 101 (30 Jan 2022 14:42)  HR: 86 (30 Jan 2022 22:20) (86 - 101)  BP: 119/57 (30 Jan 2022 22:20) (119/57 - 138/61)  BP(mean): --  RR: 18 (30 Jan 2022 22:20) (18 - 18)  SpO2: 97% (30 Jan 2022 14:42) (97% - 97%)    PHYSICAL EXAM  General: adult in NAD  HEENT: clear oropharynx, anicteric sclera, pink conjunctiva  Neck: supple  CV: normal S1/S2 with no murmur rubs or gallops  Lungs: positive air movement b/l ant lungs,clear to auscultation, no wheezes, no rales  Abdomen: soft non-tender non-distended, no hepatosplenomegaly  Ext: no clubbing cyanosis or edema  Skin: no rashes and no petechiae  Neuro: alert and oriented X 4, no focal deficits    MEDICATIONS  (STANDING):  ascorbic acid 500 milliGRAM(s) Oral daily  ATENolol  Tablet 50 milliGRAM(s) Oral daily  dextrose 40% Gel 15 Gram(s) Oral once  dextrose 5%. 1000 milliLiter(s) (50 mL/Hr) IV Continuous <Continuous>  dextrose 5%. 1000 milliLiter(s) (100 mL/Hr) IV Continuous <Continuous>  dextrose 50% Injectable 25 Gram(s) IV Push once  dextrose 50% Injectable 12.5 Gram(s) IV Push once  dextrose 50% Injectable 25 Gram(s) IV Push once  enoxaparin Injectable 40 milliGRAM(s) SubCutaneous daily  glucagon  Injectable 1 milliGRAM(s) IntraMuscular once  insulin lispro (ADMELOG) corrective regimen sliding scale   SubCutaneous three times a day before meals  lisinopril 20 milliGRAM(s) Oral daily  multivitamin 1 Tablet(s) Oral daily  simvastatin 40 milliGRAM(s) Oral at bedtime  zinc sulfate 220 milliGRAM(s) Oral daily    MEDICATIONS  (PRN):  acetaminophen     Tablet .. 650 milliGRAM(s) Oral every 6 hours PRN Temp greater or equal to 38C (100.4F), Mild Pain (1 - 3)  aluminum hydroxide/magnesium hydroxide/simethicone Suspension 30 milliLiter(s) Oral every 4 hours PRN Dyspepsia  melatonin 3 milliGRAM(s) Oral at bedtime PRN Insomnia  ondansetron Injectable 4 milliGRAM(s) IV Push every 8 hours PRN Nausea and/or Vomiting      LABS:                          9.1    16.31 )-----------( 392      ( 30 Jan 2022 07:52 )             30.6         Mean Cell Volume : 88.7 fL  Mean Cell Hemoglobin : 26.4 pg  Mean Cell Hemoglobin Concentration : 29.7 g/dL  Auto Neutrophil # : x  Auto Lymphocyte # : x  Auto Monocyte # : x  Auto Eosinophil # : x  Auto Basophil # : x  Auto Neutrophil % : x  Auto Lymphocyte % : x  Auto Monocyte % : x  Auto Eosinophil % : x  Auto Basophil % : x      Serial CBC's  01-30 @ 07:52  Hct-30.6 / Hgb-9.1 / Plat-392 / RBC-3.45 / WBC-16.31  Serial CBC's  01-29 @ 04:30  Hct-27.7 / Hgb-8.4 / Plat-423 / RBC-3.22 / WBC-17.49      01-31    135  |  102  |  23<H>  ----------------------------<  161<H>  4.1   |  24  |  0.7    Ca    7.7<L>      31 Jan 2022 04:30  Mg     1.9     01-30    TPro  5.4<L>  /  Alb  2.4<L>  /  TBili  0.4  /  DBili  x   /  AST  85<H>  /  ALT  35  /  AlkPhos  127<H>  01-30    Ferritin, Serum: 1203 ng/mL (01-28 @ 11:00)  Iron - Total Binding Capacity.: 97 ug/dL (01-28 @ 11:00)  Vitamin B12, Serum: 1224 pg/mL (01-26 @ 04:30)  Folate, Serum: 10.8 ng/mL (01-26 @ 04:30)  Reticulocyte Percent: 3.8 % (01-26 @ 04:30)    BCR/ABL sent on 1/25/22 : Not detected    Vitamin B12, Serum: 1224 pg/mL (01.26.22 @ 04:30)  Folate, Serum: 10.8 ng/mL (01.26.22 @ 04:30)    Lactate Dehydrogenase, Serum: 192 U/L (01.26.22 @ 04:30)  Haptoglobin, Serum: 379 mg/dL (01.26.22 @ 04:30)    Immunoglobulins Panel (01.26.22 @ 04:30)    CLAUDINE Kappa: 8.97 mg/dL    CLAUDINE Lambda: 8.77 mg/dL      RADIOLOGY & ADDITIONAL STUDIES:    < from: CT Abdomen and Pelvis w/ Oral Cont and w/ IV Cont (01.27.22 @ 00:41) >  IMPRESSION:    No acute inflammatory process in the abdomen and pelvis.    Cholelithiasis.    Bladder calculi.    Large right inguinal hernia containing nonobstructed small and large   bowel within it.    < from: CT Chest w/ IV Cont (01.27.22 @ 00:41) >    IMPRESSION:  No focal consolidation. No definite evidence of acute pathology within   the chest.         Patient is a 81y old  Male who presents with a chief complaint of fever (30 Jan 2022 15:34)      Subjective: Pt feels well, no complaints      Vital Signs Last 24 Hrs  T(C): 35.6 (30 Jan 2022 22:20), Max: 38.3 (30 Jan 2022 14:42)  T(F): 96 (30 Jan 2022 22:20), Max: 101 (30 Jan 2022 14:42)  HR: 86 (30 Jan 2022 22:20) (86 - 101)  BP: 119/57 (30 Jan 2022 22:20) (119/57 - 138/61)  BP(mean): --  RR: 18 (30 Jan 2022 22:20) (18 - 18)  SpO2: 97% (30 Jan 2022 14:42) (97% - 97%)    PHYSICAL EXAM  General: adult in NAD  HEENT: clear oropharynx, anicteric sclera, pink conjunctiva  Neck: supple  CV: normal S1/S2 with no murmur rubs or gallops  Lungs: positive air movement b/l ant lungs,clear to auscultation, no wheezes, no rales  Abdomen: soft non-tender non-distended, no hepatosplenomegaly  Ext: no clubbing cyanosis or edema  Skin: no rashes and no petechiae  Neuro: alert and oriented X 4, no focal deficits    MEDICATIONS  (STANDING):  ascorbic acid 500 milliGRAM(s) Oral daily  ATENolol  Tablet 50 milliGRAM(s) Oral daily  dextrose 40% Gel 15 Gram(s) Oral once  dextrose 5%. 1000 milliLiter(s) (50 mL/Hr) IV Continuous <Continuous>  dextrose 5%. 1000 milliLiter(s) (100 mL/Hr) IV Continuous <Continuous>  dextrose 50% Injectable 25 Gram(s) IV Push once  dextrose 50% Injectable 12.5 Gram(s) IV Push once  dextrose 50% Injectable 25 Gram(s) IV Push once  enoxaparin Injectable 40 milliGRAM(s) SubCutaneous daily  glucagon  Injectable 1 milliGRAM(s) IntraMuscular once  insulin lispro (ADMELOG) corrective regimen sliding scale   SubCutaneous three times a day before meals  lisinopril 20 milliGRAM(s) Oral daily  multivitamin 1 Tablet(s) Oral daily  simvastatin 40 milliGRAM(s) Oral at bedtime  zinc sulfate 220 milliGRAM(s) Oral daily    MEDICATIONS  (PRN):  acetaminophen     Tablet .. 650 milliGRAM(s) Oral every 6 hours PRN Temp greater or equal to 38C (100.4F), Mild Pain (1 - 3)  aluminum hydroxide/magnesium hydroxide/simethicone Suspension 30 milliLiter(s) Oral every 4 hours PRN Dyspepsia  melatonin 3 milliGRAM(s) Oral at bedtime PRN Insomnia  ondansetron Injectable 4 milliGRAM(s) IV Push every 8 hours PRN Nausea and/or Vomiting      LABS:                          9.1    16.31 )-----------( 392      ( 30 Jan 2022 07:52 )             30.6         Mean Cell Volume : 88.7 fL  Mean Cell Hemoglobin : 26.4 pg  Mean Cell Hemoglobin Concentration : 29.7 g/dL  Auto Neutrophil # : x  Auto Lymphocyte # : x  Auto Monocyte # : x  Auto Eosinophil # : x  Auto Basophil # : x  Auto Neutrophil % : x  Auto Lymphocyte % : x  Auto Monocyte % : x  Auto Eosinophil % : x  Auto Basophil % : x      Serial CBC's  01-30 @ 07:52  Hct-30.6 / Hgb-9.1 / Plat-392 / RBC-3.45 / WBC-16.31  Serial CBC's  01-29 @ 04:30  Hct-27.7 / Hgb-8.4 / Plat-423 / RBC-3.22 / WBC-17.49      01-31    135  |  102  |  23<H>  ----------------------------<  161<H>  4.1   |  24  |  0.7    Ca    7.7<L>      31 Jan 2022 04:30  Mg     1.9     01-30    TPro  5.4<L>  /  Alb  2.4<L>  /  TBili  0.4  /  DBili  x   /  AST  85<H>  /  ALT  35  /  AlkPhos  127<H>  01-30    Ferritin, Serum: 1203 ng/mL (01-28 @ 11:00)  Iron - Total Binding Capacity.: 97 ug/dL (01-28 @ 11:00)  Vitamin B12, Serum: 1224 pg/mL (01-26 @ 04:30)  Folate, Serum: 10.8 ng/mL (01-26 @ 04:30)  Reticulocyte Percent: 3.8 % (01-26 @ 04:30)    BCR/ABL sent on 1/25/22 : Not detected    Vitamin B12, Serum: 1224 pg/mL (01.26.22 @ 04:30)  Folate, Serum: 10.8 ng/mL (01.26.22 @ 04:30)    Lactate Dehydrogenase, Serum: 192 U/L (01.26.22 @ 04:30)  Haptoglobin, Serum: 379 mg/dL (01.26.22 @ 04:30)    Immunoglobulins Panel (01.26.22 @ 04:30)    CLAUDINE Kappa: 8.97 mg/dL    CLAUDINE Lambda: 8.77 mg/dL      RADIOLOGY & ADDITIONAL STUDIES:    < from: CT Abdomen and Pelvis w/ Oral Cont and w/ IV Cont (01.27.22 @ 00:41) >  IMPRESSION:    No acute inflammatory process in the abdomen and pelvis.    Cholelithiasis.    Bladder calculi.    Large right inguinal hernia containing nonobstructed small and large   bowel within it.    < from: CT Chest w/ IV Cont (01.27.22 @ 00:41) >    IMPRESSION:  No focal consolidation. No definite evidence of acute pathology within   the chest.

## 2022-01-31 NOTE — PROGRESS NOTE ADULT - ATTENDING COMMENTS
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 + associated w/ weakness and a decline in energy levels. per hx from family and NH paperwork pt is currently being treated oupt for presumed PNA as he has had fever and worsening leukocytosis for the past several days.      # Fever of unknown origin  # Low suspicion for PNA  # Chronic asbestos lung disease  - CXR w/ b/l opacities  - febrile on admission, now afebrile. Leukocytosis  - chronic cough at baseline, however pt reports improvement in cough since admission  - BCx x2 NGTD, UCx NG, Sputum Cx normal resp ric, Procal 0.36  - s/p ID eval - unclear etiology of fever, low suspicion for PNA, prior HIDA concerning for cholecystitis   - repeat HIDA revealed delayed visualization of gallbladder which can be seen in chronic cholecystitis, hepatocellular disease, cholestasis, pancreatic disease, and poor nutrition  - CT C/A/P unremarkable   - per ID, dc abx, obtain WBC scan ( unremarkable ) and inflammatory markers   - pt febrile today 1/30 Tmax 101, repeat UA( f/u Urine Cx ) / CXR( unremarkable )/ Blood Cx and f/u ID     #Chronic Leukocytosis   #Normocytic Anemia   - BCR- ABL negative   - Anemia w/u: Vit B12/folate/LDH WNL, hapto elevated, SPEP pending, FLC ratio normal    - f/u Hematology consult- Bone Marrow Bx tomorrow     # Generalized Weakness   - likely related to deconditioning from recurrent hospitalizations  - CT C/A/P without any mention of significant spine pathology   - PT following - will need STR     # Transaminitis, mixed pattern, improving  - s/p hepatology eval - suspect DILI from cefuroxime  - AST 61, ALT 28, ,   - HIDA scan 11/2021 suspicious for cholecystitis  - RUQ U/S w/ cholelithiasis w/o ductal dilatation  - repeat HIDA revealed delayed visualization of gallbladder which can be seen in chronic cholecystitis, hepatocellular disease, cholestasis, pancreatic disease, and poor nutrition    # High AG w/o acidosis, resolved    # HLD  - cont home simvastatin 40mg qhs    # DM2  - cont ISS    # HTN  - cont atenolol, lisinopril      DVT PPX: enoxaparin Injectable    #Progress Note Handoff:  Pending (specify):  Bone Marrow Bx tomorrow   Disposition: Home___/SNF___/Other________/Unknown at this time__x______      Jennifer Padilla DO .

## 2022-01-31 NOTE — PROGRESS NOTE ADULT - SUBJECTIVE AND OBJECTIVE BOX
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 + associated w/ weakness and a decline in energy levels. per hx from family and NH paperwork pt is currently being treated oupt for presumed PNA as he has had fever and worsening leukocytosis for the past several days.  He is currently on a 7 day course of Doxycycline and cefuroxime ( started on 2022 to be completed on 2022, last dose today )  but per family has been on multiple abx courses over the past several weeks all for presumed PNA.  pt reports for the last few days he has been feeling more weak and tired and thinks his cough may have worsened slightly over the last few days. denies any SOB or fever. no episodes of hypoxia at NH.  in the ED,pt's VS T(C): 36.4 (22 @ 00:02), Max: 38.4 (22 @ 20:20)  HR: 101 (22 @ 00:02) (101 - 122)  BP: 124/66 (22 @ 00:02) (124/66 - 135/60)  RR: 18 (22 @ 00:02) (18 - 18)  SpO2: 98% (22 @ 00:02) (96% - 98%), labs done showed WBC 18 K. hb 9.6 ( previously 11), , ALT 74 ( both trending down). UA negative.   CT head negative. CXR showed Bilateral opacities.   pt received 500 ml LR.   pt is admitted for workup/management (2022 01:21)      Patient is a 81y old Male who presents with a chief complaint of fever (2022 10:04)    Currently admitted to medicine with the primary diagnosis of Fever       Today is hospital day 14d.     PAST MEDICAL & SURGICAL HISTORY  Hypertension    Diabetes mellitus        ALLERGIES:  No Known Allergies    MEDICATIONS:  ACTIVE MEDICATIONS  acetaminophen     Tablet .. 650 milliGRAM(s) Oral every 6 hours PRN  aluminum hydroxide/magnesium hydroxide/simethicone Suspension 30 milliLiter(s) Oral every 4 hours PRN  ascorbic acid 500 milliGRAM(s) Oral daily  ATENolol  Tablet 50 milliGRAM(s) Oral daily  dextrose 40% Gel 15 Gram(s) Oral once  dextrose 5%. 1000 milliLiter(s) IV Continuous <Continuous>  dextrose 5%. 1000 milliLiter(s) IV Continuous <Continuous>  dextrose 50% Injectable 25 Gram(s) IV Push once  dextrose 50% Injectable 12.5 Gram(s) IV Push once  dextrose 50% Injectable 25 Gram(s) IV Push once  enoxaparin Injectable 40 milliGRAM(s) SubCutaneous daily  glucagon  Injectable 1 milliGRAM(s) IntraMuscular once  insulin lispro (ADMELOG) corrective regimen sliding scale   SubCutaneous three times a day before meals  lisinopril 20 milliGRAM(s) Oral daily  melatonin 3 milliGRAM(s) Oral at bedtime PRN  multivitamin 1 Tablet(s) Oral daily  ondansetron Injectable 4 milliGRAM(s) IV Push every 8 hours PRN  simvastatin 40 milliGRAM(s) Oral at bedtime  zinc sulfate 220 milliGRAM(s) Oral daily      VITALS:   T(F): 96  HR: 86  BP: 119/57  RR: 18  SpO2: 97%    LABS:                        8.1    17.81 )-----------( 362      ( 2022 04:30 )             27.3     -    135  |  102  |  23<H>  ----------------------------<  161<H>  4.1   |  24  |  0.7    Ca    7.7<L>      2022 04:30  Mg     1.9         TPro  5.4<L>  /  Alb  2.4<L>  /  TBili  0.4  /  DBili  x   /  AST  85<H>  /  ALT  35  /  AlkPhos  127<H>        Urinalysis Basic - ( 2022 15:50 )    Color: Yellow / Appearance: Slightly Turbid / S.028 / pH: x  Gluc: x / Ketone: Negative  / Bili: Negative / Urobili: <2 mg/dL   Blood: x / Protein: 30 mg/dL / Nitrite: Negative   Leuk Esterase: Negative / RBC: 7 /HPF / WBC 7 /HPF   Sq Epi: x / Non Sq Epi: 2 /HPF / Bacteria: Moderate                    PHYSICAL EXAM:  CONSTITUTIONAL: No acute distress, well-developed, well-groomed, AAOx3  HEAD: Atraumatic, normocephalic  EYES: EOM intact, PERRLA, conjunctiva and sclera clear  PULMONARY: Clear to auscultation bilaterally  CARDIOVASCULAR: Regular rate and rhythm  GASTROINTESTINAL: Soft, non-tender, non-distended  MUSCULOSKELETAL: no edema      A/P:    Patient is an 81 year old Male with past medical history of HTN, HLD, DM, 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 + associated w/ weakness and a decline in energy levels. per hx from family and NH paperwork pt is currently being treated oupt for presumed PNA as he has had fever and worsening leukocytosis for the past several days.      # Intermittent Fever of unknown origin 2/2 Chronic cholecystitis?  # Chronic asbestos lung disease  - CXR w/ b/l opacities  - febrile on admission, now afebrile. Leukocytosis  - chronic cough at baseline, however pt reports improvement in cough since admission  - BCx x2 NGTD, UCx NG, Sputum Cx normal resp ric, Procal 0.36  - s/p ID eval - unclear etiology of fever, low suspicion for PNA, prior HIDA concerning for cholecystitis   - repeat HIDA revealed delayed visualization of gallbladder which can be seen in chronic cholecystitis, hepatocellular disease, cholestasis, pancreatic disease, and poor nutrition  - CT C/A/P unremarkable   - per ID, dc abx, obtain WBC scan ( unremarkable ) and inflammatory markers   - pt febrile on  Tmax 101, repeat UA/ CXR/ Blood Cx and f/u ID     #Chronic Leukocytosis   #Normocytic Anemia   - BCR- ABL negative   - Anemia w/u: Vit B12/folate/LDH WNL, hapto elevated, SPEP pending, FLC ratio normal    - f/u Hematology consult, appreciate results of peripheral smear and final reccs     # Generalized Weakness   - likely related to deconditioning from recurrent hospitalizations  - CT C/A/P without any mention of significant spine pathology   - PT following - will need STR     # Transaminitis, mixed pattern, improving  - s/p hepatology eval - suspect DILI from cefuroxime  - AST 61, ALT 28, ,   - HIDA scan 2021 suspicious for cholecystitis  - RUQ U/S w/ cholelithiasis w/o ductal dilatation  - repeat HIDA revealed delayed visualization of gallbladder which can be seen in chronic cholecystitis, hepatocellular disease, cholestasis, pancreatic disease, and poor nutrition    # High AG w/o acidosis, resolved    # HLD  - cont home simvastatin 40mg qhs    # DM2  - cont ISS    # HTN  - cont atenolol, lisinopril      #Misc  -DVT prophylaxis: Lovenox  -GI prophylaxis: Not indicated  -Diet: Soft bite sized  -Code status: Full code    #Pending: Fever work up, STR auth             SUBJECTIVE:  HPI:  Patient is an 81 year old Male with past medical history of HTN, HLD, DM, 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 + associated w/ weakness and a decline in energy levels. per hx from family and NH paperwork pt is currently being treated oupt for presumed PNA as he has had fever and worsening leukocytosis for the past several days.  He is currently on a 7 day course of Doxycycline and cefuroxime ( started on 2022 to be completed on 2022, last dose today )  but per family has been on multiple abx courses over the past several weeks all for presumed PNA.  pt reports for the last few days he has been feeling more weak and tired and thinks his cough may have worsened slightly over the last few days. denies any SOB or fever. no episodes of hypoxia at NH.  in the ED,pt's VS T(C): 36.4 (22 @ 00:02), Max: 38.4 (22 @ 20:20)  HR: 101 (22 @ 00:02) (101 - 122)  BP: 124/66 (22 @ 00:02) (124/66 - 135/60)  RR: 18 (22 @ 00:02) (18 - 18)  SpO2: 98% (22 @ 00:02) (96% - 98%), labs done showed WBC 18 K. hb 9.6 ( previously 11), , ALT 74 ( both trending down). UA negative.   CT head negative. CXR showed Bilateral opacities.   pt received 500 ml LR.   pt is admitted for workup/management (2022 01:21)      Patient is a 81y old Male who presents with a chief complaint of fever (2022 10:04)    Currently admitted to medicine with the primary diagnosis of Fever       Today is hospital day 14d.     PAST MEDICAL & SURGICAL HISTORY  Hypertension    Diabetes mellitus        ALLERGIES:  No Known Allergies    MEDICATIONS:  ACTIVE MEDICATIONS  acetaminophen     Tablet .. 650 milliGRAM(s) Oral every 6 hours PRN  aluminum hydroxide/magnesium hydroxide/simethicone Suspension 30 milliLiter(s) Oral every 4 hours PRN  ascorbic acid 500 milliGRAM(s) Oral daily  ATENolol  Tablet 50 milliGRAM(s) Oral daily  dextrose 40% Gel 15 Gram(s) Oral once  dextrose 5%. 1000 milliLiter(s) IV Continuous <Continuous>  dextrose 5%. 1000 milliLiter(s) IV Continuous <Continuous>  dextrose 50% Injectable 25 Gram(s) IV Push once  dextrose 50% Injectable 12.5 Gram(s) IV Push once  dextrose 50% Injectable 25 Gram(s) IV Push once  enoxaparin Injectable 40 milliGRAM(s) SubCutaneous daily  glucagon  Injectable 1 milliGRAM(s) IntraMuscular once  insulin lispro (ADMELOG) corrective regimen sliding scale   SubCutaneous three times a day before meals  lisinopril 20 milliGRAM(s) Oral daily  melatonin 3 milliGRAM(s) Oral at bedtime PRN  multivitamin 1 Tablet(s) Oral daily  ondansetron Injectable 4 milliGRAM(s) IV Push every 8 hours PRN  simvastatin 40 milliGRAM(s) Oral at bedtime  zinc sulfate 220 milliGRAM(s) Oral daily      VITALS:   T(F): 96  HR: 86  BP: 119/57  RR: 18  SpO2: 97%    LABS:                        8.1    17.81 )-----------( 362      ( 2022 04:30 )             27.3     -    135  |  102  |  23<H>  ----------------------------<  161<H>  4.1   |  24  |  0.7    Ca    7.7<L>      2022 04:30  Mg     1.9         TPro  5.4<L>  /  Alb  2.4<L>  /  TBili  0.4  /  DBili  x   /  AST  85<H>  /  ALT  35  /  AlkPhos  127<H>        Urinalysis Basic - ( 2022 15:50 )    Color: Yellow / Appearance: Slightly Turbid / S.028 / pH: x  Gluc: x / Ketone: Negative  / Bili: Negative / Urobili: <2 mg/dL   Blood: x / Protein: 30 mg/dL / Nitrite: Negative   Leuk Esterase: Negative / RBC: 7 /HPF / WBC 7 /HPF   Sq Epi: x / Non Sq Epi: 2 /HPF / Bacteria: Moderate                    PHYSICAL EXAM:  CONSTITUTIONAL: No acute distress, well-developed, well-groomed, AAOx3  HEAD: Atraumatic, normocephalic  EYES: EOM intact, PERRLA, conjunctiva and sclera clear  PULMONARY: Clear to auscultation bilaterally  CARDIOVASCULAR: Regular rate and rhythm  GASTROINTESTINAL: Soft, non-tender, non-distended  MUSCULOSKELETAL: no edema      A/P:    Patient is an 81 year old Male with past medical history of HTN, HLD, DM, 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 + associated w/ weakness and a decline in energy levels. per hx from family and NH paperwork pt is currently being treated oupt for presumed PNA as he has had fever and worsening leukocytosis for the past several days.      # Intermittent Fever of unknown origin 2/2 Chronic cholecystitis?  # Chronic asbestos lung disease  - CXR w/ b/l opacities  - febrile on admission, now afebrile. Leukocytosis  - chronic cough at baseline, however pt reports improvement in cough since admission  - BCx x2 NGTD, UCx NG, Sputum Cx normal resp ric, Procal 0.36  - s/p ID eval - unclear etiology of fever, low suspicion for PNA, prior HIDA concerning for cholecystitis   - repeat HIDA revealed delayed visualization of gallbladder which can be seen in chronic cholecystitis, hepatocellular disease, cholestasis, pancreatic disease, and poor nutrition  - CT C/A/P unremarkable   - per ID, dc abx, obtain WBC scan ( unremarkable ) and inflammatory markers   - pt febrile on  Tmax 101, repeat UA/ CXR/ Blood Cx and f/u ID     #Chronic Leukocytosis   #Normocytic Anemia   - BCR- ABL negative   - Anemia w/u: Vit B12/folate/LDH WNL, hapto elevated, SPEP pending, FLC ratio normal    - Planned for BM biopsy on     # Generalized Weakness   - likely related to deconditioning from recurrent hospitalizations  - CT C/A/P without any mention of significant spine pathology   - PT following - will need STR     # Transaminitis, mixed pattern, improving  - s/p hepatology eval - suspect DILI from cefuroxime  - AST 61, ALT 28, ,   - HIDA scan 2021 suspicious for cholecystitis  - RUQ U/S w/ cholelithiasis w/o ductal dilatation  - repeat HIDA revealed delayed visualization of gallbladder which can be seen in chronic cholecystitis, hepatocellular disease, cholestasis, pancreatic disease, and poor nutrition    # High AG w/o acidosis, resolved    # HLD  - cont home simvastatin 40mg qhs    # DM2  - cont ISS    # HTN  - cont atenolol, lisinopril      #Misc  -DVT prophylaxis: Lovenox  -GI prophylaxis: Not indicated  -Diet: Soft bite sized  -Code status: Full code    #Pending: Fever work up, BM biopsy, STR auth

## 2022-01-31 NOTE — PROGRESS NOTE ADULT - ASSESSMENT
81M w/ PMHx of Chronic asbestosis lung disease, HTN, HLD, and DM presenting from NH w/ fever and baseline cough  associated w/ weakness and a decline in energy levels. Pt has had fever and worsening leukocytosis. He has been treated with multiple abx over past weeks for presumed PNA. Heme/Onc is consulted for evaluation of persistent leukocytosis.     *INCOMPLETE*    # Leukocytosis, r/o leukemia  - WBC elevation noted since 11/2021  - Cultures, RVP and COVID19 PCR till the date negative noted  - CT Chest/Abdo/Pelvis negative for any lymphadenopathy  - BCR/ABL negative: Would recommend pulm  eval for pleural plaques  - Will consider bone marrow biopsy as outpatient    # Normocytic Anemia- chronic  - Hgb declining trend noted since 11/2021  - Please monitor for s/s of bleeding and r/o any obscure bleeding that might be contributing to the anemia  - Iron studies noted for anemia of chronic inflammation; LDH, haptoglobin, vit b12, folate level WNL  - CLAUDINE Kappa and lambda elevated with normal ratio; F/u rest of the myeloma workup.        81M w/ PMHx of Chronic asbestosis lung disease, HTN, HLD, and DM presenting from NH w/ fever and baseline cough  associated w/ weakness and a decline in energy levels. Pt has had fever and worsening leukocytosis. He has been treated with multiple abx over past weeks for presumed PNA. Heme/Onc is consulted for evaluation of persistent leukocytosis.     # Leukocytosis, r/o leukemia  - WBC elevation noted since 11/2021  - Cultures, RVP and COVID19 PCR till the date negative noted  - CT Chest/Abdo/Pelvis negative for any lymphadenopathy  - BCR/ABL negative: Would recommend pulm  eval for pleural plaques  - Will plan for bone marrow biopsy tomorrow 2/1/22: pt agreeable.     # Normocytic Anemia- chronic  - Hgb declining trend noted since 11/2021  - Please monitor for s/s of bleeding and r/o any obscure bleeding that might be contributing to the anemia  - Iron studies noted for anemia of chronic inflammation; LDH, haptoglobin, vit b12, folate level WNL  - CLAUDINE Kappa and lambda elevated with normal ratio; F/u rest of the myeloma workup.

## 2022-02-01 NOTE — PROGRESS NOTE ADULT - ASSESSMENT
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 CLINICAL CORRELATION IS SUGGESTED.  - NM Hepatobiliary Imaging (01.21.22 @ 19:15): Delayed definite visualization of the gallbladder at 6 hours post  injection, demonstrating patency of the cystic duct. Delayed visualization of the gallbladder is a nonspecific finding.   - Anti Nuclear Factor Titer: Negative: Antinuclear AB (TAWANDA), IFA Method (11.19.21 @ 06:41), ANCA labs negative  - Ferritin, Serum: 1707 ng/mL (11.20.21 @ 09:17), Lactate Dehydrogenase, Serum: 192 U/L (01.26.22 @ 04:30)  - Tagged WBC scan 1/25 - update diffusely in bilateral lung fields   - CT CHest 1/27 - no focal consolidations  - CT Abd/pelvis 1/27 - no acute inflammatory process   - s/p bone marrow biopsy 2/1     #Chronic asbestos Lung disease    #Transaminitis - Cholecystitis- HIDA scan positive 11/19/2021    Recommendations  - no clinical signs of new infection - unclear if fever is due to possible underlying inflamamtory/malignant process  - follow-up blood cx  - continue to monitor off antibiotics  - follow-up BMBx path    Please call or message on [employer] Teams if with any questions.  Spectra [id number]

## 2022-02-01 NOTE — SWALLOW BEDSIDE ASSESSMENT ADULT - SWALLOW EVAL: RECOMMENDED DIET
Soft and bite sized and thin liquids
Soft and bite sized and thin liquids
Puree diet w/ mildly thick liquids.

## 2022-02-01 NOTE — DISCHARGE NOTE PROVIDER - PROVIDER TOKENS
PROVIDER:[TOKEN:[[id number]],FOLLOWUP:[2 weeks]],PROVIDER:[TOKEN:[[id number]],FOLLOWUP:[1 week],ESTABLISHEDPATIENT:[T]]

## 2022-02-01 NOTE — SWALLOW BEDSIDE ASSESSMENT ADULT - SWALLOW EVAL: DIAGNOSIS
Pt with no s/s aspiration/penetration for thin liquids via cup sips. Suspected pharyngeal dysphagia for thin liquids via straws. + toleration for soft and bite size. Pt kyphotic, poor positioning upon entering room, pt must be propped upright before feeding.
Mild oral dysphagia with soft & bite sized, suspected pharyngeal residue and pharyngeal dysphagia with soft/bite sized solids and thin liquids. + tolerance for puree with mildly thick liquids w/o overt s/s of aspiration/penetration.
+ toleration for thin liquids and soft and bite sized

## 2022-02-01 NOTE — DISCHARGE NOTE PROVIDER - CARE PROVIDERS DIRECT ADDRESSES
[email or web address]

## 2022-02-01 NOTE — SWALLOW BEDSIDE ASSESSMENT ADULT - COMMENTS
Pt s/p FEES 1/27 recs for soft and bite size and thin liquids. Mild pharyngeal dysphagia; coughing noted t/o exam however in the absence of aspiration
s/p FEES 1/27 with recs for current diet.
Patient reports worsening dysphagia since admission.

## 2022-02-01 NOTE — DISCHARGE NOTE PROVIDER - CARE PROVIDER_API CALL
Jose Roberto Lo)  Hematology; Internal Medicine; Medical Oncology  [address] [place] [id number]  Phone: [phone]  Fax: [phone]  Follow Up Time: 2 weeks    Blanca Viramontes ()  Internal Medicine  [address] [place] [address]  Phone: [phone]  Fax: [phone]  Established Patient  Follow Up Time: 1 week

## 2022-02-01 NOTE — SWALLOW BEDSIDE ASSESSMENT ADULT - NS SPL SWALLOW CLINIC TRIAL FT
Mild oral dysphagia with soft & bite sized characterized by prolonged mastication, delayed MARC, + lingual residue, suspected pharyngeal residue and pharyngeal dysphagia with soft/bite sized and thin liquids, + cough across trials (non productive). + tolerance for puree with mildly thick liquids w/o overt s/s of aspiration/penetration.
No s/s aspiration/penetration
+ toleration for small cup sips, suspected pharyngeal dysphagia for straws. NO STRAWS

## 2022-02-01 NOTE — SWALLOW BEDSIDE ASSESSMENT ADULT - PHARYNGEAL PHASE
Within functional limits
with soft & bite sized, thin liquids./Cough post oral intake
Within functional limits

## 2022-02-01 NOTE — PROGRESS NOTE ADULT - ATTENDING COMMENTS
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 + associated w/ weakness and a decline in energy levels. per hx from family and NH paperwork pt is currently being treated oupt for presumed PNA as he has had fever and worsening leukocytosis for the past several days.      # Fever of unknown origin  # Low suspicion for PNA  # Chronic asbestos lung disease  - CXR w/ b/l opacities  - febrile on admission, now afebrile. Leukocytosis  - chronic cough at baseline, however pt reports improvement in cough since admission  - BCx x2 NGTD, UCx NG, Sputum Cx normal resp ric, Procal 0.36  - s/p ID eval - unclear etiology of fever, low suspicion for PNA, prior HIDA concerning for cholecystitis   - repeat HIDA revealed delayed visualization of gallbladder which can be seen in chronic cholecystitis, hepatocellular disease, cholestasis, pancreatic disease, and poor nutrition  - CT C/A/P unremarkable   - per ID, dc abx, obtain WBC scan ( unremarkable ) and inflammatory markers   - pt febrile today 1/30 Tmax 101, repeat UA( f/u Urine Cx ) / CXR( unremarkable )/ Blood Cx NGTD   - pt again febrile today on rectal temp?? on 2/1   - appreciate ID follow up, continue monitoring off abx     #Chronic Leukocytosis   #Normocytic Anemia   - BCR- ABL negative   - Anemia w/u: Vit B12/folate/LDH WNL, hapto elevated, SPEP pending, FLC ratio normal    - f/u Hematology consult- Bone Marrow Bx performed today     # Generalized Weakness / LE Weakness   - likely related to deconditioning from recurrent hospitalizations  - CT C/A/P without any mention of significant spine pathology   - PT following - will need STR   - Neuro consult     # Transaminitis, mixed pattern, improving  - s/p hepatology eval - suspect DILI from cefuroxime  - AST 61, ALT 28, ,   - HIDA scan 11/2021 suspicious for cholecystitis  - RUQ U/S w/ cholelithiasis w/o ductal dilatation  - repeat HIDA revealed delayed visualization of gallbladder which can be seen in chronic cholecystitis, hepatocellular disease, cholestasis, pancreatic disease, and poor nutrition    # High AG w/o acidosis, resolved    # HLD  - cont home simvastatin 40mg qhs    # DM2  - cont ISS    # HTN  - cont atenolol, lisinopril      DVT PPX: enoxaparin Injectable    #Progress Note Handoff:  Pending (specify):  ID follow up for fevers  Disposition: Home___/SNF___/Other________/Unknown at this time__x______      Jennifer Padilla DO .

## 2022-02-01 NOTE — DISCHARGE NOTE PROVIDER - NSDCFUSCHEDAPPT_GEN_ALL_CORE_FT
MIGUEL ANGEL GARZA ; 02/03/2022 ; NPP Urology [facility]  MIGUEL ANGEL GARZA ; 04/25/2022 ; NPP G[facility] [address] [facility] MIGUEL ANGEL GARZA ; 04/25/2022 ; NPP [facility]astro [facility] [address] [facility] [place][note 2]

## 2022-02-01 NOTE — PROGRESS NOTE ADULT - SUBJECTIVE AND OBJECTIVE BOX
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 + associated w/ weakness and a decline in energy levels. per hx from family and NH paperwork pt is currently being treated oupt for presumed PNA as he has had fever and worsening leukocytosis for the past several days.  He is currently on a 7 day course of Doxycycline and cefuroxime ( started on 2022 to be completed on 2022, last dose today )  but per family has been on multiple abx courses over the past several weeks all for presumed PNA.  pt reports for the last few days he has been feeling more weak and tired and thinks his cough may have worsened slightly over the last few days. denies any SOB or fever. no episodes of hypoxia at NH.  in the ED,pt's VS T(C): 36.4 (22 @ 00:02), Max: 38.4 (22 @ 20:20)  HR: 101 (22 @ 00:02) (101 - 122)  BP: 124/66 (22 @ 00:02) (124/66 - 135/60)  RR: 18 (22 @ 00:02) (18 - 18)  SpO2: 98% (22 @ 00:02) (96% - 98%), labs done showed WBC 18 K. hb 9.6 ( previously 11), , ALT 74 ( both trending down). UA negative.   CT head negative. CXR showed Bilateral opacities.   pt received 500 ml LR.   pt is admitted for workup/management (2022 01:21)      Patient is a 81y old Male who presents with a chief complaint of fever (2022 11:36)    Currently admitted to medicine with the primary diagnosis of Fever       Today is hospital day 15d.     PAST MEDICAL & SURGICAL HISTORY  Hypertension    Diabetes mellitus        ALLERGIES:  No Known Allergies    MEDICATIONS:  ACTIVE MEDICATIONS  acetaminophen     Tablet .. 650 milliGRAM(s) Oral every 6 hours PRN  aluminum hydroxide/magnesium hydroxide/simethicone Suspension 30 milliLiter(s) Oral every 4 hours PRN  ascorbic acid 500 milliGRAM(s) Oral daily  ATENolol  Tablet 50 milliGRAM(s) Oral daily  dextrose 40% Gel 15 Gram(s) Oral once  dextrose 5%. 1000 milliLiter(s) IV Continuous <Continuous>  dextrose 5%. 1000 milliLiter(s) IV Continuous <Continuous>  dextrose 50% Injectable 25 Gram(s) IV Push once  dextrose 50% Injectable 12.5 Gram(s) IV Push once  dextrose 50% Injectable 25 Gram(s) IV Push once  enoxaparin Injectable 40 milliGRAM(s) SubCutaneous daily  glucagon  Injectable 1 milliGRAM(s) IntraMuscular once  insulin lispro (ADMELOG) corrective regimen sliding scale   SubCutaneous three times a day before meals  lisinopril 20 milliGRAM(s) Oral daily  melatonin 3 milliGRAM(s) Oral at bedtime PRN  multivitamin 1 Tablet(s) Oral daily  ondansetron Injectable 4 milliGRAM(s) IV Push every 8 hours PRN  simvastatin 40 milliGRAM(s) Oral at bedtime  zinc sulfate 220 milliGRAM(s) Oral daily      VITALS:   T(F): 97.4  HR: 86  BP: 108/53  RR: 18  SpO2: --    LABS:                        8.2    15.89 )-----------( 349      ( 2022 07:40 )             27.6     02-    137  |  104  |  24<H>  ----------------------------<  166<H>  4.2   |  25  |  0.8    Ca    7.8<L>      2022 07:40  Phos  3.8       Mg     1.8         TPro  5.2<L>  /  Alb  2.2<L>  /  TBili  0.4  /  DBili  x   /  AST  101<H>  /  ALT  42<H>  /  AlkPhos  192<H>  02-    PT/INR - ( 2022 07:40 )   PT: 16.70 sec;   INR: 1.46 ratio           Urinalysis Basic - ( 2022 15:50 )    Color: Yellow / Appearance: Slightly Turbid / S.028 / pH: x  Gluc: x / Ketone: Negative  / Bili: Negative / Urobili: <2 mg/dL   Blood: x / Protein: 30 mg/dL / Nitrite: Negative   Leuk Esterase: Negative / RBC: 7 /HPF / WBC 7 /HPF   Sq Epi: x / Non Sq Epi: 2 /HPF / Bacteria: Moderate            Culture - Blood (collected 2022 22:46)  Source: .Blood Blood  Preliminary Report (2022 10:01):    No growth to date.    Culture - Blood (collected 2022 22:46)  Source: .Blood Blood  Preliminary Report (2022 10:01):    No growth to date.              PHYSICAL EXAM:  CONSTITUTIONAL: No acute distress, well-developed, well-groomed, AAOx3  HEAD: Atraumatic, normocephalic  EYES: EOM intact, PERRLA, conjunctiva and sclera clear  PULMONARY: Clear to auscultation bilaterally  CARDIOVASCULAR: Regular rate and rhythm  GASTROINTESTINAL: Soft, non-tender, non-distended  MUSCULOSKELETAL: no edema; UL strength 5/5, LL strength 2/5      A/P:    Patient is an 81 year old Male with past medical history of HTN, HLD, DM, chronic absestos lung disease  ( w/ bilateral calcified plaques) presenting from NH w/ fever and baseline cough. + associated w/ weakness and a decline in energy levels. per hx from family and NH paperwork pt is currently being treated oupt for presumed PNA as he has had fever and worsening leukocytosis for the past several days.      # Intermittent Fever of unknown origin 2/2 Chronic cholecystitis?  # Chronic asbestos lung disease  - CXR w/ b/l opacities  - febrile on admission, now afebrile. Leukocytosis  - chronic cough at baseline, however pt reports improvement in cough since admission  - BCx x2 NGTD, UCx NG, Sputum Cx normal resp ric, Procal 0.36  - s/p ID eval - unclear etiology of fever, low suspicion for PNA, prior HIDA concerning for cholecystitis   - repeat HIDA revealed delayed visualization of gallbladder which can be seen in chronic cholecystitis, hepatocellular disease, cholestasis, pancreatic disease, and poor nutrition  - CT C/A/P unremarkable   - per ID, dc abx, obtain WBC scan ( unremarkable ) and inflammatory markers   - pt febrile on  Tmax 101, repeat UA/ CXR/ Blood Cx and f/u ID     #Chronic Leukocytosis   #Normocytic Anemia   - BCR- ABL negative   - Anemia w/u: Vit B12/folate/LDH WNL, hapto elevated, SPEP pending, FLC ratio normal    - BM done on , pt. will follow OP for results.    # Generalized Weakness- more in lower extremity than upper extremity  - likely related to deconditioning from recurrent hospitalizations  - CT C/A/P without any mention of significant spine pathology   - PT following - will need STR     # Transaminitis, mixed pattern, trending up  - s/p hepatology eval - suspect DILI from cefuroxime?  - AST 61, ALT 28, , > 101/42/192  - HIDA scan 2021 suspicious for cholecystitis  - RUQ U/S w/ cholelithiasis w/o ductal dilatation  - repeat HIDA revealed delayed visualization of gallbladder which can be seen in chronic cholecystitis, hepatocellular disease, cholestasis, pancreatic disease, and poor nutrition    # High AG w/o acidosis, resolved    # HLD  - cont home simvastatin 40mg qhs    # DM2  - cont ISS    # HTN  - cont atenolol, lisinopril      #Misc  -DVT prophylaxis: Lovenox  -GI prophylaxis: Not indicated  -Diet: Soft bite sized  -Code status: Full code    #Pending: Fever work up, STR auth

## 2022-02-01 NOTE — PROGRESS NOTE ADULT - ATTENDING COMMENTS
seen/examined w/ hemonc team;note reviewed; case discussed  bone marrow biopsy and bone marrow aspiration are obtained 2/1/22

## 2022-02-01 NOTE — PROGRESS NOTE ADULT - SUBJECTIVE AND OBJECTIVE BOX
KAYLAMIGUEL ANGEL  81y, Male  Allergy: No Known Allergies      LOS  15d    CHIEF COMPLAINT: fever (2022 11:04)      INTERVAL EVENTS/HPI  - No acute events overnight  - T(F): , Max: 97.4 (22 @ 05:00)  - had isoalted fever on  - no fevers since   - denies any abdominal pain, nausea, vomitnig   - WBC Count: 15.89 (22 @ 07:40)  WBC Count: 17.63 (22 @ 19:08)     - Creatinine, Serum: 0.8 (22 @ 07:40)  Creatinine, Serum: 0.7 (22 @ 04:30)       ROS  General: Denies rigors, nightsweats  HEENT: Denies headache, rhinorrhea, sore throat, eye pain  CV: Denies CP, palpitations  PULM: Denies wheezing, hemoptysis  GI: Denies hematemesis, hematochezia, melena  : Denies discharge, hematuria  MSK: Denies arthralgias, myalgias  SKIN: Denies rash, lesions  NEURO: Denies paresthesias, weakness  PSYCH: Denies depression, anxiety    VITALS:  T(F): 97.4, Max: 97.4 (22 @ 05:00)  HR: 86  BP: 108/53  RR: 18Vital Signs Last 24 Hrs  T(C): 36.3 (2022 05:00), Max: 36.3 (2022 05:00)  T(F): 97.4 (2022 05:00), Max: 97.4 (2022 05:00)  HR: 86 (2022 05:00) (86 - 95)  BP: 108/53 (2022 05:00) (105/51 - 108/53)  BP(mean): --  RR: 18 (2022 05:00) (18 - 18)  SpO2: --    PHYSICAL EXAM:  Gen: NAD, resting in bed  HEENT: Normocephalic, atraumatic  Neck: supple, no lymphadenopathy  CV: Regular rate & regular rhythm  Lungs: decreased BS at bases, no fremitus  Abdomen: Soft, BS present  Ext: Warm, well perfused  Neuro: non focal, awake  Skin: no rash, no erythema  Lines: no phlebitis    FH: Non-contributory  Social Hx: Non-contributory    TESTS & MEASUREMENTS:                        8.2    15.89 )-----------( 349      ( 2022 07:40 )             27.6         137  |  104  |  24<H>  ----------------------------<  166<H>  4.2   |  25  |  0.8    Ca    7.8<L>      2022 07:40  Phos  3.8       Mg     1.8         TPro  5.2<L>  /  Alb  2.2<L>  /  TBili  0.4  /  DBili  x   /  AST  101<H>  /  ALT  42<H>  /  AlkPhos  192<H>      eGFR if Non African American: 83 mL/min/1.73M2 (22 @ 07:40)  eGFR if : 96 mL/min/1.73M2 (22 @ 07:40)    LIVER FUNCTIONS - ( 2022 07:40 )  Alb: 2.2 g/dL / Pro: 5.2 g/dL / ALK PHOS: 192 U/L / ALT: 42 U/L / AST: 101 U/L / GGT: x           Urinalysis Basic - ( 2022 15:50 )    Color: Yellow / Appearance: Slightly Turbid / S.028 / pH: x  Gluc: x / Ketone: Negative  / Bili: Negative / Urobili: <2 mg/dL   Blood: x / Protein: 30 mg/dL / Nitrite: Negative   Leuk Esterase: Negative / RBC: 7 /HPF / WBC 7 /HPF   Sq Epi: x / Non Sq Epi: 2 /HPF / Bacteria: Moderate        Culture - Blood (collected 22 @ 22:46)  Source: .Blood Blood  Preliminary Report (22 @ 10:01):    No growth to date.    Culture - Blood (collected 22 @ 22:46)  Source: .Blood Blood  Preliminary Report (22 @ 10:01):    No growth to date.    Culture - Sputum (collected 22 @ 18:50)  Source: .Sputum Sputum  Gram Stain (22 @ 07:33):    Moderate polymorphonuclear leukocytes per low power field    Rare Squamous epithelial cells per low power field    No organisms seen per oil power field  Final Report (22 @ 17:31):    Normal Respiratory Keiok present    Culture - Urine (collected 22 @ 18:03)  Source: Clean Catch Clean Catch (Midstream)  Final Report (22 @ 17:48):    No growth    Culture - Blood (collected 22 @ 11:00)  Source: .Blood Blood  Final Report (22 @ 22:00):    No Growth Final    Culture - Blood (collected 22 @ 04:30)  Source: .Blood Blood  Final Report (22 @ 14:00):    No Growth Final    Culture - Urine (collected 22 @ 23:38)  Source: Clean Catch Clean Catch (Midstream)  Final Report (22 @ 06:10):    No growth    Culture - Blood (collected 22 @ 20:32)  Source: .Blood Blood-Peripheral  Final Report (22 @ 06:01):    No Growth Final    Culture - Blood (collected 22 @ 20:32)  Source: .Blood Blood-Peripheral  Final Report (22 @ 06:01):    No Growth Final            INFECTIOUS DISEASES TESTING  Rapid RVP Result: NotDetec (22 @ 15:50)  COVID-19 PCR: NotDetec (22 @ 04:30)  COVID-19 PCR: NotDetec (22 @ 15:17)  Procalcitonin, Serum: 0.36 (22 @ 04:30)  Rapid RVP Result: NotDetec (22 @ 20:54)  COVID-19 PCR: NotDetec (21 @ 11:19)  Procalcitonin, Serum: 0.33 (21 @ 07:20)  Procalcitonin, Serum: 0.35 (21 @ 06:26)  Procalcitonin, Serum: 0.38 (21 @ 09:58)  COVID-19 PCR: NotDetec (21 @ 08:50)  MRSA PCR Result.: Negative (11-15-21 @ 13:30)  Procalcitonin, Serum: 0.22 (11-15-21 @ 06:59)  Rapid RVP Result: NotDetec (21 @ 09:55)      INFLAMMATORY MARKERS  C-Reactive Protein, Serum: 194 mg/L (22 @ 04:30)      RADIOLOGY & ADDITIONAL TESTS:  I have personally reviewed the last available Chest xray  CXR  Xray Chest 1 View- PORTABLE-Urgent:   ACC: [id number] EXAM:  XR CHEST PORTABLE URGENT 1V                          PROCEDURE DATE:  2022          INTERPRETATION:  CLINICAL HISTORY: fever.    COMPARISON: 2022.    TECHNIQUE: Portable frontal chest radiograph. Adequate positioning.    FINDINGS:    Support devices: None.    Cardiac/mediastinum/hilum: Stable.    Lung parenchyma/Pleura: Extensive bilateral partially calcified pleural   plaques. No definite new opacities. No significant pleural effusion. No   pneumothorax.    Skeleton/soft tissues: Stable.      IMPRESSION:    Extensive bilateral partially calcified pleural plaques. No definite new   opacities. No significant pleural effusion. No pneumothorax.    --- End of Report ---            CAROLYN GARCIA MD; Attending Radiologist  This document has been electronically signed. 2022  8:13AM (22 @ 16:35)      CT      CARDIOLOGY TESTING      MEDICATIONS  ascorbic acid 500 Oral daily  ATENolol  Tablet 50 Oral daily  dextrose 40% Gel 15 Oral once  dextrose 5%. 1000 IV Continuous <Continuous>  dextrose 5%. 1000 IV Continuous <Continuous>  dextrose 50% Injectable 25 IV Push once  dextrose 50% Injectable 12.5 IV Push once  dextrose 50% Injectable 25 IV Push once  enoxaparin Injectable 40 SubCutaneous daily  glucagon  Injectable 1 IntraMuscular once  insulin lispro (ADMELOG) corrective regimen sliding scale  SubCutaneous three times a day before meals  lisinopril 20 Oral daily  multivitamin 1 Oral daily  simvastatin 40 Oral at bedtime      WEIGHT  Weight (kg): 66.2 (22 @ 20:20)  Creatinine, Serum: 0.8 mg/dL (22 @ 07:40)      ANTIBIOTICS:      All available historical records have been reviewed

## 2022-02-01 NOTE — SWALLOW BEDSIDE ASSESSMENT ADULT - SLP GENERAL OBSERVATIONS
Pt. received awake, alert, ox3, + generalized weakness, room air.
Pt awake and alert with no c/o pain, son at bedside
Pt awake and alert with no c/o pain. +kyphosis, wife at bedside.

## 2022-02-01 NOTE — DISCHARGE NOTE PROVIDER - ATTENDING DISCHARGE PHYSICAL EXAMINATION:
Gen- chronically ill appearing M  Eyes- anicteric sclera, non injected conjunctiva, EOMI  ENT- hearing grossly diminished, MMM  Cardiac- RRR, normal s1s2  Chest- symmetrical chest rise, no accessory muscle use  Abdominal- non distended, soft, non ttp  Neuro- AOx3, normal mentation  Skin- warm, dry, intact

## 2022-02-01 NOTE — DISCHARGE NOTE PROVIDER - NSDCFUADDAPPT_GEN_ALL_CORE_FT
Please also follow up with an neurologist after discharge. Please follow up with the hematologist after discharge for bone marrow biopsy result.

## 2022-02-01 NOTE — DISCHARGE NOTE PROVIDER - NSDCCPCAREPLAN_GEN_ALL_CORE_FT
PRINCIPAL DISCHARGE DIAGNOSIS  Diagnosis: Fever  Assessment and Plan of Treatment: Please follow up with Dr. Lo for bone marrow biopsy result.  Please follow up with a neurologist for weakness.  Fever  A fever is an increase in the body's temperature above 100.4°F (38°C) or higher. In adults and children older than three months, a brief mild or moderate fever generally has no long-term effect, and it usually does not require treatment. Many times, fevers are the result of viral infections, which are self-resolving.  However, certain symptoms or diagnostic tests may suggest a bacterial infection that may respond to antibiotics. Take medications as directed by your health care provider.  SEEK IMMEDIATE MEDICAL CARE IF YOU OR YOUR CHILD HAVE ANY OF THE FOLLOWING SYMPTOMS : shortness of breath, seizure, rash/stiff neck/headache, severe abdominal pain, persistent vomiting, any signs of dehydration, or if your child has a fever for over five (5) days.      SECONDARY DISCHARGE DIAGNOSES  Diagnosis: Leukocytosis  Assessment and Plan of Treatment:     Diagnosis: Weakness  Assessment and Plan of Treatment: Weakness is a lack of strength. You may feel weak all over your body (generalized), or you may feel weak in one specific part of your body (focal). There are many potential causes of weakness. Sometimes, the cause of your weakness may not be known. Some causes of weakness can be serious, so it is important to see your doctor.  Follow these instructions at home:  Rest as needed.  Try to get enough sleep. Talk to your doctor about how much sleep you need each night.  Take over-the-counter and prescription medicines only as told by your doctor.  Eat a healthy, well-balanced diet. This includes:  Proteins to build muscles, such as lean meats and fish.  Fresh fruits and vegetables.  Carbohydrates to boost energy, such as whole grains.  Drink enough fluid to keep your pee (urine) clear or pale yellow.  Do strength exercises, such as arm curls and leg raises, for 30 minutes at least 2 days a week or as told by your doctor.  Think about working with a physical therapist or  to help you get stronger.  Keep all follow-up visits as told by your doctor. This is important.  Contact a doctor if:  Your weakness does not get better or it gets worse.  Your weakness affects your ability to:  Think clearly.  Do your normal daily activities.  Get help right away if:  You have sudden weakness.  You have trouble breathing or shortness of breath.  You have problems with your vision.  You have trouble talking or swallowing.  You have trouble standing or walking.  You have chest pain.  You are light-headed.  You pass out (lose consciousness).  This information is not intended to replace advice given to you by your health care provider. Make sure you discuss any questions you have with your health care provider.     PRINCIPAL DISCHARGE DIAGNOSIS  Diagnosis: Fever  Assessment and Plan of Treatment: Please follow up with Dr. Lo for bone marrow biopsy result.  Please follow up with a neurologist for weakness.  Primary concern before discharge was weakness for which pt. would require intensive physical therapy and proper nutrition support and encouragement.  Fever  A fever is an increase in the body's temperature above 100.4°F (38°C) or higher. In adults and children older than three months, a brief mild or moderate fever generally has no long-term effect, and it usually does not require treatment. Many times, fevers are the result of viral infections, which are self-resolving.  However, certain symptoms or diagnostic tests may suggest a bacterial infection that may respond to antibiotics. Take medications as directed by your health care provider.  SEEK IMMEDIATE MEDICAL CARE IF YOU OR YOUR CHILD HAVE ANY OF THE FOLLOWING SYMPTOMS : shortness of breath, seizure, rash/stiff neck/headache, severe abdominal pain, persistent vomiting, any signs of dehydration, or if your child has a fever for over five (5) days.      SECONDARY DISCHARGE DIAGNOSES  Diagnosis: Leukocytosis  Assessment and Plan of Treatment:     Diagnosis: Weakness  Assessment and Plan of Treatment: Weakness is a lack of strength. You may feel weak all over your body (generalized), or you may feel weak in one specific part of your body (focal). There are many potential causes of weakness. Sometimes, the cause of your weakness may not be known. Some causes of weakness can be serious, so it is important to see your doctor.  Follow these instructions at home:  Rest as needed.  Try to get enough sleep. Talk to your doctor about how much sleep you need each night.  Take over-the-counter and prescription medicines only as told by your doctor.  Eat a healthy, well-balanced diet. This includes:  Proteins to build muscles, such as lean meats and fish.  Fresh fruits and vegetables.  Carbohydrates to boost energy, such as whole grains.  Drink enough fluid to keep your pee (urine) clear or pale yellow.  Do strength exercises, such as arm curls and leg raises, for 30 minutes at least 2 days a week or as told by your doctor.  Think about working with a physical therapist or  to help you get stronger.  Keep all follow-up visits as told by your doctor. This is important.  Contact a doctor if:  Your weakness does not get better or it gets worse.  Your weakness affects your ability to:  Think clearly.  Do your normal daily activities.  Get help right away if:  You have sudden weakness.  You have trouble breathing or shortness of breath.  You have problems with your vision.  You have trouble talking or swallowing.  You have trouble standing or walking.  You have chest pain.  You are light-headed.  You pass out (lose consciousness).  This information is not intended to replace advice given to you by your health care provider. Make sure you discuss any questions you have with your health care provider.

## 2022-02-01 NOTE — SWALLOW BEDSIDE ASSESSMENT ADULT - CONSISTENCIES ADMINISTERED
3 oz/thin liquid/soft & bite-sized
3oz water, 3oz puree, 3oz mildly thick, 3oz soft & bite sized/thin liquid/mildly thick/pureed/soft & bite-sized
3 oz/thin liquid/soft & bite-sized

## 2022-02-01 NOTE — DISCHARGE NOTE PROVIDER - HOSPITAL COURSE
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 + associated w/ weakness and a decline in energy levels. per hx from family and NH paperwork pt is currently being treated oupt for presumed PNA as he has had fever and worsening leukocytosis for the past several days.  He is currently on a 7 day course of Doxycycline and cefuroxime ( started on 1/11/2022 to be completed on 1/18/2022, last dose today )  but per family has been on multiple abx courses over the past several weeks all for presumed PNA.  pt reports for the last few days he has been feeling more weak and tired and thinks his cough may have worsened slightly over the last few days. denies any SOB or fever. no episodes of hypoxia at NH.  in the ED,pt's VS T(C): 36.4 (01-17-22 @ 00:02), Max: 38.4 (01-16-22 @ 20:20) HPI:  Patient is an 81 year old Male with past medical history of HTN, HLD, DM, chronic asbestos lung disease ( w/ bilateral calcified plaques) presenting from NH w/ fever and baseline cough. + associated w/ weakness and a decline in energy levels. per hx from family and NH paperwork pt was being treated oupt for presumed PNA as he has had fever and worsening leukocytosis for the past several days.  He was on a 7 day course of Doxycycline and cefuroxime ( started on 1/11/2022 to be completed on 1/18/2022, last dose today )  but per family has been on multiple abx courses over the past several weeks all for presumed PNA.  pt reported for the last few days he has been feeling more weak and tired and thinks his cough may have worsened slightly over the last few days. denies any SOB or fever. no episodes of hypoxia at NH.  in the ED,pt's VS T(C): Max: 38.4 (01-16-22 @ 20:20)    Hospital course:      Problem specific details below:        Patient is medically stable and ready for discharge.   HPI:  Patient is an 81 year old Male with past medical history of HTN, HLD, DM, chronic asbestos lung disease ( w/ bilateral calcified plaques) presenting from NH w/ fever and baseline cough. + associated w/ weakness and a decline in energy levels. per hx from family and NH paperwork pt was being treated oupt for presumed PNA as he has had fever and worsening leukocytosis for the past several days.  He was on a 7 day course of Doxycycline and cefuroxime ( started on 1/11/2022 to be completed on 1/18/2022, last dose today )  but per family has been on multiple abx courses over the past several weeks all for presumed PNA.  pt reported for the last few days he has been feeling more weak and tired and thinks his cough may have worsened slightly over the last few days. denies any SOB or fever. no episodes of hypoxia at NH.  in the ED,pt's VS T(C): Max: 38.4 (01-16-22 @ 20:20)    Hospital course:  Pt. had weakness which most likely is due to deconditioning. Pt. had multiple episodes of fever, no source was identified, was monitored off antibiotics as per ID. Pt. had mild leucocytosis around 17K, BM biopsy was done for which pt. will follow OP with Dr. Lo.    Problem specific details below:    # Intermittent Fever of unknown origin   # Chronic asbestos lung disease  - CXR w/ b/l opacities  - febrile on admission, now afebrile. Leukocytosis  - chronic cough at baseline, however pt reports improvement in cough since admission  - BCx x2 NGTD, UCx NG, Sputum Cx normal resp ric, Procal 0.36  - s/p ID eval - unclear etiology of fever, low suspicion for PNA, prior HIDA concerning for cholecystitis   - repeat HIDA revealed delayed visualization of gallbladder which can be seen in chronic cholecystitis, hepatocellular disease, cholestasis, pancreatic disease, and poor nutrition  - CT C/A/P unremarkable   - per ID, dc abx, obtain WBC scan ( unremarkable ).  - pt febrile on 1/30 Tmax 101, repeat Bcx NGTD- No Abx as per ID    #Chronic Leukocytosis   #Normocytic Anemia   - BCR- ABL negative   - Anemia w/u: Vit B12/folate/LDH WNL, hapto elevated, SPEP pending, FLC ratio normal    - BM done on 2/1, pt. will follow OP for results.    # Generalized Weakness- more in lower extremity than upper extremity  - likely related to deconditioning from recurrent hospitalizations  - CT C/A/P without any mention of significant spine pathology   - PT following - will need STR     # Transaminitis, mixed pattern, trending up  - s/p hepatology eval - suspect DILI from cefuroxime?  - AST 61, ALT 28, , > 101/42/192  - HIDA scan 11/2021 suspicious for cholecystitis  - RUQ U/S w/ cholelithiasis w/o ductal dilatation  - repeat HIDA revealed delayed visualization of gallbladder which can be seen in chronic cholecystitis, hepatocellular disease, cholestasis, pancreatic disease, and poor nutrition      Patient is medically stable and ready for discharge.   HPI:  Patient is an 81 year old Male with past medical history of HTN, HLD, DM, chronic asbestos lung disease ( w/ bilateral calcified plaques) presenting from NH w/ fever and baseline cough. + associated w/ weakness and a decline in energy levels. per hx from family and NH paperwork pt was being treated oupt for presumed PNA as he has had fever and worsening leukocytosis for the past several days.  He was on a 7 day course of Doxycycline and cefuroxime ( started on 1/11/2022 to be completed on 1/18/2022, last dose today )  but per family has been on multiple abx courses over the past several weeks all for presumed PNA.  pt reported for the last few days he has been feeling more weak and tired and thinks his cough may have worsened slightly over the last few days. denies any SOB or fever. no episodes of hypoxia at NH.  in the ED,pt's VS T(C): Max: 38.4 (01-16-22 @ 20:20)    Hospital course:  Pt. has weakness which most likely is due to deconditioning. Pt. had multiple episodes of fever, no source was identified, was monitored off antibiotics as per ID. Pt. had mild leucocytosis around 17K, BM biopsy was done for which pt. will follow OP with Dr. Lo. Pt. also has normocytic anemia, no source of bleeding was identified, required 1 unit of pRBC. Also, pt. has mild transamnitis initially it was thought to be due to drug induced liver injury 2/2 to cefuroxime use, but liver enzymes were elevated even after stopping the medication, pt. should follow out-patient for that.    Primary concern before discharge was weakness for which pt. would require intensive physical therapy and proper nutrition support and encouragement.    Patient is medically stable and ready for discharge.

## 2022-02-01 NOTE — PROGRESS NOTE ADULT - ASSESSMENT
81M w/ PMHx of Chronic asbestosis lung disease, HTN, HLD, and DM presenting from NH w/ fever and baseline cough  associated w/ weakness and a decline in energy levels. Pt has had fever and worsening leukocytosis. He has been treated with multiple abx over past weeks for presumed PNA. Heme/Onc is consulted for evaluation of persistent leukocytosis.     # Leukocytosis, r/o leukemia  - WBC elevation noted since 11/2021  - Cultures, RVP and COVID19 PCR till the date negative noted  - CT Chest/Abdo/Pelvis negative for any lymphadenopathy  - BCR/ABL negative: Would recommend pulm  eval for pleural plaques  - Bone marrow biopsy done 2/1/22: Can follow up outpatient with Dr Lo in 2-3 weeks post discharge.      # Normocytic Anemia- chronic  - Hgb declining trend noted since 11/2021  - Please monitor for s/s of bleeding and r/o any obscure bleeding that might be contributing to the anemia  - Iron studies noted for anemia of chronic inflammation; LDH, haptoglobin, vit b12, folate level WNL  - CLAUDINE Kappa and lambda elevated with normal ratio; F/u rest of the myeloma workup.

## 2022-02-01 NOTE — PROCEDURE NOTE - ADDITIONAL PROCEDURE DETAILS
After obtaining written consent, pt was placed in lateral recumbent position. The area was prepped, cleaned and sterile drape placed. Bone marrow aspirate was obtained, and then core biopsy was done successfully.   Procedural local anesthesia with 1% lidocaine was used. Bandage placed.   No post procedural complications.   Pt instructed to lie on his back for at least 30 min.    Specimens submitted: flow cytometry tubes, clot, core, slides. After obtaining written consent, pt was placed in lateral recumbent position. The area was prepped, cleaned and sterile drape placed.     Bone marrow aspirate was obtained,   and then bone marrow core biopsy was done successfully.     Procedural local anesthesia with 1% lidocaine was used. Bandage placed.   No post procedural complications.   Pt instructed to lie on his back for at least 30 min.    Specimens submitted: flow cytometry tubes, clot, core, slides.

## 2022-02-01 NOTE — PROGRESS NOTE ADULT - SUBJECTIVE AND OBJECTIVE BOX
Patient is a 81y old  Male who presents with a chief complaint of fever (2022 10:02)      Subjective:      Vital Signs Last 24 Hrs  T(C): 36.3 (2022 05:00), Max: 36.9 (2022 12:28)  T(F): 97.4 (2022 05:00), Max: 98.4 (2022 12:28)  HR: 86 (2022 05:00) (86 - 95)  BP: 108/53 (2022 05:00) (105/51 - 127/59)  BP(mean): --  RR: 18 (2022 05:00) (18 - 18)  SpO2: --    PHYSICAL EXAM  General: adult in NAD  HEENT: clear oropharynx, anicteric sclera, pink conjunctiva  Neck: supple  CV: normal S1/S2 with no murmur rubs or gallops  Lungs: positive air movement b/l ant lungs,clear to auscultation, no wheezes, no rales  Abdomen: soft non-tender non-distended, no hepatosplenomegaly  Ext: no clubbing cyanosis or edema  Skin: no rashes and no petechiae  Neuro: alert and oriented X 4, no focal deficits    MEDICATIONS  (STANDING):  ascorbic acid 500 milliGRAM(s) Oral daily  ATENolol  Tablet 50 milliGRAM(s) Oral daily  dextrose 40% Gel 15 Gram(s) Oral once  dextrose 5%. 1000 milliLiter(s) (50 mL/Hr) IV Continuous <Continuous>  dextrose 5%. 1000 milliLiter(s) (100 mL/Hr) IV Continuous <Continuous>  dextrose 50% Injectable 25 Gram(s) IV Push once  dextrose 50% Injectable 12.5 Gram(s) IV Push once  dextrose 50% Injectable 25 Gram(s) IV Push once  enoxaparin Injectable 40 milliGRAM(s) SubCutaneous daily  glucagon  Injectable 1 milliGRAM(s) IntraMuscular once  insulin lispro (ADMELOG) corrective regimen sliding scale   SubCutaneous three times a day before meals  lisinopril 20 milliGRAM(s) Oral daily  multivitamin 1 Tablet(s) Oral daily  simvastatin 40 milliGRAM(s) Oral at bedtime  zinc sulfate 220 milliGRAM(s) Oral daily    MEDICATIONS  (PRN):  acetaminophen     Tablet .. 650 milliGRAM(s) Oral every 6 hours PRN Temp greater or equal to 38C (100.4F), Mild Pain (1 - 3)  aluminum hydroxide/magnesium hydroxide/simethicone Suspension 30 milliLiter(s) Oral every 4 hours PRN Dyspepsia  melatonin 3 milliGRAM(s) Oral at bedtime PRN Insomnia  ondansetron Injectable 4 milliGRAM(s) IV Push every 8 hours PRN Nausea and/or Vomiting      LABS:                          8.2    15.89 )-----------( 349      ( 2022 07:40 )             27.6         Mean Cell Volume : 89.0 fL  Mean Cell Hemoglobin : 26.5 pg  Mean Cell Hemoglobin Concentration : 29.7 g/dL  Auto Neutrophil # : 14.00 K/uL  Auto Lymphocyte # : 0.85 K/uL  Auto Monocyte # : 0.79 K/uL  Auto Eosinophil # : 0.06 K/uL  Auto Basophil # : 0.03 K/uL  Auto Neutrophil % : 88.1 %  Auto Lymphocyte % : 5.3 %  Auto Monocyte % : 5.0 %  Auto Eosinophil % : 0.4 %  Auto Basophil % : 0.2 %      Serial CBC's   @ 07:40  Hct-27.6 / Hgb-8.2 / Plat-349 / RBC-3.10 / WBC-15.89  Serial CBC's   @ 19:08  Hct-27.7 / Hgb-8.3 / Plat-385 / RBC-3.16 / WBC-17.63  Serial CBC's   @ 04:30  Hct-27.3 / Hgb-8.1 / Plat-362 / RBC-3.07 / WBC-17.81  Serial CBC's   @ 07:52  Hct-30.6 / Hgb-9.1 / Plat-392 / RBC-3.45 / WBC-16.31  Serial CBC's   @ 04:30  Hct-27.7 / Hgb-8.4 / Plat-423 / RBC-3.22 / WBC-17.49          137  |  104  |  24<H>  ----------------------------<  166<H>  4.2   |  25  |  0.8    Ca    7.8<L>      2022 07:40  Phos  3.8       Mg     1.8         TPro  5.2<L>  /  Alb  2.2<L>  /  TBili  0.4  /  DBili  x   /  AST  101<H>  /  ALT  42<H>  /  AlkPhos  192<H>        PT/INR - ( 2022 07:40 )   PT: 16.70 sec;   INR: 1.46 ratio             Ferritin, Serum: 1203 ng/mL ( @ 11:00)  Iron - Total Binding Capacity.: 97 ug/dL ( @ 11:00)  Vitamin B12, Serum: 1224 pg/mL ( @ 04:30)  Folate, Serum: 10.8 ng/mL ( @ 04:30)  Reticulocyte Percent: 3.8 % ( @ 04:30)    Urinalysis Basic - ( 2022 15:50 )  Color: Yellow / Appearance: Slightly Turbid / S.028 / pH: x  Gluc: x / Ketone: Negative  / Bili: Negative / Urobili: <2 mg/dL   Blood: x / Protein: 30 mg/dL / Nitrite: Negative   Leuk Esterase: Negative / RBC: 7 /HPF / WBC 7 /HPF   Sq Epi: x / Non Sq Epi: 2 /HPF / Bacteria: Moderate      Culture - Blood (collected 2022 22:46)  Source: .Blood Blood  Preliminary Report (2022 10:01):    No growth to date.    Culture - Blood (collected 2022 22:46)  Source: .Blood Blood  Preliminary Report (2022 10:01):    No growth to date.      RADIOLOGY & ADDITIONAL STUDIES:

## 2022-02-01 NOTE — DISCHARGE NOTE PROVIDER - NSDCMRMEDTOKEN_GEN_ALL_CORE_FT
atenolol 50 mg oral tablet: 1 tab(s) orally once a day  glyburide-metformin 2.5 mg-500 mg oral tablet: 1 tab(s) orally once a day  lisinopril 20 mg oral tablet: 1 tab(s) orally once a day  simvastatin 40 mg oral tablet: 1 tab(s) orally once a day (at bedtime)   acetaminophen 325 mg oral tablet: 2 tab(s) orally every 6 hours, As needed, Temp greater or equal to 38C (100.4F), Mild Pain (1 - 3)  atenolol 50 mg oral tablet: 1 tab(s) orally once a day  glyburide-metformin 2.5 mg-500 mg oral tablet: 1 tab(s) orally once a day  lisinopril 20 mg oral tablet: 1 tab(s) orally once a day  Multiple Vitamins oral tablet: 1 tab(s) orally once a day  simvastatin 40 mg oral tablet: 1 tab(s) orally once a day (at bedtime)   acetaminophen 325 mg oral tablet: 2 tab(s) orally every 6 hours, As needed, Temp greater or equal to 38C (100.4F), Mild Pain (1 - 3)  atenolol 50 mg oral tablet: 1 tab(s) orally once a day  glyburide-metformin 2.5 mg-500 mg oral tablet: 1 tab(s) orally once a day  lisinopril 20 mg oral tablet: 1 tab(s) orally once a day  loratadine 5 mg/5 mL oral syrup: 10 milliliter(s) orally once a day  Multiple Vitamins oral tablet: 1 tab(s) orally once a day  simvastatin 40 mg oral tablet: 1 tab(s) orally once a day (at bedtime)

## 2022-02-01 NOTE — DISCHARGE NOTE PROVIDER - NSDCFUADDINST_GEN_ALL_CORE_FT
Primary concern before discharge was weakness for which pt. would require intensive physical therapy and proper nutrition support and encouragement.

## 2022-02-01 NOTE — SWALLOW BEDSIDE ASSESSMENT ADULT - ORAL PHASE
with soft & bite sized/Decreased anterior-posterior movement of the bolus/Delayed oral transit time
Decreased anterior-posterior movement of the bolus/Delayed oral transit time
Decreased anterior-posterior movement of the bolus/Delayed oral transit time

## 2022-02-02 NOTE — PROGRESS NOTE ADULT - ATTENDING COMMENTS
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 + associated w/ weakness and a decline in energy levels. per hx from family and NH paperwork pt is currently being treated oupt for presumed PNA as he has had fever and worsening leukocytosis for the past several days.      # Generalized Weakness  - more in lower extremity than upper extremity  - likely related to deconditioning from recurrent hospitalizations, but pt. has been deconditioning very rapidly as per the family, also family reported that pt. got COVID vaccine towards the end of last year after which his weakness progressed  - CT C/A/P without any mention of significant spine pathology   - PT following - will need STR   - Inconsistent PO intake and borderline  BP- f/u Calorie count, c/w LR @70cc/hr    # Intermittent Fever of unknown origin   # Chronic asbestos lung disease  - 2/2 Chronic cholecystitis? vs hematologic process?  - CXR w/ b/l opacities  - febrile on admission, with intermittent fevers(last fever 2/1) now afebrile. Leukocytosis stable for duration of admission  - chronic cough at baseline, however pt reports improvement in cough since admission  - BCx x2 NGTD, UCx NG, Sputum Cx normal resp ric, Procal 0.36  - s/p ID eval - unclear etiology of fever, low suspicion for PNA, prior HIDA concerning for cholecystitis   - repeat HIDA revealed delayed visualization of gallbladder which can be seen in chronic cholecystitis, hepatocellular disease, cholestasis, pancreatic disease, and poor nutrition  - CT C/A/P unremarkable   - per ID, dc abx, obtain WBC scan ( unremarkable ) and inflammatory markers   - pt febrile on 1/30 and 2/1 Tmax 101, repeat Bcx on 1/30 NGTD, No abx as per ID  - f/u Ucx    #Chronic Leukocytosis   #Normocytic Anemia   - BCR- ABL negative   - Anemia w/u: Vit B12/folate/LDH WNL, hapto elevated, SPEP pending, FLC ratio normal    - BM done on 2/1, pt. will follow OP for results.    # Transaminitis, mixed pattern, trending up  - s/p hepatology eval - suspect DILI from cefuroxime?  - AST 61, ALT 28, , > 101/42/192  - HIDA scan 11/2021 suspicious for cholecystitis  - RUQ U/S w/ cholelithiasis w/o ductal dilatation  - repeat HIDA revealed delayed visualization of gallbladder which can be seen in chronic cholecystitis, hepatocellular disease, cholestasis, pancreatic disease, and poor nutrition    # High AG w/o acidosis, resolved    # HLD  - cont home simvastatin 40mg qhs    # DM2  - cont ISS    # HTN  - cont atenolol, lisinopril    #Misc  -DVT prophylaxis: Lovenox  -GI prophylaxis: Not indicated  -Diet: Soft bite sized  -Code status: Full code      #Progress Note Handoff  Pending (specify):  fever work up, calorie count  Family discussion: patient updated  Disposition: Unknown at this time________

## 2022-02-02 NOTE — PROGRESS NOTE ADULT - SUBJECTIVE AND OBJECTIVE BOX
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 + associated w/ weakness and a decline in energy levels. per hx from family and NH paperwork pt is currently being treated oupt for presumed PNA as he has had fever and worsening leukocytosis for the past several days.  He is currently on a 7 day course of Doxycycline and cefuroxime ( started on 1/11/2022 to be completed on 1/18/2022, last dose today )  but per family has been on multiple abx courses over the past several weeks all for presumed PNA.  pt reports for the last few days he has been feeling more weak and tired and thinks his cough may have worsened slightly over the last few days. denies any SOB or fever. no episodes of hypoxia at NH.  in the ED,pt's VS T(C): 36.4 (01-17-22 @ 00:02), Max: 38.4 (01-16-22 @ 20:20)  HR: 101 (01-17-22 @ 00:02) (101 - 122)  BP: 124/66 (01-17-22 @ 00:02) (124/66 - 135/60)  RR: 18 (01-17-22 @ 00:02) (18 - 18)  SpO2: 98% (01-17-22 @ 00:02) (96% - 98%), labs done showed WBC 18 K. hb 9.6 ( previously 11), , ALT 74 ( both trending down). UA negative.   CT head negative. CXR showed Bilateral opacities.   pt received 500 ml LR.   pt is admitted for workup/management (17 Jan 2022 01:21)      Patient is a 81y old Male who presents with a chief complaint of fever (01 Feb 2022 12:50)    Currently admitted to medicine with the primary diagnosis of Fever       Today is hospital day 16d.     PAST MEDICAL & SURGICAL HISTORY  Hypertension    Diabetes mellitus        ALLERGIES:  No Known Allergies    MEDICATIONS:  ACTIVE MEDICATIONS  acetaminophen     Tablet .. 650 milliGRAM(s) Oral every 6 hours PRN  aluminum hydroxide/magnesium hydroxide/simethicone Suspension 30 milliLiter(s) Oral every 4 hours PRN  ascorbic acid 500 milliGRAM(s) Oral daily  ATENolol  Tablet 50 milliGRAM(s) Oral daily  dextrose 40% Gel 15 Gram(s) Oral once  dextrose 5%. 1000 milliLiter(s) IV Continuous <Continuous>  dextrose 5%. 1000 milliLiter(s) IV Continuous <Continuous>  dextrose 50% Injectable 25 Gram(s) IV Push once  dextrose 50% Injectable 12.5 Gram(s) IV Push once  dextrose 50% Injectable 25 Gram(s) IV Push once  enoxaparin Injectable 40 milliGRAM(s) SubCutaneous daily  glucagon  Injectable 1 milliGRAM(s) IntraMuscular once  insulin lispro (ADMELOG) corrective regimen sliding scale   SubCutaneous three times a day before meals  lactated ringers. 1000 milliLiter(s) IV Continuous <Continuous>  lisinopril 20 milliGRAM(s) Oral daily  melatonin 3 milliGRAM(s) Oral at bedtime PRN  multivitamin 1 Tablet(s) Oral daily  ondansetron Injectable 4 milliGRAM(s) IV Push every 8 hours PRN  simvastatin 40 milliGRAM(s) Oral at bedtime      VITALS:   T(F): 97.5  HR: 83  BP: 105/49  RR: 18  SpO2: 96%    LABS:                        8.2    15.89 )-----------( 349      ( 01 Feb 2022 07:40 )             27.6     02-02    135  |  102  |  26<H>  ----------------------------<  138<H>  4.4   |  27  |  0.7    Ca    7.8<L>      02 Feb 2022 07:00  Phos  3.8     02-01  Mg     2.0     02-02    TPro  4.5<L>  /  Alb  1.8<L>  /  TBili  0.3  /  DBili  x   /  AST  160<H>  /  ALT  57<H>  /  AlkPhos  192<H>  02-02    PT/INR - ( 01 Feb 2022 07:40 )   PT: 16.70 sec;   INR: 1.46 ratio                   Culture - Blood (collected 30 Jan 2022 22:46)  Source: .Blood Blood  Preliminary Report (01 Feb 2022 10:01):    No growth to date.    Culture - Blood (collected 30 Jan 2022 22:46)  Source: .Blood Blood  Preliminary Report (01 Feb 2022 10:01):    No growth to date.              PHYSICAL EXAM:  CONSTITUTIONAL: Deconditioned; Pt. 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 + associated w/ weakness and a decline in energy levels. per hx from family and NH paperwork pt is currently being treated oupt for presumed PNA as he has had fever and worsening leukocytosis for the past several days.      # Generalized Weakness- more in lower extremity than upper extremity  - likely related to deconditioning from recurrent hospitalizations, but pt. has been deconditioning very rapidly as per the family, also family reported that pt. got COVID vaccine towards the end of last year after which his weakness progressed  - CT C/A/P without any mention of significant spine pathology   - PT following - will need STR     # Intermittent Fever of unknown origin 2/2 Chronic cholecystitis?  # Chronic asbestos lung disease  - CXR w/ b/l opacities  - febrile on admission, now afebrile. Leukocytosis  - chronic cough at baseline, however pt reports improvement in cough since admission  - BCx x2 NGTD, UCx NG, Sputum Cx normal resp ric, Procal 0.36  - s/p ID eval - unclear etiology of fever, low suspicion for PNA, prior HIDA concerning for cholecystitis   - repeat HIDA revealed delayed visualization of gallbladder which can be seen in chronic cholecystitis, hepatocellular disease, cholestasis, pancreatic disease, and poor nutrition  - CT C/A/P unremarkable   - per ID, dc abx, obtain WBC scan ( unremarkable ) and inflammatory markers   - pt febrile on 1/30 and 2/1 Tmax 101, repeat Bcx on 1/30 NGTD, No abx as per ID  - f/u Ucx    #Chronic Leukocytosis   #Normocytic Anemia   - BCR- ABL negative   - Anemia w/u: Vit B12/folate/LDH WNL, hapto elevated, SPEP pending, FLC ratio normal    - BM done on 2/1, pt. will follow OP for results.    # Transaminitis, mixed pattern, trending up  - s/p hepatology eval - suspect DILI from cefuroxime?  - AST 61, ALT 28, , > 101/42/192  - HIDA scan 11/2021 suspicious for cholecystitis  - RUQ U/S w/ cholelithiasis w/o ductal dilatation  - repeat HIDA revealed delayed visualization of gallbladder which can be seen in chronic cholecystitis, hepatocellular disease, cholestasis, pancreatic disease, and poor nutrition    # High AG w/o acidosis, resolved    # HLD  - cont home simvastatin 40mg qhs    # DM2  - cont ISS    # HTN  - cont atenolol, lisinopril      #Misc  -DVT prophylaxis: Lovenox  -GI prophylaxis: Not indicated  -Diet: Soft bite sized  -Code status: Full code    #Pending: Fever work up, STR auth           SUBJECTIVE:  HPI:  Patient is an 81 year old Male with past medical history of HTN, HLD, DM, 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 + associated w/ weakness and a decline in energy levels. per hx from family and NH paperwork pt is currently being treated oupt for presumed PNA as he has had fever and worsening leukocytosis for the past several days.  He is currently on a 7 day course of Doxycycline and cefuroxime ( started on 1/11/2022 to be completed on 1/18/2022, last dose today )  but per family has been on multiple abx courses over the past several weeks all for presumed PNA.  pt reports for the last few days he has been feeling more weak and tired and thinks his cough may have worsened slightly over the last few days. denies any SOB or fever. no episodes of hypoxia at NH.  in the ED,pt's VS T(C): 36.4 (01-17-22 @ 00:02), Max: 38.4 (01-16-22 @ 20:20)  HR: 101 (01-17-22 @ 00:02) (101 - 122)  BP: 124/66 (01-17-22 @ 00:02) (124/66 - 135/60)  RR: 18 (01-17-22 @ 00:02) (18 - 18)  SpO2: 98% (01-17-22 @ 00:02) (96% - 98%), labs done showed WBC 18 K. hb 9.6 ( previously 11), , ALT 74 ( both trending down). UA negative.   CT head negative. CXR showed Bilateral opacities.   pt received 500 ml LR.   pt is admitted for workup/management (17 Jan 2022 01:21)      Patient is a 81y old Male who presents with a chief complaint of fever (01 Feb 2022 12:50)    Currently admitted to medicine with the primary diagnosis of Fever       Today is hospital day 16d.     PAST MEDICAL & SURGICAL HISTORY  Hypertension    Diabetes mellitus        ALLERGIES:  No Known Allergies    MEDICATIONS:  ACTIVE MEDICATIONS  acetaminophen     Tablet .. 650 milliGRAM(s) Oral every 6 hours PRN  aluminum hydroxide/magnesium hydroxide/simethicone Suspension 30 milliLiter(s) Oral every 4 hours PRN  ascorbic acid 500 milliGRAM(s) Oral daily  ATENolol  Tablet 50 milliGRAM(s) Oral daily  dextrose 40% Gel 15 Gram(s) Oral once  dextrose 5%. 1000 milliLiter(s) IV Continuous <Continuous>  dextrose 5%. 1000 milliLiter(s) IV Continuous <Continuous>  dextrose 50% Injectable 25 Gram(s) IV Push once  dextrose 50% Injectable 12.5 Gram(s) IV Push once  dextrose 50% Injectable 25 Gram(s) IV Push once  enoxaparin Injectable 40 milliGRAM(s) SubCutaneous daily  glucagon  Injectable 1 milliGRAM(s) IntraMuscular once  insulin lispro (ADMELOG) corrective regimen sliding scale   SubCutaneous three times a day before meals  lactated ringers. 1000 milliLiter(s) IV Continuous <Continuous>  lisinopril 20 milliGRAM(s) Oral daily  melatonin 3 milliGRAM(s) Oral at bedtime PRN  multivitamin 1 Tablet(s) Oral daily  ondansetron Injectable 4 milliGRAM(s) IV Push every 8 hours PRN  simvastatin 40 milliGRAM(s) Oral at bedtime      VITALS:   T(F): 97.5  HR: 83  BP: 105/49  RR: 18  SpO2: 96%    LABS:                        8.2    15.89 )-----------( 349      ( 01 Feb 2022 07:40 )             27.6     02-02    135  |  102  |  26<H>  ----------------------------<  138<H>  4.4   |  27  |  0.7    Ca    7.8<L>      02 Feb 2022 07:00  Phos  3.8     02-01  Mg     2.0     02-02    TPro  4.5<L>  /  Alb  1.8<L>  /  TBili  0.3  /  DBili  x   /  AST  160<H>  /  ALT  57<H>  /  AlkPhos  192<H>  02-02    PT/INR - ( 01 Feb 2022 07:40 )   PT: 16.70 sec;   INR: 1.46 ratio                   Culture - Blood (collected 30 Jan 2022 22:46)  Source: .Blood Blood  Preliminary Report (01 Feb 2022 10:01):    No growth to date.    Culture - Blood (collected 30 Jan 2022 22:46)  Source: .Blood Blood  Preliminary Report (01 Feb 2022 10:01):    No growth to date.              PHYSICAL EXAM:  CONSTITUTIONAL: Deconditioned; Pt. 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 + associated w/ weakness and a decline in energy levels. per hx from family and NH paperwork pt is currently being treated oupt for presumed PNA as he has had fever and worsening leukocytosis for the past several days.      # Generalized Weakness- more in lower extremity than upper extremity  - likely related to deconditioning from recurrent hospitalizations, but pt. has been deconditioning very rapidly as per the family, also family reported that pt. got COVID vaccine towards the end of last year after which his weakness progressed  - CT C/A/P without any mention of significant spine pathology   - PT following - will need STR   - Inconsistent PO intake and borderline  BP- f/u Calorie count, c/w LR @70cc/hr    # Intermittent Fever of unknown origin 2/2 Chronic cholecystitis?  # Chronic asbestos lung disease  - CXR w/ b/l opacities  - febrile on admission, now afebrile. Leukocytosis  - chronic cough at baseline, however pt reports improvement in cough since admission  - BCx x2 NGTD, UCx NG, Sputum Cx normal resp ric, Procal 0.36  - s/p ID eval - unclear etiology of fever, low suspicion for PNA, prior HIDA concerning for cholecystitis   - repeat HIDA revealed delayed visualization of gallbladder which can be seen in chronic cholecystitis, hepatocellular disease, cholestasis, pancreatic disease, and poor nutrition  - CT C/A/P unremarkable   - per ID, dc abx, obtain WBC scan ( unremarkable ) and inflammatory markers   - pt febrile on 1/30 and 2/1 Tmax 101, repeat Bcx on 1/30 NGTD, No abx as per ID  - f/u Ucx    #Chronic Leukocytosis   #Normocytic Anemia   - BCR- ABL negative   - Anemia w/u: Vit B12/folate/LDH WNL, hapto elevated, SPEP pending, FLC ratio normal    - BM done on 2/1, pt. will follow OP for results.    # Transaminitis, mixed pattern, trending up  - s/p hepatology eval - suspect DILI from cefuroxime?  - AST 61, ALT 28, , > 101/42/192  - HIDA scan 11/2021 suspicious for cholecystitis  - RUQ U/S w/ cholelithiasis w/o ductal dilatation  - repeat HIDA revealed delayed visualization of gallbladder which can be seen in chronic cholecystitis, hepatocellular disease, cholestasis, pancreatic disease, and poor nutrition    # High AG w/o acidosis, resolved    # HLD  - cont home simvastatin 40mg qhs    # DM2  - cont ISS    # HTN  - cont atenolol, lisinopril      #Misc  -DVT prophylaxis: Lovenox  -GI prophylaxis: Not indicated  -Diet: Soft bite sized  -Code status: Full code    #Pending: Fever work up, Calorie count

## 2022-02-03 NOTE — PROGRESS NOTE ADULT - SUBJECTIVE AND OBJECTIVE BOX
KAYLA MIGUEL ANGEL  81y, Male  Allergy: No Known Allergies      LOS  17d    CHIEF COMPLAINT: fever (03 Feb 2022 13:17)      INTERVAL EVENTS/HPI  - No acute events overnight  - T(F): , Max: 99.8 (02-03-22 @ 05:00)  - Denies any worsening symptoms  - Tolerating medication  - WBC Count: 13.84 (02-03-22 @ 11:42)  WBC Count: 12.71 (02-03-22 @ 04:30)     - Creatinine, Serum: 0.8 (02-03-22 @ 04:30)  Creatinine, Serum: 0.7 (02-02-22 @ 07:00)       ROS  General: Denies rigors, nightsweats  HEENT: Denies headache, rhinorrhea, sore throat, eye pain  CV: Denies CP, palpitations  PULM: Denies wheezing, hemoptysis  GI: Denies hematemesis, hematochezia, melena  : Denies discharge, hematuria  MSK: Denies arthralgias, myalgias  SKIN: Denies rash, lesions  NEURO: Denies paresthesias, weakness  PSYCH: Denies depression, anxiety    VITALS:  T(F): 96.5, Max: 99.8 (02-03-22 @ 05:00)  HR: 79  BP: 101/49  RR: 18Vital Signs Last 24 Hrs  T(C): 35.8 (03 Feb 2022 12:44), Max: 37.7 (03 Feb 2022 05:00)  T(F): 96.5 (03 Feb 2022 12:44), Max: 99.8 (03 Feb 2022 05:00)  HR: 79 (03 Feb 2022 12:44) (77 - 88)  BP: 101/49 (03 Feb 2022 12:44) (93/52 - 104/58)  BP(mean): --  RR: 18 (03 Feb 2022 12:44) (18 - 18)  SpO2: --    PHYSICAL EXAM:  Gen: NAD, resting in bed  HEENT: Normocephalic, atraumatic  Neck: supple, no lymphadenopathy  CV: Regular rate & regular rhythm  Lungs: decreased BS at bases, no fremitus  Abdomen: Soft, BS present  Ext: Warm, well perfused  Neuro: non focal, awake  Skin: no rash, no erythema  Lines: no phlebitis    FH: Non-contributory  Social Hx: Non-contributory    TESTS & MEASUREMENTS:                        7.3    13.84 )-----------( 291      ( 03 Feb 2022 11:42 )             24.0     02-03    139  |  104  |  24<H>  ----------------------------<  129<H>  4.1   |  22  |  0.8    Ca    7.6<L>      03 Feb 2022 04:30  Phos  3.7     02-03  Mg     2.0     02-03    TPro  4.6<L>  /  Alb  1.8<L>  /  TBili  0.3  /  DBili  x   /  AST  143<H>  /  ALT  57<H>  /  AlkPhos  212<H>  02-03    eGFR if Non African American: 83 mL/min/1.73M2 (02-03-22 @ 04:30)  eGFR if African American: 96 mL/min/1.73M2 (02-03-22 @ 04:30)    LIVER FUNCTIONS - ( 03 Feb 2022 04:30 )  Alb: 1.8 g/dL / Pro: 4.6 g/dL / ALK PHOS: 212 U/L / ALT: 57 U/L / AST: 143 U/L / GGT: x               Culture - Urine (collected 01-31-22 @ 22:27)  Source: Clean Catch Clean Catch (Midstream)  Preliminary Report (02-02-22 @ 18:43):    10,000 - 49,000 CFU/mL Pseudomonas aeruginosa    50,000 - 99,000 CFU/mL Gram positive organisms    Culture - Blood (collected 01-30-22 @ 22:46)  Source: .Blood Blood  Preliminary Report (02-01-22 @ 10:01):    No growth to date.    Culture - Blood (collected 01-30-22 @ 22:46)  Source: .Blood Blood  Preliminary Report (02-01-22 @ 10:01):    No growth to date.    Culture - Sputum (collected 01-17-22 @ 18:50)  Source: .Sputum Sputum  Gram Stain (01-18-22 @ 07:33):    Moderate polymorphonuclear leukocytes per low power field    Rare Squamous epithelial cells per low power field    No organisms seen per oil power field  Final Report (01-19-22 @ 17:31):    Normal Respiratory Keiko present    Culture - Urine (collected 01-17-22 @ 18:03)  Source: Clean Catch Clean Catch (Midstream)  Final Report (01-18-22 @ 17:48):    No growth    Culture - Blood (collected 01-17-22 @ 11:00)  Source: .Blood Blood  Final Report (01-22-22 @ 22:00):    No Growth Final    Culture - Blood (collected 01-17-22 @ 04:30)  Source: .Blood Blood  Final Report (01-22-22 @ 14:00):    No Growth Final    Culture - Urine (collected 01-16-22 @ 23:38)  Source: Clean Catch Clean Catch (Midstream)  Final Report (01-18-22 @ 06:10):    No growth    Culture - Blood (collected 01-16-22 @ 20:32)  Source: .Blood Blood-Peripheral  Final Report (01-22-22 @ 06:01):    No Growth Final    Culture - Blood (collected 01-16-22 @ 20:32)  Source: .Blood Blood-Peripheral  Final Report (01-22-22 @ 06:01):    No Growth Final            INFECTIOUS DISEASES TESTING  COVID-19 PCR: NotDetec (02-02-22 @ 13:13)  Rapid RVP Result: NotDetec (02-01-22 @ 12:50)  Rapid RVP Result: NotDetec (01-30-22 @ 15:50)  COVID-19 PCR: NotDetec (01-29-22 @ 04:30)  COVID-19 PCR: NotDetec (01-23-22 @ 15:17)  Procalcitonin, Serum: 0.36 (01-17-22 @ 04:30)  Rapid RVP Result: NotDetec (01-16-22 @ 20:54)  COVID-19 PCR: NotDetec (11-26-21 @ 11:19)  Procalcitonin, Serum: 0.33 (11-24-21 @ 07:20)  Procalcitonin, Serum: 0.35 (11-22-21 @ 06:26)  Procalcitonin, Serum: 0.38 (11-18-21 @ 09:58)  COVID-19 PCR: NotDetec (11-18-21 @ 08:50)  MRSA PCR Result.: Negative (11-15-21 @ 13:30)  Procalcitonin, Serum: 0.22 (11-15-21 @ 06:59)  Rapid RVP Result: NotDetec (11-14-21 @ 09:55)      INFLAMMATORY MARKERS  C-Reactive Protein, Serum: 194 mg/L (01-17-22 @ 04:30)      RADIOLOGY & ADDITIONAL TESTS:  I have personally reviewed the last available Chest xray  CXR      CT      CARDIOLOGY TESTING      MEDICATIONS  ascorbic acid 500 Oral daily  ATENolol  Tablet 50 Oral daily  dextrose 40% Gel 15 Oral once  dextrose 5%. 1000 IV Continuous <Continuous>  dextrose 5%. 1000 IV Continuous <Continuous>  dextrose 50% Injectable 25 IV Push once  dextrose 50% Injectable 12.5 IV Push once  dextrose 50% Injectable 25 IV Push once  enoxaparin Injectable 40 SubCutaneous daily  glucagon  Injectable 1 IntraMuscular once  insulin lispro (ADMELOG) corrective regimen sliding scale  SubCutaneous three times a day before meals  lactated ringers. 1000 IV Continuous <Continuous>  lisinopril 20 Oral daily  loratadine Syrup 10 Oral daily  multivitamin 1 Oral daily  simvastatin 40 Oral at bedtime      WEIGHT  Weight (kg): 66.2 (01-16-22 @ 20:20)  Creatinine, Serum: 0.8 mg/dL (02-03-22 @ 04:30)      ANTIBIOTICS:      All available historical records have been reviewed

## 2022-02-03 NOTE — PROGRESS NOTE ADULT - ASSESSMENT
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 CLINICAL CORRELATION IS SUGGESTED.  - NM Hepatobiliary Imaging (01.21.22 @ 19:15): Delayed definite visualization of the gallbladder at 6 hours post  injection, demonstrating patency of the cystic duct. Delayed visualization of the gallbladder is a nonspecific finding.   - Anti Nuclear Factor Titer: Negative: Antinuclear AB (TAWANDA), IFA Method (11.19.21 @ 06:41), ANCA labs negative  - Ferritin, Serum: 1707 ng/mL (11.20.21 @ 09:17), Lactate Dehydrogenase, Serum: 192 U/L (01.26.22 @ 04:30)  - Tagged WBC scan 1/25 - update diffusely in bilateral lung fields   - CT CHest 1/27 - no focal consolidations  - CT Abd/pelvis 1/27 - no acute inflammatory process   - s/p bone marrow biopsy 2/1     #Chronic asbestos Lung disease    #Transaminitis - Cholecystitis- HIDA scan positive 11/19/2021    Recommendations  - no clinical signs of new infection - Urine Cx reviewed and appears asymptomatic from this   - unclear if fever is due to possible underlying inflamamtory/malignant process  - continue to monitor off antibiotics  - follow-up BMBx path    Please call or message on [employer] Teams if with any questions.  Spectra [phone]

## 2022-02-03 NOTE — PROGRESS NOTE ADULT - SUBJECTIVE AND OBJECTIVE BOX
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 + associated w/ weakness and a decline in energy levels. per hx from family and NH paperwork pt is currently being treated oupt for presumed PNA as he has had fever and worsening leukocytosis for the past several days.  He is currently on a 7 day course of Doxycycline and cefuroxime ( started on 1/11/2022 to be completed on 1/18/2022, last dose today )  but per family has been on multiple abx courses over the past several weeks all for presumed PNA.  pt reports for the last few days he has been feeling more weak and tired and thinks his cough may have worsened slightly over the last few days. denies any SOB or fever. no episodes of hypoxia at NH.  in the ED,pt's VS T(C): 36.4 (01-17-22 @ 00:02), Max: 38.4 (01-16-22 @ 20:20)  HR: 101 (01-17-22 @ 00:02) (101 - 122)  BP: 124/66 (01-17-22 @ 00:02) (124/66 - 135/60)  RR: 18 (01-17-22 @ 00:02) (18 - 18)  SpO2: 98% (01-17-22 @ 00:02) (96% - 98%), labs done showed WBC 18 K. hb 9.6 ( previously 11), , ALT 74 ( both trending down). UA negative.   CT head negative. CXR showed Bilateral opacities.   pt received 500 ml LR.   pt is admitted for workup/management (17 Jan 2022 01:21)      Patient is a 81y old Male who presents with a chief complaint of fever (02 Feb 2022 08:33)    Currently admitted to medicine with the primary diagnosis of Fever       Today is hospital day 17d.     PAST MEDICAL & SURGICAL HISTORY  Hypertension    Diabetes mellitus        ALLERGIES:  No Known Allergies    MEDICATIONS:  ACTIVE MEDICATIONS  acetaminophen     Tablet .. 650 milliGRAM(s) Oral every 6 hours PRN  aluminum hydroxide/magnesium hydroxide/simethicone Suspension 30 milliLiter(s) Oral every 4 hours PRN  ascorbic acid 500 milliGRAM(s) Oral daily  ATENolol  Tablet 50 milliGRAM(s) Oral daily  dextrose 40% Gel 15 Gram(s) Oral once  dextrose 5%. 1000 milliLiter(s) IV Continuous <Continuous>  dextrose 5%. 1000 milliLiter(s) IV Continuous <Continuous>  dextrose 50% Injectable 25 Gram(s) IV Push once  dextrose 50% Injectable 12.5 Gram(s) IV Push once  dextrose 50% Injectable 25 Gram(s) IV Push once  enoxaparin Injectable 40 milliGRAM(s) SubCutaneous daily  glucagon  Injectable 1 milliGRAM(s) IntraMuscular once  insulin lispro (ADMELOG) corrective regimen sliding scale   SubCutaneous three times a day before meals  lactated ringers. 1000 milliLiter(s) IV Continuous <Continuous>  lisinopril 20 milliGRAM(s) Oral daily  melatonin 3 milliGRAM(s) Oral at bedtime PRN  multivitamin 1 Tablet(s) Oral daily  ondansetron Injectable 4 milliGRAM(s) IV Push every 8 hours PRN  simvastatin 40 milliGRAM(s) Oral at bedtime      VITALS:   T(F): 96.5  HR: 79  BP: 101/49  RR: 18  SpO2: --    LABS:                        6.8    12.71 )-----------( 294      ( 03 Feb 2022 04:30 )             22.5     02-03    139  |  104  |  24<H>  ----------------------------<  129<H>  4.1   |  22  |  0.8    Ca    7.6<L>      03 Feb 2022 04:30  Phos  3.7     02-03  Mg     2.0     02-03    TPro  4.6<L>  /  Alb  1.8<L>  /  TBili  0.3  /  DBili  x   /  AST  143<H>  /  ALT  57<H>  /  AlkPhos  212<H>  02-03    PT/INR - ( 03 Feb 2022 04:30 )   PT: 17.10 sec;   INR: 1.49 ratio                   Culture - Urine (collected 31 Jan 2022 22:27)  Source: Clean Catch Clean Catch (Midstream)  Preliminary Report (02 Feb 2022 18:43):    10,000 - 49,000 CFU/mL Pseudomonas aeruginosa    50,000 - 99,000 CFU/mL Gram positive organisms              PHYSICAL EXAM:  CONSTITUTIONAL: Deconditioned; Pt. looks significantly more deconditioned than 2 week before as per RN- Pt. seem to be doing slightly better overall than yesterday- Pt. was partially eating on his own.  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 + associated w/ weakness and a decline in energy levels. per hx from family and NH paperwork pt is currently being treated oupt for presumed PNA as he has had fever and worsening leukocytosis for the past several days.      #Normocytic hypochromic anemia 2/2 anemia of chronic disease?  *Hb has been on the lower side since this admission, around 8, this AM Hb was 6.8. Exact etiology is unclear  -transfuse 1 unit pRBC  -f/u PM CBC    # Generalized Weakness- more in lower extremity than upper extremity  - likely related to deconditioning from recurrent hospitalizations, but pt. has been deconditioning very rapidly as per the family, also family reported that pt. got COVID vaccine towards the end of last year after which his weakness progressed  - CT C/A/P without any mention of significant spine pathology   - PT following - will need STR   - Inconsistent PO intake and borderline  BP- f/u Calorie count, c/w LR @70cc/hr    # Intermittent Fever of unknown origin 2/2 Chronic cholecystitis?  # Chronic asbestos lung disease  - CXR w/ b/l opacities  - febrile on admission, now afebrile. Leukocytosis  - chronic cough at baseline, however pt reports improvement in cough since admission  - BCx x2 NGTD, UCx NG, Sputum Cx normal resp ric, Procal 0.36  - s/p ID eval - unclear etiology of fever, low suspicion for PNA, prior HIDA concerning for cholecystitis   - repeat HIDA revealed delayed visualization of gallbladder which can be seen in chronic cholecystitis, hepatocellular disease, cholestasis, pancreatic disease, and poor nutrition  - CT C/A/P unremarkable   - per ID, dc abx, obtain WBC scan ( unremarkable ) and inflammatory markers   - pt febrile on 1/30 and 2/1 Tmax 101, repeat Bcx on 1/30 NGTD, No abx as per ID  - Ucx grew Pseudomonas and Gram +ve bacteria, no carrero, no symptoms, awaiting ID recs regarding if should give Abx in this case, considering the chronic leukocytosis and episode of pyrexia of unknown origin.    #Chronic Leukocytosis   #Normocytic Anemia   - BCR- ABL negative   - Anemia w/u: Vit B12/folate/LDH WNL, hapto elevated, SPEP pending, FLC ratio normal    - BM done on 2/1, pt. will follow OP for results.    # Transaminitis, mixed pattern, trending up  - s/p hepatology eval - suspect DILI from cefuroxime?  - AST 61, ALT 28, , > 101/42/192  - HIDA scan 11/2021 suspicious for cholecystitis  - RUQ U/S w/ cholelithiasis w/o ductal dilatation  - repeat HIDA revealed delayed visualization of gallbladder which can be seen in chronic cholecystitis, hepatocellular disease, cholestasis, pancreatic disease, and poor nutrition    # High AG w/o acidosis, resolved    # HLD  - cont home simvastatin 40mg qhs    # DM2  - cont ISS    # HTN  - cont atenolol, lisinopril      #Misc  -DVT prophylaxis: Lovenox  -GI prophylaxis: Not indicated  -Diet: Soft bite sized  -Code status: Full code    #Pending: Fever work up, Anemia resolution, Calorie count, ID recs

## 2022-02-03 NOTE — PROGRESS NOTE ADULT - ATTENDING COMMENTS
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 + associated w/ weakness and a decline in energy levels. per hx from family and NH paperwork pt is currently being treated oupt for presumed PNA as he has had fever and worsening leukocytosis for the past several days.      # Generalized Weakness  - more in lower extremity than upper extremity  - likely related to deconditioning from recurrent hospitalizations, but pt. has been deconditioning very rapidly as per the family, also family reported that pt. got COVID vaccine towards the end of last year after which his weakness progressed  - CT C/A/P without any mention of significant spine pathology   - PT following - will need STR   - Inconsistent PO intake and borderline  BP- f/u Calorie count, c/w LR @70cc/hr    # Intermittent Fever of unknown origin   # Chronic asbestos lung disease  - 2/2 Chronic cholecystitis? vs hematologic process?  - CXR w/ b/l opacities  - febrile on admission, with intermittent fevers(last fever 2/1) now afebrile. Leukocytosis stable for duration of admission  - chronic cough at baseline, however pt reports improvement in cough since admission  - BCx x2 NGTD, UCx NG, Sputum Cx normal resp ric, Procal 0.36  - s/p ID eval - unclear etiology of fever, low suspicion for PNA, prior HIDA concerning for cholecystitis   - repeat HIDA revealed delayed visualization of gallbladder which can be seen in chronic cholecystitis, hepatocellular disease, cholestasis, pancreatic disease, and poor nutrition  - CT C/A/P unremarkable   - per ID, dc abx, obtain WBC scan ( unremarkable ) and inflammatory markers   - pt febrile on 1/30 and 2/1 Tmax 101, repeat Bcx on 1/30 NGTD, No abx as per ID  - no recurrent fevers since 2/1  - f/u Ucx    #Chronic Leukocytosis   #Normocytic Anemia   - BCR- ABL negative   - Anemia w/u: Vit B12/folate/LDH WNL, hapto elevated, SPEP pending, FLC ratio normal    - BM done on 2/1, pt. will follow OP for results.  - hgb <7 this morning. giving 1u of pRBCs    # Transaminitis, mixed pattern, trending up  - s/p hepatology eval - suspect DILI from cefuroxime?  - AST 61, ALT 28, , > 101/42/192  - HIDA scan 11/2021 suspicious for cholecystitis  - RUQ U/S w/ cholelithiasis w/o ductal dilatation  - repeat HIDA revealed delayed visualization of gallbladder which can be seen in chronic cholecystitis, hepatocellular disease, cholestasis, pancreatic disease, and poor nutrition    # High AG w/o acidosis, resolved    # HLD  - cont home simvastatin 40mg qhs    # DM2  - cont ISS    # HTN  - cont atenolol, lisinopril    #Misc  -DVT prophylaxis: Lovenox  -GI prophylaxis: Not indicated  -Diet: Soft bite sized  -Code status: Full code      #Progress Note Handoff  Pending (specify):  fever work up, calorie count, PT, blood transfusion  Family discussion: patient updated  Disposition: [facility] once medically optimized

## 2022-02-04 NOTE — PROGRESS NOTE ADULT - PROVIDER SPECIALTY LIST ADULT
Internal Medicine
Internal Medicine
Heme/Onc
Internal Medicine
Hospitalist
Infectious Disease
Internal Medicine
Heme/Onc
Hospitalist
Hospitalist
Internal Medicine
Internal Medicine
Infectious Disease

## 2022-02-04 NOTE — PROGRESS NOTE ADULT - ATTENDING SUPERVISION STATEMENT
Resident
Resident/Fellow/Student
Resident
Resident/Fellow/Student
Resident

## 2022-02-04 NOTE — PROGRESS NOTE ADULT - REASON FOR ADMISSION
fever

## 2022-02-04 NOTE — PROGRESS NOTE ADULT - TIME BILLING
I have personally seen and examined this patient.    I have reviewed all pertinent clinical information and reviewed all relevant imaging and diagnostic studies personally.   I counseled the patient about diagnostic testing and treatment plan. All questions were answered.   I discussed recommendations with the primary team.
chart review, resident teaching/nursing rounds, and interdisciplinary planning.
I have personally seen and examined this patient.    I have reviewed all pertinent clinical information and reviewed all relevant imaging and diagnostic studies personally.   I counseled the patient about diagnostic testing and treatment plan. All questions were answered.   I discussed recommendations with the primary team.
I have personally seen and examined this patient.    I have reviewed all pertinent clinical information and reviewed all relevant imaging and diagnostic studies personally.   I counseled the patient about diagnostic testing and treatment plan. All questions were answered.   I discussed recommendations with the primary team.
charting, resident teaching rounds, and interdisciplinary planning.
chart review, resident teaching/nursing rounds, and interdisciplinary planning.
charting, resident teaching rounds, and interdisciplinary planning.
charting, resident teaching rounds, and interdisciplinary planning.
chart review, resident/nursing teaching rounds, and interdisciplinary planning.

## 2022-02-04 NOTE — PROGRESS NOTE ADULT - ATTENDING COMMENTS
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 + associated w/ weakness and a decline in energy levels. per hx from family and NH paperwork pt is currently being treated oupt for presumed PNA as he has had fever and worsening leukocytosis for the past several days.      # Generalized Weakness  - more in lower extremity than upper extremity  - likely related to deconditioning from recurrent hospitalizations, but pt. has been deconditioning very rapidly as per the family, also family reported that pt. got COVID vaccine towards the end of last year after which his weakness progressed  - CT C/A/P without any mention of significant spine pathology   - PT following - will need STR   - Inconsistent PO intake and borderline  BP- f/u Calorie count, c/w LR @70cc/hr  - active calorie count, assisted feeds    # Intermittent Fever of unknown origin   # Chronic asbestos lung disease  - 2/2 Chronic cholecystitis? vs hematologic process?  - CXR w/ b/l opacities  - febrile on admission, with intermittent fevers(last fever 2/1) now afebrile. Leukocytosis stable for duration of admission  - chronic cough at baseline, however pt reports improvement in cough since admission  - BCx x2 NGTD, UCx NG, Sputum Cx normal resp ric, Procal 0.36  - s/p ID eval - unclear etiology of fever, low suspicion for PNA, prior HIDA concerning for cholecystitis   - repeat HIDA revealed delayed visualization of gallbladder which can be seen in chronic cholecystitis, hepatocellular disease, cholestasis, pancreatic disease, and poor nutrition  - CT C/A/P unremarkable   - per ID, dc abx, obtain WBC scan ( unremarkable ) and inflammatory markers   - pt febrile on 1/30 and 2/1 Tmax 101, repeat Bcx on 1/30 NGTD, No abx as per ID  - no recurrent fevers since 2/1  - f/u Ucx    #Chronic Leukocytosis   #Normocytic Anemia   - BCR- ABL negative   - Anemia w/u: Vit B12/folate/LDH WNL, hapto elevated, SPEP pending, FLC ratio normal    - BM done on 2/1, pt. will follow OP for results.  - hgb <7 this morning. giving 1u of pRBCs  - WBC stable at 11-13k, no fevers or focal signs of symptoms  - Heme/Onc following          Workup as above did not reveal any abnormalities. Leukocytosis trending down.           Will schedule a appointment with Dr oL in 2-3 weeks to follow up rest of the bone marrow biopsy results. Heme/Onc will sign off. Recall prn.    # Transaminitis, mixed pattern, trending up  - s/p hepatology eval - suspect DILI from cefuroxime?  - AST 61, ALT 28, , > 101/42/192  - HIDA scan 11/2021 suspicious for cholecystitis  - RUQ U/S w/ cholelithiasis w/o ductal dilatation  - repeat HIDA revealed delayed visualization of gallbladder which can be seen in chronic cholecystitis, hepatocellular disease, cholestasis, pancreatic disease, and poor nutrition    # High AG w/o acidosis, resolved    # HLD  - cont home simvastatin 40mg qhs    # DM2  - cont ISS    # HTN  - cont atenolol, lisinopril    #Misc  -DVT prophylaxis: Lovenox  -GI prophylaxis: Not indicated  -Diet: Soft bite sized  -Code status: Full code      #Progress Note Handoff  Pending (specify):  calorie count  Family discussion: patient updated  Disposition: [facility] tomorrow

## 2022-02-04 NOTE — CHART NOTE - NSCHARTNOTEFT_GEN_A_CORE
Heme/Onc following for chronic leukocytosis and fever of unknown origin.                          8.6    13.61 )-----------( 239      ( 2022 04:30 )             28.5       - Iron studies noted for anemia of chronic inflammation; LDH, haptoglobin, vit b12, folate level WNL  - CLAUDINE Kappa and lambda elevated with normal ratio  - Serum Protein Electrophoresis Interp: Hypoalbuminemia (22 @ 04:30)  - Immunoglobulins Panel (22 @ 04:30)    Quantitative Ig mg/dL    Quantitative IgA: 368 mg/dL    Quantitative IgM: 54 mg/dL    CLAUDINE Kappa: 8.97 mg/dL    CLAUDINE Lambda: 8.77 mg/dL    [place]/Lambda Free Light Chain Ratio, Serum: 1.02 Ratio  - Immunofixation, Serum: No Monoclonal Band Identified      BCR/ABL By RT - PCR Quantitative:   BCR Final Report  Date Reported: 2022 15:48  RESULTS:   Not detected    TM Interpretation:   Flow Cytometry Final Report  ________________________________________________________________________  Specimen: Bone marrow    DIAGNOSIS:  Bone marrow aspirate:       - The lymphocyte immunophenotypic findings show no diagnostic abnormalities.       - The myeloid immunophenotypic findings show normal myeloid granularity, no increase in  myeloid immaturity, and normal myeloid antigen maturation pattern.  Please see interpretation.  (22 @ 09:56)    Workup as above did not reveal any abnormalities. Leukocytosis trending down.     Will schedule a appointment with Dr Lo in 2-3 weeks to follow up rest of the bone marrow biopsy results. Heme/Onc will sign off. Recall prn.

## 2022-02-04 NOTE — PROGRESS NOTE ADULT - SUBJECTIVE AND OBJECTIVE BOX
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 + associated w/ weakness and a decline in energy levels. per hx from family and NH paperwork pt is currently being treated oupt for presumed PNA as he has had fever and worsening leukocytosis for the past several days.  He is currently on a 7 day course of Doxycycline and cefuroxime ( started on 1/11/2022 to be completed on 1/18/2022, last dose today )  but per family has been on multiple abx courses over the past several weeks all for presumed PNA.  pt reports for the last few days he has been feeling more weak and tired and thinks his cough may have worsened slightly over the last few days. denies any SOB or fever. no episodes of hypoxia at NH.  in the ED,pt's VS T(C): 36.4 (01-17-22 @ 00:02), Max: 38.4 (01-16-22 @ 20:20)  HR: 101 (01-17-22 @ 00:02) (101 - 122)  BP: 124/66 (01-17-22 @ 00:02) (124/66 - 135/60)  RR: 18 (01-17-22 @ 00:02) (18 - 18)  SpO2: 98% (01-17-22 @ 00:02) (96% - 98%), labs done showed WBC 18 K. hb 9.6 ( previously 11), , ALT 74 ( both trending down). UA negative.   CT head negative. CXR showed Bilateral opacities.   pt received 500 ml LR.   pt is admitted for workup/management (17 Jan 2022 01:21)      Patient is a 81y old Male who presents with a chief complaint of fever (03 Feb 2022 17:16)    Currently admitted to medicine with the primary diagnosis of Fever       Today is hospital day 18d.     PAST MEDICAL & SURGICAL HISTORY  Hypertension    Diabetes mellitus        ALLERGIES:  No Known Allergies    MEDICATIONS:  ACTIVE MEDICATIONS  acetaminophen     Tablet .. 650 milliGRAM(s) Oral every 6 hours PRN  aluminum hydroxide/magnesium hydroxide/simethicone Suspension 30 milliLiter(s) Oral every 4 hours PRN  ascorbic acid 500 milliGRAM(s) Oral daily  ATENolol  Tablet 50 milliGRAM(s) Oral daily  dextrose 40% Gel 15 Gram(s) Oral once  dextrose 5%. 1000 milliLiter(s) IV Continuous <Continuous>  dextrose 5%. 1000 milliLiter(s) IV Continuous <Continuous>  dextrose 50% Injectable 25 Gram(s) IV Push once  dextrose 50% Injectable 12.5 Gram(s) IV Push once  dextrose 50% Injectable 25 Gram(s) IV Push once  enoxaparin Injectable 40 milliGRAM(s) SubCutaneous daily  glucagon  Injectable 1 milliGRAM(s) IntraMuscular once  insulin lispro (ADMELOG) corrective regimen sliding scale   SubCutaneous three times a day before meals  lactated ringers. 1000 milliLiter(s) IV Continuous <Continuous>  lisinopril 20 milliGRAM(s) Oral daily  loratadine Syrup 10 milliGRAM(s) Oral daily  melatonin 3 milliGRAM(s) Oral at bedtime PRN  multivitamin 1 Tablet(s) Oral daily  ondansetron Injectable 4 milliGRAM(s) IV Push every 8 hours PRN  simvastatin 40 milliGRAM(s) Oral at bedtime      VITALS:   T(F): 98.4  HR: 76  BP: 124/58  RR: 19  SpO2: 98%    LABS:                        8.6    13.61 )-----------( 239      ( 04 Feb 2022 04:30 )             28.5     02-04    132<L>  |  101  |  20  ----------------------------<  146<H>  4.0   |  22  |  0.7    Ca    7.5<L>      04 Feb 2022 04:30  Phos  3.7     02-03  Mg     1.9     02-04    TPro  4.7<L>  /  Alb  1.9<L>  /  TBili  0.4  /  DBili  x   /  AST  177<H>  /  ALT  73<H>  /  AlkPhos  240<H>  02-04    PT/INR - ( 03 Feb 2022 04:30 )   PT: 17.10 sec;   INR: 1.49 ratio                           PHYSICAL EXAM:  CONSTITUTIONAL: Deconditioned  HEAD: Atraumatic, normocephalic  EYES: EOM intact, PERRLA, conjunctiva and sclera clear  PULMONARY: Clear to auscultation bilaterally  CARDIOVASCULAR: Regular rate and rhythm  GASTROINTESTINAL: Soft, non-tender, non-distended  MUSCULOSKELETAL: no edema; UL strength 4/5, LL strength 1-2/5      A/P:    Patient is an 81 year old Male with past medical history of HTN, HLD, DM, chronic absestos lung disease  ( w/ bilateral calcified plaques) presenting from NH w/ fever and baseline cough. + associated w/ weakness and a decline in energy levels. per hx from family and NH paperwork pt is currently being treated oupt for presumed PNA as he has had fever and worsening leukocytosis for the past several days.      #Normocytic hypochromic anemia 2/2 anemia of chronic disease?  *Hb has been on the lower side since this admission, around 8, s/p 1unit pRBC. Exact etiology is unclear  -monitor    # Generalized Weakness- more in lower extremity than upper extremity  - likely related to deconditioning from recurrent hospitalizations, but pt. has been deconditioning very rapidly as per the family, also family reported that pt. got COVID vaccine towards the end of last year after which his weakness progressed  - CT C/A/P without any mention of significant spine pathology   - PT following - will need STR   - Inconsistent PO intake and borderline  BP- f/u Calorie count, c/w LR @70cc/hr    # Intermittent Fever of unknown origin 2/2 Chronic cholecystitis?  # Chronic asbestos lung disease  - CXR w/ b/l opacities  - febrile on admission, now afebrile. Leukocytosis  - chronic cough at baseline, however pt reports improvement in cough since admission  - BCx x2 NGTD, UCx NG, Sputum Cx normal resp ric, Procal 0.36  - s/p ID eval - unclear etiology of fever, low suspicion for PNA, prior HIDA concerning for cholecystitis   - repeat HIDA revealed delayed visualization of gallbladder which can be seen in chronic cholecystitis, hepatocellular disease, cholestasis, pancreatic disease, and poor nutrition  - CT C/A/P unremarkable   - per ID, dc abx, obtain WBC scan ( unremarkable ) and inflammatory markers   - pt febrile on 1/30 and 2/1 Tmax 101, repeat Bcx on 1/30 NGTD, No abx as per ID  - Ucx grew Pseudomonas and Gram +ve bacteria, no carrero, no symptoms, awaiting ID recs regarding if should give Abx in this case, considering the chronic leukocytosis and episode of pyrexia of unknown origin.    #Chronic Leukocytosis   #Normocytic Anemia   - BCR- ABL negative   - Anemia w/u: Vit B12/folate/LDH WNL, hapto elevated, SPEP pending, FLC ratio normal    - BM done on 2/1, pt. will follow OP for results.    # Transaminitis, mixed pattern, trending up  - s/p hepatology eval - suspect DILI from cefuroxime?  - AST 61, ALT 28, , > 101/42/192  - HIDA scan 11/2021 suspicious for cholecystitis  - RUQ U/S w/ cholelithiasis w/o ductal dilatation  - repeat HIDA revealed delayed visualization of gallbladder which can be seen in chronic cholecystitis, hepatocellular disease, cholestasis, pancreatic disease, and poor nutrition    # High AG w/o acidosis, resolved    # HLD  - cont home simvastatin 40mg qhs    # DM2  - cont ISS    # HTN  - cont atenolol, lisinopril      #Misc  -DVT prophylaxis: Lovenox  -GI prophylaxis: Not indicated  -Diet: Soft bite sized  -Code status: Full code    #Pending: Fever work up, Anemia resolution, Calorie count, ID recs

## 2022-02-04 NOTE — DISCHARGE NOTE NURSING/CASE MANAGEMENT/SOCIAL WORK - NSDCPEFALRISK_GEN_ALL_CORE
For information on Fall & Injury Prevention, visit: [email or web address] OR  [email or web address] OR  https://www.cdc.gov/steadi/patient.html

## 2022-02-04 NOTE — DISCHARGE NOTE NURSING/CASE MANAGEMENT/SOCIAL WORK - NSDCVIVACCINE_GEN_ALL_CORE_FT
COVID-19 vaccine, vector-nr, rS-Ad26, PF, 0.5 mL (Filippo); 26-Nov-2021 13:01; Beatrice Forde (RENETTA); Filippo; 150D24C (Exp. Date: 21-Dec-2021); IntraMuscular; Deltoid Left.; 0.5 milliLiter(s);

## 2022-02-04 NOTE — DISCHARGE NOTE NURSING/CASE MANAGEMENT/SOCIAL WORK - PATIENT PORTAL LINK FT
You can access the FollowMyHealth Patient Portal offered by [facility] by registering at the following website: [email or web address]. By joining [employer]’s FollowMyHealth portal, you will also be able to view your health information using other applications (apps) compatible with our system.

## 2022-02-05 NOTE — CHART NOTE - NSCHARTNOTEFT_GEN_A_CORE
Patient was to be discharged on 2/5, however, delayed discharge to transport issues. Patient was discharged early 2/5, prior to my arrival on the unit. I did not evaluate or provide care for this patient on 2/5 as I did not see him.

## 2022-02-05 NOTE — CHART NOTE - NSCHARTNOTESELECT_GEN_ALL_CORE
Event Note
Heme/Onc/Event Note
Off Service Note
Heme/Onc/Event Note
RD Follow Up/Event Note
RD Limited/Event Note

## 2022-03-19 NOTE — ED PROVIDER NOTE - PHYSICAL EXAMINATION
CONSTITUTIONAL: Thin, frail old male, laying on the bed, tachypneic  SKIN: warm, dry  HEAD: Normocephalic; atraumatic.  EYES: no conjunctival injection. PERRL.   ENT: No nasal discharge; airway clear.  NECK: Supple; non tender.  CARD: S1, S2 normal; no murmurs, gallops, or rubs. Regular rate and rhythm.   RESP: Tachypneic, mild respiratory accessory muscle use. No wheezes or rhonchi. B/l diffuse rales.  ABD: soft ntnd  BACK: Large sacral stage 4 pressure ulcer, no pus or surrounding erythema.  EXT: No clubbing, cyanosis or edema. Contracted lower extremities.  LYMPH: No acute cervical adenopathy.  NEURO: Alert, nonverbal.  PSYCH: Cooperative, appropriate.

## 2022-03-19 NOTE — H&P ADULT - NSHPLABSRESULTS_GEN_ALL_CORE
LABS:                        9.0    18.21 )-----------( 363      ( 19 Mar 2022 15:47 )             30.8     03-19    148<H>  |  109  |  53<H>  ----------------------------<  366<H>  4.7   |  19  |  1.5    Ca    8.1<L>      19 Mar 2022 15:47    TPro  6.0  /  Alb  2.3<L>  /  TBili  0.4  /  DBili  x   /  AST  22  /  ALT  12  /  AlkPhos  131<H>  03-19    PT/INR - ( 19 Mar 2022 15:47 )   PT: 16.40 sec;   INR: 1.43 ratio    PTT - ( 19 Mar 2022 15:47 )  PTT:30.6 sec    Lactate, Blood: 7.4: Critical value:

## 2022-03-19 NOTE — ED PROVIDER NOTE - CARE PLAN
Principal Discharge DX:	Pneumonia  Secondary Diagnosis:	Sepsis   1 Principal Discharge DX:	Pneumonia  Secondary Diagnosis:	Sepsis  Secondary Diagnosis:	ARF (acute respiratory failure)

## 2022-03-19 NOTE — H&P ADULT - CONVERSATION DETAILS
***Pt w/ AMS, consents obtained from Family (Wife and Son [Fernando]) at bedside    The pt has a DNR/DNI in the chart that was signed by his Son (Fernando) at bedside on 2/22/22. I had an extended conversation with the Son and Wife (Jo) at bedside asking whether they understand how sick the pt is and his poor prognosis. They both acknowledged this and confirmed that the pt's wishes are to be DNR/DNI. They would like to speak to a palliative specialist to discuss their options. They are not sure if they would like CMO.

## 2022-03-19 NOTE — H&P ADULT - ASSESSMENT
82 yo M with PMHx of HTN, DLD, DM, Chronic Asbestosis Lung disease (with bilateral calcified plaques) presents from [facility] for congestion and desaturation.    #Acute Hypoxemic Respiratory Failure   #Sepsis likely due to pneumonia vs sacral wound infection (less likely)  #FILOMENA, likely prerenal  - Lactate 6-->7, repeat in AM  - Remains HD stable, is on NRB  - Given Cefepime in ED, will c/w Vanco and Cefepime given pt with severe infectious process, recent admission in February for pneumonia as well  - MRSA nares pending, d/c Vanco if negative  - Blood and Urine Cx collected  - Cr elevated to 1.5, baseline is 0.8, likely prerenal, c/w IVF  - Is lethargic, unarousable, keep NPO including medications, S+S pending  - Family at bedside wish to continue with treatment for now, request to speak with Palliative team regarding potential comfort care    #HTN  - Takes Atenolol  - Hold while NPO    #DM  #DLD  - Takes Metformin  - Monitor FS, initiate insulin if over 180  - A1c in AM    #Chronic Asbestosis  - Does not appear to be in flare, hypoxemia likely due to infectious process    DVT ppx: HSQ  Diet: NPO  Activity: IAT  DNR/DNI  Dispo: Acute 80 yo M with PMHx of HTN, DLD, DM, Chronic Asbestosis Lung disease (with bilateral calcified plaques) presents from [facility] for congestion and desaturation.    #Acute Hypoxemic Respiratory Failure   #Sepsis likely due to pneumonia vs sacral wound infection (less likely)  #FILOMENA, likely prerenal  - Lactate 6-->7, repeat in AM  - Remains HD stable, is on NRB  - Given Cefepime in ED, will c/w Vanco and Cefepime given pt with severe infectious process, recent admission in February for pneumonia as well  - MRSA nares pending, d/c Vanco if negative  - Blood and Urine Cx collected  - Cr elevated to 1.5, baseline is 0.8, likely prerenal, c/w IVF  - Is lethargic, unarousable, keep NPO including medications, S+S pending  - Family at bedside wish to continue with treatment for now, request to speak with Palliative team regarding potential comfort care    #HTN  - Takes Atenolol  - Hold while NPO    #DM  #DLD  - Takes Metformin  - Monitor FS, initiate insulin if over 180  - A1c in AM    #Chronic Asbestosis  - Does not appear to be in flare, hypoxemia likely due to infectious process    #Hypothyroidism  - C/w Levothyroxine  - TSH in AM    DVT ppx: HSQ  Diet: NPO  Activity: IAT  DNR/DNI  Dispo: Acute 80 yo M with PMHx of HTN, DLD, DM, Chronic Asbestosis Lung disease (with bilateral calcified plaques) presents from [facility] for congestion and desaturation.    #Acute Hypoxemic Respiratory Failure   #Sepsis likely due to pneumonia vs sacral wound infection (less likely)  #FILOMENA, likely prerenal  - Lactate 6-->7, repeat in AM  - Remains HD stable, is on NRB  - Given Cefepime in ED, will c/w Vanco and Cefepime given pt with severe infectious process, recent admission in February for pneumonia as well  - MRSA nares pending, d/c Vanco if negative  - Blood and Urine Cx collected  - Sacral wound appears clean, no malodor, purulence, unlikely cause of sepsis; wound care c/s pending  - Cr elevated to 1.5, baseline is 0.8, likely prerenal, c/w IVF  - Colbert placed in ED for urinary retention, now having adequate urinary output  - Is lethargic, unarousable, keep NPO including medications, S+S pending  - Family at bedside wish to continue with treatment for now, request to speak with Palliative team regarding potential comfort care    #HTN  - Takes Atenolol  - Hold while NPO    #DM  #DLD  - Takes Metformin  - Monitor FS, initiate insulin if over 180  - A1c in AM    #Chronic Asbestosis  - Does not appear to be in flare, hypoxemia likely due to infectious process    #Hypothyroidism  - C/w Levothyroxine  - TSH in AM    DVT ppx: HSQ  Diet: NPO  Activity: IAT  DNR/DNI  Dispo: Acute

## 2022-03-19 NOTE — H&P ADULT - ATTENDING COMMENTS
***Hx and physical limited due to AMS. Supplemental information obtained from EMR, house staff, NH chart, and Family (Wife and Son [Fernando]) at bedside    82 YO M with a PMH of HTN, DLD, DM2, Chronic Asbestosis Lung disease, and Hypothyroidism who was sent into the hospital by his NH ([facility]) for eval of increased lethargy over the past x 1-2 days. Associated with congestion and hypoxia. Unable to obtain ROS.     In the ED, Chest X-Ray w/ RLL pneumonia, concerning for aspiration (pending official read). Pt started on IV ABXs (Cefe) and IVFs (LR) in the ED. Pt hypoxic to 80's, escalated to Venti mask.     FMHx: Reviewed, not relevant    Physical exam shows lethargic pt who is not responsive to verbal stimuli, audible gurgling noises. HR (110's), afebrile, not hypoxic on Venti mask. A&Ox3. Unable to assess neuro exam, does not follow commands, not moving extremities. Rhonchi present. Tachycardic, no M/G/R. ABD is soft and non-tender, normoactive BSs. LEs without swelling. 5x5 cm sacral ulcer present, clean edges, no necrosis, no foul smell, no drainage. Labs and radiology as above.     Acute hypoxic respiratory failure + septic encephalopathy, suspect aspiration pneumonia; Sepsis present on admission. IV ABXs (Vanc/Cefep). IVFs (LR). FU cultures. Anti-tussives PRN. Strep and legionella. MRSA nares. Send Procal/CRP. RVP.   -Pt's family (at bedside) do not wish for aggressive medical measures. IVFs and IV ABXs alone. the pt's wishes were to never be placed on a ventilator. Family understands that he is poor prognosis. Will likely not make it through the night. Asking for palliative consult to discuss their options.     HAGMA (High Anion Gap Metabolic Acidosis) from lactic acidosis. IVFs (LR). Repeat lactate and BMP in the AM.     FILOMENA, likely pre-renal; Doubt ATN. Send UA, urine lytes, urine pr:cr ratio, and trend BMP. IVFs (LR). Monitor urinary out-put. Hold nephrotoxic agents.     Normocytic anemia, slightly above baseline. Pt denies bleeding symptoms. Send anemia work-up. Replace as necessary.     Hypernatremia. Serial BMPs. Do not overcorrect (<8-10 in 24 hours)    Diabetes mellitus with hyperglycemia. A1c. FSs. Insulin standing/correctional dosing    Hypoalbuminemia, from poor oral intake. Nutrition eval.     Hx of HTN, DLD, Chronic Asbestosis Lung disease, and Hypothyroidism. Restart home meds, except as stated above. DVT PPX. Inform PCP of pt's admission to hospital. My note supersedes the residents note.     Spoke with family: Family (Wife and Son [Fernando]) at bedside    Date seen by Attending: 3/19/22

## 2022-03-19 NOTE — H&P ADULT - HISTORY OF PRESENT ILLNESS
80 yo M with PMHx of HTN, DLD, DM, Chronic Asbestosis Lung disease (with bilateral calcified plaques) presents from [facility] for congestion and desaturation. Per NH notes, pt appeared to have increased congestion and was desaturating, requiring oxygen. History cannot be obtained from pt as he is lethargic. Family at bedside were not aware that pt was severely sick at NH. Currently wish to continue with treatment for presumed infection, although wish to speak to Palliative team regarding potentially proceeding with comfort care.   In the ED, T 98.7, /65, HR 65, RR 24, placed on NRB 15L, saturating 94%. WBC ct 18, lactate 6.3. CXR revealed chronic bilateral opacities, appear increased in R lower lung. Given Cefepime in ED. 80 yo M with PMHx of HTN, DLD, DM, Chronic Asbestosis Lung disease (with bilateral calcified plaques), Hypothyroidism presents from [facility] for congestion and desaturation. Per NH notes, pt appeared to have increased congestion and was desaturating, requiring oxygen. History cannot be obtained from pt as he is lethargic. Family at bedside were not aware that pt was severely sick at NH. Currently wish to continue with treatment for presumed infection, although wish to speak to Palliative team regarding potentially proceeding with comfort care.   In the ED, T 98.7, /65, HR 65, RR 24, placed on NRB 15L, saturating 94%. WBC ct 18, lactate 6.3. CXR revealed chronic bilateral opacities, appear increased in R lower lung. Given Cefepime in ED.

## 2022-03-19 NOTE — H&P ADULT - NSHPPHYSICALEXAM_GEN_ALL_CORE
VITALS:   T(C): 36.1 (03-19-22 @ 20:22), Max: 37.4 (03-19-22 @ 15:13)  HR: 95 (03-19-22 @ 20:22) (95 - 115)  BP: 101/50 (03-19-22 @ 20:22) (100/58 - 164/65)  RR: 20 (03-19-22 @ 20:22) (20 - 24)  SpO2: 96% (03-19-22 @ 20:22) (91% - 96%)    GENERAL: Lethargic, unarousable, appears in distress, on NRB  ENT: Dry mucous membranes  HEART: Regular rate and rhythm  LUNGS: Slightly labored respiration, significant upper airway congestion, rhonchi  ABDOMEN: Soft, nondistended  EXTREMITIES: No clubbing, cyanosis, or edema  SKIN: 4x4 cm deep, stage IV sacral ulcer, appears clean, no purulence, malodor or eschar; small, closed wound on inner L ankle

## 2022-03-19 NOTE — ED PROVIDER NOTE - ATTENDING CONTRIBUTION TO CARE
I personally evaluated the patient. I reviewed the Resident’s or Physician Assistant’s note (as assigned above), and agree with the findings and plan except as documented in my note.  81 yo man presents from SNF with respiratory distress and worsening hypoxia.  As per family, mental status is at his baseline.  Patient is DNR/DNI.  Patient contracted with diffuse ulcers on sacrum and heels.  On arrival, noted to be in urinary retention and Colbert catheter placed.  Plan is admission for possible COPD exacerbation and sepsis and likely palliative consultation.

## 2022-03-19 NOTE — ED PROVIDER NOTE - CLINICAL SUMMARY MEDICAL DECISION MAKING FREE TEXT BOX
I personally evaluated the patient. I reviewed the Resident´s or Physician Assistant´s note (as assigned above), and agree with the findings and plan except as documented in my note.  Patient signed out to me by Dr. Portillo.  Patient evaluated for shortness of breath and hypoxia.  Labs, EKG, chest x-ray performed in the ED.  Patient started on supplemental oxygen with improvement in respiratory status.  Discussed goals of care with patient's family, started on antibiotics.  Patient admitted to medicine for further evaluation and treatment.

## 2022-03-19 NOTE — ED ADULT NURSE NOTE - NSIMPLEMENTINTERV_GEN_ALL_ED
Implemented All Fall Risk Interventions:  [place] to call system. Call bell, personal items and telephone within reach. Instruct patient to call for assistance. Room bathroom lighting operational. Non-slip footwear when patient is off stretcher. Physically safe environment: no spills, clutter or unnecessary equipment. Stretcher in lowest position, wheels locked, appropriate side rails in place. Provide visual cue, wrist band, yellow gown, etc. Monitor gait and stability. Monitor for mental status changes and reorient to person, place, and time. Review medications for side effects contributing to fall risk. Reinforce activity limits and safety measures with patient and family.

## 2022-03-19 NOTE — ED PROVIDER NOTE - OBJECTIVE STATEMENT
Patient is a 83 yo male with PMHx of HTN, HLD, DM, asbestosis c/o increased SOB since today. Patient was at nursing home, staff noticed patient had increased SOB and increased work of breathing, sent to ED for evaluation. Denies fever, chills, n/v/d. Patient at baseline per family. Had admission 1 to 2 months ago for pneumonia.

## 2022-03-20 NOTE — PATIENT PROFILE ADULT - FALL HARM RISK - HARM RISK INTERVENTIONS

## 2022-03-20 NOTE — PROGRESS NOTE ADULT - SUBJECTIVE AND OBJECTIVE BOX
MIGUEL ANGEL GARZA  [facility]      Patient was evaluated and examined  by bedside, very lethargic, on NRB due to hypoxia          REVIEW OF SYSTEMS:  unable to obtain, patient is lethargic    T(C): , Max: 37.4 (03-19-22 @ 15:13)  HR: 46 (03-20-22 @ 05:05)  BP: 133/72 (03-20-22 @ 05:05)  RR: 20 (03-20-22 @ 05:05)  SpO2: 95% (03-19-22 @ 22:56)  CAPILLARY BLOOD GLUCOSE      POCT Blood Glucose.: 165 mg/dL (20 Mar 2022 11:15)  POCT Blood Glucose.: 193 mg/dL (20 Mar 2022 07:38)      PHYSICAL EXAM:  General: NAD, lethargic patient is laying comfortably in bed  HEENT: AT, NC, Supple, NO JVD, NO CB, NRB mask present  Lungs: decreased breath sounds  B/L, no wheezing, no rhonchi  CVS: normal S1, S2, RRR, NO M/G/R  Abdomen: soft, bowel sounds present, non-tender, non-distended  : carrero present, draining clear yellow urine  Extremities: no peripheral edema, no clubbing, no cyanosis, positive peripheral pulses b/l  Neuro: profound lethargic state  Skin: no rash, no ecchymosis      LAB  CBC  Date: 03-20-22 @ 04:30  Mean cell Uvcvpwhkgv76.2  Mean cell Hemoglobin Conc27.0  Mean cell Volum 89.8  Platelet count-Automate 275  RBC Count 3.22  Red Cell Distrib Width17.4  WBC Count10.74  % Albumin, Urine--  Hematocrit 28.9  Hemoglobin 7.8  CBC  Date: 03-19-22 @ 15:47  Mean cell Ywiyjpxomb69.7  Mean cell Hemoglobin Conc29.2  Mean cell Volum 88.0  Platelet count-Automate 363  RBC Count 3.50  Red Cell Distrib Width18.2  WBC Count18.21  % Albumin, Urine--  Hematocrit 30.8  Hemoglobin 9.0    [facility] [place]  03-20-22 @ 04:30  Blood Gas Arterial-Calcium,Ionized--  Blood Urea Nitrogen, Serum 65 mg/dL<HH> [10 - 20] [Critical value:]  Carbon Dioxide, Serum17 mmol/L [17 - 32]  Chloride, Aezlf399 mmol/L [98 - 110]  Creatinie, Serum2.0 mg/dL<H> [0.7 - 1.5]  Glucose, Likqa860 mg/dL<H> [70 - 99]  Potassium, Serum4.1 mmol/L [3.5 - 5.0]  Sodium, Serum 148 mmol/L<H> [135 - 146]  BMP  03-19-22 @ 15:47  Blood Gas Arterial-Calcium,Ionized--  Blood Urea Nitrogen, Serum 53 mg/dL<H> [10 - 20]  Carbon Dioxide, Serum19 mmol/L [17 - 32]  Chloride, Ovbxb290 mmol/L [98 - 110]  Creatinie, Serum1.5 mg/dL [0.7 - 1.5]  Glucose, Hnqaz314 mg/dL<H> [70 - 99]  Potassium, Serum4.7 mmol/L [3.5 - 5.0]  Sodium, Serum 148 mmol/L<H> [135 - 146]        PT/INR - ( 19 Mar 2022 15:47 )   PT: 16.40 sec;   INR: 1.43 ratio         PTT - ( 19 Mar 2022 15:47 )  PTT:30.6 sec        Medications:  acetaminophen     Tablet .. 650 milliGRAM(s) Oral every 6 hours PRN  artificial  tears Solution 1 Drop(s) Both EYES three times a day  ATENolol  Tablet 50 milliGRAM(s) Oral daily  cefepime   IVPB 500 milliGRAM(s) IV Intermittent every 24 hours  chlorhexidine 4% Liquid 1 Application(s) Topical <User Schedule>  collagenase Ointment 1 Application(s) Topical two times a day  heparin   Injectable 5000 Unit(s) SubCutaneous every 8 hours  lactated ringers. 1000 milliLiter(s) IV Continuous <Continuous>  levothyroxine 50 MICROGram(s) Oral daily  multivitamin 1 Tablet(s) Oral daily  silver sulfADIAZINE 1% Cream 1 Application(s) Topical two times a day  simvastatin 40 milliGRAM(s) Oral at bedtime  vancomycin  IVPB 1250 milliGRAM(s) IV Intermittent every 24 hours  vancomycin  IVPB            Assessment and Plan:  82 YO M with a PMH of HTN, Dyslipidemia, DM2, Chronic Asbestosis Lung disease, and Hypothyroidism who was sent into the hospital by his [facility] ([facility]) for eval of increased lethargy over the past x 1-2 days. Associated with congestion and hypoxia.   In the ED, Chest X-Ray w/ RLL pneumonia . Pt started on IV ABXs (Cefe) and IVFs (LR) in the ED. Pt hypoxic to 80's, placed on NRB 12 L.      Acute hypoxic respiratory failure + septic encephalopathy, suspect aspiration pneumonia; Sepsis was  present on admission.   -continue IV ABXs Vancomycin/Cefepime. IVFs . f/up blood  cultures. f/up  Strep and legionella. MRSA nares. f/up procal level   - 3/20- on NRB 12 L sat 92%    Acute metabolic encephalopathy- strict NPO, IVF      HAGMA (High Anion Gap Metabolic Acidosis) from lactic acidosis. lactic acid trending down    FILOMENA due to sepsis- renal function worse today, continue IVF, strict I and O chart    Normocytic anemia- h/h drop to 7.8- due to dilutional effect, no gross bleeding events, continue to monitor CBC daily    Hypernatremia. Serial BMPs. IVF    Diabetes mellitus - hold insulin due to NPO status, continue bsfs monitoring    Hypoalbuminemia, from poor oral intake. Nutrition eval.     Hx of HTN, DLD, Chronic Asbestosis Lung disease, and Hypothyroidism- all po meds on hold , due to NPO status    DVT proph- Heparin sub. tx.    Code status : DNR/DNI    overall patient's prognosis poor: post palliative team consult, will have family meeting tomorrow.    #Progress Note Handoff: continue IV abx, monitor pulse ox on NRB. IVF.   Family discussion: yes, medical team Disposition: to be determined    Total time spent to complete patient's bedside assessment, review medical chart, discuss medical plan of care with covering medical team was more than 35 minutes

## 2022-03-20 NOTE — CHART NOTE - NSCHARTNOTEFT_GEN_A_CORE
Consult placed in ER for further goals of care cased/w Dr Crawford on call resident, pt now on 3 A    Chart reviewed Pt came in from SNF with hypoxia currently on venti mask. Did not pass speech and swallow. Family discussed GOC in ER would like further discussions with palliative care team about comfort measures.     Recommendations  - DNR/DNI IVF, ABT - see MOLST in chart as per ER note  - Spoke to resident if any symptoms to address prior to tomorrow call x [phone] for palliative care team  - Full palliative care consult to follow 3/21/22

## 2022-03-21 NOTE — PROGRESS NOTE ADULT - SUBJECTIVE AND OBJECTIVE BOX
KAYLAMIGUEL ANGEL  82y Male    INTERVAL HPI/OVERNIGHT EVENTS:    Pt remains lethargic - was arousable to voice and strong tactile stimuli this morning  denied pain  did not appear dyspneic  was tolerating NC when I saw him   no fever  unable to obtain ROS due to lethargy    T(F): 96 (03-21-22 @ 14:30), Max: 96.3 (03-21-22 @ 05:00)  HR: 70 (03-21-22 @ 14:30) (70 - 82)  BP: 111/53 (03-21-22 @ 14:30) (95/60 - 111/53)  RR: 18 (03-21-22 @ 14:30) (18 - 18)  SpO2: --  I&O's Summary    20 Mar 2022 07:01  -  21 Mar 2022 07:00  --------------------------------------------------------  IN: 1100 mL / OUT: 300 mL / NET: 800 mL    21 Mar 2022 07:01  -  21 Mar 2022 15:49  --------------------------------------------------------  IN: 0 mL / OUT: 200 mL / NET: -200 mL      CAPILLARY BLOOD GLUCOSE      POCT Blood Glucose.: 165 mg/dL (21 Mar 2022 11:21)  POCT Blood Glucose.: 159 mg/dL (21 Mar 2022 07:33)  POCT Blood Glucose.: 151 mg/dL (20 Mar 2022 21:23)  POCT Blood Glucose.: 160 mg/dL (20 Mar 2022 16:15)        PHYSICAL EXAM:  GENERAL: NAD in bed  HEAD:  Normocephalic  EYES:  conjunctiva pale and sclera clear  ENMT: Mouth closed   NERVOUS SYSTEM:  lethargic, arousable with strong tactile stimuli and voice  CHEST/LUNG: rhonchi b/l  HEART: Regular rate and rhythm  ABDOMEN: Soft, Nontender, Nondistended; Bowel sounds present  EXTREMITIES:   No edema  SKIN: pale    Consultant(s) Notes Reviewed:  [x ] YES  [ ] NO  Care Discussed with Consultants/Other Providers [ x] YES  [ ] NO    MEDICATIONS  (STANDING):  artificial  tears Solution 1 Drop(s) Both EYES three times a day  ATENolol  Tablet 50 milliGRAM(s) Oral daily  cefepime   IVPB 500 milliGRAM(s) IV Intermittent every 24 hours  chlorhexidine 4% Liquid 1 Application(s) Topical <User Schedule>  collagenase Ointment 1 Application(s) Topical two times a day  heparin   Injectable 5000 Unit(s) SubCutaneous every 8 hours  lactated ringers. 1000 milliLiter(s) (100 mL/Hr) IV Continuous <Continuous>  levothyroxine 50 MICROGram(s) Oral daily  multivitamin 1 Tablet(s) Oral daily  silver sulfADIAZINE 1% Cream 1 Application(s) Topical two times a day  simvastatin 40 milliGRAM(s) Oral at bedtime  vancomycin  IVPB 1250 milliGRAM(s) IV Intermittent every 24 hours  vancomycin  IVPB        MEDICATIONS  (PRN):  acetaminophen     Tablet .. 650 milliGRAM(s) Oral every 6 hours PRN Temp greater or equal to 38C (100.4F), Moderate Pain (4 - 6)      LABS:                        8.5    15.91 )-----------( 178      ( 21 Mar 2022 04:30 )             30.6     03-21    147<H>  |  109  |  85<HH>  ----------------------------<  145<H>  4.4   |  20  |  2.4<H>    Ca    7.8<L>      21 Mar 2022 04:30  Phos  5.8     03-21  Mg     2.2     03-21    TPro  5.3<L>  /  Alb  2.1<L>  /  TBili  0.4  /  DBili  x   /  AST  21  /  ALT  9   /  AlkPhos  120<H>  03-21        Culture - Urine (collected 19 Mar 2022 16:04)  Source: Clean Catch Clean Catch (Midstream)  Final Report (20 Mar 2022 20:21):    No growth    Culture - Blood (collected 19 Mar 2022 15:47)  Source: .Blood Blood-Peripheral  Preliminary Report (21 Mar 2022 01:00):    No growth to date.    Culture - Blood (collected 19 Mar 2022 15:47)  Source: .Blood Blood-Peripheral  Preliminary Report (21 Mar 2022 01:00):    No growth to date.        RADIOLOGY & ADDITIONAL TESTS:    Imaging or report Personally Reviewed:  [x ] YES  [ ] NO    < from: Xray Chest 1 View- PORTABLE-Urgent (03.19.22 @ 15:44) >    IMPRESSION:    No definite new opacities since 2/20/2022.    < end of copied text >      CXR also reviewed by me      Case discussed with residents and RN on rounds today

## 2022-03-21 NOTE — CONSULT NOTE ADULT - PROBLEM SELECTOR RECOMMENDATION 3
since fall at home  PT/OT as appropriate  bedbound  would likely be hospice appropriate given comorbidities if within GOC  continue medical management for now

## 2022-03-21 NOTE — CONSULT NOTE ADULT - SUBJECTIVE AND OBJECTIVE BOX
MIGUEL ANGEL GARZA          MRN-[id number]              HPI:  82 yo M with PMHx of HTN, DLD, DM, Chronic Asbestosis Lung disease (with bilateral calcified plaques), Hypothyroidism presents from [facility] for congestion and desaturation. Per NH notes, pt appeared to have increased congestion and was desaturating, requiring oxygen. History cannot be obtained from pt as he is lethargic. Family at bedside were not aware that pt was severely sick at NH. Currently wish to continue with treatment for presumed infection, although wish to speak to Palliative team regarding potentially proceeding with comfort care.   In the ED, T 98.7, /65, HR 65, RR 24, placed on NRB 15L, saturating 94%. WBC ct 18, lactate 6.3. CXR revealed chronic bilateral opacities, appear increased in R lower lung. Given Cefepime in ED. (19 Mar 2022 22:23)      PAST MEDICAL & SURGICAL HISTORY:  Hypertension    Diabetes mellitus    No significant past surgical history        FAMILY HISTORY:  No pertinent family history in first degree relatives     Reviewed and found non contributory in mother or father    SOCIAL HISTORY:   Tobacco/etoh/illicit drug use use reported. Yes [ ]  _________  No [ ]  Pt resides at: home [ ]  facility [ X]  other [ ] _______  In and out of nursing facilities following multiple hospitalizations  Wife and son support the patient.       ROS:	        Dyspnea (Margaret 0-10): 0                       N/V (Y/N): No                             Secretions (Y/N) : No                                          Agitation(Y/N): No                              Pain (Y/N): No                                 -Provocation/Palliation: N/A  -Quality/Quantity: N/A  -Radiating: N/A  -Severity: No pain  -Timing/Frequency: N/A  -Impact on ADLs: N/A    General:  Denied  HEENT:    Denied  Neck:  Denied  CVS:  Denied  Resp:  Denied  GI:  Denied    :  Denied  Musc:  Denied  Neuro:  Denied  Psych:  Denied  Skin:  Denied  Lymph:  Denied      Allergies    No Known Allergies    Intolerances     -iStop reviewed (Y/N):   Ref#:                  Labs:	    CBC:                        8.5    15.91 )-----------( 178      ( 21 Mar 2022 04:30 )             30.6     CMP:    03-    147<H>  |  109  |  85<HH>  ----------------------------<  145<H>  4.4   |  20  |  2.4<H>    Ca    7.8<L>      21 Mar 2022 04:30  Phos  5.8       Mg     2.2         TPro  5.3<L>  /  Alb  2.1<L>  /  TBili  0.4  /  DBili  x   /  AST  21  /  ALT  9   /  AlkPhos  120<H>         PT/INR - ( 19 Mar 2022 15:47 )   PT: 16.40 sec;   INR: 1.43 ratio         PTT - ( 19 Mar 2022 15:47 )  PTT:30.6 sec     Urinalysis Basic - ( 19 Mar 2022 16:04 )    Color: Yellow / Appearance: Clear / S.024 / pH: x  Gluc: x / Ketone: Negative  / Bili: Negative / Urobili: 3 mg/dL   Blood: x / Protein: 30 mg/dL / Nitrite: Negative   Leuk Esterase: Negative / RBC: 6 /HPF / WBC 9 /HPF   Sq Epi: x / Non Sq Epi: 4 /HPF / Bacteria: Negative      Radiology:	     < from: Xray Chest 1 View- PORTABLE-Urgent (22 @ 15:44) >    IMPRESSION:    No definite new opacities since 2022.    --- End of Report ---    < end of copied text >      EK Lead ECG:   Ventricular Rate 109 BPM    Atrial Rate 109 BPM    P-R Interval 150 ms    QRS Duration 90 ms    Q-T Interval 376 ms    QTC Calculation(Bazett) 506 ms    P Axis 40 degrees    R Axis -10 degrees    T Axis 49 degrees    Diagnosis Line *** Poor data quality, interpretation may be adversely affected  Sinus tachycardia  Otherwise normal ECG    Confirmed by Ashley Vick MD ([id number]) on 3/20/2022 10:54:13 AM (22 @ 16:54)      Imaging Personally Reviewed:  [ X] YES  [ ] NO    Consultant(s) Notes Reviewed:  [ X] YES  [ ] NO  Care Discussed with Consultants/Other Providers [ X] YES  [ ] NO    PEx:	  T(C): 35.6 (22 @ 14:30), Max: 35.7 (22 @ 05:00)  HR: 70 (22 @ 14:30) (70 - 82)  BP: 111/53 (22 @ 14:30) (95/60 - 111/53)  RR: 18 (22 @ 14:30) (18 - 18)    General:  found in bed in NAD, ill appearing  Eyes:  PERRL EOMI Non icteric MOM  ENMT: no external oral ulcers, MMM, no thrush   CVS: RR, no edema   Resp: + tachypneic + increased WOB - on Nasal cannula   GI:  Soft, tender on Left side   :  Colbert   Musc: No C/C/E    Neuro: Lethargic, nods yes or no to questions   Psych: Calm Pleasant    Skin: Non jaundiced , no rash   Lymph: no adenopathy     Preadmit Karnofsky:  30 %           Current Karnofsky:   10  %      Medications:	      MEDICATIONS  (STANDING):  artificial  tears Solution 1 Drop(s) Both EYES three times a day  ATENolol  Tablet 50 milliGRAM(s) Oral daily  cefepime   IVPB 500 milliGRAM(s) IV Intermittent every 24 hours  chlorhexidine 4% Liquid 1 Application(s) Topical <User Schedule>  collagenase Ointment 1 Application(s) Topical two times a day  heparin   Injectable 5000 Unit(s) SubCutaneous every 8 hours  lactated ringers. 1000 milliLiter(s) (100 mL/Hr) IV Continuous <Continuous>  levothyroxine 50 MICROGram(s) Oral daily  multivitamin 1 Tablet(s) Oral daily  silver sulfADIAZINE 1% Cream 1 Application(s) Topical two times a day  simvastatin 40 milliGRAM(s) Oral at bedtime  vancomycin  IVPB 1250 milliGRAM(s) IV Intermittent every 24 hours  vancomycin  IVPB        MEDICATIONS  (PRN):  acetaminophen     Tablet .. 650 milliGRAM(s) Oral every 6 hours PRN Temp greater or equal to 38C (100.4F), Moderate Pain (4 - 6)        Advanced Directives:	     DNR/DNI      Decision maker: The patient is unable to participate in complex medical decision making conversations.   Legal surrogate: Wife defers to son- they make decisions together     PSYCHOSOCIAL-SPIRITUAL ASSESSMENT:       Reviewed       See Palliative Care SW/ documentation        	    REFERRALS       Palliative Med        Unit SW/Case Mgmt          MIGUEL ANGEL GARZA          MRN-[id number]              HPI:  80 yo M with PMHx of HTN, DLD, DM, Chronic Asbestosis Lung disease (with bilateral calcified plaques), Hypothyroidism presents from [facility] for congestion and desaturation. Per NH notes, pt appeared to have increased congestion and was desaturating, requiring oxygen. History cannot be obtained from pt as he is lethargic. Family at bedside were not aware that pt was severely sick at NH. Currently wish to continue with treatment for presumed infection, although wish to speak to Palliative team regarding potentially proceeding with comfort care.   In the ED, T 98.7, /65, HR 65, RR 24, placed on NRB 15L, saturating 94%. WBC ct 18, lactate 6.3. CXR revealed chronic bilateral opacities, appear increased in R lower lung. Given Cefepime in ED. (19 Mar 2022 22:23)      PAST MEDICAL & SURGICAL HISTORY:  Hypertension    Diabetes mellitus    No significant past surgical history        FAMILY HISTORY:  No pertinent family history in first degree relatives  No GI cancers in first or second degree relatives      SOCIAL HISTORY:   Tobacco/etoh/illicit drug use use reported. Yes [ ]  _________  No [ ]  Pt resides at: home [ ]  facility [ X]  other [ ] _______  In and out of nursing facilities following multiple hospitalizations  Wife and son support the patient.       ROS:	        Dyspnea (Margaret 0-10): 0                       N/V (Y/N): No                             Secretions (Y/N) : No                                          Agitation(Y/N): No                              Pain (Y/N): No                                 -Provocation/Palliation: N/A  -Quality/Quantity: N/A  -Radiating: N/A  -Severity: No pain  -Timing/Frequency: N/A  -Impact on ADLs: N/A    General:  Denied  HEENT:    Denied  Neck:  Denied  CVS:  Denied  Resp:  Denied  GI:  Denied    :  Denied  Musc:  Denied  Neuro:  Denied  Psych:  Denied  Skin:  Denied  Lymph:  Denied      Allergies    No Known Allergies    Intolerances     -iStop reviewed (Y/N):   Ref#:                  Labs:	  reviewed  CBC:                        8.5    15.91 )-----------( 178      ( 21 Mar 2022 04:30 )             30.6     CMP:    03-    147<H>  |  109  |  85<HH>  ----------------------------<  145<H>  4.4   |  20  |  2.4<H>    Ca    7.8<L>      21 Mar 2022 04:30  Phos  5.8       Mg     2.2         TPro  5.3<L>  /  Alb  2.1<L>  /  TBili  0.4  /  DBili  x   /  AST  21  /  ALT  9   /  AlkPhos  120<H>         PT/INR - ( 19 Mar 2022 15:47 )   PT: 16.40 sec;   INR: 1.43 ratio         PTT - ( 19 Mar 2022 15:47 )  PTT:30.6 sec     Urinalysis Basic - ( 19 Mar 2022 16:04 )    Color: Yellow / Appearance: Clear / S.024 / pH: x  Gluc: x / Ketone: Negative  / Bili: Negative / Urobili: 3 mg/dL   Blood: x / Protein: 30 mg/dL / Nitrite: Negative   Leuk Esterase: Negative / RBC: 6 /HPF / WBC 9 /HPF   Sq Epi: x / Non Sq Epi: 4 /HPF / Bacteria: Negative      Radiology:	     < from: Xray Chest 1 View- PORTABLE-Urgent (22 @ 15:44) >    IMPRESSION:    No definite new opacities since 2022.    --- End of Report ---    < end of copied text >      EK Lead ECG:   Ventricular Rate 109 BPM    Atrial Rate 109 BPM    P-R Interval 150 ms    QRS Duration 90 ms    Q-T Interval 376 ms    QTC Calculation(Bazett) 506 ms    P Axis 40 degrees    R Axis -10 degrees    T Axis 49 degrees    Diagnosis Line *** Poor data quality, interpretation may be adversely affected  Sinus tachycardia  Otherwise normal ECG    Confirmed by Ashley Vick MD ([id number]) on 3/20/2022 10:54:13 AM (22 @ 16:54)      Imaging Personally Reviewed:  [ X] YES  [ ] NO    Consultant(s) Notes Reviewed:  [ X] YES  [ ] NO  Care Discussed with Consultants/Other Providers [ X] YES  [ ] NO    PEx:	  T(C): 35.6 (22 @ 14:30), Max: 35.7 (22 @ 05:00)  HR: 70 (22 @ 14:30) (70 - 82)  BP: 111/53 (22 @ 14:30) (95/60 - 111/53)  RR: 18 (22 @ 14:30) (18 - 18)    General:  found in bed in NAD, ill appearing  Eyes:  PERRL EOMI Non icteric MOM  ENMT: no external oral ulcers, MMM, no thrush   CVS: RR, no edema   Resp: + tachypneic + increased WOB - on Nasal cannula   GI:  Soft, tender on Left side   :  Colbert   Musc: No C/C/E    Neuro: Lethargic, nods yes or no to questions   Psych: Calm Pleasant    Skin: Non jaundiced , no rash   Lymph: no adenopathy     Preadmit Karnofsky:  30 %           Current Karnofsky:   10  %      Medications:	      MEDICATIONS  (STANDING):  artificial  tears Solution 1 Drop(s) Both EYES three times a day  ATENolol  Tablet 50 milliGRAM(s) Oral daily  cefepime   IVPB 500 milliGRAM(s) IV Intermittent every 24 hours  chlorhexidine 4% Liquid 1 Application(s) Topical <User Schedule>  collagenase Ointment 1 Application(s) Topical two times a day  heparin   Injectable 5000 Unit(s) SubCutaneous every 8 hours  lactated ringers. 1000 milliLiter(s) (100 mL/Hr) IV Continuous <Continuous>  levothyroxine 50 MICROGram(s) Oral daily  multivitamin 1 Tablet(s) Oral daily  silver sulfADIAZINE 1% Cream 1 Application(s) Topical two times a day  simvastatin 40 milliGRAM(s) Oral at bedtime  vancomycin  IVPB 1250 milliGRAM(s) IV Intermittent every 24 hours  vancomycin  IVPB        MEDICATIONS  (PRN):  acetaminophen     Tablet .. 650 milliGRAM(s) Oral every 6 hours PRN Temp greater or equal to 38C (100.4F), Moderate Pain (4 - 6)        Advanced Directives:	     DNR/DNI      Decision maker: The patient is unable to participate in complex medical decision making conversations.   Legal surrogate: Wife defers to son- they make decisions together     PSYCHOSOCIAL-SPIRITUAL ASSESSMENT:       Reviewed       See Palliative Care SW/ documentation        	    REFERRALS       Palliative Med        Unit SW/Case Mgmt          MIGUEL ANGEL GARZA          MRN-[id number]              HPI:  80 yo M with PMHx of HTN, DLD, DM, Chronic Asbestosis Lung disease (with bilateral calcified plaques), Hypothyroidism presents from [facility] for congestion and desaturation. Per NH notes, pt appeared to have increased congestion and was desaturating, requiring oxygen. History cannot be obtained from pt as he is lethargic. Family at bedside were not aware that pt was severely sick at NH. Currently wish to continue with treatment for presumed infection, although wish to speak to Palliative team regarding potentially proceeding with comfort care.   In the ED, T 98.7, /65, HR 65, RR 24, placed on NRB 15L, saturating 94%. WBC ct 18, lactate 6.3. CXR revealed chronic bilateral opacities, appear increased in R lower lung. Given Cefepime in ED. (19 Mar 2022 22:23)      PAST MEDICAL & SURGICAL HISTORY:  Hypertension    Diabetes mellitus    No significant past surgical history        FAMILY HISTORY:  No pertinent family history in first degree relatives  No GI cancers in first or second degree relatives      SOCIAL HISTORY:   Tobacco/etoh/illicit drug use use reported. Yes [ ]  _________  No [ ]  Pt resides at: home [ ]  facility [ X]  other [ ] _______  In and out of nursing facilities following multiple hospitalizations  Wife and son support the patient.       ROS:	        Dyspnea (Margaret 0-10): 0                       N/V (Y/N): No                             Secretions (Y/N) : No                                          Agitation(Y/N): No                              Pain (Y/N): No                                 -Provocation/Palliation: N/A  -Quality/Quantity: N/A  -Radiating: N/A  -Severity: No pain  -Timing/Frequency: N/A  -Impact on ADLs: N/A    General:  Denied  HEENT:    Denied  Neck:  Denied  CVS:  Denied  Resp:  Denied  GI:  Denied    :  Denied  Musc:  Denied  Neuro:  Denied  Psych:  Denied  Skin:  Denied  Lymph:  Denied      Allergies    No Known Allergies    Intolerances     -iStop reviewed (Y/N): Y  Ref#:          [id number]        Labs:	  reviewed  CBC:                        8.5    15.91 )-----------( 178      ( 21 Mar 2022 04:30 )             30.6     CMP:    03-21    147<H>  |  109  |  85<HH>  ----------------------------<  145<H>  4.4   |  20  |  2.4<H>    Ca    7.8<L>      21 Mar 2022 04:30  Phos  5.8       Mg     2.2         TPro  5.3<L>  /  Alb  2.1<L>  /  TBili  0.4  /  DBili  x   /  AST  21  /  ALT  9   /  AlkPhos  120<H>         PT/INR - ( 19 Mar 2022 15:47 )   PT: 16.40 sec;   INR: 1.43 ratio         PTT - ( 19 Mar 2022 15:47 )  PTT:30.6 sec     Urinalysis Basic - ( 19 Mar 2022 16:04 )    Color: Yellow / Appearance: Clear / S.024 / pH: x  Gluc: x / Ketone: Negative  / Bili: Negative / Urobili: 3 mg/dL   Blood: x / Protein: 30 mg/dL / Nitrite: Negative   Leuk Esterase: Negative / RBC: 6 /HPF / WBC 9 /HPF   Sq Epi: x / Non Sq Epi: 4 /HPF / Bacteria: Negative      Radiology:	     < from: Xray Chest 1 View- PORTABLE-Urgent (22 @ 15:44) >    IMPRESSION:    No definite new opacities since 2022.    --- End of Report ---    < end of copied text >      EK Lead ECG:   Ventricular Rate 109 BPM    Atrial Rate 109 BPM    P-R Interval 150 ms    QRS Duration 90 ms    Q-T Interval 376 ms    QTC Calculation(Bazett) 506 ms    P Axis 40 degrees    R Axis -10 degrees    T Axis 49 degrees    Diagnosis Line *** Poor data quality, interpretation may be adversely affected  Sinus tachycardia  Otherwise normal ECG    Confirmed by Ashley Vick MD ([id number]) on 3/20/2022 10:54:13 AM (22 @ 16:54)      Imaging Personally Reviewed:  [ X] YES  [ ] NO    Consultant(s) Notes Reviewed:  [ X] YES  [ ] NO  Care Discussed with Consultants/Other Providers [ X] YES  [ ] NO    PEx:	  T(C): 35.6 (22 @ 14:30), Max: 35.7 (22 @ 05:00)  HR: 70 (22 @ 14:30) (70 - 82)  BP: 111/53 (22 @ 14:30) (95/60 - 111/53)  RR: 18 (22 @ 14:30) (18 - 18)    General:  found in bed in NAD, ill appearing  Eyes:  PERRL EOMI Non icteric MOM  ENMT: no external oral ulcers, MMM, no thrush   CVS: RR, no edema   Resp: + tachypneic + increased WOB - on Nasal cannula   GI:  Soft, tender on Left side   :  Colbert   Musc: No C/C/E    Neuro: Lethargic, nods yes or no to questions   Psych: Calm Pleasant    Skin: Non jaundiced , no rash   Lymph: no adenopathy     Preadmit Karnofsky:  30 %           Current Karnofsky:   10  %      Medications:	      MEDICATIONS  (STANDING):  artificial  tears Solution 1 Drop(s) Both EYES three times a day  ATENolol  Tablet 50 milliGRAM(s) Oral daily  cefepime   IVPB 500 milliGRAM(s) IV Intermittent every 24 hours  chlorhexidine 4% Liquid 1 Application(s) Topical <User Schedule>  collagenase Ointment 1 Application(s) Topical two times a day  heparin   Injectable 5000 Unit(s) SubCutaneous every 8 hours  lactated ringers. 1000 milliLiter(s) (100 mL/Hr) IV Continuous <Continuous>  levothyroxine 50 MICROGram(s) Oral daily  multivitamin 1 Tablet(s) Oral daily  silver sulfADIAZINE 1% Cream 1 Application(s) Topical two times a day  simvastatin 40 milliGRAM(s) Oral at bedtime  vancomycin  IVPB 1250 milliGRAM(s) IV Intermittent every 24 hours  vancomycin  IVPB        MEDICATIONS  (PRN):  acetaminophen     Tablet .. 650 milliGRAM(s) Oral every 6 hours PRN Temp greater or equal to 38C (100.4F), Moderate Pain (4 - 6)        Advanced Directives:	     DNR/DNI      Decision maker: The patient is unable to participate in complex medical decision making conversations.   Legal surrogate: Wife defers to son- they make decisions together     PSYCHOSOCIAL-SPIRITUAL ASSESSMENT:       Reviewed       See Palliative Care SW/ documentation        	    REFERRALS       Palliative Med        Unit SW/Case Mgmt

## 2022-03-21 NOTE — CONSULT NOTE ADULT - PROBLEM SELECTOR RECOMMENDATION 2
poor mental status, failing S& S evals   family wants NGT placed for the time being  if mental status does not improve, will have to address PEG

## 2022-03-21 NOTE — CONSULT NOTE ADULT - CONVERSATION DETAILS
Spoke with family at bedside. Discussed the patients overall worsening condition , frequent hospitalizations. They express frustration with his frequent infections and lack of explanation as to his recurrent hospitalizations. They describe a downward spiral since he fell last year at home.     Discussed code status- confirmed DNR/DNI status.   Discussed feeding tubes- they are OK with trial of feeding tube if needed to continue to medically manage the patient. Their hope is that he might be medically stabilized enough to return to the nursing home how he was prior to this admission. Spoke with family at bedside. Discussed the patients overall worsening condition , frequent hospitalizations. They express frustration with his frequent infections and lack of explanation as to his recurrent hospitalizations. They describe a downward spiral since he fell last year at home.     Discussed code status- confirmed DNR/DNI status. ASHUTOSH completed  Discussed feeding tubes- they are OK with trial of feeding tube if needed to continue to medically manage the patient. Their hope is that he might be medically stabilized enough to return to the nursing home how he was prior to this admission.

## 2022-03-21 NOTE — CONSULT NOTE ADULT - PROBLEM SELECTOR RECOMMENDATION 6
DNR/DNI  Continue medical management  Trial of feeding tube  Will re-address GOC as patient clinically progresses  Will follow

## 2022-03-21 NOTE — PROGRESS NOTE ADULT - SUBJECTIVE AND OBJECTIVE BOX
MIGUEL ANGEL GARZA 82y Male  MRN#: [id number]   CODE STATUS: DNR/DNI  Hospital Day: 2d  Pt is currently admitted with the primary diagnosis of: Sepsis + Respiratory Failure    SUBJECTIVE  Overnight events: None reported.  Subjective complaints: Unable to assess.                                          ----------------------------------------------------------  OBJECTIVE  PAST MEDICAL & SURGICAL HISTORY  Hypertension    Diabetes mellitus    No significant past surgical history                                          -----------------------------------------------------------  ALLERGIES:  No Known Allergies                                          ------------------------------------------------------------  HOME MEDICATIONS  Home Medications:  acetaminophen 325 mg oral tablet: 2 tab(s) orally every 6 hours, As needed, Temp greater or equal to 38C (100.4F), Mild Pain (1 - 3) (2022 11:24)  atenolol 50 mg oral tablet: 1 tab(s) orally once a day (2022 01:45)  furosemide 20 mg oral tablet: 1 tab(s) orally once a day for LE swelling (19 Mar 2022 21:59)  Levoxyl 50 mcg (0.05 mg) oral tablet: 1 tab(s) orally once a day (19 Mar 2022 21:59)  metFORMIN 500 mg oral tablet: 1 tab(s) orally 2 times a day (19 Mar 2022 21:59)  Multiple Vitamins oral tablet: 1 tab(s) orally once a day (2022 11:24)  Refresh ophthalmic solution: 1 drop(s) to each affected eye 3 times a day (19 Mar 2022 22:00)  Santyl 250 units/g topical ointment: Apply topically to affected area 2 times a day (19 Mar 2022 22:00)  silver sulfADIAZINE 1% topical cream: Apply topically to affected area 2 times a day (19 Mar 2022 22:00)  simvastatin 40 mg oral tablet: 1 tab(s) orally once a day (at bedtime) (2022 01:45)  zinc oxide topical ointment: Apply topically to affected area 2 times a day (19 Mar 2022 22:01)                         MEDICATIONS:  STANDING MEDICATIONS  artificial  tears Solution 1 Drop(s) Both EYES three times a day  ATENolol  Tablet 50 milliGRAM(s) Oral daily  cefepime   IVPB 500 milliGRAM(s) IV Intermittent every 24 hours  chlorhexidine 4% Liquid 1 Application(s) Topical <User Schedule>  collagenase Ointment 1 Application(s) Topical two times a day  heparin   Injectable 5000 Unit(s) SubCutaneous every 8 hours  lactated ringers. 1000 milliLiter(s) IV Continuous <Continuous>  levothyroxine 50 MICROGram(s) Oral daily  multivitamin 1 Tablet(s) Oral daily  silver sulfADIAZINE 1% Cream 1 Application(s) Topical two times a day  simvastatin 40 milliGRAM(s) Oral at bedtime  vancomycin  IVPB 1250 milliGRAM(s) IV Intermittent every 24 hours  vancomycin  IVPB        PRN MEDICATIONS  acetaminophen     Tablet .. 650 milliGRAM(s) Oral every 6 hours PRN                                          ------------------------------------------------------------  VITAL SIGNS: Last 24 Hours  T(C): 35.7 (21 Mar 2022 05:00), Max: 36.1 (20 Mar 2022 12:45)  T(F): 96.3 (21 Mar 2022 05:00), Max: 96.9 (20 Mar 2022 12:45)  HR: 78 (21 Mar 2022 05:00) (78 - 92)  BP: 102/51 (21 Mar 2022 05:00) (95/60 - 108/55)  BP(mean): --  RR: 18 (21 Mar 2022 05:00) (18 - 19)  SpO2: --    22 @ 07:01  -  22 @ 07:00  --------------------------------------------------------  IN: 1100 mL / OUT: 300 mL / NET: 800 mL                                         --------------------------------------------------------------  LABS:             8.5    15.91 )-----------( 178      ( 21 Mar 2022 04:30 )             30.6     03-21    147<H>  |  109  |  85<HH>  ----------------------------<  145<H>  4.4   |  20  |  2.4<H>    Ca    7.8<L>      21 Mar 2022 04:30  Phos  5.8     03-  Mg     2.2     -    TPro  5.3<L>  /  Alb  2.1<L>  /  TBili  0.4  /  DBili  x   /  AST  21  /  ALT  9   /  AlkPhos  120<H>  03-21    PT/INR - ( 19 Mar 2022 15:47 )   PT: 16.40 sec;   INR: 1.43 ratio       PTT - ( 19 Mar 2022 15:47 )  PTT:30.6 sec  Urinalysis Basic - ( 19 Mar 2022 16:04 )    Color: Yellow / Appearance: Clear / S.024 / pH: x  Gluc: x / Ketone: Negative  / Bili: Negative / Urobili: 3 mg/dL   Blood: x / Protein: 30 mg/dL / Nitrite: Negative   Leuk Esterase: Negative / RBC: 6 /HPF / WBC 9 /HPF   Sq Epi: x / Non Sq Epi: 4 /HPF / Bacteria: Negative    Culture - Urine (collected 19 Mar 2022 16:04)  Source: Clean Catch Clean Catch (Midstream)  Final Report (20 Mar 2022 20:21):    No growth    Culture - Blood (collected 19 Mar 2022 15:47)  Source: .Blood Blood-Peripheral  Preliminary Report (21 Mar 2022 01:00):    No growth to date.    Culture - Blood (collected 19 Mar 2022 15:47)  Source: .Blood Blood-Peripheral  Preliminary Report (21 Mar 2022 01:00):    No growth to date.                                          -------------------------------------------------------------  RADIOLOGY:                                          --------------------------------------------------------------  PHYSICAL EXAM:  General: no acute distress  HEENT: atraumatic  LUNGS: coarse breath sounds bilaterally, no wheezes noted  HEART: regular rate/rhythm, no murmurs/gallops  ABDOMEN: soft, nontender, nondistended, normoactive bowel sounds  EXT: 2+ radial pulses  NEURO: aaox0  SKIN: intact, no new lesions noted                                         --------------------------------------------------------------  ASSESSMENT & PLAN  Past medical history and hospital course:  80 yo M with PMHx of HTN, DLD, DM, Chronic Asbestosis Lung disease (with bilateral calcified plaques) presents from [facility] for congestion and desaturation.    #Acute Hypoxemic Respiratory Failure   #Sepsis likely due to pneumonia vs sacral wound infection (less likely)  #FILOMENA, likely prerenal  - Lactate 6-->7, repeat in AM  - Remains HD stable, is on NRB  - Given Cefepime in ED, will c/w Vanco and Cefepime given pt with severe infectious process, recent admission in February for pneumonia as well  - MRSA nares pending, d/c Vanco if negative  - Blood and Urine Cx collected  - Sacral wound appears clean, no malodor, purulence, unlikely cause of sepsis; wound care c/s pending  - Cr elevated to 1.5, baseline is 0.8, likely prerenal, c/w IVF  - Colbert placed in ED for urinary retention, now having adequate urinary output  - Is lethargic, unarousable, keep NPO including medications, S+S pending  - Family at bedside wish to continue with treatment for now, request to speak with Palliative team regarding potential comfort care    #HTN  - Takes Atenolol  - Hold while NPO    #DM  #DLD  - Takes Metformin  - Monitor FS, initiate insulin if over 180  - A1c in AM    #Chronic Asbestosis  - Does not appear to be in flare, hypoxemia likely due to infectious process    #Hypothyroidism  - C/w Levothyroxine  - TSH in AM    DVT ppx: HSQ  Diet: NPO  Activity: IAT  DNR/DNI  Dispo: Acute                                                                            ----------------------------------------------------  # DVT prophylaxis:  # GI prophylaxis:  # Diet:  # Activity:  # Code status:  # Disposition:                                                                           --------------------------------------------------------  # Handoff: MIGUEL ANGEL GARZA 82y Male  MRN#: [id number]   CODE STATUS: DNR/DNI  Hospital Day: 2d  Pt is currently admitted with the primary diagnosis of: Sepsis + Respiratory Failure    SUBJECTIVE  Overnight events: None reported.  Subjective complaints: Unable to assess.                                          ----------------------------------------------------------  OBJECTIVE  PAST MEDICAL & SURGICAL HISTORY  Hypertension    Diabetes mellitus    No significant past surgical history                                          -----------------------------------------------------------  ALLERGIES:  No Known Allergies                                          ------------------------------------------------------------  HOME MEDICATIONS  Home Medications:  acetaminophen 325 mg oral tablet: 2 tab(s) orally every 6 hours, As needed, Temp greater or equal to 38C (100.4F), Mild Pain (1 - 3) (2022 11:24)  atenolol 50 mg oral tablet: 1 tab(s) orally once a day (2022 01:45)  furosemide 20 mg oral tablet: 1 tab(s) orally once a day for LE swelling (19 Mar 2022 21:59)  Levoxyl 50 mcg (0.05 mg) oral tablet: 1 tab(s) orally once a day (19 Mar 2022 21:59)  metFORMIN 500 mg oral tablet: 1 tab(s) orally 2 times a day (19 Mar 2022 21:59)  Multiple Vitamins oral tablet: 1 tab(s) orally once a day (2022 11:24)  Refresh ophthalmic solution: 1 drop(s) to each affected eye 3 times a day (19 Mar 2022 22:00)  Santyl 250 units/g topical ointment: Apply topically to affected area 2 times a day (19 Mar 2022 22:00)  silver sulfADIAZINE 1% topical cream: Apply topically to affected area 2 times a day (19 Mar 2022 22:00)  simvastatin 40 mg oral tablet: 1 tab(s) orally once a day (at bedtime) (2022 01:45)  zinc oxide topical ointment: Apply topically to affected area 2 times a day (19 Mar 2022 22:01)                         MEDICATIONS:  STANDING MEDICATIONS  artificial  tears Solution 1 Drop(s) Both EYES three times a day  ATENolol  Tablet 50 milliGRAM(s) Oral daily  cefepime   IVPB 500 milliGRAM(s) IV Intermittent every 24 hours  chlorhexidine 4% Liquid 1 Application(s) Topical <User Schedule>  collagenase Ointment 1 Application(s) Topical two times a day  heparin   Injectable 5000 Unit(s) SubCutaneous every 8 hours  lactated ringers. 1000 milliLiter(s) IV Continuous <Continuous>  levothyroxine 50 MICROGram(s) Oral daily  multivitamin 1 Tablet(s) Oral daily  silver sulfADIAZINE 1% Cream 1 Application(s) Topical two times a day  simvastatin 40 milliGRAM(s) Oral at bedtime  vancomycin  IVPB 1250 milliGRAM(s) IV Intermittent every 24 hours  vancomycin  IVPB        PRN MEDICATIONS  acetaminophen     Tablet .. 650 milliGRAM(s) Oral every 6 hours PRN                                          ------------------------------------------------------------  VITAL SIGNS: Last 24 Hours  T(C): 35.7 (21 Mar 2022 05:00), Max: 36.1 (20 Mar 2022 12:45)  T(F): 96.3 (21 Mar 2022 05:00), Max: 96.9 (20 Mar 2022 12:45)  HR: 78 (21 Mar 2022 05:00) (78 - 92)  BP: 102/51 (21 Mar 2022 05:00) (95/60 - 108/55)  BP(mean): --  RR: 18 (21 Mar 2022 05:00) (18 - 19)  SpO2: --    22 @ 07:01  -  22 @ 07:00  --------------------------------------------------------  IN: 1100 mL / OUT: 300 mL / NET: 800 mL                                         --------------------------------------------------------------  LABS:             8.5    15.91 )-----------( 178      ( 21 Mar 2022 04:30 )             30.6     03-21    147<H>  |  109  |  85<HH>  ----------------------------<  145<H>  4.4   |  20  |  2.4<H>    Ca    7.8<L>      21 Mar 2022 04:30  Phos  5.8     03-  Mg     2.2     -    TPro  5.3<L>  /  Alb  2.1<L>  /  TBili  0.4  /  DBili  x   /  AST  21  /  ALT  9   /  AlkPhos  120<H>  03-21    PT/INR - ( 19 Mar 2022 15:47 )   PT: 16.40 sec;   INR: 1.43 ratio       PTT - ( 19 Mar 2022 15:47 )  PTT:30.6 sec  Urinalysis Basic - ( 19 Mar 2022 16:04 )    Color: Yellow / Appearance: Clear / S.024 / pH: x  Gluc: x / Ketone: Negative  / Bili: Negative / Urobili: 3 mg/dL   Blood: x / Protein: 30 mg/dL / Nitrite: Negative   Leuk Esterase: Negative / RBC: 6 /HPF / WBC 9 /HPF   Sq Epi: x / Non Sq Epi: 4 /HPF / Bacteria: Negative    Culture - Urine (collected 19 Mar 2022 16:04)  Source: Clean Catch Clean Catch (Midstream)  Final Report (20 Mar 2022 20:21):    No growth    Culture - Blood (collected 19 Mar 2022 15:47)  Source: .Blood Blood-Peripheral  Preliminary Report (21 Mar 2022 01:00):    No growth to date.    Culture - Blood (collected 19 Mar 2022 15:47)  Source: .Blood Blood-Peripheral  Preliminary Report (21 Mar 2022 01:00):    No growth to date.                                          -------------------------------------------------------------  RADIOLOGY:                                          --------------------------------------------------------------  PHYSICAL EXAM:  General: no acute distress  HEENT: atraumatic  LUNGS: coarse breath sounds bilaterally, no wheezes noted  HEART: regular rate/rhythm, no murmurs/gallops  ABDOMEN: soft, nontender, nondistended, normoactive bowel sounds  EXT: 2+ radial pulses  NEURO: aaox0  SKIN: intact, no new lesions noted                                         --------------------------------------------------------------  ASSESSMENT & PLAN  Past medical history and hospital course:  80 yo M with PMHx of HTN, DLD, DM, Chronic Asbestosis Lung disease (with bilateral calcified plaques) presents from [facility] for congestion and desaturation.    #Acute Hypoxemic Respiratory Failure secondary to possible #Aspiration Pneumonia with #Sepsis POA  #Lactic Acidosis - improving  #    #FILOMENA, likely prerenal  - Lactate 6-->7, repeat in AM  - Remains HD stable, is on NRB  - Given Cefepime in ED, will c/w Vanco and Cefepime given pt with severe infectious process, recent admission in February for pneumonia as well  - MRSA nares pending, d/c Vanco if negative  - Blood and Urine Cx collected  - Sacral wound appears clean, no malodor, purulence, unlikely cause of sepsis; wound care c/s pending  - Cr elevated to 1.5, baseline is 0.8, likely prerenal, c/w IVF  - Colbert placed in ED for urinary retention, now having adequate urinary output  - Is lethargic, unarousable, keep NPO including medications, S+S pending  - Family at bedside wish to continue with treatment for now, request to speak with Palliative team regarding potential comfort care    #HTN  - Takes Atenolol  - Hold while NPO    #DM  #DLD  - Takes Metformin  - Monitor FS, initiate insulin if over 180  - A1c in AM    #Chronic Asbestosis  - Does not appear to be in flare, hypoxemia likely due to infectious process    #Hypothyroidism  - C/w Levothyroxine  - TSH in AM    DVT ppx: HSQ  Diet: NPO  Activity: IAT  DNR/DNI  Dispo: Acute                                                                            ----------------------------------------------------  # DVT prophylaxis:  # GI prophylaxis:  # Diet:  # Activity:  # Code status:  # Disposition:                                                                           --------------------------------------------------------  # Handoff: MIGUEL ANGEL GARZA 82y Male  MRN#: [id number]   CODE STATUS: DNR/DNI  Hospital Day: 2d  Pt is currently admitted with the primary diagnosis of: Sepsis + Respiratory Failure    SUBJECTIVE  Overnight events: None reported.  Subjective complaints: Unable to assess.                                          ----------------------------------------------------------  OBJECTIVE  PAST MEDICAL & SURGICAL HISTORY  Hypertension    Diabetes mellitus    No significant past surgical history                                          -----------------------------------------------------------  ALLERGIES:  No Known Allergies                                          ------------------------------------------------------------  HOME MEDICATIONS  Home Medications:  acetaminophen 325 mg oral tablet: 2 tab(s) orally every 6 hours, As needed, Temp greater or equal to 38C (100.4F), Mild Pain (1 - 3) (2022 11:24)  atenolol 50 mg oral tablet: 1 tab(s) orally once a day (2022 01:45)  furosemide 20 mg oral tablet: 1 tab(s) orally once a day for LE swelling (19 Mar 2022 21:59)  Levoxyl 50 mcg (0.05 mg) oral tablet: 1 tab(s) orally once a day (19 Mar 2022 21:59)  metFORMIN 500 mg oral tablet: 1 tab(s) orally 2 times a day (19 Mar 2022 21:59)  Multiple Vitamins oral tablet: 1 tab(s) orally once a day (2022 11:24)  Refresh ophthalmic solution: 1 drop(s) to each affected eye 3 times a day (19 Mar 2022 22:00)  Santyl 250 units/g topical ointment: Apply topically to affected area 2 times a day (19 Mar 2022 22:00)  silver sulfADIAZINE 1% topical cream: Apply topically to affected area 2 times a day (19 Mar 2022 22:00)  simvastatin 40 mg oral tablet: 1 tab(s) orally once a day (at bedtime) (2022 01:45)  zinc oxide topical ointment: Apply topically to affected area 2 times a day (19 Mar 2022 22:01)                         MEDICATIONS:  STANDING MEDICATIONS  artificial  tears Solution 1 Drop(s) Both EYES three times a day  ATENolol  Tablet 50 milliGRAM(s) Oral daily  cefepime   IVPB 500 milliGRAM(s) IV Intermittent every 24 hours  chlorhexidine 4% Liquid 1 Application(s) Topical <User Schedule>  collagenase Ointment 1 Application(s) Topical two times a day  heparin   Injectable 5000 Unit(s) SubCutaneous every 8 hours  lactated ringers. 1000 milliLiter(s) IV Continuous <Continuous>  levothyroxine 50 MICROGram(s) Oral daily  multivitamin 1 Tablet(s) Oral daily  silver sulfADIAZINE 1% Cream 1 Application(s) Topical two times a day  simvastatin 40 milliGRAM(s) Oral at bedtime  vancomycin  IVPB 1250 milliGRAM(s) IV Intermittent every 24 hours  vancomycin  IVPB        PRN MEDICATIONS  acetaminophen     Tablet .. 650 milliGRAM(s) Oral every 6 hours PRN                                          ------------------------------------------------------------  VITAL SIGNS: Last 24 Hours  T(C): 35.7 (21 Mar 2022 05:00), Max: 36.1 (20 Mar 2022 12:45)  T(F): 96.3 (21 Mar 2022 05:00), Max: 96.9 (20 Mar 2022 12:45)  HR: 78 (21 Mar 2022 05:00) (78 - 92)  BP: 102/51 (21 Mar 2022 05:00) (95/60 - 108/55)  BP(mean): --  RR: 18 (21 Mar 2022 05:00) (18 - 19)  SpO2: --    22 @ 07:01  -  22 @ 07:00  --------------------------------------------------------  IN: 1100 mL / OUT: 300 mL / NET: 800 mL                                         --------------------------------------------------------------  LABS:             8.5    15.91 )-----------( 178      ( 21 Mar 2022 04:30 )             30.6     03-21    147<H>  |  109  |  85<HH>  ----------------------------<  145<H>  4.4   |  20  |  2.4<H>    Ca    7.8<L>      21 Mar 2022 04:30  Phos  5.8     03-  Mg     2.2     -    TPro  5.3<L>  /  Alb  2.1<L>  /  TBili  0.4  /  DBili  x   /  AST  21  /  ALT  9   /  AlkPhos  120<H>  03-21    PT/INR - ( 19 Mar 2022 15:47 )   PT: 16.40 sec;   INR: 1.43 ratio       PTT - ( 19 Mar 2022 15:47 )  PTT:30.6 sec  Urinalysis Basic - ( 19 Mar 2022 16:04 )    Color: Yellow / Appearance: Clear / S.024 / pH: x  Gluc: x / Ketone: Negative  / Bili: Negative / Urobili: 3 mg/dL   Blood: x / Protein: 30 mg/dL / Nitrite: Negative   Leuk Esterase: Negative / RBC: 6 /HPF / WBC 9 /HPF   Sq Epi: x / Non Sq Epi: 4 /HPF / Bacteria: Negative    Culture - Urine (collected 19 Mar 2022 16:04)  Source: Clean Catch Clean Catch (Midstream)  Final Report (20 Mar 2022 20:21):    No growth    Culture - Blood (collected 19 Mar 2022 15:47)  Source: .Blood Blood-Peripheral  Preliminary Report (21 Mar 2022 01:00):    No growth to date.    Culture - Blood (collected 19 Mar 2022 15:47)  Source: .Blood Blood-Peripheral  Preliminary Report (21 Mar 2022 01:00):    No growth to date.                                          -------------------------------------------------------------  RADIOLOGY:                                          --------------------------------------------------------------  PHYSICAL EXAM:  General: no acute distress  HEENT: atraumatic  LUNGS: coarse breath sounds bilaterally, no wheezes noted  HEART: regular rate/rhythm, no murmurs/gallops  ABDOMEN: soft, nontender, nondistended, normoactive bowel sounds  EXT: 2+ radial pulses  NEURO: aaox0  SKIN: intact, no new lesions noted                                         --------------------------------------------------------------  ASSESSMENT & PLAN  Past medical history and hospital course:  80 yo M with PMHx of HTN, DLD, DM, Chronic Asbestosis Lung disease (with bilateral calcified plaques) presents from [facility] for congestion and desaturation.    #Acute Hypoxemic Respiratory Failure secondary to possible #Aspiration Pneumonia with #Sepsis POA - now on Venti Mask ~98%, will trial nasal cannula today, sepsis resolved  #Chronic Asbestosis  #Lactic Acidosis - improving, continue trending  - s/p Cefepime in ED, on Cefepime IV 500mg daily, Vancomycin 1250mg IV daily, MRSA Nares pending, BCx + UCx from 3/19 NGTD f/u  - CXR 3/19 with chronic opacities, extensive bilateral partially calcified pleural plaques  - GOC: family wishes to continue with treatment for now, but will be speaking to Palliative team today regarding possible comfort care  - S/S consulted, remaining NPO and would need NGT if continuing aggressive medical care, f/u with palliative/GOC discussion    #Sacral Wound? - Wound Care consulted, f/u, unlikely source for sepsis  FILOMENA - likely pre-renal secondary to sepsis, worsening, on LR @100cc/h currently  - s/p Sayra in ED for retention, keeping for now as still lethargic with minimal arousal    #HTN - on atenolol 50mg PO daily, holding while NPO as BP also borderline  #T2DM - metformin at home, A1C pending from 3/20, sugars well controlled off insulin  #DLD - Simvastatin 40mg PO @bedtime    #Hypothyroidism - on Levothyroxine 50ug PO daily, TSH pending from 3/20                                                                            ----------------------------------------------------  # DVT prophylaxis: Heparin 5000U subQ q8h  # GI prophylaxis: N/A  # Diet: NPO  # Activity: Increase as Tolerated  # Code status: DNR/DNI  # Disposition: Remain on floor                                                                           --------------------------------------------------------  # Handoff: f/u with Palliative

## 2022-03-21 NOTE — CONSULT NOTE ADULT - PROBLEM SELECTOR RECOMMENDATION 4
DNR/DNI  Continue medical management  Trial of feeding tube  Will re-address GOC as patient clinically progresses  Will follow s.p carrero placement  was retaining  labs worse today  continue med mgmt

## 2022-03-21 NOTE — CONSULT NOTE ADULT - PROBLEM SELECTOR RECOMMENDATION 9
sepsis resolved, now off venti mask on NC  somewhat improved  continue medical management (IVF, ABX) for now sepsis resolved, now off venti mask on NC  somewhat improved  continue medical management (IVF, abx) for now

## 2022-03-22 NOTE — SWALLOW BEDSIDE ASSESSMENT ADULT - SWALLOW EVAL: FUNCTIONAL LEVEL AT TIME OF EVAL
Pt. received lethargic, NGT in situ, poor arousability despite max encouragement by SLP, + wet upper airway.

## 2022-03-22 NOTE — PROGRESS NOTE ADULT - SUBJECTIVE AND OBJECTIVE BOX
MIGUEL ANGEL GARZA 82y Male  MRN#: [id number]   CODE STATUS:  Hospital Day: 3d  Pt is currently admitted with the primary diagnosis of:     SUBJECTIVE  Overnight events:  Subjective complaints:                                          ----------------------------------------------------------  OBJECTIVE  PAST MEDICAL & SURGICAL HISTORY  Hypertension    Diabetes mellitus    No significant past surgical history                                          -----------------------------------------------------------  ALLERGIES:  No Known Allergies                                          ------------------------------------------------------------  HOME MEDICATIONS  Home Medications:  acetaminophen 325 mg oral tablet: 2 tab(s) orally every 6 hours, As needed, Temp greater or equal to 38C (100.4F), Mild Pain (1 - 3) (01 Feb 2022 11:24)  atenolol 50 mg oral tablet: 1 tab(s) orally once a day (17 Jan 2022 01:45)  furosemide 20 mg oral tablet: 1 tab(s) orally once a day for LE swelling (19 Mar 2022 21:59)  Levoxyl 50 mcg (0.05 mg) oral tablet: 1 tab(s) orally once a day (19 Mar 2022 21:59)  metFORMIN 500 mg oral tablet: 1 tab(s) orally 2 times a day (19 Mar 2022 21:59)  Multiple Vitamins oral tablet: 1 tab(s) orally once a day (01 Feb 2022 11:24)  Refresh ophthalmic solution: 1 drop(s) to each affected eye 3 times a day (19 Mar 2022 22:00)  Santyl 250 units/g topical ointment: Apply topically to affected area 2 times a day (19 Mar 2022 22:00)  silver sulfADIAZINE 1% topical cream: Apply topically to affected area 2 times a day (19 Mar 2022 22:00)  simvastatin 40 mg oral tablet: 1 tab(s) orally once a day (at bedtime) (17 Jan 2022 01:45)  zinc oxide topical ointment: Apply topically to affected area 2 times a day (19 Mar 2022 22:01)                         MEDICATIONS:  STANDING MEDICATIONS  artificial  tears Solution 1 Drop(s) Both EYES three times a day  ATENolol  Tablet 50 milliGRAM(s) Oral daily  cefepime   IVPB 500 milliGRAM(s) IV Intermittent every 24 hours  chlorhexidine 4% Liquid 1 Application(s) Topical <User Schedule>  collagenase Ointment 1 Application(s) Topical two times a day  heparin   Injectable 5000 Unit(s) SubCutaneous every 8 hours  lactated ringers. 1000 milliLiter(s) IV Continuous <Continuous>  levothyroxine 50 MICROGram(s) Oral daily  multivitamin 1 Tablet(s) Oral daily  silver sulfADIAZINE 1% Cream 1 Application(s) Topical two times a day  simvastatin 40 milliGRAM(s) Oral at bedtime    PRN MEDICATIONS  acetaminophen     Tablet .. 650 milliGRAM(s) Oral every 6 hours PRN                                          ------------------------------------------------------------  VITAL SIGNS: Last 24 Hours  T(C): 35.7 (22 Mar 2022 05:00), Max: 36.1 (21 Mar 2022 20:44)  T(F): 96.2 (22 Mar 2022 05:00), Max: 97 (21 Mar 2022 20:44)  HR: 84 (22 Mar 2022 05:00) (70 - 84)  BP: 103/50 (22 Mar 2022 05:00) (103/50 - 111/53)  BP(mean): --  RR: 100 (22 Mar 2022 08:33) (18 - 100)  SpO2: --    03-21-22 @ 07:01  -  03-22-22 @ 07:00  --------------------------------------------------------  IN: 0 mL / OUT: 690 mL / NET: -690 mL                                         --------------------------------------------------------------  LABS:             8.3    16.06 )-----------( 220      ( 22 Mar 2022 08:00 )             29.4     03-22    147<H>  |  108  |  91<HH>  ----------------------------<  262<H>  3.8   |  21  |  2.5<H>    Ca    7.6<L>      22 Mar 2022 08:00  Phos  5.8     03-21  Mg     2.2     03-22    TPro  5.2<L>  /  Alb  1.9<L>  /  TBili  0.3  /  DBili  x   /  AST  40  /  ALT  13  /  AlkPhos  174<H>  03-22    Lactate, Blood: 4.6 mmol/L *HH* (03-22-22 @ 08:00)    Culture - Urine (collected 19 Mar 2022 16:04)  Source: Clean Catch Clean Catch (Midstream)  Final Report (20 Mar 2022 20:21):    No growth    Culture - Blood (collected 19 Mar 2022 15:47)  Source: .Blood Blood-Peripheral  Preliminary Report (21 Mar 2022 01:00):    No growth to date.    Culture - Blood (collected 19 Mar 2022 15:47)  Source: .Blood Blood-Peripheral  Preliminary Report (21 Mar 2022 01:00):    No growth to date.                                          -------------------------------------------------------------  RADIOLOGY:  < from: Xray Chest 1 View- PORTABLE-Urgent (Xray Chest 1 View- PORTABLE-Urgent .) (03.21.22 @ 16:25) >  Impression:  In comparison with the prior chest x-ray dated March 19, 2022 time 3:14 PM: Interval placement of a nasogastric tube with the tip in the stomach.  < end of copied text >                                          --------------------------------------------------------------  PHYSICAL EXAM:  General: no acute distress  HEENT: atraumatic  LUNGS: coarse breath sounds bilaterally, no wheezes noted  HEART: regular rate/rhythm, no murmurs/gallops  ABDOMEN: soft, nontender, nondistended, normoactive bowel sounds  EXT: 2+ radial pulses  NEURO: aaox0  SKIN: intact, no new lesions noted                                         --------------------------------------------------------------  ASSESSMENT & PLAN  Past medical history and hospital course:  80 yo M with PMHx of HTN, DLD, DM, Chronic Asbestosis Lung disease (with bilateral calcified plaques) presents from [facility] for congestion and desaturation.    #Acute Hypoxemic Respiratory Failure secondary to possible #Aspiration Pneumonia with #Sepsis POA - now on 3-NC ~100%, will wean off  #Chronic Asbestosis  #Lactic Acidosis - improving, continue trending  - s/p Cefepime in ED, on Cefepime IV 500mg daily, Vancomycin 1250mg IV daily, MRSA Nares pending, BCx + UCx from 3/19 NGTD f/u  - CXR 3/19 with chronic opacities, extensive bilateral partially calcified pleural plaques  - GOC: family wishes to continue with treatment for now  - S/S consulted, s/p NGT with Nutrition/Dietician consulted    #Sacral Wound - Wound Care consulted, f/u, unlikely source for sepsis  FILOMENA - likely pre-renal secondary to sepsis, worsening, on LR @100cc/h currently  - s/p Colbert in ED for retention, keeping for now as still lethargic with minimal arousal    #HTN - on atenolol 50mg PO daily, holding while NPO as BP also borderline  #T2DM - Metformin at home, A1C 6.6% from 3/20, sugars well controlled off insulin  #DLD - Simvastatin 40mg PO @bedtime  #Hypothyroidism - on Levothyroxine 50ug PO daily, TSH WNL on 3/20                                                                            ----------------------------------------------------  # DVT prophylaxis: Heparin 5000U subQ q8h  # GI prophylaxis: N/A  # Diet: NPO  # Activity: Increase as Tolerated  # Code status: DNR/DNI  # Disposition: Remain on floor                                                                           --------------------------------------------------------  # Handoff: f/u with Palliative

## 2022-03-22 NOTE — PROGRESS NOTE ADULT - PROBLEM SELECTOR PLAN 1
? aspiration pna  continue abx, IVF  respiratory status improving ? aspiration pna  continue IV abx, IVF  respiratory status improving

## 2022-03-22 NOTE — SWALLOW BEDSIDE ASSESSMENT ADULT - SWALLOW EVAL: DIAGNOSIS
Pt. continues to present with poor mental status. Therefore, PO trials are not appropriate at this time 2'high risk of aspiration risk.

## 2022-03-22 NOTE — CHART NOTE - NSCHARTNOTEFT_GEN_A_CORE
NUTRITION SUPPORT CONSULTATION    HPI:  80 yo M with PMHx of HTN, DLD, DM, Chronic Asbestosis Lung disease (with bilateral calcified plaques), Hypothyroidism presents from [facility] for congestion and desaturation. Per NH notes, pt appeared to have increased congestion and was desaturating, requiring oxygen. History cannot be obtained from pt as he is lethargic. Family at bedside were not aware that pt was severely sick at NH. Currently wish to continue with treatment for presumed infection, although wish to speak to Palliative team regarding potentially proceeding with comfort care.   In the ED, T 98.7, /65, HR 65, RR 24, placed on NRB 15L, saturating 94%. WBC ct 18, lactate 6.3. CXR revealed chronic bilateral opacities, appear increased in R lower lung. Given Cefepime in ED. (19 Mar 2022 22:23)      PAST MEDICAL & SURGICAL HISTORY:  Hypertension  Diabetes mellitus  No significant past surgical history    Allergies  No Known Allergies    MEDICATIONS  (STANDING):  artificial  tears Solution 1 Drop(s) Both EYES three times a day  ATENolol  Tablet 50 milliGRAM(s) Oral daily  cefepime   IVPB 500 milliGRAM(s) IV Intermittent every 24 hours  chlorhexidine 4% Liquid 1 Application(s) Topical <User Schedule>  collagenase Ointment 1 Application(s) Topical two times a day  heparin   Injectable 5000 Unit(s) SubCutaneous every 8 hours  lactated ringers. 1000 milliLiter(s) (100 mL/Hr) IV Continuous <Continuous>  levothyroxine 50 MICROGram(s) Oral daily  multivitamin 1 Tablet(s) Oral daily  silver sulfADIAZINE 1% Cream 1 Application(s) Topical two times a day  simvastatin 40 milliGRAM(s) Oral at bedtime    MEDICATIONS  (PRN):  acetaminophen     Tablet .. 650 milliGRAM(s) Oral every 6 hours PRN Temp greater or equal to 38C (100.4F), Moderate Pain (4 - 6)      ICU Vital Signs Last 24 Hrs  T(C): 35.7 (22 Mar 2022 05:00), Max: 36.1 (21 Mar 2022 20:44)  T(F): 96.2 (22 Mar 2022 05:00), Max: 97 (21 Mar 2022 20:44)  HR: 84 (22 Mar 2022 05:00) (70 - 84)  BP: 103/50 (22 Mar 2022 05:00) (103/50 - 111/53)  RR: 100 (22 Mar 2022 08:33) (18 - 100)    Drug Dosing Weight  Height (cm): 172.7 (19 Mar 2022 22:56)  Weight (kg): 66 (19 Mar 2022 22:56)  BMI (kg/m2): 22.1 (19 Mar 2022 22:56)  BSA (m2): 1.79 (19 Mar 2022 22:56)    EXAM    Abd:    LE:   Enteral access:  IV access:    LABS  03-21    147<H>  |  109  |  85<HH>  ----------------------------<  145<H>  4.4   |  20  |  2.4<H>    Ca    7.8<L>      21 Mar 2022 04:30  Phos  5.8     03-21  Mg     2.2     03-21    TPro  5.3<L>  /  Alb  2.1<L>  /  TBili  0.4  /  DBili  x   /  AST  21  /  ALT  9   /  AlkPhos  120<H>  03-21                       8.3    16.06 )-----------( 220      ( 22 Mar 2022 08:00 )             29.4     CAPILLARY BLOOD GLUCOSE  POCT Blood Glucose.: 286 mg/dL (22 Mar 2022 07:37)  POCT Blood Glucose.: 133 mg/dL (21 Mar 2022 21:27)  POCT Blood Glucose.: 162 mg/dL (21 Mar 2022 16:20)  POCT Blood Glucose.: 165 mg/dL (21 Mar 2022 11:21)    Diet, NPO with Tube Feed:   Tube Feeding Modality: Nasogastric  Jevity 1.2 Cayden  Total Volume for 24 Hours (mL): 880  Bolus  Total Volume of Bolus (mL):  220  Total # of Feeds: 4  Tube Feed Frequency: Every 6 hours   Tube Feed Start Time: 18:00  Bolus Feed Rate (mL per Hour): 44   Bolus Feed Duration (in Hours): 5  Bolus   Total Volume per Flush (mL): 150   Frequency: Every 4 Hours (03-21-22 @ 16:14)      ASSESSMENT        PLAN NUTRITION SUPPORT CONSULTATION    HPI:  80 yo M with PMHx of HTN, DLD, DM, Chronic Asbestosis Lung disease (with bilateral calcified plaques), Hypothyroidism presents from [facility] for congestion and desaturation. Per NH notes, pt appeared to have increased congestion and was desaturating, requiring oxygen. History cannot be obtained from pt as he is lethargic. Family at bedside were not aware that pt was severely sick at NH. Currently wish to continue with treatment for presumed infection, although wish to speak to Palliative team regarding potentially proceeding with comfort care.   In the ED, T 98.7, /65, HR 65, RR 24, placed on NRB 15L, saturating 94%. WBC ct 18, lactate 6.3. CXR revealed chronic bilateral opacities, appear increased in R lower lung. Given Cefepime in ED. (19 Mar 2022 22:23)    Pt's wife at bedside, discussed NGT and feedings. Tube placed yesterday and feeds started. Palliative appreciated, ? PEG.    PAST MEDICAL & SURGICAL HISTORY:  Hypertension  Diabetes mellitus  No significant past surgical history    Allergies  No Known Allergies    MEDICATIONS  (STANDING):  artificial  tears Solution 1 Drop(s) Both EYES three times a day  ATENolol  Tablet 50 milliGRAM(s) Oral daily  cefepime   IVPB 500 milliGRAM(s) IV Intermittent every 24 hours  chlorhexidine 4% Liquid 1 Application(s) Topical <User Schedule>  collagenase Ointment 1 Application(s) Topical two times a day  heparin   Injectable 5000 Unit(s) SubCutaneous every 8 hours  lactated ringers. 1000 milliLiter(s) (100 mL/Hr) IV Continuous <Continuous>  levothyroxine 50 MICROGram(s) Oral daily  multivitamin 1 Tablet(s) Oral daily  silver sulfADIAZINE 1% Cream 1 Application(s) Topical two times a day  simvastatin 40 milliGRAM(s) Oral at bedtime    MEDICATIONS  (PRN):  acetaminophen     Tablet .. 650 milliGRAM(s) Oral every 6 hours PRN Temp greater or equal to 38C (100.4F), Moderate Pain (4 - 6)    ICU Vital Signs Last 24 Hrs  T(C): 35.7 (22 Mar 2022 05:00), Max: 36.1 (21 Mar 2022 20:44)  T(F): 96.2 (22 Mar 2022 05:00), Max: 97 (21 Mar 2022 20:44)  HR: 84 (22 Mar 2022 05:00) (70 - 84)  BP: 103/50 (22 Mar 2022 05:00) (103/50 - 111/53)  RR: 100 (22 Mar 2022 08:33) (18 - 100)    Drug Dosing Weight  Height (cm): 172.7 (19 Mar 2022 22:56)  Weight (kg): 66 (19 Mar 2022 22:56)  BMI (kg/m2): 22.1 (19 Mar 2022 22:56)  BSA (m2): 1.79 (19 Mar 2022 22:56)    EXAM  Lethargic, nonverbal, appears comfortable on NC O2  Abd: soft, ND  Skin: stage IV sacral ulcer  LE: no edema  Enteral access: +NGT  IV access: peripheral     LABS  03-21    147<H>  |  109  |  85<HH>  ----------------------------<  145<H>  4.4   |  20  |  2.4<H>    Ca    7.8<L>      21 Mar 2022 04:30  Phos  5.8     03-21  Mg     2.2     03-21  Vitamin B12, Serum: 1224 pg/mL (01.26.22 @ 04:30)  Folate, Serum: 10.8 ng/mL (01.26.22 @ 04:30)    TPro  5.3<L>  /  Alb  2.1<L>  /  TBili  0.4  /  DBili  x   /  AST  21  /  ALT  9   /  AlkPhos  120<H>  03-21                       8.3    16.06 )-----------( 220      ( 22 Mar 2022 08:00 )             29.4     CAPILLARY BLOOD GLUCOSE  POCT Blood Glucose.: 286 mg/dL (22 Mar 2022 07:37)  POCT Blood Glucose.: 133 mg/dL (21 Mar 2022 21:27)  POCT Blood Glucose.: 162 mg/dL (21 Mar 2022 16:20)  POCT Blood Glucose.: 165 mg/dL (21 Mar 2022 11:21)    Diet, NPO with Tube Feed:   Tube Feeding Modality: Nasogastric  Jevity 1.2 Cayden  Total Volume for 24 Hours (mL): 880  Bolus  Total Volume of Bolus (mL):  220  Total # of Feeds: 4  Tube Feed Frequency: Every 6 hours   Tube Feed Start Time: 18:00  Bolus Feed Rate (mL per Hour): 44   Bolus Feed Duration (in Hours): 5  Bolus   Total Volume per Flush (mL): 150   Frequency: Every 4 Hours (03-21-22 @ 16:14)    ASSESSMENT  80 y/o man with PMH of HTN, Dyslipidemia, DM2, Chronic Asbestosis Lung disease and Hypothyroidism who was sent into the hospital from [facility] for eval of increased lethargy over the past x 1-2 days associated with congestion and hypoxemia.     - Acute hypoxemic respiratory failure, suspected aspiration pneumonia  - sepsis on admission  - HAGMA secondary to lactic acidosis and FILOMENA  - metabolic encephalopathy  - chronic anemia  - stage IV pressure ulcer  - DM, A1c 6.6  - dysphagia  - moderate protein calorie malnutrition    PLAN  - rec change feeds to Glucerna 1.2 360ml X 2 feeds and 240ml X 2 feeds/day for now  - give feeds at meal times and qhs, not on q6hr schedule  - change MV to MV with minerals   - add vitamin C 500mg daily  - monitor WBC's and temp, re-eval for addition of Barry over next 48hrs  - will follow as goals of care established

## 2022-03-22 NOTE — PROGRESS NOTE ADULT - SUBJECTIVE AND OBJECTIVE BOX
MIGUEL ANGEL GARZA             MRN-[id number]      Patient is a 82y old Male who presents with a chief complaint of sepsis (22 Mar 2022 12:27)    Currently admitted with the primary diagnosis of: sepsis        SUBJECTIVE:    - pt seen at bedside  - no improvement with mental status from yesterday  - per medical team, respiratory status improving, tolerating NGT   - wife Amirah at bedside     ROS:  UNABLE TO OBTAIN  due to: obtunded       PEx:   T(C): 36.3 (03-22-22 @ 13:10), Max: 36.3 (03-22-22 @ 13:10)  HR: 76 (03-22-22 @ 13:10) (76 - 84)  BP: 126/62 (03-22-22 @ 13:10) (103/50 - 126/62)  RR: 18 (03-22-22 @ 13:10) (18 - 100)            General:  found in bed in NAD, ill appearing   Eyes:  PERRL EOMI Non icteric MOM  ENMT: no external oral ulcers, MMM, no thrush   CVS: RR, no edema   Resp: Unlabored Non tachypneic No increased WOB, on NC  GI:  Soft NT ND   Musc: No C/C/E    Neuro: Obtunded   Psych: Calm Pleasant, AAOx0    Skin: Non jaundiced , no rash       ALLERGIES: No Known Allergies      Labs:	    CBC:                        8.3    16.06 )-----------( 220      ( 22 Mar 2022 08:00 )             29.4     CMP:    03-22    147<H>  |  108  |  91<HH>  ----------------------------<  262<H>  3.8   |  21  |  2.5<H>    Ca    7.6<L>      22 Mar 2022 08:00  Phos  5.8     03-21  Mg     2.2     03-22    TPro  5.2<L>  /  Alb  1.9<L>  /  TBili  0.3  /  DBili  x   /  AST  40  /  ALT  13  /  AlkPhos  174<H>  03-22       RADIOLOGY    < from: Xray Chest 1 View- PORTABLE-Urgent (Xray Chest 1 View- PORTABLE-Urgent .) (03.21.22 @ 16:25) >  Impression:    In comparison with the prior chest x-ray dated March 19, 2022 time 3:14   PM:    Interval placement of a nasogastric tube with the tip in the stomach.    --- End of Report ---    < end of copied text >      EKG  12 Lead ECG:   Ventricular Rate 109 BPM    Atrial Rate 109 BPM    P-R Interval 150 ms    QRS Duration 90 ms    Q-T Interval 376 ms    QTC Calculation(Bazett) 506 ms    P Axis 40 degrees    R Axis -10 degrees    T Axis 49 degrees    Diagnosis Line *** Poor data quality, interpretation may be adversely affected  Sinus tachycardia  Otherwise normal ECG    Confirmed by Ashley Vick MD ([id number]) on 3/20/2022 10:54:13 AM (03-19-22 @ 16:54)      Imaging Personally Reviewed:  [ X] YES  [ ] NO    Consultant(s) Notes Reviewed:  [X ] YES  [ ] NO  Care Discussed with Consultants/Other Providers [X ] YES  [ ] NO    Medications:	      MEDICATIONS  (STANDING):  artificial  tears Solution 1 Drop(s) Both EYES three times a day  ATENolol  Tablet 50 milliGRAM(s) Oral daily  cefepime   IVPB 500 milliGRAM(s) IV Intermittent every 24 hours  chlorhexidine 4% Liquid 1 Application(s) Topical <User Schedule>  collagenase Ointment 1 Application(s) Topical two times a day  heparin   Injectable 5000 Unit(s) SubCutaneous every 8 hours  levothyroxine 50 MICROGram(s) Oral daily  multivitamin 1 Tablet(s) Oral daily  silver sulfADIAZINE 1% Cream 1 Application(s) Topical two times a day  simvastatin 40 milliGRAM(s) Oral at bedtime    MEDICATIONS  (PRN):  acetaminophen     Tablet .. 650 milliGRAM(s) Oral every 6 hours PRN Temp greater or equal to 38C (100.4F), Moderate Pain (4 - 6)        ADVANCED DIRECTIVES:            DNR DNI         DECISION MAKER: Patient [  ]  Family [ X ]  Other [  ] _______  LEGAL SURROGATE: Wife and son       GOALS OF CARE DISCUSSION  - confirmed DNR/DNI, trial of feeding tube yesterday  - family is very focused on WHY the patient isnt getting better, frustrated by the lack of clear cut answers   - plan to re-address GOC should the patients mental status fail to improve   - spoke with wife at bedside today, offered support     PSYCHOSOCIAL-SPIRITUAL ASSESSMENT:       Reviewed       See Palliative Care SW/ documentation

## 2022-03-22 NOTE — PROGRESS NOTE ADULT - SUBJECTIVE AND OBJECTIVE BOX
MIGUEL ANGEL GARZA  82y Male    INTERVAL HPI/OVERNIGHT EVENTS:    Pt remains lethargic. no fever  tolerating O2 via NC  unable to obtain ROS due to mental status    T(F): 96.2 (03-22-22 @ 05:00), Max: 97 (03-21-22 @ 20:44)  HR: 84 (03-22-22 @ 05:00) (70 - 84)  BP: 103/50 (03-22-22 @ 05:00) (103/50 - 111/53)  RR: 100 (03-22-22 @ 08:33) (18 - 100)  SpO2: 100% on 3L   I&O's Summary    21 Mar 2022 07:01  -  22 Mar 2022 07:00  --------------------------------------------------------  IN: 0 mL / OUT: 690 mL / NET: -690 mL      CAPILLARY BLOOD GLUCOSE      POCT Blood Glucose.: 252 mg/dL (22 Mar 2022 11:26)  POCT Blood Glucose.: 286 mg/dL (22 Mar 2022 07:37)  POCT Blood Glucose.: 133 mg/dL (21 Mar 2022 21:27)  POCT Blood Glucose.: 162 mg/dL (21 Mar 2022 16:20)        PHYSICAL EXAM:  GENERAL: NAD  HEAD:  Normocephalic  EYES:  closed  ENMT: NGT, Mouth closed  NERVOUS SYSTEM: lethargic, grimaces to pain  CHEST/LUNG:  rhonchi b/l  HEART: Regular rate and rhythm  ABDOMEN: Soft, Nontender, Nondistended; Bowel sounds present  EXTREMITIES:   No edema    Consultant(s) Notes Reviewed:  [x ] YES  [ ] NO  Care Discussed with Consultants/Other Providers [ x] YES  [ ] NO    MEDICATIONS  (STANDING):  artificial  tears Solution 1 Drop(s) Both EYES three times a day  ATENolol  Tablet 50 milliGRAM(s) Oral daily  cefepime   IVPB 500 milliGRAM(s) IV Intermittent every 24 hours  chlorhexidine 4% Liquid 1 Application(s) Topical <User Schedule>  collagenase Ointment 1 Application(s) Topical two times a day  heparin   Injectable 5000 Unit(s) SubCutaneous every 8 hours  lactated ringers. 1000 milliLiter(s) (100 mL/Hr) IV Continuous <Continuous>  levothyroxine 50 MICROGram(s) Oral daily  multivitamin 1 Tablet(s) Oral daily  silver sulfADIAZINE 1% Cream 1 Application(s) Topical two times a day  simvastatin 40 milliGRAM(s) Oral at bedtime    MEDICATIONS  (PRN):  acetaminophen     Tablet .. 650 milliGRAM(s) Oral every 6 hours PRN Temp greater or equal to 38C (100.4F), Moderate Pain (4 - 6)      LABS:                        8.3    16.06 )-----------( 220      ( 22 Mar 2022 08:00 )             29.4     03-22    147<H>  |  108  |  91<HH>  ----------------------------<  262<H>  3.8   |  21  |  2.5<H>    Ca    7.6<L>      22 Mar 2022 08:00  Phos  5.8     03-21  Mg     2.2     03-22    TPro  5.2<L>  /  Alb  1.9<L>  /  TBili  0.3  /  DBili  x   /  AST  40  /  ALT  13  /  AlkPhos  174<H>  03-22        Culture - Urine (collected 19 Mar 2022 16:04)  Source: Clean Catch Clean Catch (Midstream)  Final Report (20 Mar 2022 20:21):    No growth    Culture - Blood (collected 19 Mar 2022 15:47)  Source: .Blood Blood-Peripheral  Preliminary Report (21 Mar 2022 01:00):    No growth to date.    Culture - Blood (collected 19 Mar 2022 15:47)  Source: .Blood Blood-Peripheral  Preliminary Report (21 Mar 2022 01:00):    No growth to date.        RADIOLOGY & ADDITIONAL TESTS:    Imaging or report Personally Reviewed:  [x ] YES  [ ] NO    CXR reviewed by me    < from: Xray Chest 1 View- PORTABLE-Urgent (Xray Chest 1 View- PORTABLE-Urgent .) (03.21.22 @ 16:25) >  Impression:    In comparison with the prior chest x-ray dated March 19, 2022 time 3:14   PM:    Interval placement of a nasogastric tube with the tip in the stomach.    < end of copied text >      Case discussed with residents and RN on rounds today    Care discussed with pt's family

## 2022-03-22 NOTE — PROGRESS NOTE ADULT - NS ATTEND AMEND GEN_ALL_CORE FT
Agree with above, patient comfortable with NGT in, ongoing GOC based on clinical status, wife at bedside    ______________  Rishi Bledsoe MD  Palliative Medicine  [facility]   [facility] Pall[facility]  [phone]

## 2022-03-23 NOTE — PROGRESS NOTE ADULT - SUBJECTIVE AND OBJECTIVE BOX
MIGUEL ANGEL GARZA             MRN-[id number]      Patient is a 82y old Male who presents with a chief complaint of sepsis (22 Mar 2022 12:27)    Currently admitted with the primary diagnosis of: sepsis        SUBJECTIVE:    - pt seen at bedside  - no improvement with mental status from yesterday  - per medical team, respiratory status improving, tolerating NGT   - wife Amirah at bedside with son as well    ROS:  UNABLE TO OBTAIN  due to: obtunded       PEx:   ICU Vital Signs Last 24 Hrs  T(C): 36.1 (23 Mar 2022 05:00), Max: 36.3 (22 Mar 2022 13:10)  T(F): 96.9 (23 Mar 2022 05:00), Max: 97.3 (22 Mar 2022 13:10)  HR: 102 (23 Mar 2022 05:00) (76 - 102)  BP: 127/66 (23 Mar 2022 05:00) (126/62 - 134/63)  RR: 22 (23 Mar 2022 05:00) (18 - 32)  SpO2: 98% (23 Mar 2022 08:07) (98% - 98%)            General:  found in bed in NAD, ill appearing   Eyes:  PERRL EOMI Non icteric MOM  ENMT: no external oral ulcers, MMM, no thrush   CVS: RR, no edema   Resp: Unlabored Non tachypneic No increased WOB, on NC  GI:  Soft NT ND   Musc: No C/C/E    Neuro: Obtunded   Psych: Calm Pleasant, AAOx0    Skin: Non jaundiced , no rash       ALLERGIES: No Known Allergies      Labs:	        03-23    147<H>  |  111<H>  |  88<HH>  ----------------------------<  236<H>  4.2   |  19  |  2.1<H>    Ca    7.1<L>      23 Mar 2022 04:30  Mg     1.9     03-23    TPro  4.2<L>  /  Alb  1.5<L>  /  TBili  0.4  /  DBili  x   /  AST  138<H>  /  ALT  34  /  AlkPhos  292<H>  03-23                            8.2    9.42  )-----------( 168      ( 23 Mar 2022 04:30 )             28.4        RADIOLOGY    < from: Xray Chest 1 View- PORTABLE-Urgent (Xray Chest 1 View- PORTABLE-Urgent .) (03.21.22 @ 16:25) >  Impression:    In comparison with the prior chest x-ray dated March 19, 2022 time 3:14   PM:    Interval placement of a nasogastric tube with the tip in the stomach.    --- End of Report ---    < end of copied text >      EKG  12 Lead ECG:   Ventricular Rate 109 BPM    Atrial Rate 109 BPM    P-R Interval 150 ms    QRS Duration 90 ms    Q-T Interval 376 ms    QTC Calculation(Bazett) 506 ms    P Axis 40 degrees    R Axis -10 degrees    T Axis 49 degrees    Diagnosis Line *** Poor data quality, interpretation may be adversely affected  Sinus tachycardia  Otherwise normal ECG    Confirmed by Ashley Vick MD ([id number]) on 3/20/2022 10:54:13 AM (03-19-22 @ 16:54)      Imaging Personally Reviewed:  [ X] YES  [ ] NO    Consultant(s) Notes Reviewed:  [X ] YES  [ ] NO  Care Discussed with Consultants/Other Providers [X ] YES  [ ] NO    Medications:	      MEDICATIONS  (STANDING):  artificial  tears Solution 1 Drop(s) Both EYES three times a day  ATENolol  Tablet 50 milliGRAM(s) Oral daily  cefepime   IVPB 500 milliGRAM(s) IV Intermittent every 24 hours  chlorhexidine 4% Liquid 1 Application(s) Topical <User Schedule>  collagenase Ointment 1 Application(s) Topical two times a day  heparin   Injectable 5000 Unit(s) SubCutaneous every 8 hours  levothyroxine 50 MICROGram(s) Oral daily  multivitamin 1 Tablet(s) Oral daily  silver sulfADIAZINE 1% Cream 1 Application(s) Topical two times a day  simvastatin 40 milliGRAM(s) Oral at bedtime    MEDICATIONS  (PRN):  acetaminophen     Tablet .. 650 milliGRAM(s) Oral every 6 hours PRN Temp greater or equal to 38C (100.4F), Moderate Pain (4 - 6)        ADVANCED DIRECTIVES:            DNR DNI         DECISION MAKER: Patient [  ]  Family [ X ]  Other [  ] _______  LEGAL SURROGATE: Wife and son       GOALS OF CARE DISCUSSION  - confirmed DNR/DNI, trial of feeding tube  - family is very focused on WHY the patient isnt getting better, frustrated by the lack of clear cut answers   - plan to re-address GOC should the patients mental status fail to improve   - discussed PEG tube versus hospice as option with family- will re-address in a few days. they want to think about it.     PSYCHOSOCIAL-SPIRITUAL ASSESSMENT:       Reviewed       See Palliative Care SW/ documentation

## 2022-03-23 NOTE — CDI QUERY NOTE - NSCDIOTHERTXTBX_GEN_ALL_CORE_HH
-----------------------------------------------------------------------------------------------------------------------------------  82 F, with Diagnosis:  Sepsis,  Pneumonia, Acute Hypoxic Respiratory Failure  Documentation:   3/20/2022:   RN:  Adult Assessment and Intervention :  AM-PAC Functional  Assessment: Basic Mobility  Turning from your back to your side while in a flat bed without using bedrails?:  1 = Total assistance  Moving from lying on your back to sitting on the flat side of a flat bed without  using bedrails?: 1 = Total assistance  Moving to and from a bed to a chair (including a wheelchair)?: 1 = Total  assistance  Standing up from a chair using your arms (e.g. wheelchair or bedside chair)?: 1  = Total assistance  To walk in hospital room?: 1 = Total assistance  Climbing 3-5 steps with a railing?: 1 = Total assistance         Score: 6      Activity Type: Bedrest  Activity Assistance Provided: Total assist    [place] Assessment  [place] Sensory Sensory Perception: (1) completely limited  [place] Moisture Moisture: (2) very moist  [place] Activity Activity: (1) bedfast  [place] Mobility Mobility: (1) completely immobile  [place] Nutrition Nutrition: (1) very poor  [place] Friction and Shear Friction and Shear: (1) problem  [place] Score Score (if 18 or less activate Skin Injury Risk Increased CPG): 7       Pressure Injury #1 (Initial/Weekly)  Was Pressure Injury Present on Admission to hospital?: yes  Is the Pressure Injury related to medical devices?: no  Location/: sacrum  Stage: Stage IV    Based on your professional judgment and clinical indicators, can the adult  assessment be further specified as:    -----  Complete immobility due to severe physical disability  -----  Functional quadriplegia  ------ Other (please specify)  ------ Clinically unable to determine    Thank you.

## 2022-03-23 NOTE — PROGRESS NOTE ADULT - NS ATTEND AMEND GEN_ALL_CORE FT
Agree with above, patient comfortable, NGT in place; will discuss further nutritional GOC with family in near future    ______________  Rishi Bledsoe MD  Palliative Medicine  [facility]   of Geriatric and Palliative Medicine  [phone]

## 2022-03-23 NOTE — CHART NOTE - NSCHARTNOTEFT_GEN_A_CORE
PALLIATIVE MEDICINE INTERDISCIPLINARY TEAM NOTE    Provider:  [  x ]Social Work   [   ]          [   ] Initial visit [x   ] Follow up    Family or contact name / phone #   Met with: [  x ] Patient:  patient was observed to be resting comfortably.  [x   ] Family:  T/C to spouse, Jo Welch  [   ] Other:    Primary Language: [ x  ] English [   ] Other*:                      *Interpretation provided by:    SUPPORT DIAGNOSES            (Check all that apply)  [   ] Psychosocial spiritual assessment (PSSA)  [  x ] EOL issues  [   ] Cultural / spiritual concerns  [   ] Pain / suffering  [   ] Dementia / AMS  [   ] Other:  [  x ] AD issues  [x   ] Grief / loss / sadness  [   ] Discharge issues  [ x  ] Distress / coping    PSYCHOSOCIAL ASSESSMENT OF PATIENT         (Check all that apply)  [  x ] Initial Assessment            [   ] Reassessment          [   ] Not Applicable this visit    Pain/suffering acuity:  patient appeared to be comfortable  [   ] None to mild (0-3)           [   ] Moderate (4-6)        [   ] High (7-10)    Mental Status:  [   ] Alert/oriented (x3)          [   ] Confused/Altered(x2/x1)         [  x ] Non-resp    Functional status:  [   ] Independent w ADLs      [   ] Needs Assistance             [ x  ] Bedbound/Full Care    Coping:  unable to assess  [   ] Coping well                     [   ] Coping w/difficulty            [   ] Poor coping    Support system:  [  x ] Strong                              [   ] Adequate                        [   ] Inadequate      Past history and medications for:   none, as per spouse    [ ] Anxiety       [ ] Depression    [ ] Sleep disorders     SPIRITUAL ASSESSMENT  Pentecostalism/Spiritual practice: ___________________________    Role of organized Anabaptism:  [   ] Important                     [   ] Some (fam tradition, cultural)               [   ] None    Effects on medical care:  [   ] Yes, _____________________________________                         [   ] None    Cultural/Hinduism need:  [   ] Yes, _____________________________________                         [   ] None    Refer to Pastoral Care:  [   ] Yes           [   ] No, not at this time    SERVICE PROVIDED  [   ]PSSA                                                                             [   ]Discharge support / facilitation  [x   ]AD / goals of care counseling                                  [   ]EOL / death / bereavement counseling  [ x  ]Counseling / support                                                [   ] Family meeting  [   ]Prayer / sacrament / ritual                                      [   ] Referral   [   ]Other                                                                       NOTE and Plan of Care (PoC):    Patient is a 82 year old, , male.  Patient was admitted from [place] ([facility] on 3/19/22, dx:  Sepsis.  Patient has PMH of HTN, Dyslipidemia, DM2, Chronic Asbestosis Lung Disease and Hypothyroidism.    Patient was observed to be resting comfortably.  T/C to spouse Jo Welch:  reviewed role of Palliative Care.  Spouse expressed concerns regarding patient's prognosis.  Support rendered.

## 2022-03-23 NOTE — PROGRESS NOTE ADULT - SUBJECTIVE AND OBJECTIVE BOX
pt with O2 by NC, no distress, min responsive  NG tube in place  abd soft, ND  Vital Signs Last 24 Hrs  T(C): 37.7 (23 Mar 2022 13:00), Max: 37.7 (23 Mar 2022 13:00)  T(F): 99.9 (23 Mar 2022 13:00), Max: 99.9 (23 Mar 2022 13:00)  HR: 97 (23 Mar 2022 13:00) (90 - 102)  BP: 122/58 (23 Mar 2022 13:00) (122/58 - 134/63)  BP(mean): --  RR: 20 (23 Mar 2022 13:00) (20 - 32)  SpO2: 98% (23 Mar 2022 08:07) (98% - 98%)    MEDICATIONS  (STANDING):  artificial  tears Solution 1 Drop(s) Both EYES three times a day  ascorbic acid 500 milliGRAM(s) Oral daily  ATENolol  Tablet 50 milliGRAM(s) Oral daily  cefepime   IVPB 500 milliGRAM(s) IV Intermittent every 24 hours  chlorhexidine 4% Liquid 1 Application(s) Topical <User Schedule>  collagenase Ointment 1 Application(s) Topical two times a day  heparin   Injectable 5000 Unit(s) SubCutaneous every 8 hours  insulin lispro (ADMELOG) corrective regimen sliding scale   SubCutaneous every 6 hours  levothyroxine 50 MICROGram(s) Oral daily  multivitamin/minerals 1 Tablet(s) Oral daily  silver sulfADIAZINE 1% Cream 1 Application(s) Topical two times a day  simvastatin 40 milliGRAM(s) Oral at bedtime                        8.2    9.42  )-----------( 168      ( 23 Mar 2022 04:30 )             28.4   03-23    147<H>  |  111<H>  |  88<HH>  ----------------------------<  236<H>  4.2   |  19  |  2.1<H>    Ca    7.1<L>      23 Mar 2022 04:30  Mg     1.9     03-23    TPro  4.2<L>  /  Alb  1.5<L>  /  TBili  0.4  /  DBili  x   /  AST  138<H>  /  ALT  34  /  AlkPhos  292<H>  03-23  Phosphorus Level, Serum: 5.8 mg/dL (03.21.22 @ 04:30) but BUN adn creat had been somewhat higher at that time    Diet, NPO with Tube Feed:   Tube Feeding Modality: Nasogastric  Glucerna 1.2 [place]  Total Volume for 24 Hours (mL): 1440  Total Volume of Bolus (mL):  360  Total # of Feeds: 4  Tube Feed Frequency: Every 6 hours   Tube Feed Start Time: 16:21  Bolus Feed Rate (mL per Hour): 720   Bolus Feed Duration (in Hours): 0.5  Bolus Feed Instructions:  Give 360ml X 2 and 240ml X 2 feeds/day  Please give feeds at 6:30am, 11am, 16:00 and 21:00 daily, not on q6hr feeding schedule  Bolus   Total Volume per Flush (mL): 150   Frequency: Every 4 Hours (03-22-22 @ 16:22) [Active]

## 2022-03-23 NOTE — PROGRESS NOTE ADULT - SUBJECTIVE AND OBJECTIVE BOX
MIGUEL ANGEL GARZA 82y Male  MRN#: [id number]   CODE STATUS: DNR/DNI  Hospital Day: 4d  Pt is currently admitted with the primary diagnosis of: Sepsis + Respiratory Failure    SUBJECTIVE  Overnight events: None reported.  Subjective complaints: Unable to assess.                                          ----------------------------------------------------------  OBJECTIVE  PAST MEDICAL & SURGICAL HISTORY  Hypertension    Diabetes mellitus    No significant past surgical history                                          -----------------------------------------------------------  ALLERGIES:  No Known Allergies                                          ------------------------------------------------------------  HOME MEDICATIONS  Home Medications:  acetaminophen 325 mg oral tablet: 2 tab(s) orally every 6 hours, As needed, Temp greater or equal to 38C (100.4F), Mild Pain (1 - 3) (01 Feb 2022 11:24)  atenolol 50 mg oral tablet: 1 tab(s) orally once a day (17 Jan 2022 01:45)  furosemide 20 mg oral tablet: 1 tab(s) orally once a day for LE swelling (19 Mar 2022 21:59)  Levoxyl 50 mcg (0.05 mg) oral tablet: 1 tab(s) orally once a day (19 Mar 2022 21:59)  metFORMIN 500 mg oral tablet: 1 tab(s) orally 2 times a day (19 Mar 2022 21:59)  Multiple Vitamins oral tablet: 1 tab(s) orally once a day (01 Feb 2022 11:24)  Refresh ophthalmic solution: 1 drop(s) to each affected eye 3 times a day (19 Mar 2022 22:00)  Santyl 250 units/g topical ointment: Apply topically to affected area 2 times a day (19 Mar 2022 22:00)  silver sulfADIAZINE 1% topical cream: Apply topically to affected area 2 times a day (19 Mar 2022 22:00)  simvastatin 40 mg oral tablet: 1 tab(s) orally once a day (at bedtime) (17 Jan 2022 01:45)  zinc oxide topical ointment: Apply topically to affected area 2 times a day (19 Mar 2022 22:01)                         MEDICATIONS:  STANDING MEDICATIONS  artificial  tears Solution 1 Drop(s) Both EYES three times a day  ascorbic acid 500 milliGRAM(s) Oral daily  ATENolol  Tablet 50 milliGRAM(s) Oral daily  cefepime   IVPB 500 milliGRAM(s) IV Intermittent every 24 hours  chlorhexidine 4% Liquid 1 Application(s) Topical <User Schedule>  collagenase Ointment 1 Application(s) Topical two times a day  heparin   Injectable 5000 Unit(s) SubCutaneous every 8 hours  levothyroxine 50 MICROGram(s) Oral daily  multivitamin/minerals 1 Tablet(s) Oral daily  silver sulfADIAZINE 1% Cream 1 Application(s) Topical two times a day  simvastatin 40 milliGRAM(s) Oral at bedtime    PRN MEDICATIONS  acetaminophen     Tablet .. 650 milliGRAM(s) Oral every 6 hours PRN                                          ------------------------------------------------------------  VITAL SIGNS: Last 24 Hours  T(C): 36.1 (23 Mar 2022 05:00), Max: 36.3 (22 Mar 2022 13:10)  T(F): 96.9 (23 Mar 2022 05:00), Max: 97.3 (22 Mar 2022 13:10)  HR: 102 (23 Mar 2022 05:00) (76 - 102)  BP: 127/66 (23 Mar 2022 05:00) (126/62 - 134/63)  BP(mean): --  RR: 22 (23 Mar 2022 05:00) (18 - 32)  SpO2: 98% (23 Mar 2022 08:07) (98% - 98%)    03-22-22 @ 07:01  -  03-23-22 @ 07:00  --------------------------------------------------------  IN: 880 mL / OUT: 1300 mL / NET: -420 mL                                         --------------------------------------------------------------  LABS:             8.2    9.42  )-----------( 168      ( 23 Mar 2022 04:30 )             28.4     03-23    147<H>  |  111<H>  |  88<HH>  ----------------------------<  236<H>  4.2   |  19  |  2.1<H>    Ca    7.1<L>      23 Mar 2022 04:30  Mg     1.9     03-23    TPro  4.2<L>  /  Alb  1.5<L>  /  TBili  0.4  /  DBili  x   /  AST  138<H>  /  ALT  34  /  AlkPhos  292<H>  03-23                                          -------------------------------------------------------------  RADIOLOGY:                                          --------------------------------------------------------------  PHYSICAL EXAM:  General: no acute distress  HEENT: atraumatic  LUNGS: coarse breath sounds bilaterally, no wheezes noted  HEART: regular rate/rhythm, no murmurs/gallops  ABDOMEN: soft, nontender, nondistended, normoactive bowel sounds  EXT: 2+ radial pulses  NEURO: aaox0  SKIN: intact, no new lesions noted                                         --------------------------------------------------------------  ASSESSMENT & PLAN  Past medical history and hospital course:  80 yo M with PMHx of HTN, DLD, DM, Chronic Asbestosis Lung disease (with bilateral calcified plaques) presents from [facility] for congestion and desaturation.    #Acute Hypoxemic Respiratory Failure secondary to possible #Aspiration Pneumonia with #Sepsis POA - now on 3-NC ~95%, will wean off  #Chronic Asbestosis  #Lactic Acidosis - still high, continue trending  - s/p Cefepime in ED, on Cefepime IV 500mg daily, now d/c Vancomycin, MRSA Nares negative, BCx + UCx from 3/19 NGTD f/u  - CXR 3/19 with chronic opacities, extensive bilateral partially calcified pleural plaques  - GOC: family wishes to continue with treatment for now  - S/S consulted, s/p NGT with Nutrition/Dietician consulted    #Sacral Wound - Wound Care consulted, f/u, unlikely source for sepsis  FILOMENA - likely pre-renal secondary to sepsis, worsening, on LR @100cc/h currently  - s/p Colbert in ED for retention, keeping for now as still lethargic with minimal arousal    #HTN - on atenolol 50mg PO daily, holding while NPO as BP also borderline  #T2DM - Metformin at home, A1C 6.6% from 3/20, sugars well controlled off insulin  #DLD - Simvastatin 40mg PO @bedtime  #Hypothyroidism - on Levothyroxine 50ug PO daily, TSH WNL on 3/20                                                                            ----------------------------------------------------  # DVT prophylaxis: Heparin 5000U subQ q8h  # GI prophylaxis: N/A  # Diet: NPO  # Activity: Increase as Tolerated  # Code status: DNR/DNI  # Disposition: Remain on floor                                                                           --------------------------------------------------------  # Handoff: f/u with Palliative + continue abx

## 2022-03-23 NOTE — ADVANCED PRACTICE NURSE CONSULT - ASSESSMENT
82 yo M with PMHx of HTN, DLD, DM, Chronic Asbestosis Lung disease (with bilateral calcified plaques), Hypothyroidism presents from [facility] (3/19) for congestion and desaturation. Per NH notes, pt appeared to have increased congestion and was desaturating, requiring oxygen. History cannot be obtained from pt as he is lethargic. Family at bedside were not aware that pt was severely sick at NH. Currently wish to continue with treatment for presumed infection, although wish to speak to Palliative team regarding potentially proceeding with comfort care.   In the ED, T 98.7, /65, HR 65, RR 24, placed on NRB 15L, saturating 94%. WBC ct 18, lactate 6.3. CXR revealed chronic bilateral opacities, appear increased in R lower lung. Given Cefepime in ED.  Currently admitted with the primary diagnosis of Sepsis + Respiratory Failure; today is hospital day-4d; on 3A, being managed for Acute Hypoxemic Respiratory Failure secondary to possible Aspiration Pneumonia with Sepsis POA - now on 3-NC ~95%, will wean off; Chronic Asbestosis; Lactic Acidosis - still high, continue trending; Sacral Wound - Wound Care consulted, f/u, unlikely source for sepsis; FILOMENA - likely pre-renal secondary to sepsis, worsening, on LR @100cc/h currently; HTN; T2DM; DLD; Hypothyroid. Palliative consulted- per palliative care note (3/23)-" confirmed DNR/DNI, trial of feeding tube, plan to re-address GOC should the patients mental status fail to improve - discussed PEG tube versus hospice as option with family- will re-address in a few days. They want to think about it"     Received patient on 3A, laying supine in bed, turned to left side (pillow under right side, offloading boots to BLEs in place), HOB elevated 30 degrees. Pt obtunded,  remains w/ eyes firmly closed, non-verbal (pt's baseline per RN report), primary RN Eliza at bedside, made aware of purpose of WOCN visit, agreeable to consult.     Bilateral heels intact.   With assistance from RN, turned patient to right side for skin assessment. ABD pad secured w/ foam tape to sacrococcygeal in place, dressing removed.    Type of wound: Stage 4 pressure injury; POA  Location: sacrococcygeal   Wound measurements: 6cm x 5cm x 2cm   Tunneling: none  Undermining: circumferentially at 2.5cm deep    Wound bed: 20% dark pink tissue, 80% purple devitalized tissue & adherent yellow-tan slough tissue; bone palpable within center of wound   Wound edges: open   Periwound: blanchable erythema   Wound exudate: removed packing dressing saturated w/ serosanguinous drainage   Wound odor: none  Induration, erythema, warmth: none  Wound pain: pt groaned during wound bed assessment     Patient bedbound, immobile, + carrero,  incontinent of large amount of semi-formed stool during WOCN visit. Receiving TF via NGT.

## 2022-03-23 NOTE — ADVANCED PRACTICE NURSE CONSULT - RECOMMEDATIONS
1. Stage 4 sacrococcygeal pressure injury- Discontinue Silvadene, continue w/ Collagenase Santyl- Cleanse w/ normal saline, gently pat dry. Apply Cavilon no-sting barrier film to wound edges and periwound to prevent maceration.  Apply Collagenase Santyl nickel-thick to entire wound bed to enzymatically debride devitalized tissue. Lightly pack wound bed & all undermining areas w/ silver hydrofiber Aquacel Ag to fill in wound depth, absorb wound exudate & provide anti-microbial properties to wound bed (upon removal of old dressing, it will be in gel form since it gels when in contact w/ exudate).  Cover w/ ABD pad & secure w/ foam tape. Apply Collagenase and change dressing daily and prn for strike-through drainage or soiling.  -Recommend MRI to r/o osteomyelitis given bone palpability within in wound bed; if osteo present, recommend ID for appropriate Abx coverage.   -Consider Burn consult for further evaluation & treatment- ? debridement if patient candidate & within pt's goals of care.   -Assess skin/wound qshift report changes to primary     Additional recs/PI Prevention:   -Continue turning/positioning patient from side-to-side q2h while in bed, or in accordance w/ pt's plan of care. Continue utilizing pillows and/or fluidized positioner to assist w/ turning/positioning.   -Continue to offload heels from bed surface with offloading boots to BLEs.   -Continue applying Coloplast Nadine Protect moisture barrier cream to buttock and perineal area daily and prn after each incontinent episode.    -Continue utilizing one underpad underneath patient to contain incontinence episodes; change pad when saturated/soiled.   -When/if carrero d/c'ed & patient w/ consistent urinary incontinence, consider utilizing condom catheter (if patient candidate) to contain any urinary incontinence.   -Continue nutrition consult for optimal wound healing & nutritional status.     Plan of Care: Primary RN Eliza at bedside & made aware of above recs. Spoke w/  covering/primary MD Joel in regards to above. No further needs/recs from [facility] service at this time. Staff RN to perform routine skin/wound assessment and manage wound care. Questions or concerns or if wound worsens and reconsult needed, please contact [facility], [phone].

## 2022-03-24 NOTE — SWALLOW BEDSIDE ASSESSMENT ADULT - SWALLOW EVAL: PATIENT/FAMILY GOALS STATEMENT
Goals of care discussion (3/22), Pt's wife agrees with DNR/ DNI and trial of feeding tube (palliative care note)
Pt's son Fernando and Pt's wife Amirah educated on NPO rec

## 2022-03-24 NOTE — SWALLOW BEDSIDE ASSESSMENT ADULT - SLP PERTINENT HISTORY OF CURRENT PROBLEM
80 yo M with PMHx of HTN, DLD, DM, Chronic Asbestosis Lung disease (with bilateral calcified plaques), Hypothyroidism presents from [facility] for congestion and desaturation. Per NH notes, pt appeared to have increased congestion and was desaturating, requiring oxygen. History cannot be obtained from pt as he is lethargic. Family at bedside were not aware that pt was severely sick at NH. Currently wish to continue with treatment for presumed infection, although wish to speak to Palliative team regarding potentially proceeding with comfort care.
82 yo M with PMHx of HTN, DLD, DM, Chronic Asbestosis Lung disease (with bilateral calcified plaques), Hypothyroidism presents from [facility] for congestion and desaturation. Per NH notes, pt appeared to have increased congestion and was desaturating, requiring oxygen. History cannot be obtained from pt as he is lethargic. Family at bedside were not aware that pt was severely sick at NH. Currently wish to continue with treatment for presumed infection, although wish to speak to Palliative team regarding potentially proceeding with comfort care.
80 yo M with PMHx of HTN, DLD, DM, Chronic Asbestosis Lung disease (with bilateral calcified plaques), Hypothyroidism presents from [facility] for congestion and desaturation. Per NH notes, pt appeared to have increased congestion and was desaturating, requiring oxygen. History cannot be obtained from pt as he is lethargic. Family at bedside were not aware that pt was severely sick at NH. Currently wish to continue with treatment for presumed infection, although wish to speak to Palliative team regarding potentially proceeding with comfort care.
82 yo M with PMHx of HTN, DLD, DM, Chronic Asbestosis Lung disease (with bilateral calcified plaques), Hypothyroidism presents from [facility] for congestion and desaturation. Per NH notes, pt appeared to have increased congestion and was desaturating, requiring oxygen. History cannot be obtained from pt as he is lethargic. Family at bedside were not aware that pt was severely sick at NH. Currently wish to continue with treatment for presumed infection, although wish to speak to Palliative team on board with family discussion.

## 2022-03-24 NOTE — SWALLOW BEDSIDE ASSESSMENT ADULT - SWALLOW EVAL: RECOMMENDED DIET
NPO with NGT
NPO  w/ alternate means of nutrition/hydration
NPO with alternate means for nutrition/ hydration
NPO  w/ alternate means of nutrition/hydration

## 2022-03-24 NOTE — CONSULT NOTE ADULT - SUBJECTIVE AND OBJECTIVE BOX
KAYLAMIGUEL ANGEL  82y, Male  Allergy: No Known Allergies      All historical available data reviewed.    HPI:  82 yo M with PMHx of HTN, DLD, DM, Chronic Asbestosis Lung disease (with bilateral calcified plaques), Hypothyroidism presents from [facility] for congestion and desaturation. Per NH notes, pt appeared to have increased congestion and was desaturating, requiring oxygen. History cannot be obtained from pt as he is lethargic. Family at bedside were not aware that pt was severely sick at NH. Currently wish to continue with treatment for presumed infection, although wish to speak to Palliative team regarding potentially proceeding with comfort care.   In the ED, T 98.7, /65, HR 65, RR 24, placed on NRB 15L, saturating 94%. WBC ct 18, lactate 6.3. CXR revealed chronic bilateral opacities, appear increased in R lower lung. Given Cefepime in ED. (19 Mar 2022 22:23)    FAMILY HISTORY:  No pertinent family history in first degree relatives      PAST MEDICAL & SURGICAL HISTORY:  Hypertension    Diabetes mellitus    No significant past surgical history          VITALS:  T(F): 99.3, Max: 99.9 (03-23-22 @ 13:00)  HR: 91  BP: 107/57  RR: 16Vital Signs Last 24 Hrs  T(C): 37.4 (24 Mar 2022 05:00), Max: 37.7 (23 Mar 2022 13:00)  T(F): 99.3 (24 Mar 2022 05:00), Max: 99.9 (23 Mar 2022 13:00)  HR: 91 (24 Mar 2022 05:00) (91 - 97)  BP: 107/57 (24 Mar 2022 05:00) (107/57 - 122/58)  BP(mean): --  RR: 16 (24 Mar 2022 05:00) (16 - 20)  SpO2: 97% (24 Mar 2022 05:00) (97% - 97%)    TESTS & MEASUREMENTS:                        7.2    10.13 )-----------( 126      ( 24 Mar 2022 07:08 )             24.6     03-24    153<H>  |  112<H>  |  86<HH>  ----------------------------<  168<H>  4.0   |  24  |  2.1<H>    Ca    7.5<L>      24 Mar 2022 07:08  Phos  2.5     03-24  Mg     1.8     03-24    TPro  4.8<L>  /  Alb  1.6<L>  /  TBili  0.5  /  DBili  x   /  AST  66<H>  /  ALT  36  /  AlkPhos  330<H>  03-24    LIVER FUNCTIONS - ( 24 Mar 2022 07:08 )  Alb: 1.6 g/dL / Pro: 4.8 g/dL / ALK PHOS: 330 U/L / ALT: 36 U/L / AST: 66 U/L / GGT: x             Culture - Urine (collected 03-19-22 @ 16:04)  Source: Clean Catch Clean Catch (Midstream)  Final Report (03-20-22 @ 20:21):    No growth    Culture - Blood (collected 03-19-22 @ 15:47)  Source: .Blood Blood-Peripheral  Preliminary Report (03-21-22 @ 01:00):    No growth to date.    Culture - Blood (collected 03-19-22 @ 15:47)  Source: .Blood Blood-Peripheral  Preliminary Report (03-21-22 @ 01:00):    No growth to date.            RADIOLOGY & ADDITIONAL TESTS:  Personal review of radiological diagnostics performed  Echo and EKG results noted when applicable.     MEDICATIONS:  acetaminophen     Tablet .. 650 milliGRAM(s) Oral every 6 hours PRN  artificial  tears Solution 1 Drop(s) Both EYES three times a day  ascorbic acid 500 milliGRAM(s) Oral daily  ATENolol  Tablet 50 milliGRAM(s) Oral daily  cefepime   IVPB 500 milliGRAM(s) IV Intermittent every 24 hours  chlorhexidine 4% Liquid 1 Application(s) Topical <User Schedule>  collagenase Ointment 1 Application(s) Topical two times a day  heparin   Injectable 5000 Unit(s) SubCutaneous every 8 hours  insulin lispro (ADMELOG) corrective regimen sliding scale   SubCutaneous every 6 hours  levothyroxine 50 MICROGram(s) Oral daily  multivitamin/minerals 1 Tablet(s) Oral daily  silver sulfADIAZINE 1% Cream 1 Application(s) Topical two times a day  simvastatin 40 milliGRAM(s) Oral at bedtime  sodium chloride 0.45%. 1000 milliLiter(s) IV Continuous <Continuous>      ANTIBIOTICS:  cefepime   IVPB 500 milliGRAM(s) IV Intermittent every 24 hours

## 2022-03-24 NOTE — PROGRESS NOTE ADULT - SUBJECTIVE AND OBJECTIVE BOX
MIGUEL ANGEL GARZA 82y Male  MRN#: [id number]   CODE STATUS: DNR/DNI  Hospital Day: 5d  Pt is currently admitted with the primary diagnosis of: Sepsis + Respiratory Failure    SUBJECTIVE  Overnight events: None reported.  Subjective complaints: Unable to assess.                                          ----------------------------------------------------------  OBJECTIVE  PAST MEDICAL & SURGICAL HISTORY  Hypertension    Diabetes mellitus    No significant past surgical history                                          -----------------------------------------------------------  ALLERGIES:  No Known Allergies                                          ------------------------------------------------------------  HOME MEDICATIONS  Home Medications:  acetaminophen 325 mg oral tablet: 2 tab(s) orally every 6 hours, As needed, Temp greater or equal to 38C (100.4F), Mild Pain (1 - 3) (01 Feb 2022 11:24)  atenolol 50 mg oral tablet: 1 tab(s) orally once a day (17 Jan 2022 01:45)  furosemide 20 mg oral tablet: 1 tab(s) orally once a day for LE swelling (19 Mar 2022 21:59)  Levoxyl 50 mcg (0.05 mg) oral tablet: 1 tab(s) orally once a day (19 Mar 2022 21:59)  metFORMIN 500 mg oral tablet: 1 tab(s) orally 2 times a day (19 Mar 2022 21:59)  Multiple Vitamins oral tablet: 1 tab(s) orally once a day (01 Feb 2022 11:24)  Refresh ophthalmic solution: 1 drop(s) to each affected eye 3 times a day (19 Mar 2022 22:00)  Santyl 250 units/g topical ointment: Apply topically to affected area 2 times a day (19 Mar 2022 22:00)  silver sulfADIAZINE 1% topical cream: Apply topically to affected area 2 times a day (19 Mar 2022 22:00)  simvastatin 40 mg oral tablet: 1 tab(s) orally once a day (at bedtime) (17 Jan 2022 01:45)  zinc oxide topical ointment: Apply topically to affected area 2 times a day (19 Mar 2022 22:01)                         MEDICATIONS:  STANDING MEDICATIONS  artificial  tears Solution 1 Drop(s) Both EYES three times a day  ascorbic acid 500 milliGRAM(s) Oral daily  ATENolol  Tablet 50 milliGRAM(s) Oral daily  cefepime   IVPB 500 milliGRAM(s) IV Intermittent every 24 hours  chlorhexidine 4% Liquid 1 Application(s) Topical <User Schedule>  collagenase Ointment 1 Application(s) Topical two times a day  heparin   Injectable 5000 Unit(s) SubCutaneous every 8 hours  insulin lispro (ADMELOG) corrective regimen sliding scale   SubCutaneous every 6 hours  levothyroxine 50 MICROGram(s) Oral daily  multivitamin/minerals 1 Tablet(s) Oral daily  silver sulfADIAZINE 1% Cream 1 Application(s) Topical two times a day  simvastatin 40 milliGRAM(s) Oral at bedtime  sodium chloride 0.45%. 1000 milliLiter(s) IV Continuous <Continuous>    PRN MEDICATIONS  acetaminophen     Tablet .. 650 milliGRAM(s) Oral every 6 hours PRN                                          ------------------------------------------------------------  VITAL SIGNS: Last 24 Hours  T(C): 37.4 (24 Mar 2022 05:00), Max: 37.7 (23 Mar 2022 13:00)  T(F): 99.3 (24 Mar 2022 05:00), Max: 99.9 (23 Mar 2022 13:00)  HR: 91 (24 Mar 2022 05:00) (91 - 97)  BP: 107/57 (24 Mar 2022 05:00) (107/57 - 122/58)  BP(mean): --  RR: 16 (24 Mar 2022 05:00) (16 - 20)  SpO2: 97% (24 Mar 2022 05:00) (97% - 97%)    03-23-22 @ 07:01  -  03-24-22 @ 07:00  --------------------------------------------------------  IN: 1000 mL / OUT: 700 mL / NET: 300 mL                                         --------------------------------------------------------------  LABS:             7.2    10.13 )-----------( 126      ( 24 Mar 2022 07:08 )             24.6     03-24    153<H>  |  112<H>  |  86<HH>  ----------------------------<  168<H>  4.0   |  24  |  2.1<H>    Ca    7.5<L>      24 Mar 2022 07:08  Phos  2.5     03-24  Mg     1.8     03-24    TPro  4.8<L>  /  Alb  1.6<L>  /  TBili  0.5  /  DBili  x   /  AST  66<H>  /  ALT  36  /  AlkPhos  330<H>  03-24    Lactate, Blood: 4.5 mmol/L *HH* (03-23-22 @ 11:44)                                          -------------------------------------------------------------  RADIOLOGY:                                          --------------------------------------------------------------  PHYSICAL EXAM:  General: no acute distress  HEENT: atraumatic  LUNGS: coarse breath sounds bilaterally, no wheezes noted  HEART: regular rate/rhythm, no murmurs/gallops  ABDOMEN: soft, nontender, nondistended, normoactive bowel sounds  EXT: 2+ radial pulses  NEURO: aaox0  SKIN: intact, no new lesions noted                                         --------------------------------------------------------------  ASSESSMENT & PLAN  Past medical history and hospital course:  82 yo M with PMHx of HTN, DLD, DM, Chronic Asbestosis Lung disease (with bilateral calcified plaques) presents from [facility] for congestion and desaturation.    #Acute Hypoxemic Respiratory Failure secondary to possible #Aspiration Pneumonia with #Sepsis POA - now on 3-NC ~97%, will wean off  #Chronic Asbestosis  #Lactic Acidosis and #Hypernatremia - still high, continue trending, will trial 1/2-NS @50cc for 1d  - s/p Cefepime in ED, on Cefepime IV 500mg daily, now d/c Vancomycin, MRSA Nares negative, BCx + UCx from 3/19 NGTD f/u  - CXR 3/19 with chronic opacities, extensive bilateral partially calcified pleural plaques  - GOC: family wishes to continue with treatment for now  - S/S consulted, s/p NGT with Nutrition/Dietician consulted    #Sacral Wound - Wound Care consulted, due to concern for possible osteomyelitis MRI-Pelvis ordered  - ID consulted, no abx for now  - Burn consulted, will f/u    FILOMENA - likely pre-renal secondary to sepsis, worsening, on IVFs as above, s/p LR before  - s/p Colbert in ED for retention, keeping for now as still lethargic with minimal arousal    #HTN - on Atenolol 50mg PO daily, holding while NPO as BP also borderline  #T2DM - Metformin at home, A1C 6.6% from 3/20, sugars high after starting tube feeds, SSI for now, sugars better overnight, monitor  #DLD - Simvastatin 40mg PO @bedtime  #Hypothyroidism - on Levothyroxine 50ug PO daily, TSH WNL on 3/20                                                                            ----------------------------------------------------  # DVT prophylaxis: Heparin 5000U subQ q8h  # GI prophylaxis: N/A  # Diet: NPO with Tube Feeds  # Activity: Increase as Tolerated  # Code status: DNR/DNI  # Disposition: Remain on floor                                                                           --------------------------------------------------------  # Handoff: f/u with Palliative + continue abx   MIGUEL ANGEL GARZA 82y Male  MRN#: [id number]   CODE STATUS: DNR/DNI  Hospital Day: 5d  Pt is currently admitted with the primary diagnosis of: Sepsis + Respiratory Failure    SUBJECTIVE  Overnight events: None reported.  Subjective complaints: Unable to assess.                                          ----------------------------------------------------------  OBJECTIVE  PAST MEDICAL & SURGICAL HISTORY  Hypertension    Diabetes mellitus    No significant past surgical history                                          -----------------------------------------------------------  ALLERGIES:  No Known Allergies                                          ------------------------------------------------------------  HOME MEDICATIONS  Home Medications:  acetaminophen 325 mg oral tablet: 2 tab(s) orally every 6 hours, As needed, Temp greater or equal to 38C (100.4F), Mild Pain (1 - 3) (01 Feb 2022 11:24)  atenolol 50 mg oral tablet: 1 tab(s) orally once a day (17 Jan 2022 01:45)  furosemide 20 mg oral tablet: 1 tab(s) orally once a day for LE swelling (19 Mar 2022 21:59)  Levoxyl 50 mcg (0.05 mg) oral tablet: 1 tab(s) orally once a day (19 Mar 2022 21:59)  metFORMIN 500 mg oral tablet: 1 tab(s) orally 2 times a day (19 Mar 2022 21:59)  Multiple Vitamins oral tablet: 1 tab(s) orally once a day (01 Feb 2022 11:24)  Refresh ophthalmic solution: 1 drop(s) to each affected eye 3 times a day (19 Mar 2022 22:00)  Santyl 250 units/g topical ointment: Apply topically to affected area 2 times a day (19 Mar 2022 22:00)  silver sulfADIAZINE 1% topical cream: Apply topically to affected area 2 times a day (19 Mar 2022 22:00)  simvastatin 40 mg oral tablet: 1 tab(s) orally once a day (at bedtime) (17 Jan 2022 01:45)  zinc oxide topical ointment: Apply topically to affected area 2 times a day (19 Mar 2022 22:01)                         MEDICATIONS:  STANDING MEDICATIONS  artificial  tears Solution 1 Drop(s) Both EYES three times a day  ascorbic acid 500 milliGRAM(s) Oral daily  ATENolol  Tablet 50 milliGRAM(s) Oral daily  cefepime   IVPB 500 milliGRAM(s) IV Intermittent every 24 hours  chlorhexidine 4% Liquid 1 Application(s) Topical <User Schedule>  collagenase Ointment 1 Application(s) Topical two times a day  heparin   Injectable 5000 Unit(s) SubCutaneous every 8 hours  insulin lispro (ADMELOG) corrective regimen sliding scale   SubCutaneous every 6 hours  levothyroxine 50 MICROGram(s) Oral daily  multivitamin/minerals 1 Tablet(s) Oral daily  silver sulfADIAZINE 1% Cream 1 Application(s) Topical two times a day  simvastatin 40 milliGRAM(s) Oral at bedtime  sodium chloride 0.45%. 1000 milliLiter(s) IV Continuous <Continuous>    PRN MEDICATIONS  acetaminophen     Tablet .. 650 milliGRAM(s) Oral every 6 hours PRN                                          ------------------------------------------------------------  VITAL SIGNS: Last 24 Hours  T(C): 37.4 (24 Mar 2022 05:00), Max: 37.7 (23 Mar 2022 13:00)  T(F): 99.3 (24 Mar 2022 05:00), Max: 99.9 (23 Mar 2022 13:00)  HR: 91 (24 Mar 2022 05:00) (91 - 97)  BP: 107/57 (24 Mar 2022 05:00) (107/57 - 122/58)  BP(mean): --  RR: 16 (24 Mar 2022 05:00) (16 - 20)  SpO2: 97% (24 Mar 2022 05:00) (97% - 97%)    03-23-22 @ 07:01  -  03-24-22 @ 07:00  --------------------------------------------------------  IN: 1000 mL / OUT: 700 mL / NET: 300 mL                                         --------------------------------------------------------------  LABS:             7.2    10.13 )-----------( 126      ( 24 Mar 2022 07:08 )             24.6     03-24    153<H>  |  112<H>  |  86<HH>  ----------------------------<  168<H>  4.0   |  24  |  2.1<H>    Ca    7.5<L>      24 Mar 2022 07:08  Phos  2.5     03-24  Mg     1.8     03-24    TPro  4.8<L>  /  Alb  1.6<L>  /  TBili  0.5  /  DBili  x   /  AST  66<H>  /  ALT  36  /  AlkPhos  330<H>  03-24    Lactate, Blood: 4.5 mmol/L *HH* (03-23-22 @ 11:44)                                          -------------------------------------------------------------  RADIOLOGY:                                          --------------------------------------------------------------  PHYSICAL EXAM:  General: no acute distress  HEENT: atraumatic  LUNGS: coarse breath sounds bilaterally, no wheezes noted  HEART: regular rate/rhythm, no murmurs/gallops  ABDOMEN: soft, nontender, nondistended, normoactive bowel sounds  EXT: 2+ radial pulses  NEURO: aaox0  SKIN: intact, no new lesions noted                                         --------------------------------------------------------------  ASSESSMENT & PLAN  Past medical history and hospital course:  80 yo M with PMHx of HTN, DLD, DM, Chronic Asbestosis Lung disease (with bilateral calcified plaques) presents from [facility] for congestion and desaturation.    #Acute Hypoxemic Respiratory Failure secondary to possible #Aspiration Pneumonia with #Sepsis POA - now on 3-NC ~97%, will wean off  #Chronic Asbestosis  #Lactic Acidosis and #Hypernatremia - still high, continue trending, will trial 1/2-NS @50cc for 1d  #Functional Quadriplegia  - s/p Cefepime in ED, on Cefepime IV 500mg daily, now d/c Vancomycin, MRSA Nares negative, BCx + UCx from 3/19 NGTD f/u  - CXR 3/19 with chronic opacities, extensive bilateral partially calcified pleural plaques  - GOC: family wishes to continue with treatment for now  - S/S consulted, s/p NGT with Nutrition/Dietician consulted    #Sacral Wound - Wound Care consulted, due to concern for possible osteomyelitis MRI-Pelvis ordered  - ID consulted, no abx for now  - Burn consulted, will f/u    FILOMENA - likely pre-renal secondary to sepsis, worsening, on IVFs as above, s/p LR before  - s/p Colbert in ED for retention, keeping for now as still lethargic with minimal arousal    #HTN - on Atenolol 50mg PO daily, holding while NPO as BP also borderline  #T2DM - Metformin at home, A1C 6.6% from 3/20, sugars high after starting tube feeds, SSI for now, sugars better overnight, monitor  #DLD - Simvastatin 40mg PO @bedtime  #Hypothyroidism - on Levothyroxine 50ug PO daily, TSH WNL on 3/20                                                                            ----------------------------------------------------  # DVT prophylaxis: Heparin 5000U subQ q8h  # GI prophylaxis: N/A  # Diet: NPO with Tube Feeds  # Activity: Increase as Tolerated  # Code status: DNR/DNI  # Disposition: Remain on floor                                                                           --------------------------------------------------------  # Handoff: f/u with Palliative + continue abx

## 2022-03-24 NOTE — CONSULT NOTE ADULT - GASTROINTESTINAL
Soft, non-tender, no hepatosplenomegaly, normal bowel sounds -mild elevation in Miguel, likely 2/2 demand ischemia in setting of likely gi bleed epistaxis   -cont monitor on tele   -Miguel trended to peak   -repeat ekg form 5/9 Atrial fibrillation with frequent ventricular-paced complexes  Incomplete LBBB, ST & T wave abnormality, consider lateral ischemia  -cardio consulted- reccs noted

## 2022-03-24 NOTE — SWALLOW BEDSIDE ASSESSMENT ADULT - SWALLOW EVAL: DIAGNOSIS
Pt lethargic and not appropriate for PO trials. Pt remains with NGT. Family in agreement Pt not appropriate at this time.

## 2022-03-24 NOTE — SWALLOW BEDSIDE ASSESSMENT ADULT - SLP GENERAL OBSERVATIONS
pt received lethargic, +venti mask. pt is currently not a candidate for po trials 2'high risk of aspiration.
Pt asleep in bed, NGT in place
pt known to acute service, w/recs: soft, thins. pt received lethargic, +NC. pt w/decreased arousability, despite max encouragement by SLP. pt is currently not a candidate for po trials 2'high risk of aspiration.

## 2022-03-24 NOTE — CONSULT NOTE ADULT - SUBJECTIVE AND OBJECTIVE BOX
HPI:  81 yo M with PMHx of HTN, DLD, DM, Chronic Asbestosis Lung disease (with bilateral calcified plaques), Hypothyroidism presents from [facility] for congestion and desaturation. Per NH notes, pt appeared to have increased congestion and was desaturating, requiring oxygen. History cannot be obtained from pt as he is lethargic. Family at bedside were not aware that pt was severely sick at NH. Currently wish to continue with treatment for presumed infection, although wish to speak to Palliative team regarding potentially proceeding with comfort care.   In the ED, T 98.7, /65, HR 65, RR 24, placed on NRB 15L, saturating 94%. WBC ct 18, lactate 6.3. CXR revealed chronic bilateral opacities, appear increased in R lower lung. Given Cefepime in ED. (19 Mar 2022 22:23)    Burn consulted to evaluate sacral pressure ulcer. Family at bedside (wife and son) state he has had this for a couple of months after a fall caused him to go to a nursing facility. Family states he has not been the same since. Per family, MRI is planned to rule out bone infection 2/2 sacral pressure ulcer.       Allergies    No Known Allergies        PAST MEDICAL & SURGICAL HISTORY:  Hypertension    Diabetes mellitus    No significant past surgical history                              7.2    10.13 )-----------( 126      ( 24 Mar 2022 07:08 )             24.6     Exam:  Gen: NAD, lying comfortably in bed   Wound: Sacrum ~7x5cm full thickness pressure ulcer with ~4cm of undermining superiorly - tissue is pink, moist with small area of adherent yellow eschar - no erythema, no purulence no bleeding . no exposed bone          HPI:  83 yo M with PMHx of HTN, DLD, DM, Chronic Asbestosis Lung disease (with bilateral calcified plaques), Hypothyroidism presents from [facility] for congestion and desaturation. Per NH notes, pt appeared to have increased congestion and was desaturating, requiring oxygen. History cannot be obtained from pt as he is lethargic. Family at bedside were not aware that pt was severely sick at NH. Currently wish to continue with treatment for presumed infection, although wish to speak to Palliative team regarding potentially proceeding with comfort care.   In the ED, T 98.7, /65, HR 65, RR 24, placed on NRB 15L, saturating 94%. WBC ct 18, lactate 6.3. CXR revealed chronic bilateral opacities, appear increased in R lower lung. Given Cefepime in ED. (19 Mar 2022 22:23)    Burn consulted to evaluate sacral pressure ulcer. Family at bedside (wife and son) state he has had this for a couple of months after a fall caused him to go to a nursing facility. Family states he has not been the same since. Per family, MRI is planned to rule out bone infection 2/2 sacral pressure ulcer.       Allergies    No Known Allergies        PAST MEDICAL & SURGICAL HISTORY:  Hypertension    Diabetes mellitus    No significant past surgical history                              7.2    10.13 )-----------( 126      ( 24 Mar 2022 07:08 )             24.6     Exam:  Gen: NAD, lying comfortably in bed   Wound: Sacrum ~7x5cm full thickness pressure ulcer with ~4cm of undermining superiorly - tissue is pink, moist with small area of adherent yellow eschar - no erythema, no purulence no bleeding . no grossly    exposed bone ; are of  brown discoloration of the muscle left undermined aspect    wound irrigated Santyl with Hydrogel and moist Kerlix gauze applied with dry dressing

## 2022-03-24 NOTE — CONSULT NOTE ADULT - NS ATTEND AMEND GEN_ALL_CORE FT
Continuing wound care , nutritional support and offloading discussed with pt's son and wife. Concerns addressed

## 2022-03-24 NOTE — SWALLOW BEDSIDE ASSESSMENT ADULT - COMMENTS
pt known to acute service, w/recs: soft diet w/ thin liquids.  Pt. is s/p NGT placement
pt known to acute service, w/recs: soft, thins. pt received lethargic, +NC. pt w/decreased arousability, despite max encouragement by SLP. pt is currently not a candidate for po trials 2'high risk of aspiration.
pt known to acute service, w/recs: soft, thins. pt received lethargic, +venti mask. pt is currently not a candidate for po trials 2'high risk of aspiration.
pt known to acute service, w/recs: soft diet w/ thin liquids.  Pt. is s/p NGT placement

## 2022-03-25 NOTE — CONSULT NOTE ADULT - ATTENDING COMMENTS
I edited the note
Agree with above, patient comfortable but lethargic, d/w family, DNR/DNI but all other measures for now, including trial of NGT.     ______________  Rishi Bledsoe MD  Palliative Medicine  [facility]   of Geriatric and Palliative Medicine  [phone]

## 2022-03-25 NOTE — PROGRESS NOTE ADULT - SUBJECTIVE AND OBJECTIVE BOX
MIGUEL ANGEL GARZA 82y Male  MRN#: [id number]   CODE STATUS: DNR/DNI  Hospital Day: 6d  Pt is currently admitted with the primary diagnosis of: Sepsis + Respiratory Failure    SUBJECTIVE  Overnight events: None reported.  Subjective complaints: Unable to assess.                                          ----------------------------------------------------------  OBJECTIVE  PAST MEDICAL & SURGICAL HISTORY  Hypertension    Diabetes mellitus    No significant past surgical history                                          -----------------------------------------------------------  ALLERGIES:  No Known Allergies                                          ------------------------------------------------------------  HOME MEDICATIONS  Home Medications:  acetaminophen 325 mg oral tablet: 2 tab(s) orally every 6 hours, As needed, Temp greater or equal to 38C (100.4F), Mild Pain (1 - 3) (01 Feb 2022 11:24)  atenolol 50 mg oral tablet: 1 tab(s) orally once a day (17 Jan 2022 01:45)  furosemide 20 mg oral tablet: 1 tab(s) orally once a day for LE swelling (19 Mar 2022 21:59)  Levoxyl 50 mcg (0.05 mg) oral tablet: 1 tab(s) orally once a day (19 Mar 2022 21:59)  metFORMIN 500 mg oral tablet: 1 tab(s) orally 2 times a day (19 Mar 2022 21:59)  Multiple Vitamins oral tablet: 1 tab(s) orally once a day (01 Feb 2022 11:24)  Refresh ophthalmic solution: 1 drop(s) to each affected eye 3 times a day (19 Mar 2022 22:00)  Santyl 250 units/g topical ointment: Apply topically to affected area 2 times a day (19 Mar 2022 22:00)  silver sulfADIAZINE 1% topical cream: Apply topically to affected area 2 times a day (19 Mar 2022 22:00)  simvastatin 40 mg oral tablet: 1 tab(s) orally once a day (at bedtime) (17 Jan 2022 01:45)  zinc oxide topical ointment: Apply topically to affected area 2 times a day (19 Mar 2022 22:01)                         MEDICATIONS:  STANDING MEDICATIONS  artificial  tears Solution 1 Drop(s) Both EYES three times a day  ascorbic acid 500 milliGRAM(s) Oral daily  ATENolol  Tablet 50 milliGRAM(s) Oral daily  cefepime   IVPB 500 milliGRAM(s) IV Intermittent every 24 hours  chlorhexidine 4% Liquid 1 Application(s) Topical <User Schedule>  collagenase Ointment 1 Application(s) Topical two times a day  Dakins Solution - 1/2 Strength 1 Application(s) Topical two times a day  heparin   Injectable 5000 Unit(s) SubCutaneous every 8 hours  insulin lispro (ADMELOG) corrective regimen sliding scale   SubCutaneous every 6 hours  levothyroxine 50 MICROGram(s) Oral daily  magnesium sulfate  IVPB 2 Gram(s) IV Intermittent every 2 hours  multivitamin/minerals 1 Tablet(s) Oral daily  simvastatin 40 milliGRAM(s) Oral at bedtime  sodium chloride 0.45%. 1000 milliLiter(s) IV Continuous <Continuous>    PRN MEDICATIONS  acetaminophen     Tablet .. 650 milliGRAM(s) Oral every 6 hours PRN                                          ------------------------------------------------------------  VITAL SIGNS: Last 24 Hours  T(C): 36.1 (25 Mar 2022 05:00), Max: 36.4 (24 Mar 2022 20:46)  T(F): 97 (25 Mar 2022 05:00), Max: 97.6 (24 Mar 2022 20:46)  HR: 73 (25 Mar 2022 05:00) (73 - 91)  BP: 161/72 (25 Mar 2022 05:00) (107/53 - 161/72)  BP(mean): --  RR: 18 (25 Mar 2022 05:00) (18 - 18)  SpO2: 97% (24 Mar 2022 11:04) (97% - 97%)    03-24-22 @ 07:01  -  03-25-22 @ 07:00  --------------------------------------------------------  IN: 2050 mL / OUT: 900 mL / NET: 1150 mL                                         --------------------------------------------------------------  LABS:             7.0    9.25  )-----------( 123      ( 25 Mar 2022 07:02 )             24.8     03-25    146  |  110  |  86<HH>  ----------------------------<  180<H>  3.8   |  23  |  2.0<H>    Ca    7.2<L>      25 Mar 2022 07:02  Phos  2.5     03-24  Mg     1.6     03-25    TPro  4.5<L>  /  Alb  1.7<L>  /  TBili  0.3  /  DBili  x   /  AST  73<H>  /  ALT  36  /  AlkPhos  323<H>  03-25    Lactate, Blood: 1.8 mmol/L (03-25-22 @ 07:02)  Lactate, Blood: 1.6 mmol/L (03-24-22 @ 16:00)  Lactate, Blood: 2.1 mmol/L *H* (03-24-22 @ 12:55)                                          -------------------------------------------------------------  RADIOLOGY:                                          --------------------------------------------------------------  PHYSICAL EXAM:  General: no acute distress  HEENT: atraumatic  LUNGS: coarse breath sounds bilaterally, no wheezes noted  HEART: regular rate/rhythm, no murmurs/gallops  ABDOMEN: soft, nontender, nondistended, normoactive bowel sounds  EXT: 2+ radial pulses  NEURO: aaox0  SKIN: intact, no new lesions noted                                         --------------------------------------------------------------  ASSESSMENT & PLAN  Past medical history and hospital course:  80 yo M with PMHx of HTN, DLD, DM, Chronic Asbestosis Lung disease (with bilateral calcified plaques) presents from [facility] for congestion and desaturation.    #Acute Hypoxemic Respiratory Failure secondary to possible #Aspiration Pneumonia with #Sepsis POA - now on 3-NC ~97%, will wean off  #Chronic Asbestosis  #Lactic Acidosis and #Hypernatremia - improved, s/p IVFs  #Functional Quadriplegia  - s/p Cefepime in ED, on Cefepime IV 500mg daily (last day today), now d/c Vancomycin, MRSA Nares negative, BCx + UCx from 3/19 NGTD f/u  - CXR 3/19 with chronic opacities, extensive bilateral partially calcified pleural plaques  - GOC: family wishes to continue with treatment for now  - S/S consulted, s/p NGT with Nutrition/Dietician consulted    #Sacral Wound - Wound Care/Burn consulted, no obvious signs of infection, Santyl/Hydrogel/Dakins BID  - ID consulted, no abx for now    FILOMENA - likely pre-renal secondary to sepsis, improving, s/p IVFs  - baseline creatinine ~0.8, currently 2.0  - s/p Colbert in ED for retention, keeping for now as still lethargic with minimal arousal    #UE Edema - worsened after IVF administration, monitor, elevate  #HTN - on Atenolol 50mg PO daily, holding while NPO as BP also borderline  #T2DM - Metformin at home, A1C 6.6% from 3/20, sugars high after starting tube feeds, SSI for now, sugars better overnight, monitor  #DLD - Simvastatin 40mg PO @bedtime  #Hypothyroidism - on Levothyroxine 50ug PO daily, TSH WNL on 3/20                                                                            ----------------------------------------------------  # DVT prophylaxis: Heparin 5000U subQ q8h  # GI prophylaxis: N/A  # Diet: NPO with Tube Feeds  # Activity: Increase as Tolerated  # Code status: DNR/DNI  # Disposition: Remain on floor                                                                           --------------------------------------------------------  # Handoff: f/u with Palliative MIGUEL ANGEL GARZA 82y Male  MRN#: [id number]   CODE STATUS: DNR/DNI  Hospital Day: 6d  Pt is currently admitted with the primary diagnosis of: Sepsis + Respiratory Failure    SUBJECTIVE  Overnight events: None reported.  Subjective complaints: Unable to assess.                                          ----------------------------------------------------------  OBJECTIVE  PAST MEDICAL & SURGICAL HISTORY  Hypertension    Diabetes mellitus    No significant past surgical history                                          -----------------------------------------------------------  ALLERGIES:  No Known Allergies                                          ------------------------------------------------------------  HOME MEDICATIONS  Home Medications:  acetaminophen 325 mg oral tablet: 2 tab(s) orally every 6 hours, As needed, Temp greater or equal to 38C (100.4F), Mild Pain (1 - 3) (01 Feb 2022 11:24)  atenolol 50 mg oral tablet: 1 tab(s) orally once a day (17 Jan 2022 01:45)  furosemide 20 mg oral tablet: 1 tab(s) orally once a day for LE swelling (19 Mar 2022 21:59)  Levoxyl 50 mcg (0.05 mg) oral tablet: 1 tab(s) orally once a day (19 Mar 2022 21:59)  metFORMIN 500 mg oral tablet: 1 tab(s) orally 2 times a day (19 Mar 2022 21:59)  Multiple Vitamins oral tablet: 1 tab(s) orally once a day (01 Feb 2022 11:24)  Refresh ophthalmic solution: 1 drop(s) to each affected eye 3 times a day (19 Mar 2022 22:00)  Santyl 250 units/g topical ointment: Apply topically to affected area 2 times a day (19 Mar 2022 22:00)  silver sulfADIAZINE 1% topical cream: Apply topically to affected area 2 times a day (19 Mar 2022 22:00)  simvastatin 40 mg oral tablet: 1 tab(s) orally once a day (at bedtime) (17 Jan 2022 01:45)  zinc oxide topical ointment: Apply topically to affected area 2 times a day (19 Mar 2022 22:01)                         MEDICATIONS:  STANDING MEDICATIONS  artificial  tears Solution 1 Drop(s) Both EYES three times a day  ascorbic acid 500 milliGRAM(s) Oral daily  ATENolol  Tablet 50 milliGRAM(s) Oral daily  cefepime   IVPB 500 milliGRAM(s) IV Intermittent every 24 hours  chlorhexidine 4% Liquid 1 Application(s) Topical <User Schedule>  collagenase Ointment 1 Application(s) Topical two times a day  Dakins Solution - 1/2 Strength 1 Application(s) Topical two times a day  heparin   Injectable 5000 Unit(s) SubCutaneous every 8 hours  insulin lispro (ADMELOG) corrective regimen sliding scale   SubCutaneous every 6 hours  levothyroxine 50 MICROGram(s) Oral daily  magnesium sulfate  IVPB 2 Gram(s) IV Intermittent every 2 hours  multivitamin/minerals 1 Tablet(s) Oral daily  simvastatin 40 milliGRAM(s) Oral at bedtime  sodium chloride 0.45%. 1000 milliLiter(s) IV Continuous <Continuous>    PRN MEDICATIONS  acetaminophen     Tablet .. 650 milliGRAM(s) Oral every 6 hours PRN                                          ------------------------------------------------------------  VITAL SIGNS: Last 24 Hours  T(C): 36.1 (25 Mar 2022 05:00), Max: 36.4 (24 Mar 2022 20:46)  T(F): 97 (25 Mar 2022 05:00), Max: 97.6 (24 Mar 2022 20:46)  HR: 73 (25 Mar 2022 05:00) (73 - 91)  BP: 161/72 (25 Mar 2022 05:00) (107/53 - 161/72)  BP(mean): --  RR: 18 (25 Mar 2022 05:00) (18 - 18)  SpO2: 97% (24 Mar 2022 11:04) (97% - 97%)    03-24-22 @ 07:01  -  03-25-22 @ 07:00  --------------------------------------------------------  IN: 2050 mL / OUT: 900 mL / NET: 1150 mL                                         --------------------------------------------------------------  LABS:             7.0    9.25  )-----------( 123      ( 25 Mar 2022 07:02 )             24.8     03-25    146  |  110  |  86<HH>  ----------------------------<  180<H>  3.8   |  23  |  2.0<H>    Ca    7.2<L>      25 Mar 2022 07:02  Phos  2.5     03-24  Mg     1.6     03-25    TPro  4.5<L>  /  Alb  1.7<L>  /  TBili  0.3  /  DBili  x   /  AST  73<H>  /  ALT  36  /  AlkPhos  323<H>  03-25    Lactate, Blood: 1.8 mmol/L (03-25-22 @ 07:02)  Lactate, Blood: 1.6 mmol/L (03-24-22 @ 16:00)  Lactate, Blood: 2.1 mmol/L *H* (03-24-22 @ 12:55)                                          -------------------------------------------------------------  RADIOLOGY:                                          --------------------------------------------------------------  PHYSICAL EXAM:  General: no acute distress  HEENT: atraumatic  LUNGS: coarse breath sounds bilaterally, no wheezes noted  HEART: regular rate/rhythm, no murmurs/gallops  ABDOMEN: soft, nontender, nondistended, normoactive bowel sounds  EXT: 2+ radial pulses  NEURO: aaox0  SKIN: intact, no new lesions noted                                         --------------------------------------------------------------  ASSESSMENT & PLAN  Past medical history and hospital course:  80 yo M with PMHx of HTN, DLD, DM, Chronic Asbestosis Lung disease (with bilateral calcified plaques) presents from [facility] for congestion and desaturation.    #Acute Hypoxemic Respiratory Failure secondary to possible #Aspiration Pneumonia with #Sepsis POA - now on 3-NC ~97%, will wean off  #Chronic Asbestosis  #Lactic Acidosis and #Hypernatremia - improved, s/p IVFs  #Functional Quadriplegia  - s/p Cefepime in ED, on Cefepime IV 500mg daily (last day today), now d/c Vancomycin, MRSA Nares negative, BCx + UCx from 3/19 NGTD f/u  - CXR 3/19 with chronic opacities, extensive bilateral partially calcified pleural plaques  - GOC: family wishes to continue with treatment for now  - S/S consulted, s/p NGT with Nutrition/Dietician consulted, GI consulted for PEG tube, son deciding between hospice vs. PEG tube    #Sacral Wound - Wound Care/Burn consulted, no obvious signs of infection, Santyl/Hydrogel/Dakins BID  - ID consulted, no abx for now    FILOMENA - likely pre-renal secondary to sepsis, improving, s/p IVFs  - baseline creatinine ~0.8, currently 2.0  - s/p Colbert in ED for retention, keeping for now as still lethargic with minimal arousal    #UE Edema - worsened after IVF administration, monitor, elevate  #HTN - on Atenolol 50mg PO daily, holding while NPO as BP also borderline  #T2DM - Metformin at home, A1C 6.6% from 3/20, sugars high after starting tube feeds, SSI for now, sugars better overnight, monitor  #DLD - Simvastatin 40mg PO @bedtime  #Hypothyroidism - on Levothyroxine 50ug PO daily, TSH WNL on 3/20                                                                            ----------------------------------------------------  # DVT prophylaxis: Heparin 5000U subQ q8h  # GI prophylaxis: N/A  # Diet: NPO with Tube Feeds  # Activity: Increase as Tolerated  # Code status: DNR/DNI  # Disposition: Remain on floor                                                                           --------------------------------------------------------  # Handoff: f/u with Palliative

## 2022-03-25 NOTE — PROGRESS NOTE ADULT - PROBLEM SELECTOR PLAN 2
2/2 AMS  NGT placed  family OK with trial of feeding tube and IVF  will need to discuss PEG versus comfort feeding should he not improve   recs per S & S, nutrition

## 2022-03-25 NOTE — PROGRESS NOTE ADULT - PROBLEM SELECTOR PLAN 3
s/p fall last year  multiple hospitalizations  now bed bound   ? dementia dx

## 2022-03-25 NOTE — PROGRESS NOTE ADULT - ATTENDING COMMENTS
1. Acute hypoxemic respiratory failure  Sepsis present on admission - pneumonia vs sacral decubitus   metabolic encephalopathy  - pt now tolerating O2 via NC   - due to suspected aspiration pneumonia  - continue Cefepime  - vanco stopped  - MRSA nasal swab negative  - stage 4 sacral decub   - blood cultures negative  - urine culture negative  - overall very guarded prognosis  - DNR/DNI  - palliative care following   - ID eval, ? mri for concern for om     2. HAGMA from lactic acidosis and FILOMENA  - trend lactate (4.6 today) and continue IV hydration  - Cr trended up    4. Hypernatremia - continue IVF and monitor     5. Chronic normocytic anemia - now relatively stable    6. DM now with hyperglycemia - nutrition eval in progress  would change feeds to Glucerna and add insulin for FS >180   A1C 6.6    7. Dysphagia - NPO for now - remains too lethargic for swallow eval  per palliative care, Tube feeds with NGT, if does not improve then will need peg     8. HTN - SBP on the lower side  hold atenolol for SBP < 100    9. Chronic Asbestosis Lung disease    10. Hypothyroidism on synthroid    11. DVT prophylaxis - Heparin SQ    Code status : DNR/DNI    provider handoff: ID consult for concern for OM
Patient seen and examined at bedside   no acute overnight events noted   patient is arousable to tactile/verbal stimuli     1. Acute hypoxemic respiratory failure  Sepsis present on admission - pneumonia vs sacral decubitus   metabolic encephalopathy  Dysphagia   - pt now tolerating O2 via NC   - due to suspected aspiration pneumonia  - continue Cefepime - complete 7 days course   - vanco stopped  - MRSA nasal swab negative  - stage 4 sacral decub - ID eval noted - necrotic tissue - no need for iv abx   - blood cultures negative  - urine culture negative  - overall very guarded prognosis  - GI consulted for possible PEG tube placement - family is leaning more towards hospice/comfort measures only - but undecided at this time   - DNR/DNI  - palliative care following   - ID eval    2. HAGMA from lactic acidosis and FILOMENA  - trend lactate (4.6 today) and continue IV hydration  - Cr trended up    4. Hypernatremia - continue IVF and monitor     5. Chronic normocytic anemia - Hb level of 7.0 this am, repeat cbc with type and screen, transfuse to keep hb above 7    6. DM now with hyperglycemia - nutrition eval in progress  would change feeds to Glucerna and add insulin for FS >180   A1C 6.6    7. Dysphagia - NGT placed,  on TF, will consult GI for possible PEG placement     8. HTN - SBP on the lower side  hold atenolol for SBP < 100    9. Chronic Asbestosis Lung disease    10. Hypothyroidism on synthroid    11. DVT prophylaxis - Heparin SQ    Code status : DNR/DNI    provider handoff: decision about PEG placement   overall very poor prognosis
Patient seen and examined at bedside   no acute overnight events noted   patient is arousable to tactile stimuli     1. Acute hypoxemic respiratory failure  Sepsis present on admission - pneumonia vs sacral decubitus   metabolic encephalopathy  - pt now tolerating O2 via NC   - due to suspected aspiration pneumonia  - continue Cefepime - complete 7 days course   - vanco stopped  - MRSA nasal swab negative  - stage 4 sacral decub - ID eval noted - necrotic tissue - no need for iv abx   - blood cultures negative  - urine culture negative  - overall very guarded prognosis  - DNR/DNI  - palliative care following   - ID eval    2. HAGMA from lactic acidosis and FILOMENA  - trend lactate (4.6 today) and continue IV hydration  - Cr trended up    4. Hypernatremia - continue IVF and monitor     5. Chronic normocytic anemia - now relatively stable    6. DM now with hyperglycemia - nutrition eval in progress  would change feeds to Glucerna and add insulin for FS >180   A1C 6.6    7. Dysphagia - NGT placed,  on TF, will consult GI for possible PEG placement     8. HTN - SBP on the lower side  hold atenolol for SBP < 100    9. Chronic Asbestosis Lung disease    10. Hypothyroidism on synthroid    11. DVT prophylaxis - Heparin SQ    Code status : DNR/DNI    provider handoff: discharge planning

## 2022-03-25 NOTE — PROGRESS NOTE ADULT - PROBLEM SELECTOR PLAN 5
minimal improvement  encephalopathy versus dementia- unclear etiology  continue current medical management - discussed PEG tube versus hospice as option with family. they likely would opt for comfort feedings/hospice.   - for now, plan to continue current medical management    * 18 min spend discussing GOC

## 2022-03-25 NOTE — PROGRESS NOTE ADULT - NS ATTEND OPT1 GEN_ALL_CORE
I independently performed the documented:

## 2022-03-25 NOTE — CONSULT NOTE ADULT - TIME BILLING
Counseled staff about diagnostic testing and treatment plan. All questions answered.
Coordination of care

## 2022-03-25 NOTE — PROGRESS NOTE ADULT - PROBLEM SELECTOR PLAN 4
DNR/DNI  Continue current medical management  Will re-address GOC as appropriate  Will follow minimal improvement  encephalopathy versus dementia- unclear etiology  continue current medical management

## 2022-03-25 NOTE — CHART NOTE - NSCHARTNOTEFT_GEN_A_CORE
Spouse and son were at bedside.  Family expressed concerns regarding patient's prognosis.  Support rendered.

## 2022-03-25 NOTE — PROGRESS NOTE ADULT - PROBLEM SELECTOR PLAN 6
DNR/DNI  Continue current medical management  Will re-address GOC as appropriate  Will follow  see ACP above re: nutritional GOC

## 2022-03-25 NOTE — PROGRESS NOTE ADULT - NS ATTEND AMEND GEN_ALL_CORE FT
Agree with above, patient comfortable, NGT in place; will discuss further nutritional GOC with family in near future    ______________  Rishi Bledsoe MD  Palliative Medicine  [facility]   of Geriatric and Palliative Medicine  [phone] Agree with above, patient comfortable, NGT in place; will discuss further nutritional GOC with family next Friday after NGT trial    ______________  Rishi Bledsoe MD  Palliative Medicine  [facility]   of Geriatric and Palliative Medicine  [phone]

## 2022-03-25 NOTE — PROGRESS NOTE ADULT - SUBJECTIVE AND OBJECTIVE BOX
MIUGEL ANGEL GARZA             [id number]      Patient is a 82y old Male who presents with a chief complaint of sepsis (22 Mar 2022 12:27)    Currently admitted with the primary diagnosis of: sepsis        SUBJECTIVE:    - pt seen at bedside  - minimal improvement with mental status   - respiratory status improving, tolerating NGT. GI came to eval for PEG placement- family likely not interested.   - wife Amirah at bedside with son as well    ROS:  UNABLE TO OBTAIN  due to: obtunded       PEx:   ICU Vital Signs Last 24 Hrs  T(C): 35.6 (25 Mar 2022 13:17), Max: 36.4 (24 Mar 2022 20:46)  T(F): 96 (25 Mar 2022 13:17), Max: 97.6 (24 Mar 2022 20:46)  HR: 74 (25 Mar 2022 13:17) (73 - 80)  BP: 123/56 (25 Mar 2022 13:17) (112/56 - 161/72)  RR: 18 (25 Mar 2022 13:17) (18 - 18)  SpO2: 95% (25 Mar 2022 08:56) (95% - 95%)            General:  found in bed in NAD, ill appearing   Eyes:  PERRL EOMI Non icteric MOM  ENMT: no external oral ulcers, MMM, no thrush , NGT in place  CVS: RR, no edema   Resp: Unlabored Non tachypneic No increased WOB, on NC  GI:  Soft NT ND   Musc: No C/C/E    Neuro: Obtunded   Psych: Calm Pleasant, AAOx0    Skin: Non jaundiced , no rash       ALLERGIES: No Known Allergies      Labs:	          03-25    146  |  110  |  86<HH>  ----------------------------<  180<H>  3.8   |  23  |  2.0<H>    Ca    7.2<L>      25 Mar 2022 07:02  Phos  2.5     03-24  Mg     1.6     03-25    TPro  4.5<L>  /  Alb  1.7<L>  /  TBili  0.3  /  DBili  x   /  AST  73<H>  /  ALT  36  /  AlkPhos  323<H>  03-25                            7.0    9.25  )-----------( 123      ( 25 Mar 2022 07:02 )             24.8        RADIOLOGY    < from: Xray Chest 1 View- PORTABLE-Urgent (Xray Chest 1 View- PORTABLE-Urgent .) (03.21.22 @ 16:25) >  Impression:    In comparison with the prior chest x-ray dated March 19, 2022 time 3:14   PM:    Interval placement of a nasogastric tube with the tip in the stomach.    --- End of Report ---    < end of copied text >      EKG  12 Lead ECG:   Ventricular Rate 109 BPM    Atrial Rate 109 BPM    P-R Interval 150 ms    QRS Duration 90 ms    Q-T Interval 376 ms    QTC Calculation(Bazett) 506 ms    P Axis 40 degrees    R Axis -10 degrees    T Axis 49 degrees    Diagnosis Line *** Poor data quality, interpretation may be adversely affected  Sinus tachycardia  Otherwise normal ECG    Confirmed by Nava INGRAM Ashley ([id number]) on 3/20/2022 10:54:13 AM (03-19-22 @ 16:54)      Imaging Personally Reviewed:  [ X] YES  [ ] NO    Consultant(s) Notes Reviewed:  [X ] YES  [ ] NO  Care Discussed with Consultants/Other Providers [X ] YES  [ ] NO    Medications:	      MEDICATIONS  (STANDING):  artificial  tears Solution 1 Drop(s) Both EYES three times a day  ATENolol  Tablet 50 milliGRAM(s) Oral daily  cefepime   IVPB 500 milliGRAM(s) IV Intermittent every 24 hours  chlorhexidine 4% Liquid 1 Application(s) Topical <User Schedule>  collagenase Ointment 1 Application(s) Topical two times a day  heparin   Injectable 5000 Unit(s) SubCutaneous every 8 hours  levothyroxine 50 MICROGram(s) Oral daily  multivitamin 1 Tablet(s) Oral daily  silver sulfADIAZINE 1% Cream 1 Application(s) Topical two times a day  simvastatin 40 milliGRAM(s) Oral at bedtime    MEDICATIONS  (PRN):  acetaminophen     Tablet .. 650 milliGRAM(s) Oral every 6 hours PRN Temp greater or equal to 38C (100.4F), Moderate Pain (4 - 6)        ADVANCED DIRECTIVES:            DNR DNI         DECISION MAKER: Patient [  ]  Family [ X ]  Other [  ] _______  LEGAL SURROGATE: Wife and son       GOALS OF CARE DISCUSSION  - confirmed DNR/DNI, trial of feeding tube  - family is very focused on WHY the patient isnt getting better, frustrated by the lack of clear cut answers   - plan to re-address GOC should the patients mental status fail to improve   - discussed PEG tube versus hospice as option with family. they likely would opt for comfort feedings/hospice.   - for now, plan to continue current medical management    * 18 min spend discussing GOC    PSYCHOSOCIAL-SPIRITUAL ASSESSMENT:       Reviewed       See Palliative Care SW/ documentation

## 2022-03-25 NOTE — CONSULT NOTE ADULT - ASSESSMENT
82 yo M with PMHx of HTN, DLD, DM, Chronic Asbestosis Lung disease (with bilateral calcified plaques) presents from [facility] for congestion and desaturation.    #PEG placement  #AHRF on 3L NC  #Chronic asbestosis  - Son is leaning towards hospice if pt is unable to tolerate NG feeds overt the next few days and there is no improvement in overall mental status  - If son is agreeable to PEG next and medically optimized please recall GI for PEG placement.
Sacral pressure ulcer; full thickness; no obvious signs of infection     RECOMMENDATION:  Wound care - wash with soap and water. Apply santyl/hydrogel/dakins WTD BID   No further surgical debridement   F/u MRI to rule out osteomyelitis   Offloading/ positional changes    
82yMaleb with PMH including HTN, DLD, DM, Chronic Asbestosis Lung disease (with bilateral calcified plaques), Hypothyroidism, being evaluated for GOC. Patient was admitted from [place] with respiratory failure/sepsis, possibly due to aspiration pna. Patient now on NC with poor mental status, worsening FILOMENA (s/p carrero for retention), lactic acidosis. Family describes a functional decline in recent months, now with sacral wound, bedbound.    Family wants to continue medical management for now, and is agreeable to placing NGT for now. They would like to re-address GOC as time goes on.   Patient comfortable one exam.     MEDD (morphine equivalent daily dose): 0      See Recs below.    Please call [phone] with questions or concerns 24/7.   We will continue to follow.     Discussed with primary MD.  
80 yo M with PMHx of HTN, DLD, DM, Chronic Asbestosis Lung disease (with bilateral calcified plaques), Hypothyroidism presents from [facility] for congestion and desaturation. Per NH notes, pt appeared to have increased congestion and was desaturating, requiring oxygen. History cannot be obtained from pt as he is lethargic. Family at bedside were not aware that pt was severely sick at NH. Currently wish to continue with treatment for presumed infection, although wish to speak to Palliative team regarding potentially proceeding with comfort care.     IMPRESSION;  Sacral ulcers with areas of necrosis  CXR chronic changes with high probability of aspiration   Blood & Urine cxs NGTD 3/19  Edwin MIRANDA    RECOMMENDATIONS;  Off loading to prevent pressure sores and preventive measures to avoid aspiration   ABx of no benefit  Please do not hesitate to recall ID if any questions arise either through [employer] or through [employer] teams

## 2022-03-25 NOTE — CONSULT NOTE ADULT - SUBJECTIVE AND OBJECTIVE BOX
Gastroenterology Consultation:    Patient is a 82y old  Male who presents with a chief complaint of sepsis (25 Mar 2022 09:26)        Admitted on: 03-19-22      HPI:  80 yo M with PMHx of HTN, DLD, DM, Chronic Asbestosis Lung disease (with bilateral calcified plaques), Hypothyroidism presents from [facility] for congestion and desaturation. Per NH notes, pt appeared to have increased congestion and was desaturating, requiring oxygen. History cannot be obtained from pt as he is lethargic. Family at bedside were not aware that pt was severely sick at NH. Currently wish to continue with treatment for presumed infection, although wish to speak to Palliative team regarding potentially proceeding with comfort care.   In the ED, T 98.7, /65, HR 65, RR 24, placed on NRB 15L, saturating 94%. WBC ct 18, lactate 6.3. CXR revealed chronic bilateral opacities, appear increased in R lower lung. Given Cefepime in ED. (19 Mar 2022 22:23)        Prior EGD: none in chart    Prior Colonoscopy: none in chart      PAST MEDICAL & SURGICAL HISTORY:  Hypertension    Diabetes mellitus    No significant past surgical history          FAMILY HISTORY:  No pertinent family history in first degree relatives        Social History:  unable to obtain    Home Medications:  acetaminophen 325 mg oral tablet: 2 tab(s) orally every 6 hours, As needed, Temp greater or equal to 38C (100.4F), Mild Pain (1 - 3) (01 Feb 2022 11:24)  atenolol 50 mg oral tablet: 1 tab(s) orally once a day (17 Jan 2022 01:45)  furosemide 20 mg oral tablet: 1 tab(s) orally once a day for LE swelling (19 Mar 2022 21:59)  Levoxyl 50 mcg (0.05 mg) oral tablet: 1 tab(s) orally once a day (19 Mar 2022 21:59)  metFORMIN 500 mg oral tablet: 1 tab(s) orally 2 times a day (19 Mar 2022 21:59)  Multiple Vitamins oral tablet: 1 tab(s) orally once a day (01 Feb 2022 11:24)  Refresh ophthalmic solution: 1 drop(s) to each affected eye 3 times a day (19 Mar 2022 22:00)  Santyl 250 units/g topical ointment: Apply topically to affected area 2 times a day (19 Mar 2022 22:00)  silver sulfADIAZINE 1% topical cream: Apply topically to affected area 2 times a day (19 Mar 2022 22:00)  simvastatin 40 mg oral tablet: 1 tab(s) orally once a day (at bedtime) (17 Jan 2022 01:45)  zinc oxide topical ointment: Apply topically to affected area 2 times a day (19 Mar 2022 22:01)        MEDICATIONS  (STANDING):  artificial  tears Solution 1 Drop(s) Both EYES three times a day  ascorbic acid 500 milliGRAM(s) Oral daily  ATENolol  Tablet 50 milliGRAM(s) Oral daily  cefepime   IVPB 500 milliGRAM(s) IV Intermittent every 24 hours  chlorhexidine 4% Liquid 1 Application(s) Topical <User Schedule>  collagenase Ointment 1 Application(s) Topical two times a day  Dakins Solution - 1/2 Strength 1 Application(s) Topical two times a day  heparin   Injectable 5000 Unit(s) SubCutaneous every 8 hours  insulin lispro (ADMELOG) corrective regimen sliding scale   SubCutaneous every 6 hours  levothyroxine 50 MICROGram(s) Oral daily  magnesium sulfate  IVPB 2 Gram(s) IV Intermittent every 2 hours  multivitamin/minerals 1 Tablet(s) Oral daily  simvastatin 40 milliGRAM(s) Oral at bedtime  sodium chloride 0.45%. 1000 milliLiter(s) (50 mL/Hr) IV Continuous <Continuous>    MEDICATIONS  (PRN):  acetaminophen     Tablet .. 650 milliGRAM(s) Oral every 6 hours PRN Temp greater or equal to 38C (100.4F), Moderate Pain (4 - 6)      Allergies  No Known Allergies      Review of Systems:   unable to obtain          Physical Examination:  T(C): 36.1 (03-25-22 @ 05:00), Max: 36.4 (03-24-22 @ 20:46)  HR: 73 (03-25-22 @ 05:00) (73 - 91)  BP: 161/72 (03-25-22 @ 05:00) (107/53 - 161/72)  RR: 18 (03-25-22 @ 05:00) (18 - 18)  SpO2: 97% (03-24-22 @ 11:04) (97% - 97%)      03-23-22 @ 07:01  -  03-24-22 @ 07:00  --------------------------------------------------------  IN: 1000 mL / OUT: 700 mL / NET: 300 mL    03-24-22 @ 07:01  -  03-25-22 @ 07:00  --------------------------------------------------------  IN: 2050 mL / OUT: 900 mL / NET: 1150 mL          GENERAL:NAD NG tube in place  HEAD:  Atraumatic, Normocephalic  EYES: conjunctiva and sclera clear  NECK: Supple, no JVD or thyromegaly  CHEST/LUNG: reduced breath sounds b/l  HEART: Regular rate and rhythm; normal S1, S2, No murmurs.  ABDOMEN: Soft, nontender, nondistended; Bowel sounds present  NEUROLOGY: No asterixis or tremor.   SKIN: Intact, no jaundice        Data:                        7.0    9.25  )-----------( 123      ( 25 Mar 2022 07:02 )             24.8     Hgb Trend:  7.0  03-25-22 @ 07:02  7.6  03-24-22 @ 16:00  7.2  03-24-22 @ 07:08  8.2  03-23-22 @ 04:30        03-25    146  |  110  |  86<HH>  ----------------------------<  180<H>  3.8   |  23  |  2.0<H>    Ca    7.2<L>      25 Mar 2022 07:02  Phos  2.5     03-24  Mg     1.6     03-25    TPro  4.5<L>  /  Alb  1.7<L>  /  TBili  0.3  /  DBili  x   /  AST  73<H>  /  ALT  36  /  AlkPhos  323<H>  03-25    Liver panel trend:  TBili 0.3   /   AST 73   /   ALT 36   /   AlkP 323   /   Tptn 4.5   /   Alb 1.7    /   DBili --      03-25  TBili 0.5   /   AST 66   /   ALT 36   /   AlkP 330   /   Tptn 4.8   /   Alb 1.6    /   DBili -- 03-24  TBili 0.4   /      /   ALT 34   /   AlkP 292   /   Tptn 4.2   /   Alb 1.5    /   DBili -- 03-23  TBili 0.3   /   AST 40   /   ALT 13   /   AlkP 174   /   Tptn 5.2   /   Alb 1.9    /   DBili -- 03-22  TBili 0.4   /   AST 21   /   ALT 9   /   AlkP 120   /   Tptn 5.3   /   Alb 2.1    /   DBili -- 03-21  TBili 0.3   /   AST 18   /   ALT 10   /   AlkP 115   /   Tptn 5.4   /   Alb 2.1    /   DBili -- 03-20  TBili 0.4   /   AST 22   /   ALT 12   /   AlkP 131   /   Tptn 6.0   /   Alb 2.3    /   DBili -- 03-19              Radiology:

## 2022-03-26 NOTE — PROGRESS NOTE ADULT - SUBJECTIVE AND OBJECTIVE BOX
MIGUEL ANGEL GARZA 82y Male  MRN#: [id number]   CODE STATUS: DNR/DNI  Hospital Day: 7d  Pt is currently admitted with the primary diagnosis of: Sepsis + Respiratory Failure    SUBJECTIVE  Overnight events: None reported.  Subjective complaints: Unable to assess.                                     OBJECTIVE  PAST MEDICAL & SURGICAL HISTORY  Hypertension    Diabetes mellitus    No significant past surgical history                                      ALLERGIES:  No Known Allergies                                    HOME MEDICATIONS  Home Medications:  acetaminophen 325 mg oral tablet: 2 tab(s) orally every 6 hours, As needed, Temp greater or equal to 38C (100.4F), Mild Pain (1 - 3) (01 Feb 2022 11:24)  atenolol 50 mg oral tablet: 1 tab(s) orally once a day (17 Jan 2022 01:45)  furosemide 20 mg oral tablet: 1 tab(s) orally once a day for LE swelling (19 Mar 2022 21:59)  Levoxyl 50 mcg (0.05 mg) oral tablet: 1 tab(s) orally once a day (19 Mar 2022 21:59)  metFORMIN 500 mg oral tablet: 1 tab(s) orally 2 times a day (19 Mar 2022 21:59)  Multiple Vitamins oral tablet: 1 tab(s) orally once a day (01 Feb 2022 11:24)  Refresh ophthalmic solution: 1 drop(s) to each affected eye 3 times a day (19 Mar 2022 22:00)  Santyl 250 units/g topical ointment: Apply topically to affected area 2 times a day (19 Mar 2022 22:00)  silver sulfADIAZINE 1% topical cream: Apply topically to affected area 2 times a day (19 Mar 2022 22:00)  simvastatin 40 mg oral tablet: 1 tab(s) orally once a day (at bedtime) (17 Jan 2022 01:45)  zinc oxide topical ointment: Apply topically to affected area 2 times a day (19 Mar 2022 22:01)                         MEDICATIONS:  STANDING MEDICATIONS  artificial  tears Solution 1 Drop(s) Both EYES three times a day  ascorbic acid 500 milliGRAM(s) Oral daily  ATENolol  Tablet 50 milliGRAM(s) Oral daily  cefepime   IVPB 500 milliGRAM(s) IV Intermittent every 24 hours  chlorhexidine 4% Liquid 1 Application(s) Topical <User Schedule>  collagenase Ointment 1 Application(s) Topical two times a day  Dakins Solution - 1/2 Strength 1 Application(s) Topical two times a day  heparin   Injectable 5000 Unit(s) SubCutaneous every 8 hours  insulin lispro (ADMELOG) corrective regimen sliding scale   SubCutaneous every 6 hours  levothyroxine 50 MICROGram(s) Oral daily  magnesium sulfate  IVPB 2 Gram(s) IV Intermittent every 2 hours  multivitamin/minerals 1 Tablet(s) Oral daily  simvastatin 40 milliGRAM(s) Oral at bedtime  sodium chloride 0.45%. 1000 milliLiter(s) IV Continuous <Continuous>    PRN MEDICATIONS  acetaminophen     Tablet .. 650 milliGRAM(s) Oral every 6 hours PRN                                           VITAL SIGNS: Last 24 Hours  T(C): 36.1 (26 Mar 2022 13:34), Max: 36.9 (26 Mar 2022 05:00)  T(F): 97 (26 Mar 2022 13:34), Max: 98.4 (26 Mar 2022 05:00)  HR: 82 (26 Mar 2022 13:34) (82 - 88)  BP: 112/57 (26 Mar 2022 13:34) (112/57 - 118/55)  RR: 18 (26 Mar 2022 13:34) (16 - 18)  SpO2: 98% (26 Mar 2022 08:00) (98% - 99%)    LABS:                        7.0    7.87  )-----------( 123      ( 26 Mar 2022 04:30 )             23.8     03-26    146  |  109  |  80  ----------------------------<  169  4.5   |  22  |  1.8    Ca    7.4<L>      26 Mar 2022 04:30    Mg     2.5     03-26    TPro  4.6<L>  /  Alb  1.7<L>  /  TBili  0.2  /  DBili  x   /  AST  64<H>  /  ALT  33  /  AlkPhos  308<H>  03-26    Lactate, Blood: 1.8 mmol/L (03-25-22 @ 07:02)  Lactate, Blood: 1.6 mmol/L (03-24-22 @ 16:00)  Lactate, Blood: 2.1 mmol/L *H* (03-24-22 @ 12:55)                        RADIOLOGY: Reviewed                                    PHYSICAL EXAM:  General: no acute distress  HEENT: atraumatic  LUNGS: coarse breath sounds bilaterally, no wheezes noted  HEART: regular rate/rhythm, no murmurs/gallops  ABDOMEN: soft, nontender, nondistended, normoactive bowel sounds  EXT: 2+ radial pulses  NEURO: aaox0  SKIN: intact, no new lesions noted                                      ASSESSMENT & PLAN  Past medical history and hospital course:  82 yo M with PMHx of HTN, DLD, DM, Chronic Asbestosis Lung disease (with bilateral calcified plaques) presents from [facility] for congestion and desaturation.    #Acute Hypoxemic Respiratory Failure secondary to possible   #Aspiration Pneumonia - keep sats 95%   #Sepsis POA   #Chronic Asbestosis  #Lactic Acidosis and #Hypernatremia - improved, s/p IVFs  #Functional Quadriplegia  - s/p Cefepime 7d completed course today   - CXR 3/19 with chronic opacities, extensive bilateral partially calcified pleural plaques  - GOC: family wishes to continue with treatment for now  - S/S consulted, s/p NGT with Nutrition/Dietician consulted   - Pt will benefit from PEG Tube and today discussed risk and benefits - Pt is interested in PEG Tube.  - Get GI f/u to schedule pt for PEG Placement pls   - Nutrition Support Consult - Pt should not be eating at midnight via NGT - Hernán to assist w/ more physiologic tube feeds     #Sacral Wound - Wound Care/Burn consulted, no obvious signs of infection, Santyl/Hydrogel/Dakins BID  - ID consulted, no abx for now    FILOMENA - likely pre-renal secondary to sepsis, improving, s/p IVFs  - baseline creatinine ~0.8, currently 2.0  - s/p Colbert in ED for retention, keeping for now as still lethargic with minimal arousal    #UE Edema - worsened after IVF administration, monitor, elevate  #HTN - on Atenolol 50mg PO daily, holding while NPO as BP also borderline  #T2DM - Metformin at home, A1C 6.6% from 3/20, sugars high after starting tube feeds, SSI for now, sugars better overnight, monitor  #DLD - Simvastatin 40mg PO @bedtime  #Hypothyroidism - on Levothyroxine 50ug PO daily, TSH WNL on 3/20                                                                   # DVT prophylaxis: Heparin 5000U subQ q8h  # GI prophylaxis: N/A  # Diet: NPO with Tube Feeds  # Activity: Increase as Tolerated  # Code status: DNR/DNI  # Disposition: Remain on floor    # Handoff: f/u GI and Nutrition Support  MIGUEL ANGEL GARZA 82y Male  MRN#: [id number]   CODE STATUS: DNR/DNI  Hospital Day: 7d  Pt is currently admitted with the primary diagnosis of: Sepsis + Respiratory Failure    SUBJECTIVE  Overnight events: None reported.  Subjective complaints: Unable to assess.                                     OBJECTIVE  PAST MEDICAL & SURGICAL HISTORY  Hypertension    Diabetes mellitus    No significant past surgical history                                      ALLERGIES:  No Known Allergies                                    HOME MEDICATIONS  Home Medications:  acetaminophen 325 mg oral tablet: 2 tab(s) orally every 6 hours, As needed, Temp greater or equal to 38C (100.4F), Mild Pain (1 - 3) (01 Feb 2022 11:24)  atenolol 50 mg oral tablet: 1 tab(s) orally once a day (17 Jan 2022 01:45)  furosemide 20 mg oral tablet: 1 tab(s) orally once a day for LE swelling (19 Mar 2022 21:59)  Levoxyl 50 mcg (0.05 mg) oral tablet: 1 tab(s) orally once a day (19 Mar 2022 21:59)  metFORMIN 500 mg oral tablet: 1 tab(s) orally 2 times a day (19 Mar 2022 21:59)  Multiple Vitamins oral tablet: 1 tab(s) orally once a day (01 Feb 2022 11:24)  Refresh ophthalmic solution: 1 drop(s) to each affected eye 3 times a day (19 Mar 2022 22:00)  Santyl 250 units/g topical ointment: Apply topically to affected area 2 times a day (19 Mar 2022 22:00)  silver sulfADIAZINE 1% topical cream: Apply topically to affected area 2 times a day (19 Mar 2022 22:00)  simvastatin 40 mg oral tablet: 1 tab(s) orally once a day (at bedtime) (17 Jan 2022 01:45)  zinc oxide topical ointment: Apply topically to affected area 2 times a day (19 Mar 2022 22:01)                         MEDICATIONS:  STANDING MEDICATIONS  artificial  tears Solution 1 Drop(s) Both EYES three times a day  ascorbic acid 500 milliGRAM(s) Oral daily  ATENolol  Tablet 50 milliGRAM(s) Oral daily  cefepime   IVPB 500 milliGRAM(s) IV Intermittent every 24 hours  chlorhexidine 4% Liquid 1 Application(s) Topical <User Schedule>  collagenase Ointment 1 Application(s) Topical two times a day  Dakins Solution - 1/2 Strength 1 Application(s) Topical two times a day  heparin   Injectable 5000 Unit(s) SubCutaneous every 8 hours  insulin lispro (ADMELOG) corrective regimen sliding scale   SubCutaneous every 6 hours  levothyroxine 50 MICROGram(s) Oral daily  magnesium sulfate  IVPB 2 Gram(s) IV Intermittent every 2 hours  multivitamin/minerals 1 Tablet(s) Oral daily  simvastatin 40 milliGRAM(s) Oral at bedtime  sodium chloride 0.45%. 1000 milliLiter(s) IV Continuous <Continuous>    PRN MEDICATIONS  acetaminophen     Tablet .. 650 milliGRAM(s) Oral every 6 hours PRN                                           VITAL SIGNS: Last 24 Hours  T(C): 36.1 (26 Mar 2022 13:34), Max: 36.9 (26 Mar 2022 05:00)  T(F): 97 (26 Mar 2022 13:34), Max: 98.4 (26 Mar 2022 05:00)  HR: 82 (26 Mar 2022 13:34) (82 - 88)  BP: 112/57 (26 Mar 2022 13:34) (112/57 - 118/55)  RR: 18 (26 Mar 2022 13:34) (16 - 18)  SpO2: 98% (26 Mar 2022 08:00) (98% - 99%)    LABS:                        7.0    7.87  )-----------( 123      ( 26 Mar 2022 04:30 )             23.8     03-26    146  |  109  |  80  ----------------------------<  169  4.5   |  22  |  1.8    Ca    7.4<L>      26 Mar 2022 04:30    Mg     2.5     03-26    TPro  4.6<L>  /  Alb  1.7<L>  /  TBili  0.2  /  DBili  x   /  AST  64<H>  /  ALT  33  /  AlkPhos  308<H>  03-26    Lactate, Blood: 1.8 mmol/L (03-25-22 @ 07:02)  Lactate, Blood: 1.6 mmol/L (03-24-22 @ 16:00)  Lactate, Blood: 2.1 mmol/L *H* (03-24-22 @ 12:55)                        RADIOLOGY: Reviewed                                    PHYSICAL EXAM:  General: no acute distress  HEENT: atraumatic  LUNGS: coarse breath sounds bilaterally, no wheezes noted  HEART: regular rate/rhythm, no murmurs/gallops  ABDOMEN: soft, nontender, nondistended, normoactive bowel sounds  EXT: 2+ radial pulses  NEURO: aaox0  SKIN: intact, no new lesions noted                                      ASSESSMENT & PLAN  Past medical history and hospital course:  80 yo M with PMHx of HTN, DLD, DM, Chronic Asbestosis Lung disease (with bilateral calcified plaques) presents from [facility] for congestion and desaturation.    #Acute Hypoxemic Respiratory Failure secondary to possible   #Aspiration Pneumonia - keep sats 95%   #Sepsis POA   #Chronic Asbestosis  #Lactic Acidosis and #Hypernatremia - improved, s/p IVFs  #Functional Quadriplegia  - s/p Cefepime 7d completed course today   - CXR 3/19 with chronic opacities, extensive bilateral partially calcified pleural plaques  - GOC: family wishes to continue with treatment for now  - S/S consulted, s/p NGT with Nutrition/Dietician consulted    #Sacral Wound - Wound Care/Burn consulted, no obvious signs of infection, Santyl/Hydrogel/Dakins BID  - ID consulted, no abx for now    FILOMENA - likely pre-renal secondary to sepsis, improving, s/p IVFs  - baseline creatinine ~0.8, currently 2.0  - s/p Colbert in ED for retention, keeping for now as still lethargic with minimal arousal    #UE Edema - worsened after IVF administration, monitor, elevate  #HTN - on Atenolol 50mg PO daily, holding while NPO as BP also borderline  #T2DM - Metformin at home, A1C 6.6% from 3/20, sugars high after starting tube feeds, SSI for now, sugars better overnight, monitor  #DLD - Simvastatin 40mg PO @bedtime  #Hypothyroidism - on Levothyroxine 50ug PO daily, TSH WNL on 3/20                                                                   # DVT prophylaxis: Heparin 5000U subQ q8h  # GI prophylaxis: N/A  # Diet: NPO with Tube Feeds  # Activity: Increase as Tolerated  # Code status: DNR/DNI  # Disposition: Remain on floor    # Handoff: f/u labs

## 2022-03-26 NOTE — PROGRESS NOTE ADULT - TIME BILLING
Johnathan Flores CNP.
Direct patient care
Direct patient care.

## 2022-03-27 NOTE — CHART NOTE - NSCHARTNOTEFT_GEN_A_CORE
Patient de-satting into high-60s on rebreather mask, rapid response was called on DNR/DNI patient. Was similarly non-responsive earlier in the day during which family was informed of deteriorating functional status, affirmed DNR/DNI status with plans to speak to Palliative Care team tomorrow. STAT CXR, ABG ordered. Patient received a bolus of normal saline and was placed on bipap with consent from family over phone, satting in the 90s. They are aware that patient may not survive the night, are on their way to the hospital. S/p 2 mg IV morphine for comfort.

## 2022-03-27 NOTE — PROGRESS NOTE ADULT - ASSESSMENT
80 y/o man with PMH of HTN, Dyslipidemia, DM2, Chronic Asbestosis Lung disease and Hypothyroidism who was sent into the hospital from [facility] for eval of increased lethargy over the past x 1-2 days associated with congestion and hypoxemia.   In the ED, he was hypoxemic to 80's and placed on NRB 12 L.    1. Acute hypoxemic respiratory failure  Sepsis present on admission  metabolic encephalopathy  - pt now tolerating O2 via NC   - due to suspected aspiration pneumonia  - continue Vancomycin and Cefepime  - vanco level on 3/22  - MRSA nasal swab negative  - f/up Strep and legionella, procal  - blood cultures negative  - urine culture negative  - overall very guarded prognosis  - DNR/DNI  - palliative care following     2. HAGMA from lactic acidosis and FILOMENA  - trend lactate and continue IV hydration  - Cr trending up  - oliguria  - check urine lytes, Cr and renal US  - I's and O's    4. Hypernatremia - continue IVF and monitor - trending down    5. Chronic normocytic anemia - now relatively stable    6. DM - monitor FS    7. Dysphagia - NPO for now - too lethargic for swallow eval  per palliative care, family wants NGT placement for now for feeds and meds  hold atenolol for SBP < 100    8. Chronic Asbestosis Lung disease    9. Hypothyroidism on synthroid    10. DVT prophylaxis - Heparin SQ    Code status : DNR/DNI        PROGRESS NOTE HANDOFF    Pending: labs in AM, NGT placement and CXR for confirmation, titrate O2 as tolerated, monitor mental status, renal US, urine lytes    Family discussion: medical team will update family    Disposition: from [facility]
82 y/o man with PMH of HTN, Dyslipidemia, DM2, Chronic Asbestosis Lung disease and Hypothyroidism who was sent into the hospital from [facility] for eval of increased lethargy over the past x 1-2 days associated with congestion and hypoxemia.   In the ED, he was hypoxemic to 80's and placed on NRB 12 L.    1. Acute hypoxemic respiratory failure  Sepsis present on admission  metabolic encephalopathy  - pt now tolerating O2 via NC   - due to suspected aspiration pneumonia  - continue Cefepime  - vanco stopped  - MRSA nasal swab negative  - f/up Strep and legionella, procal  - blood cultures negative  - urine culture negative  - overall very guarded prognosis  - DNR/DNI  - palliative care following     2. HAGMA from lactic acidosis and FILOMENA  - trend lactate (4.6 today) and continue IV hydration  - Cr trended up  - check urine lytes, Cr and renal US  - I's and O's    4. Hypernatremia - continue IVF and monitor     5. Chronic normocytic anemia - now relatively stable    6. DM now with hyperglycemia - nutrition eval in progress  would change feeds to Glucerna and add insulin for FS >180   A1C 6.6    7. Dysphagia - NPO for now - remains too lethargic for swallow eval  per palliative care, family wants NGT placement for feeds and meds  S&S f/u when more awake    8. HTN - SBP on the lower side  hold atenolol for SBP < 100    9. Chronic Asbestosis Lung disease    10. Hypothyroidism on synthroid    11. DVT prophylaxis - Heparin SQ    Code status : DNR/DNI        PROGRESS NOTE HANDOFF    Pending: labs in AM, titrate O2 as tolerated, monitor mental status, renal US, urine lytes    Family discussion: with son Geovani Welch today  pt's baseline now: interacts with family at times    Disposition: from [facility]
82yMaleb with PMH including HTN, DLD, DM, Chronic Asbestosis Lung disease (with bilateral calcified plaques), Hypothyroidism, being evaluated for GOC. Patient was admitted from [facility] with respiratory failure/sepsis, possibly due to aspiration pna. Patient now on NC with poor mental status, worsening FILOMENA (s/p carrero for retention), lactic acidosis. Family describes a functional decline in recent months, now with sacral wound, bedbound.    Spoke with wife at bedside. For now, they want ongoing medical management and to take things one day at a time. They are hoping his mental status will improve and he can go back to NH.   Discussed in detail his condition, potential causes for his declining condition, and offered support.   Plan to re-address GOC as time goes on.   patient appears comfortable on exam.     MEDD (morphine equivalent daily dose): 0      See Recs below.    Please call [phone] with questions or concerns 24/7.   We will continue to follow.     Discussed with primary MD.  
80 yo M with PMHx of HTN, DLD, DM, Chronic Asbestosis Lung disease (with bilateral calcified plaques) presents from [facility] for congestion and desaturation.    #Acute Hypoxemic Respiratory Failure secondary to possible   #Aspiration Pneumonia - keep sats 95%   #Sepsis POA   #Chronic Asbestosis  #Lactic Acidosis and #Hypernatremia - improved, s/p IVFs  #Functional Quadriplegia  - s/p Cefepime 7d completed course today   - CXR 3/19 with chronic opacities, extensive bilateral partially calcified pleural plaques  - GOC: family wishes to continue with treatment for now  - S/S consulted, s/p NGT with Nutrition/Dietician consulted    --> 3/27 this afternoon patient noted to be hypoxic on 6 L with crackles   - stat chest xray and placed on venti mask     #Sacral Wound - Wound Care/Burn consulted, no obvious signs of infection, Santyl/Hydrogel/Dakins BID  - ID consulted, no abx for now    FILOMENA - likely pre-renal secondary to sepsis, improving, s/p IVFs  - baseline creatinine ~0.8, currently 2.0  - s/p Colbert in ED for retention, keeping for now as still lethargic with minimal arousal    #UE Edema - worsened after IVF administration, monitor, elevate  #HTN - on Atenolol 50mg PO daily, holding while NPO as BP also borderline  #T2DM - Metformin at home, A1C 6.6% from 3/20, sugars high after starting tube feeds, SSI for now, sugars better overnight, monitor  #DLD - Simvastatin 40mg PO @bedtime  #Hypothyroidism - on Levothyroxine 50ug PO daily, TSH WNL on 3/20                                                                   # DVT prophylaxis: Heparin 5000U subQ q8h  # GI prophylaxis: N/A  # Diet: NPO with Tube Feeds  # Activity: Increase as Tolerated  # Code status: DNR/DNI  # Disposition: Remain on floor    # Handoff: acute - currently on ventimask 
ASSESSMENT  80 y/o man with PMH of HTN, Dyslipidemia, DM2, Chronic Asbestosis Lung disease and Hypothyroidism who was sent into the hospital from [facility] for eval of increased lethargy over the past x 1-2 days associated with congestion and hypoxemia.   - Acute hypoxemic respiratory failure, suspected aspiration pneumonia  - sepsis on admission  - HAGMA secondary to lactic acidosis and FILOMENA  - metabolic encephalopathy  - chronic anemia  - stage IV pressure ulcer  - DM, A1c 6.6  - dysphagia  - moderate protein calorie malnutrition    PLAN  - cont Glucerna 1.2 360ml X 2 feeds and 240ml X 2 feeds/day for now = 1200 ml total feeding per day  - need to document all feeds  - give feeds at meal times and qhs, not on q6hr schedule  - include phos level with am labs  - cont MV with minerals and vitamin C 500mg daily  - monitor WBC's and temp, re-eval for addition of Barry over next 48hrs  - will follow as goals of care established.
82yMaleb with PMH including HTN, DLD, DM, Chronic Asbestosis Lung disease (with bilateral calcified plaques), Hypothyroidism, being evaluated for GOC. Patient was admitted from [place] with respiratory failure/sepsis, possibly due to aspiration pna. Patient now on NC with poor mental status, worsening FILOMENA (s/p carrero for retention), lactic acidosis. Family describes a functional decline in recent months, now with sacral wound, bedbound.    Spoke with wife at bedside. For now, they want ongoing medical management and to take things one day at a time. They are hoping his mental status will improve and he can go back to NH.   Discussed in detail his condition, potential causes for his declining condition, and offered support. Discussed possible PEG tube placement versus hospice- family to discuss among themselves.   Plan to re-address GOC as time goes on.   patient appears comfortable on exam.     MEDD (morphine equivalent daily dose): 0      See Recs below.    Please call [phone] with questions or concerns 24/7.   We will continue to follow.     Discussed with primary MD.  
82yMaleb with PMH including HTN, DLD, DM, Chronic Asbestosis Lung disease (with bilateral calcified plaques), Hypothyroidism, being evaluated for GOC. Patient was admitted from [place] with respiratory failure/sepsis, possibly due to aspiration pna. Patient now on NC with poor mental status, worsening FILOMENA (s/p carrero for retention), lactic acidosis. Family describes a functional decline in recent months, now with sacral wound, bedbound.    Spoke with wife and son at bedside. For now, they want ongoing medical management and to take things one day at a time. They are hoping his mental status will improve and he can go back to NH if he can pass S &S.  Discussed PEG, they likely would not want one but want to continue with NGT trial for now.   Plan to re-address GOC as time goes on.   patient appears comfortable on exam.     MEDD (morphine equivalent daily dose): 0      See Recs below.    Please call [phone] with questions or concerns 24/7.   We will continue to follow.     Discussed with primary MD.

## 2022-03-27 NOTE — CHART NOTE - NSCHARTNOTEFT_GEN_A_CORE
Rapid response called at 7:32PM for acute respiratory distress and altered sensorium.  Earlier today patient's oxygen requirements had increased up to NRM.   Around 7:30 PM, went into respiratory distress with labored breathing.   Not arousable to sternal rub.  Hypotensive, diffuse B/l rhonchi with expiratory mucus heard.   Bipap tried briefly that improved his saturations but repeat CXR shows a probable worsening RLL opacity which might imply a new aspiration PNA,  which makes Bipap a contraindication.   Patient is DNR/DNI.  Will try HFNC + NRM to see if it maintains his sats. If not, will talk to family about comfort measures (they are on their way to see him now).  1L Bolus underway. f/u repeat BP.  ABG showed pH7.25/pCO2 66/pO2 60.   Check stat Lactic acid, CBC, CMP, Procalc, CRP, Ferritin, D-dimer, coags.   Change Abx to Zosyn.     Prognosis guarded    55 minutes of CC time spent.

## 2022-03-27 NOTE — PROGRESS NOTE ADULT - PROVIDER SPECIALTY LIST ADULT
Hospitalist
Internal Medicine
Nutrition Support
Palliative Care
Hospitalist
Hospitalist
Internal Medicine
Internal Medicine
Hospitalist
Internal Medicine
Palliative Care
Palliative Care

## 2022-03-27 NOTE — DISCHARGE NOTE FOR THE EXPIRED PATIENT - FINDINGS/TREATMENT
Sepsis secondary to pneumonia (consolidation on CXR and leukocytosis), Cefepime, BiPAP. Heparin for DVT prophylaxis Sacral wound: Burn Sugrical team consulted, wound care recommendations appreciated and applied

## 2022-03-27 NOTE — DISCHARGE NOTE FOR THE EXPIRED PATIENT - OTHER SIGNIFICANT FINDINGS
Attending attestation: Admitted for acute hypoxic respiratory failure with a history of asbestosis. He respiratory status continued to decline throughout the day. A rapid response was called earlier with concern for possible aspiration pneumonia. He Abx were readjusted and placed on High flow NC and NRB mask. He was DNR/ DNI, family was present at bedside following the rapid response. They opted to transition him to CMO and he passed shortly after.

## 2022-03-27 NOTE — DISCHARGE NOTE FOR THE EXPIRED PATIENT - NAME OF PERSON WHO MADE CONTACTED FAMILY
PROCEDURE  ECG 12 Lead Documentation  Date/Time: 11/4/2018 11:38 AM  Performed by: Zo Aviles  Authorized by: Zo Aviles     Indications / Diagnosis:  Dizziness  ECG reviewed by me, the ED Provider: yes    Patient location:  ED  Interpretation:     Interpretation: abnormal    Rate:     ECG rate:  86  Rhythm:     Rhythm: atrial fibrillation    Ectopy:     Ectopy: none    QRS:     QRS axis:  Normal    QRS intervals:  Normal  ST segments:     ST segments:  Normal  T waves:     T waves: inverted      Inverted:  I, II, V3, V4, V5 and V6  Other findings:     Other findings: LVH             Nelva Frankel, MD  11/04/18 1146
Fernando (son) and Wife

## 2022-03-27 NOTE — PROGRESS NOTE ADULT - SUBJECTIVE AND OBJECTIVE BOX
MIGUEL ANGEL GARZA  82y/Male  [facility] [id number] 016 A    Patient is a 82y old  Male who presents with a chief complaint of Sepsis (26 Mar 2022 15:14)      INTERVAL HPI/OVERNIGHT EVENTS: Patient seen and examined at bedside this morning   patient arouasable to verbal and tactile stimuli - minimally verbal     REVIEW OF SYSTEMS: negative  Vital Signs Last 24 Hrs  T(C): 36.9 (27 Mar 2022 14:13), Max: 36.9 (27 Mar 2022 14:13)  T(F): 98.5 (27 Mar 2022 14:13), Max: 98.5 (27 Mar 2022 14:13)  HR: 96 (27 Mar 2022 15:40) (50 - 101)  BP: 141/57 (27 Mar 2022 14:13) (109/57 - 141/57)  BP(mean): --  RR: 19 (27 Mar 2022 15:40) (18 - 19)  SpO2: 96% (27 Mar 2022 15:40) (88% - 96%)    PHYSICAL EXAM:   NAD; Normocephalic;   LUNGS - no wheezing  HEART: S1 S2+   ABDOMEN: Soft, Nontender, non distended  EXTREMITIES: bilateral upper extremity swelling   NERVOUS SYSTEM:  Awake    LABS:                        7.3    13.01 )-----------( 173      ( 27 Mar 2022 04:30 )             24.8     03-27    146  |  111<H>  |  74<HH>  ----------------------------<  171<H>  4.0   |  24  |  1.8<H>    Ca    7.7<L>      27 Mar 2022 04:30  Mg     2.2     03-27    TPro  5.0<L>  /  Alb  1.8<L>  /  TBili  0.3  /  DBili  x   /  AST  66<H>  /  ALT  39  /  AlkPhos  344<H>  03-27        CAPILLARY BLOOD GLUCOSE      POCT Blood Glucose.: 206 mg/dL (27 Mar 2022 11:51)  POCT Blood Glucose.: 192 mg/dL (27 Mar 2022 07:35)  POCT Blood Glucose.: 157 mg/dL (27 Mar 2022 06:14)  POCT Blood Glucose.: 191 mg/dL (26 Mar 2022 23:59)  POCT Blood Glucose.: 155 mg/dL (26 Mar 2022 21:35)  POCT Blood Glucose.: 188 mg/dL (26 Mar 2022 18:39)      Medications:  MEDICATIONS  (STANDING):  artificial  tears Solution 1 Drop(s) Both EYES three times a day  ascorbic acid 500 milliGRAM(s) Oral daily  ATENolol  Tablet 50 milliGRAM(s) Oral daily  cefepime   IVPB 500 milliGRAM(s) IV Intermittent every 24 hours  chlorhexidine 4% Liquid 1 Application(s) Topical <User Schedule>  collagenase Ointment 1 Application(s) Topical two times a day  Dakins Solution - 1/2 Strength 1 Application(s) Topical two times a day  heparin   Injectable 5000 Unit(s) SubCutaneous every 8 hours  insulin lispro (ADMELOG) corrective regimen sliding scale   SubCutaneous every 6 hours  levothyroxine 50 MICROGram(s) Oral daily  multivitamin/minerals 1 Tablet(s) Oral daily  simvastatin 40 milliGRAM(s) Oral at bedtime    MEDICATIONS  (PRN):  acetaminophen     Tablet .. 650 milliGRAM(s) Oral every 6 hours PRN Temp greater or equal to 38C (100.4F), Moderate Pain (4 - 6)

## 2022-03-27 NOTE — CHART NOTE - NSCHARTNOTEFT_GEN_A_CORE
Patient desatting to 91-93% on 6L NC, crackles appreciated. NG tube feeds held, HOB elevated, f/u urgent CXR and ABG. RT notified, pt on venti mask at 50%. Family counseled at bedside re: balance between fluid overload and kidney injury, advised palliative care f/u, family in agreement. Patient desatting to 91-93% on 6L NC, crackles appreciated. NG tube feeds held, HOB elevated, f/u urgent CXR and ABG. RT notified, pt on venturi mask at 50%. Family counseled at bedside re: balance between fluid overload and kidney injury, advised palliative care f/u, family in agreement.

## 2022-03-27 NOTE — DISCHARGE NOTE FOR THE EXPIRED PATIENT - HOSPITAL COURSE
80 yo M with PMHx of HTN, DLD, DM, Chronic Asbestosis Lung disease (with bilateral calcified plaques) presents from [facility] for congestion and desaturation. Patient admitted and treated for acute hypoxic respiratory failure secondary to aspiration pneumonia. Patient received IV hydration, antibiotics and ventilatory support. On 3/27 a rapid response was called when the patient desaturated. A repeat chest xray was performed that showed an increasing consolidation in his lung. Patient was taken off BiPAP and placed on high flow and non rebreather. Goals of Care conversation was had with the family who opted for comfort care only. All blood draws, fingersticks and vital checks were withdrawn. Within an our the patient passed with family at bedside. Family still undecided on weather to perform an autopsy or not    #Acute Hypoxemic Respiratory Failure secondary to possible #Aspiration Pneumonia with #Sepsis POA - now on 3-NC ~97%, will wean off  #Chronic Asbestosis  #Lactic Acidosis and #Hypernatremia - improved, s/p IVFs  #Functional Quadriplegia  - s/p Cefepime in ED, on Cefepime IV 500mg daily (last day today), now d/c Vancomycin, MRSA Nares negative, BCx + UCx from 3/19 NGTD f/u  - CXR 3/19 with chronic opacities, extensive bilateral partially calcified pleural plaques  - GOC: family wishes to continue with treatment for now  - S/S consulted, s/p NGT with Nutrition/Dietician consulted, GI consulted for PEG tube, son deciding between hospice vs. PEG tube    #Sacral Wound - Wound Care/Burn consulted, no obvious signs of infection, Santyl/Hydrogel/Dakins BID  - ID consulted, no abx for now    FILOMENA - likely pre-renal secondary to sepsis, improving, s/p IVFs  - baseline creatinine ~0.8, currently 2.0  - s/p Colbert in ED for retention, keeping for now as still lethargic with minimal arousal    #UE Edema - worsened after IVF administration, monitor, elevate  #HTN - on Atenolol 50mg PO daily, holding while NPO as BP also borderline  #T2DM - Metformin at home, A1C 6.6% from 3/20, sugars high after starting tube feeds, SSI for now, sugars better overnight, monitor  #DLD - Simvastatin 40mg PO @bedtime  #Hypothyroidism - on Levothyroxine 50ug PO daily, TSH WNL on 3/20                                                              81 yo M with PMHx of HTN, DLD, DM, Chronic Asbestosis Lung disease (with bilateral calcified plaques) presents from [facility] for congestion and desaturation. Patient admitted and treated for acute hypoxic respiratory failure secondary to aspiration pneumonia. Patient received IV hydration, antibiotics and ventilatory support. On 3/27 a rapid response was called when the patient desaturated. A repeat chest xray was performed that showed an increasing consolidation in his lung. Patient was taken off BiPAP and placed on high flow and non rebreather. Goals of Care conversation was had with the family who opted for comfort care only. All blood draws, fingersticks and vital checks were withdrawn. Within an our the patient passed with family at bedside. Family still undecided on weather to perform an autopsy or not    #Acute Hypoxemic Respiratory Failure secondary to possible #Aspiration Pneumonia with #Sepsis POA - now on 3-NC ~97%, will wean off  #Chronic Asbestosis  #Lactic Acidosis and #Hypernatremia - improved, s/p IVFs  #Functional Quadriplegia  - s/p Cefepime in ED, on Cefepime IV 500mg daily (last day today), now d/c Vancomycin, MRSA Nares negative, BCx + UCx from 3/19 NGTD f/u  - CXR 3/19 with chronic opacities, extensive bilateral partially calcified pleural plaques  - GOC: family wishes to continue with treatment for now  - S/S consulted, s/p NGT with Nutrition/Dietician consulted, GI consulted for PEG tube, son deciding between hospice vs. PEG tube    #Sacral Wound - Wound Care/Burn consulted, no obvious signs of infection, Santyl/Hydrogel/Dakins BID  - ID consulted, no abx for now    FILOMENA - likely pre-renal secondary to sepsis, improving, s/p IVFs  - baseline creatinine ~0.8, currently 2.0  - s/p Colbert in ED for retention, keeping for now as still lethargic with minimal arousal    #UE Edema - worsened after IVF administration, monitor, elevate  #HTN - on Atenolol 50mg PO daily, holding while NPO as BP also borderline  #T2DM - Metformin at home, A1C 6.6% from 3/20, sugars high after starting tube feeds, SSI for now, sugars better overnight, monitor  #DLD - Simvastatin 40mg PO @bedtime  #Hypothyroidism - on Levothyroxine 50ug PO daily, TSH WNL on 3/20                                                              83 yo M with PMHx of HTN, DLD, DM, Chronic Asbestosis Lung disease (with bilateral calcified plaques) presents from [facility] for congestion and desaturation. Patient admitted and treated for acute hypoxic respiratory failure secondary to aspiration pneumonia. Patient received IV hydration, antibiotics and ventilatory support. On 3/27 a rapid response was called when the patient desaturated. A repeat chest xray was performed that showed an increasing consolidation in his lung. Patient was taken off BiPAP and placed on high flow and non rebreather. Goals of Care conversation was had with the family who opted for comfort care only. All blood draws, fingersticks and vital checks were withdrawn. Within an our the patient passed with family at bedside. Family still undecided on weather to perform an autopsy or not    #Acute Hypoxemic Respiratory Failure secondary to possible #Aspiration Pneumonia with #Sepsis POA   #Chronic Asbestosis  #Lactic Acidosis and #Hypernatremia - improved, s/p IVFs  #Functional Quadriplegia  - s/p Cefepime in ED, on Cefepime IV 500mg daily (last day today), now d/c Vancomycin, MRSA Nares negative, BCx + UCx from 3/19 NGTD f/u  - CXR 3/19 with chronic opacities, extensive bilateral partially calcified pleural plaques  - GOC: family wishes to continue with treatment for now  - S/S consulted, s/p NGT with Nutrition/Dietician consulted, GI consulted for PEG tube, son deciding between hospice vs. PEG tube    #Sacral Wound - Wound Care/Burn consulted, no obvious signs of infection, Santyl/Hydrogel/Dakins BID  - ID consulted, no abx for now    FILOMENA - likely pre-renal secondary to sepsis, improving, s/p IVFs  - baseline creatinine ~0.8, currently 2.0  - s/p Colbert in ED for retention, keeping for now as still lethargic with minimal arousal    #UE Edema - worsened after IVF administration, monitor, elevate  #HTN - on Atenolol 50mg PO daily, holding while NPO as BP also borderline  #T2DM - Metformin at home, A1C 6.6% from 3/20, sugars high after starting tube feeds, SSI for now, sugars better overnight, monitor  #DLD - Simvastatin 40mg PO @bedtime  #Hypothyroidism - on Levothyroxine 50ug PO daily, TSH WNL on 3/20

## 2022-03-27 NOTE — CHART NOTE - NSCHARTNOTESELECT_GEN_ALL_CORE
Event Note
Nutrition Support/Nutrition Services
Palliative Care NP/Event Note
Palliative Care SW Initial Assessment/Event Note
Palliative Care SW Note/Event Note
Palliative Care SW Note/Event Note
Rapid Response

## 2022-03-28 DIAGNOSIS — J69.0 PNEUMONITIS DUE TO INHALATION OF FOOD AND VOMIT: ICD-10-CM

## 2022-03-28 LAB
GLUCOSE BLDC GLUCOMTR-MCNC: 172 MG/DL — HIGH (ref 70–99)
GLUCOSE BLDC GLUCOMTR-MCNC: 192 MG/DL — HIGH (ref 70–99)

## 2022-03-31 DIAGNOSIS — I10 ESSENTIAL (PRIMARY) HYPERTENSION: ICD-10-CM

## 2022-03-31 DIAGNOSIS — J69.0 PNEUMONITIS DUE TO INHALATION OF FOOD AND VOMIT: ICD-10-CM

## 2022-03-31 DIAGNOSIS — R65.20 SEVERE SEPSIS WITHOUT SEPTIC SHOCK: ICD-10-CM

## 2022-03-31 DIAGNOSIS — E11.65 TYPE 2 DIABETES MELLITUS WITH HYPERGLYCEMIA: ICD-10-CM

## 2022-03-31 DIAGNOSIS — Z66 DO NOT RESUSCITATE: ICD-10-CM

## 2022-03-31 DIAGNOSIS — E44.0 MODERATE PROTEIN-CALORIE MALNUTRITION: ICD-10-CM

## 2022-03-31 DIAGNOSIS — Z51.5 ENCOUNTER FOR PALLIATIVE CARE: ICD-10-CM

## 2022-03-31 DIAGNOSIS — J96.01 ACUTE RESPIRATORY FAILURE WITH HYPOXIA: ICD-10-CM

## 2022-03-31 DIAGNOSIS — A41.9 SEPSIS, UNSPECIFIED ORGANISM: ICD-10-CM

## 2022-03-31 DIAGNOSIS — Z79.84 LONG TERM (CURRENT) USE OF ORAL HYPOGLYCEMIC DRUGS: ICD-10-CM

## 2022-03-31 DIAGNOSIS — E87.2 ACIDOSIS: ICD-10-CM

## 2022-03-31 DIAGNOSIS — J18.9 PNEUMONIA, UNSPECIFIED ORGANISM: ICD-10-CM

## 2022-03-31 DIAGNOSIS — L89.154 PRESSURE ULCER OF SACRAL REGION, STAGE 4: ICD-10-CM

## 2022-03-31 DIAGNOSIS — G93.41 METABOLIC ENCEPHALOPATHY: ICD-10-CM

## 2022-03-31 DIAGNOSIS — E87.0 HYPEROSMOLALITY AND HYPERNATREMIA: ICD-10-CM

## 2022-03-31 DIAGNOSIS — R60.0 LOCALIZED EDEMA: ICD-10-CM

## 2022-03-31 DIAGNOSIS — I96 GANGRENE, NOT ELSEWHERE CLASSIFIED: ICD-10-CM

## 2022-03-31 DIAGNOSIS — Z74.01 BED CONFINEMENT STATUS: ICD-10-CM

## 2022-03-31 DIAGNOSIS — F03.90 UNSPECIFIED DEMENTIA WITHOUT BEHAVIORAL DISTURBANCE: ICD-10-CM

## 2022-03-31 DIAGNOSIS — E63.9 NUTRITIONAL DEFICIENCY, UNSPECIFIED: ICD-10-CM

## 2022-03-31 DIAGNOSIS — N17.9 ACUTE KIDNEY FAILURE, UNSPECIFIED: ICD-10-CM

## 2022-03-31 DIAGNOSIS — E03.9 HYPOTHYROIDISM, UNSPECIFIED: ICD-10-CM

## 2022-03-31 DIAGNOSIS — J92.0 PLEURAL PLAQUE WITH PRESENCE OF ASBESTOS: ICD-10-CM

## 2022-03-31 DIAGNOSIS — E78.5 HYPERLIPIDEMIA, UNSPECIFIED: ICD-10-CM

## 2022-03-31 DIAGNOSIS — R13.10 DYSPHAGIA, UNSPECIFIED: ICD-10-CM

## 2022-03-31 DIAGNOSIS — E88.09 OTHER DISORDERS OF PLASMA-PROTEIN METABOLISM, NOT ELSEWHERE CLASSIFIED: ICD-10-CM

## 2022-03-31 DIAGNOSIS — D64.9 ANEMIA, UNSPECIFIED: ICD-10-CM

## 2022-03-31 DIAGNOSIS — S91.002A UNSPECIFIED OPEN WOUND, LEFT ANKLE, INITIAL ENCOUNTER: ICD-10-CM

## 2022-03-31 DIAGNOSIS — R53.2 FUNCTIONAL QUADRIPLEGIA: ICD-10-CM

## 2022-04-10 NOTE — CHART NOTE - NSCHARTNOTEFT_GEN_A_CORE
Consult received. Defer to Nutrition Support Team for nutrition recommendations      Amargo Brayan, #[phone] spectra There are no Wet Read(s) to document.

## 2022-04-25 ENCOUNTER — APPOINTMENT (OUTPATIENT)
Dept: GASTROENTEROLOGY | Facility: CLINIC | Age: 82
End: 2022-04-25

## 2022-05-27 NOTE — ED ADULT TRIAGE NOTE - SOURCE OF INFORMATION
EMS Propranolol Counseling:  I discussed with the patient the risks of propranolol including but not limited to low heart rate, low blood pressure, low blood sugar, restlessness and increased cold sensitivity. They should call the office if they experience any of these side effects.

## 2022-06-28 NOTE — PROGRESS NOTE ADULT - SUBJECTIVE AND OBJECTIVE BOX
MIGUEL ANGEL GARZA  81y  Male    History of Present Illness:  Reason for Admission:     Interim HPI: pt seen during AM rounds. NAEO               Vital Signs Last 24 Hrs  T(C): 36.1 (18 Jan 2022 05:17), Max: 38.1 (17 Jan 2022 14:29)  T(F): 97 (18 Jan 2022 05:17), Max: 100.5 (17 Jan 2022 14:29)  HR: 108 (18 Jan 2022 05:17) (79 - 108)  BP: 158/76 (18 Jan 2022 05:17) (107/55 - 158/76)  BP(mean): --  RR: 18 (18 Jan 2022 05:17) (18 - 18)  SpO2: --    PHYSICAL EXAM:  GENERAL: NAD, well-groomed, well-developed  NECK: Supple, No JVD  HEART: Regular rate and rhythm  ABDOMEN: Soft, Nontender, Nondistended  EXTREMITIES:  2+ Peripheral Pulses, No clubbing, cyanosis, or edema  NEURO:  No Focal deficits, sensory and motor intact  SKIN: No rashes or lesions    LABS:          RADIOLOGY & ADDITIONAL TESTS:    Imaging Personally Reviewed:  [ ] YES  [ ] NO  MedsMEDICATIONS  (STANDING):  ATENolol  Tablet 50 milliGRAM(s) Oral daily  dextrose 40% Gel 15 Gram(s) Oral once  dextrose 5%. 1000 milliLiter(s) (50 mL/Hr) IV Continuous <Continuous>  dextrose 5%. 1000 milliLiter(s) (100 mL/Hr) IV Continuous <Continuous>  dextrose 50% Injectable 25 Gram(s) IV Push once  dextrose 50% Injectable 12.5 Gram(s) IV Push once  dextrose 50% Injectable 25 Gram(s) IV Push once  enoxaparin Injectable 40 milliGRAM(s) SubCutaneous daily  glucagon  Injectable 1 milliGRAM(s) IntraMuscular once  insulin lispro (ADMELOG) corrective regimen sliding scale   SubCutaneous three times a day before meals  lisinopril 20 milliGRAM(s) Oral daily  piperacillin/tazobactam IVPB.. 3.375 Gram(s) IV Intermittent every 8 hours  simvastatin 40 milliGRAM(s) Oral at bedtime  sodium chloride 0.9%. 1000 milliLiter(s) (75 mL/Hr) IV Continuous <Continuous>    MEDICATIONS  (PRN):  acetaminophen     Tablet .. 650 milliGRAM(s) Oral every 6 hours PRN Temp greater or equal to 38C (100.4F), Mild Pain (1 - 3)  aluminum hydroxide/magnesium hydroxide/simethicone Suspension 30 milliLiter(s) Oral every 4 hours PRN Dyspepsia  guaifenesin/dextromethorphan Oral Liquid 10 milliLiter(s) Oral every 4 hours PRN Cough  melatonin 3 milliGRAM(s) Oral at bedtime PRN Insomnia  ondansetron Injectable 4 milliGRAM(s) IV Push every 8 hours PRN Nausea and/or Vomiting      HEALTH ISSUES - PROBLEM Dx:             MIGUEL ANGEL GARZA  81y  Male    History of Present Illness:  Reason for Admission:     Interim subjective note: Patient was seen and examined at bedside. No acute complaints. NAEO         Vital Signs Last 24 Hrs  T(C): 36.1 (18 Jan 2022 05:17), Max: 38.1 (17 Jan 2022 14:29)  T(F): 97 (18 Jan 2022 05:17), Max: 100.5 (17 Jan 2022 14:29)  HR: 108 (18 Jan 2022 05:17) (79 - 108)  BP: 158/76 (18 Jan 2022 05:17) (107/55 - 158/76)  BP(mean): --  RR: 18 (18 Jan 2022 05:17) (18 - 18)  SpO2: --    PHYSICAL EXAM:  GENERAL: NAD, well-groomed, well-developed  NECK: Supple, No JVD  HEART: Regular rate and rhythm  ABDOMEN: Soft, Nontender, Nondistended  EXTREMITIES:  2+ Peripheral Pulses, No clubbing, cyanosis, or edema  NEURO:  No Focal deficits, sensory and motor intact  SKIN: No rashes or lesions    LABS:          RADIOLOGY & ADDITIONAL TESTS:    Imaging Personally Reviewed:  [ ] YES  [ ] NO  MedsMEDICATIONS  (STANDING):  ATENolol  Tablet 50 milliGRAM(s) Oral daily  dextrose 40% Gel 15 Gram(s) Oral once  dextrose 5%. 1000 milliLiter(s) (50 mL/Hr) IV Continuous <Continuous>  dextrose 5%. 1000 milliLiter(s) (100 mL/Hr) IV Continuous <Continuous>  dextrose 50% Injectable 25 Gram(s) IV Push once  dextrose 50% Injectable 12.5 Gram(s) IV Push once  dextrose 50% Injectable 25 Gram(s) IV Push once  enoxaparin Injectable 40 milliGRAM(s) SubCutaneous daily  glucagon  Injectable 1 milliGRAM(s) IntraMuscular once  insulin lispro (ADMELOG) corrective regimen sliding scale   SubCutaneous three times a day before meals  lisinopril 20 milliGRAM(s) Oral daily  piperacillin/tazobactam IVPB.. 3.375 Gram(s) IV Intermittent every 8 hours  simvastatin 40 milliGRAM(s) Oral at bedtime  sodium chloride 0.9%. 1000 milliLiter(s) (75 mL/Hr) IV Continuous <Continuous>    MEDICATIONS  (PRN):  acetaminophen     Tablet .. 650 milliGRAM(s) Oral every 6 hours PRN Temp greater or equal to 38C (100.4F), Mild Pain (1 - 3)  aluminum hydroxide/magnesium hydroxide/simethicone Suspension 30 milliLiter(s) Oral every 4 hours PRN Dyspepsia  guaifenesin/dextromethorphan Oral Liquid 10 milliLiter(s) Oral every 4 hours PRN Cough  melatonin 3 milliGRAM(s) Oral at bedtime PRN Insomnia  ondansetron Injectable 4 milliGRAM(s) IV Push every 8 hours PRN Nausea and/or Vomiting      HEALTH ISSUES - PROBLEM Dx:             MIGUEL ANGEL GARZA  81y  Male    History of Present Illness:  Reason for Admission:     Interim subjective note: Patient was seen and examined at bedside by covering Hospitalist. No acute complaints. NAEO         Vital Signs Last 24 Hrs  T(C): 36.1 (18 Jan 2022 05:17), Max: 38.1 (17 Jan 2022 14:29)  T(F): 97 (18 Jan 2022 05:17), Max: 100.5 (17 Jan 2022 14:29)  HR: 108 (18 Jan 2022 05:17) (79 - 108)  BP: 158/76 (18 Jan 2022 05:17) (107/55 - 158/76)  BP(mean): --  RR: 18 (18 Jan 2022 05:17) (18 - 18)  SpO2: --    PHYSICAL EXAM:  GENERAL: NAD, well-groomed, well-developed  NECK: Supple, No JVD  HEART: Regular rate and rhythm  ABDOMEN: Soft, Nontender, Nondistended  EXTREMITIES:  2+ Peripheral Pulses, No clubbing, cyanosis, or edema  NEURO:  No Focal deficits, sensory and motor intact  SKIN: No rashes or lesions    LABS:          RADIOLOGY & ADDITIONAL TESTS:    Imaging Personally Reviewed:  [ ] YES  [ ] NO  MedsMEDICATIONS  (STANDING):  ATENolol  Tablet 50 milliGRAM(s) Oral daily  dextrose 40% Gel 15 Gram(s) Oral once  dextrose 5%. 1000 milliLiter(s) (50 mL/Hr) IV Continuous <Continuous>  dextrose 5%. 1000 milliLiter(s) (100 mL/Hr) IV Continuous <Continuous>  dextrose 50% Injectable 25 Gram(s) IV Push once  dextrose 50% Injectable 12.5 Gram(s) IV Push once  dextrose 50% Injectable 25 Gram(s) IV Push once  enoxaparin Injectable 40 milliGRAM(s) SubCutaneous daily  glucagon  Injectable 1 milliGRAM(s) IntraMuscular once  insulin lispro (ADMELOG) corrective regimen sliding scale   SubCutaneous three times a day before meals  lisinopril 20 milliGRAM(s) Oral daily  piperacillin/tazobactam IVPB.. 3.375 Gram(s) IV Intermittent every 8 hours  simvastatin 40 milliGRAM(s) Oral at bedtime  sodium chloride 0.9%. 1000 milliLiter(s) (75 mL/Hr) IV Continuous <Continuous>    MEDICATIONS  (PRN):  acetaminophen     Tablet .. 650 milliGRAM(s) Oral every 6 hours PRN Temp greater or equal to 38C (100.4F), Mild Pain (1 - 3)  aluminum hydroxide/magnesium hydroxide/simethicone Suspension 30 milliLiter(s) Oral every 4 hours PRN Dyspepsia  guaifenesin/dextromethorphan Oral Liquid 10 milliLiter(s) Oral every 4 hours PRN Cough  melatonin 3 milliGRAM(s) Oral at bedtime PRN Insomnia  ondansetron Injectable 4 milliGRAM(s) IV Push every 8 hours PRN Nausea and/or Vomiting      HEALTH ISSUES - PROBLEM Dx:             MIGUEL ANGEL GARZA  81y  Male    History of Present Illness:  Reason for Admission:     Interim subjective note: Patient was seen and examined at bedside by covering Hospitalist. No acute complaints. NAEO         Vital Signs Last 24 Hrs  T(C): 36.1 (18 Jan 2022 05:17), Max: 38.1 (17 Jan 2022 14:29)  T(F): 97 (18 Jan 2022 05:17), Max: 100.5 (17 Jan 2022 14:29)  HR: 108 (18 Jan 2022 05:17) (79 - 108)  BP: 158/76 (18 Jan 2022 05:17) (107/55 - 158/76)  BP(mean): --  RR: 18 (18 Jan 2022 05:17) (18 - 18)  SpO2: --    PHYSICAL EXAM:  GENERAL: NAD, well-groomed, well-developed  NECK: Supple, No JVD  HEART: Regular rate and rhythm  ABDOMEN: Soft, Nontender, Nondistended  NEURO:  No Focal deficits, sensory and motor intact  SKIN: No rashes or lesions    LABS:          RADIOLOGY & ADDITIONAL TESTS:    Imaging Personally Reviewed:  [ ] YES  [ ] NO  MedsMEDICATIONS  (STANDING):  ATENolol  Tablet 50 milliGRAM(s) Oral daily  dextrose 40% Gel 15 Gram(s) Oral once  dextrose 5%. 1000 milliLiter(s) (50 mL/Hr) IV Continuous <Continuous>  dextrose 5%. 1000 milliLiter(s) (100 mL/Hr) IV Continuous <Continuous>  dextrose 50% Injectable 25 Gram(s) IV Push once  dextrose 50% Injectable 12.5 Gram(s) IV Push once  dextrose 50% Injectable 25 Gram(s) IV Push once  enoxaparin Injectable 40 milliGRAM(s) SubCutaneous daily  glucagon  Injectable 1 milliGRAM(s) IntraMuscular once  insulin lispro (ADMELOG) corrective regimen sliding scale   SubCutaneous three times a day before meals  lisinopril 20 milliGRAM(s) Oral daily  piperacillin/tazobactam IVPB.. 3.375 Gram(s) IV Intermittent every 8 hours  simvastatin 40 milliGRAM(s) Oral at bedtime  sodium chloride 0.9%. 1000 milliLiter(s) (75 mL/Hr) IV Continuous <Continuous>    MEDICATIONS  (PRN):  acetaminophen     Tablet .. 650 milliGRAM(s) Oral every 6 hours PRN Temp greater or equal to 38C (100.4F), Mild Pain (1 - 3)  aluminum hydroxide/magnesium hydroxide/simethicone Suspension 30 milliLiter(s) Oral every 4 hours PRN Dyspepsia  guaifenesin/dextromethorphan Oral Liquid 10 milliLiter(s) Oral every 4 hours PRN Cough  melatonin 3 milliGRAM(s) Oral at bedtime PRN Insomnia  ondansetron Injectable 4 milliGRAM(s) IV Push every 8 hours PRN Nausea and/or Vomiting      HEALTH ISSUES - PROBLEM Dx:           Provided by Anesthesia Department

## 2022-07-05 NOTE — ED PROCEDURE NOTE - PROCEDURE NAME, MLM
Splint Cibinqo Pregnancy And Lactation Text: It is unknown if this medication will adversely affect pregnancy or breast feeding.  You should not take this medication if you are currently pregnant or planning a pregnancy or while breastfeeding.

## 2022-07-14 NOTE — DIETITIAN INITIAL EVALUATION ADULT. - ENTER TO (ML/KG)
HOME CARE INSTRUCTIONS    ACTIVITY: Rest and take it easy for the first 24 hours.  A responsible adult is recommended to remain with you during that time.  It is normal to feel sleepy.  We encourage you to not do anything that requires balance, judgment or coordination.    FOR 24 HOURS DO NOT:  Drive, operate machinery or run household appliances.  Drink beer or alcoholic beverages.  Make important decisions or sign legal documents.    SPECIAL INSTRUCTIONS:   Remove ACE wrap in 24 hours   Remove plastic and gauze in 3 days   Leave underlying steristips on until they fall off   Patient may shower on Post OP Day #2    DIET: To avoid nausea, slowly advance diet as tolerated, avoiding spicy or greasy foods for the first day.  Add more substantial food to your diet according to your physician's instructions.  Babies can be fed formula or breast milk as soon as they are hungry.  INCREASE FLUIDS AND FIBER TO AVOID CONSTIPATION.    MEDICATIONS: Resume taking daily medication.  Take prescribed pain medication with food.  If no medication is prescribed, you may take non-aspirin pain medication if needed.  PAIN MEDICATION CAN BE VERY CONSTIPATING.  Take a stool softener or laxative such as senokot, pericolace, or milk of magnesia if needed.    Last pain medication given: Tylenol given at 2:30pm.     A follow-up appointment should be arranged with your doctor; call to schedule.    You should CALL YOUR PHYSICIAN if you develop:  Fever greater than 101 degrees F.  Pain not relieved by medication, or persistent nausea or vomiting.  Excessive bleeding (blood soaking through dressing) or unexpected drainage from the wound.  Extreme redness or swelling around the incision site, drainage of pus or foul smelling drainage.  Inability to urinate or empty your bladder within 8 hours.  Problems with breathing or chest pain.    You should call 911 if you develop problems with breathing or chest pain.  If you are unable to contact your  doctor or surgical center, you should go to the nearest emergency room or urgent care center.    Physician's telephone #: Dr. Mcelroy [phone]    MILD FLU-LIKE SYMPTOMS ARE NORMAL.  YOU MAY EXPERIENCE GENERALIZED MUSCLE ACHES, THROAT IRRITATION, HEADACHE AND/OR SOME NAUSEA.    If any questions arise, call your doctor.  If your doctor is not available, please feel free to call the Surgical Center at [phone].  The Center is open Monday through Friday from 7AM to 7PM.      A registered nurse may call you a few days after your surgery to see how you are doing after your procedure.    You may also receive a survey in the mail within the next two weeks and we ask that you take a few moments to complete the survey and return it to us.  Our goal is to provide you with very good care and we value your comments.     Depression / Suicide Risk    As you are discharged from this [facility] facility, it is important to learn how to keep safe from harming yourself.    Recognize the warning signs:  Abrupt changes in personality, positive or negative- including increase in energy   Giving away possessions  Change in eating patterns- significant weight changes-  positive or negative  Change in sleeping patterns- unable to sleep or sleeping all the time   Unwillingness or inability to communicate  Depression  Unusual sadness, discouragement and loneliness  Talk of wanting to die  Neglect of personal appearance   Rebelliousness- reckless behavior  Withdrawal from people/activities they love  Confusion- inability to concentrate     If you or a loved one observes any of these behaviors or has concerns about self-harm, here's what you can do:  Talk about it- your feelings and reasons for harming yourself  Remove any means that you might use to hurt yourself (examples: pills, rope, extension cords, firearm)  Get professional help from the community (Mental Health, Substance Abuse, psychological counseling)  Do not be  alone:Call your Safe Contact- someone whom you trust who will be there for you.  Call your local CRISIS HOTLINE 288-1206 or [phone]  Call your local Children's Mobile Crisis Response Team [place] (955) 208-7283 or www.[employer]  Call the toll free National Suicide Prevention Hotlines   National Suicide Prevention Lifeline 024-194-IHLD (1867)  [facility] 800-UQLEWBI (233-1986)    I acknowledge receipt and understanding of these Home Care instructions.   40

## 2022-09-14 NOTE — PROGRESS NOTE ADULT - PROBLEM SELECTOR PROBLEM 2
Patient presenting with rectal bleeding for one week. Initially with wiping then in toilet bowl associated with one week of hard stools and constipation. Exam consistent with possible small anal fissure at left lateral position. No significant hemorrhoids on anoscope exam. Overall clinical narrative and exam most consistent with bleeding due to anal fissure. Given no underlying weight loss, prior rectal bleeding, diarrhea or abdominal pain, IBD would be less likely.     · Recommend lidocain/nifedipine/hydrocortisone ointment BID to apply to anal fissure  · Recommend treatment of constipation with adequate water intake, metamucil 1 tablespoon BID with 8 oz of water and Miralax daily  · Offered consideration for colonoscopy, but patient deferred at this time and prefers conservative management first. Follow up with me in 2-3 months. If bleeding does not improve with conservative measures as above, then will consider colonoscopy for further evaluation  · Additionally, advised patient to present to ER for evaluation if worsening rectal bleeding with dizziness, lightheadedness or syncopal symptoms. Patient voiced understanding with plan.   
Nutritional deficiency

## 2024-09-09 NOTE — PROGRESS NOTE ADULT - PROBLEM SELECTOR PROBLEM 1
used
Sepsis with acute respiratory failure

## 2024-10-03 NOTE — OCCUPATIONAL THERAPY INITIAL EVALUATION ADULT - PLANNED THERAPY INTERVENTIONS, OT EVAL
ADL retraining/balance training/bed mobility training/cognitive, visual perceptual/fine motor coordination training/joint mobilization/motor coordination training/neuromuscular re-education/ROM/strengthening/stretching/transfer training
Oriented - self; Oriented - place; Oriented - time

## 2025-01-09 NOTE — DISCHARGE NOTE NURSING/CASE MANAGEMENT/SOCIAL WORK - BRAND OF FIRST COVID-19 BOOSTER
Subjective: Patient is status post left total knee.  Patient is 6 months postop. Patient is doing well    Objective: Left extremity evaluated. Incision clean dry and intact. Upgoing EHL. Positive pedal pulse. Good range of motion. Range of motion is 0 to 120 degrees..    Impression: Status post left total knee replacement    Plan: Patient to follow-up with me in 6 months. Patient reminded that antibiotics have to be taken prior to any dental or GI work such as colonoscopy for rest the patient's life.    Patient states that he is having dental work done in the next few weeks hence we will order antibiotics    Electronically signed by  Pedro Conti DO      [place]
